# Patient Record
Sex: FEMALE | Race: WHITE | NOT HISPANIC OR LATINO | Employment: OTHER | URBAN - METROPOLITAN AREA
[De-identification: names, ages, dates, MRNs, and addresses within clinical notes are randomized per-mention and may not be internally consistent; named-entity substitution may affect disease eponyms.]

---

## 2023-03-10 ENCOUNTER — TELEPHONE (OUTPATIENT)
Dept: PHARMACY | Facility: HOSPITAL | Age: 78
End: 2023-03-10
Payer: MEDICARE

## 2023-03-10 PROBLEM — I82.403 DVT, BILATERAL LOWER LIMBS (MULTI): Status: ACTIVE | Noted: 2023-03-10

## 2023-03-10 NOTE — TELEPHONE ENCOUNTER
Pharmacist Clinic  Genesis Garcia is a 77 y.o. female was referred to Clinical Pharmacy Team for post hospital follow up in December. Pharmacy team reached out to patient as she had been re-hospitalized in January.     Referring Provider: Albert Garner MD     PATIENT EDUCATION/DISCUSSION:  Spoke with patient regarding her recent hospitalizations:   1. Eliquis: counseled on medication, discussed this medication can become expensive, advised pt to reach out to pharmacy team if for any reason she has issues affording the medication   2. Diuretics: patient states she has noticed an increase in urination, counseled to take meds in the morning    Clinical Pharmacist follow-up: as needed, patient has my contact information to reach out with any questions/concerns    Thank you,   Savanna Frazier, BhaveshD

## 2023-07-18 LAB
ALANINE AMINOTRANSFERASE (SGPT) (U/L) IN SER/PLAS: 8 U/L (ref 7–45)
ALBUMIN (G/DL) IN SER/PLAS: 3.5 G/DL (ref 3.4–5)
ALKALINE PHOSPHATASE (U/L) IN SER/PLAS: 81 U/L (ref 33–136)
ANION GAP IN SER/PLAS: 12 MMOL/L (ref 10–20)
ANTI-DNA (DS): 2 IU/ML
ASPARTATE AMINOTRANSFERASE (SGOT) (U/L) IN SER/PLAS: 16 U/L (ref 9–39)
BILIRUBIN TOTAL (MG/DL) IN SER/PLAS: 0.4 MG/DL (ref 0–1.2)
CALCIUM (MG/DL) IN SER/PLAS: 9.2 MG/DL (ref 8.6–10.6)
CARBON DIOXIDE, TOTAL (MMOL/L) IN SER/PLAS: 30 MMOL/L (ref 21–32)
CHLORIDE (MMOL/L) IN SER/PLAS: 102 MMOL/L (ref 98–107)
COMPLEMENT C3 (MG/DL) IN SER/PLAS: 140 MG/DL (ref 87–200)
COMPLEMENT C4 (MG/DL) IN SER/PLAS: 34 MG/DL (ref 10–50)
CREATININE (MG/DL) IN SER/PLAS: 1.56 MG/DL (ref 0.5–1.05)
ERYTHROCYTE DISTRIBUTION WIDTH (RATIO) BY AUTOMATED COUNT: 17.2 % (ref 11.5–14.5)
ERYTHROCYTE MEAN CORPUSCULAR HEMOGLOBIN CONCENTRATION (G/DL) BY AUTOMATED: 28.7 G/DL (ref 32–36)
ERYTHROCYTE MEAN CORPUSCULAR VOLUME (FL) BY AUTOMATED COUNT: 79 FL (ref 80–100)
ERYTHROCYTES (10*6/UL) IN BLOOD BY AUTOMATED COUNT: 3.7 X10E12/L (ref 4–5.2)
GFR FEMALE: 34 ML/MIN/1.73M2
GLUCOSE (MG/DL) IN SER/PLAS: 94 MG/DL (ref 74–99)
HEMATOCRIT (%) IN BLOOD BY AUTOMATED COUNT: 29.3 % (ref 36–46)
HEMOGLOBIN (G/DL) IN BLOOD: 8.4 G/DL (ref 12–16)
LEUKOCYTES (10*3/UL) IN BLOOD BY AUTOMATED COUNT: 8.9 X10E9/L (ref 4.4–11.3)
NRBC (PER 100 WBCS) BY AUTOMATED COUNT: 0 /100 WBC (ref 0–0)
PLATELETS (10*3/UL) IN BLOOD AUTOMATED COUNT: 373 X10E9/L (ref 150–450)
POTASSIUM (MMOL/L) IN SER/PLAS: 4.1 MMOL/L (ref 3.5–5.3)
PROTEIN TOTAL: 6.3 G/DL (ref 6.4–8.2)
SODIUM (MMOL/L) IN SER/PLAS: 140 MMOL/L (ref 136–145)
UREA NITROGEN (MG/DL) IN SER/PLAS: 36 MG/DL (ref 6–23)

## 2023-07-21 ENCOUNTER — TELEPHONE (OUTPATIENT)
Dept: PRIMARY CARE | Facility: CLINIC | Age: 78
End: 2023-07-21
Payer: MEDICARE

## 2023-07-21 NOTE — TELEPHONE ENCOUNTER
Pt lvm stating she need to schedule with pcp    Called pt she request increase in gabapentin and she is scheduled

## 2023-07-27 PROBLEM — D64.9 ANEMIA: Status: ACTIVE | Noted: 2023-07-27

## 2023-07-27 PROBLEM — I48.91 ATRIAL FIBRILLATION (MULTI): Status: ACTIVE | Noted: 2023-07-27

## 2023-07-27 PROBLEM — J44.9 COPD (CHRONIC OBSTRUCTIVE PULMONARY DISEASE) (MULTI): Status: ACTIVE | Noted: 2023-07-27

## 2023-07-27 PROBLEM — E55.9 VITAMIN D DEFICIENCY: Status: ACTIVE | Noted: 2023-07-27

## 2023-07-27 PROBLEM — J45.909 ASTHMA (HHS-HCC): Status: ACTIVE | Noted: 2023-07-27

## 2023-07-27 PROBLEM — R92.8 ABNORMAL MAMMOGRAM: Status: ACTIVE | Noted: 2023-07-27

## 2023-07-27 PROBLEM — M19.90 ARTHRITIS: Status: ACTIVE | Noted: 2023-07-27

## 2023-07-27 PROBLEM — K57.90 DIVERTICULOSIS: Status: ACTIVE | Noted: 2023-07-27

## 2023-07-27 PROBLEM — R01.1 HEART MURMUR: Status: ACTIVE | Noted: 2023-07-27

## 2023-07-27 RX ORDER — NYSTATIN 100000 [USP'U]/ML
SUSPENSION ORAL
COMMUNITY
Start: 2023-01-03 | End: 2023-10-02 | Stop reason: ALTCHOICE

## 2023-07-27 RX ORDER — ALBUTEROL SULFATE 90 UG/1
AEROSOL, METERED RESPIRATORY (INHALATION)
Status: ON HOLD | COMMUNITY
End: 2023-12-28 | Stop reason: ENTERED-IN-ERROR

## 2023-07-27 RX ORDER — ASPIRIN 81 MG/1
TABLET ORAL 2 TIMES DAILY
COMMUNITY
Start: 2022-11-22 | End: 2023-10-02 | Stop reason: ALTCHOICE

## 2023-07-27 RX ORDER — APIXABAN 5 MG/1
1 TABLET, FILM COATED ORAL 2 TIMES DAILY
COMMUNITY
Start: 2023-01-20 | End: 2024-04-22 | Stop reason: ALTCHOICE

## 2023-07-27 RX ORDER — FLUTICASONE PROPIONATE AND SALMETEROL 250; 50 UG/1; UG/1
POWDER RESPIRATORY (INHALATION)
COMMUNITY
End: 2023-10-02 | Stop reason: SDUPTHER

## 2023-07-27 RX ORDER — TORSEMIDE 20 MG/1
TABLET ORAL
COMMUNITY
Start: 2022-11-23 | End: 2023-10-02 | Stop reason: ALTCHOICE

## 2023-07-27 RX ORDER — GABAPENTIN 400 MG/1
1 CAPSULE ORAL 3 TIMES DAILY
COMMUNITY
Start: 2023-01-20 | End: 2024-02-20 | Stop reason: SDUPTHER

## 2023-07-27 RX ORDER — CALCIUM CARBONATE 600 MG
1 TABLET ORAL DAILY
COMMUNITY

## 2023-07-27 RX ORDER — DOXYCYCLINE HYCLATE 100 MG
1 TABLET ORAL 2 TIMES DAILY
COMMUNITY
Start: 2022-12-30 | End: 2023-10-02 | Stop reason: ALTCHOICE

## 2023-07-27 RX ORDER — VANCOMYCIN HYDROCHLORIDE 125 MG/1
CAPSULE ORAL
COMMUNITY
Start: 2023-01-03 | End: 2023-10-02 | Stop reason: ALTCHOICE

## 2023-07-27 RX ORDER — COLLAGENASE SANTYL 250 [ARB'U]/G
OINTMENT TOPICAL
COMMUNITY
Start: 2023-01-03 | End: 2023-10-02 | Stop reason: ALTCHOICE

## 2023-07-27 RX ORDER — TRIAMTERENE AND HYDROCHLOROTHIAZIDE 37.5; 25 MG/1; MG/1
1 CAPSULE ORAL DAILY
COMMUNITY
End: 2023-10-02 | Stop reason: ALTCHOICE

## 2023-07-27 RX ORDER — LOSARTAN POTASSIUM 100 MG/1
1 TABLET ORAL DAILY
COMMUNITY

## 2023-07-27 RX ORDER — CHOLECALCIFEROL (VITAMIN D3) 50 MCG
TABLET ORAL
COMMUNITY

## 2023-07-27 RX ORDER — HYDROXYCHLOROQUINE SULFATE 200 MG/1
1 TABLET, FILM COATED ORAL 2 TIMES DAILY
COMMUNITY

## 2023-07-27 RX ORDER — SPIRONOLACTONE 25 MG/1
1 TABLET ORAL DAILY
COMMUNITY
Start: 2023-02-24

## 2023-07-27 RX ORDER — FUROSEMIDE 40 MG/1
40 TABLET ORAL DAILY
COMMUNITY
End: 2023-10-02 | Stop reason: ALTCHOICE

## 2023-07-27 RX ORDER — LOPERAMIDE HYDROCHLORIDE 2 MG/1
CAPSULE ORAL
COMMUNITY
Start: 2023-01-03 | End: 2023-10-02 | Stop reason: ALTCHOICE

## 2023-07-27 RX ORDER — POTASSIUM CHLORIDE 1500 MG/1
TABLET, EXTENDED RELEASE ORAL
COMMUNITY
Start: 2022-11-23 | End: 2023-10-02 | Stop reason: ALTCHOICE

## 2023-07-28 ENCOUNTER — OFFICE VISIT (OUTPATIENT)
Dept: PRIMARY CARE | Facility: CLINIC | Age: 78
End: 2023-07-28
Payer: MEDICARE

## 2023-07-28 VITALS
SYSTOLIC BLOOD PRESSURE: 132 MMHG | WEIGHT: 205 LBS | OXYGEN SATURATION: 96 % | BODY MASS INDEX: 37.73 KG/M2 | HEART RATE: 73 BPM | RESPIRATION RATE: 16 BRPM | HEIGHT: 62 IN | DIASTOLIC BLOOD PRESSURE: 66 MMHG

## 2023-07-28 DIAGNOSIS — M79.672 BILATERAL FOOT PAIN: Primary | ICD-10-CM

## 2023-07-28 DIAGNOSIS — M79.671 BILATERAL FOOT PAIN: Primary | ICD-10-CM

## 2023-07-28 DIAGNOSIS — J45.909 ASTHMA, UNSPECIFIED ASTHMA SEVERITY, UNSPECIFIED WHETHER COMPLICATED, UNSPECIFIED WHETHER PERSISTENT (HHS-HCC): ICD-10-CM

## 2023-07-28 DIAGNOSIS — N18.32 STAGE 3B CHRONIC KIDNEY DISEASE (MULTI): ICD-10-CM

## 2023-07-28 DIAGNOSIS — I10 PRIMARY HYPERTENSION: ICD-10-CM

## 2023-07-28 PROCEDURE — 3075F SYST BP GE 130 - 139MM HG: CPT | Performed by: FAMILY MEDICINE

## 2023-07-28 PROCEDURE — 1159F MED LIST DOCD IN RCRD: CPT | Performed by: FAMILY MEDICINE

## 2023-07-28 PROCEDURE — 1160F RVW MEDS BY RX/DR IN RCRD: CPT | Performed by: FAMILY MEDICINE

## 2023-07-28 PROCEDURE — 1126F AMNT PAIN NOTED NONE PRSNT: CPT | Performed by: FAMILY MEDICINE

## 2023-07-28 PROCEDURE — 3078F DIAST BP <80 MM HG: CPT | Performed by: FAMILY MEDICINE

## 2023-07-28 PROCEDURE — 1036F TOBACCO NON-USER: CPT | Performed by: FAMILY MEDICINE

## 2023-07-28 PROCEDURE — 99214 OFFICE O/P EST MOD 30 MIN: CPT | Performed by: FAMILY MEDICINE

## 2023-07-28 PROCEDURE — 1157F ADVNC CARE PLAN IN RCRD: CPT | Performed by: FAMILY MEDICINE

## 2023-07-28 NOTE — PATIENT INSTRUCTIONS
Referred to pain management as discussed.    F/U for med refills (asthma., hypertension) when needed.

## 2023-07-28 NOTE — PROGRESS NOTES
"Subjective   Patient ID: Genesis Garcia is a 78 y.o. female who presents for Med Refill (Discuss refills/dose change).    HPI   Reports numbness in ankles.  States she saw neuro, who told her she did not have neuropathy.  [facility] neuro referred her to rheumatology for further eval.  [facility] rheumatology told her it was possibly neuropathy and advised her to follow up w/PCP to consider increasing Gabapentin.  States prior PCP was giving her Gabapentin for back pain (which has since resolved).    Of note:  I have not provided Gabapentin in the past.  Per [place], 3 diff physicians have recently prescribed Gabapentin since her last PCP, but she states she does not know who they were (she did not recognize their names when reviewing [place] in office).    H/O Asthma, HTN.  Condition(s) stable.  Taking med(s) as directed.  Does not need refills at this time.    H/O CKD.  Followed by nephrology    Review of Systems  No other complaints.     Objective   /66   Pulse 73   Resp 16   Ht 1.575 m (5' 2\")   Wt 93 kg (205 lb)   SpO2 96%   BMI 37.49 kg/m²     Physical Exam  Constitutional:       General: She is not in acute distress.     Appearance: She is obese.   Musculoskeletal:      Comments: Ambulating w/wheelchair.   Neurological:      Mental Status: She is oriented to person, place, and time.   Psychiatric:         Mood and Affect: Mood normal.         Behavior: Behavior normal.     Assessment/Plan   Diagnoses and all orders for this visit:  Bilateral foot pain  -     Referral to Pain Medicine; Future  Asthma, unspecified asthma severity, unspecified whether complicated, unspecified whether persistent  Primary hypertension  Stage 3b chronic kidney disease    Referred to pain management as discussed.    F/U for med refills (asthma, hypertension) when needed.  "

## 2023-08-23 PROBLEM — L03.119 CELLULITIS, LEG: Status: ACTIVE | Noted: 2023-08-23

## 2023-08-23 PROBLEM — L97.919 ULCER OF RIGHT LOWER EXTREMITY (MULTI): Status: ACTIVE | Noted: 2023-08-23

## 2023-08-23 PROBLEM — S82.63XA CLOSED FRACTURE OF LATERAL MALLEOLUS: Status: ACTIVE | Noted: 2023-08-23

## 2023-08-23 PROBLEM — H26.9 CATARACT: Status: ACTIVE | Noted: 2023-08-23

## 2023-08-23 PROBLEM — I82.403 ACUTE DEEP VEIN THROMBOSIS (DVT) OF BOTH LOWER EXTREMITIES (MULTI): Status: ACTIVE | Noted: 2023-08-23

## 2023-08-23 PROBLEM — K43.9 VENTRAL HERNIA: Status: ACTIVE | Noted: 2023-08-23

## 2023-08-23 PROBLEM — M16.11 ARTHRITIS OF RIGHT HIP: Status: ACTIVE | Noted: 2023-08-23

## 2023-08-23 PROBLEM — L02.416 ABSCESS OF HIP, LEFT: Status: ACTIVE | Noted: 2023-08-23

## 2023-08-23 PROBLEM — M19.079 ANKLE ARTHRITIS: Status: ACTIVE | Noted: 2023-08-23

## 2023-08-23 PROBLEM — E04.2 MULTINODULAR GOITER: Status: ACTIVE | Noted: 2023-08-23

## 2023-08-23 PROBLEM — M84.30XA STRESS FRACTURE: Status: ACTIVE | Noted: 2023-08-23

## 2023-08-23 PROBLEM — E87.6 HYPOKALEMIA: Status: ACTIVE | Noted: 2023-08-23

## 2023-08-23 PROBLEM — K42.9 UMBILICAL HERNIA: Status: ACTIVE | Noted: 2023-08-23

## 2023-08-23 PROBLEM — R06.09 DYSPNEA ON EXERTION: Status: ACTIVE | Noted: 2023-08-23

## 2023-08-23 PROBLEM — R94.4 DECREASED GFR: Status: ACTIVE | Noted: 2023-08-23

## 2023-08-23 PROBLEM — M21.40 FLAT FOOT: Status: ACTIVE | Noted: 2020-10-07

## 2023-08-23 PROBLEM — R22.43 LOCALIZED SWELLING OF BOTH LOWER LEGS: Status: ACTIVE | Noted: 2023-08-23

## 2023-08-23 PROBLEM — M13.0 POLYARTHRITIS: Status: ACTIVE | Noted: 2020-10-07

## 2023-08-23 PROBLEM — R49.0 HOARSENESS OF VOICE: Status: ACTIVE | Noted: 2023-08-23

## 2023-08-23 PROBLEM — I87.2 CHRONIC VENOUS INSUFFICIENCY: Status: ACTIVE | Noted: 2023-08-23

## 2023-08-23 PROBLEM — L71.9 ROSACEA: Status: ACTIVE | Noted: 2020-02-13

## 2023-08-23 PROBLEM — K21.00 GASTROESOPHAGEAL REFLUX DISEASE WITH ESOPHAGITIS WITHOUT HEMORRHAGE: Status: ACTIVE | Noted: 2023-08-23

## 2023-08-23 PROBLEM — R76.8 ANA POSITIVE: Status: ACTIVE | Noted: 2020-10-07

## 2023-08-23 PROBLEM — N63.20 LEFT BREAST MASS: Status: ACTIVE | Noted: 2023-08-23

## 2023-08-23 PROBLEM — M35.9 CONNECTIVE TISSUE DISEASE, UNDIFFERENTIATED (MULTI): Status: ACTIVE | Noted: 2023-08-23

## 2023-08-23 PROBLEM — I82.409 DVT (DEEP VENOUS THROMBOSIS) (MULTI): Status: ACTIVE | Noted: 2023-08-23

## 2023-08-23 PROBLEM — R49.9 VOICE DISTURBANCE: Status: ACTIVE | Noted: 2023-08-23

## 2023-08-23 PROBLEM — K43.2 POSTOPERATIVE INCISIONAL HERNIA: Status: ACTIVE | Noted: 2023-08-23

## 2023-08-23 PROBLEM — S82.839A FRACTURE OF DISTAL FIBULA: Status: ACTIVE | Noted: 2023-08-23

## 2023-08-23 PROBLEM — M85.80 OSTEOPENIA, SENILE: Status: ACTIVE | Noted: 2020-12-29

## 2023-08-23 PROBLEM — L97.209 CHRONIC ULCER OF CALF (MULTI): Status: ACTIVE | Noted: 2023-08-23

## 2023-08-23 RX ORDER — LOSARTAN POTASSIUM 50 MG/1
1 TABLET ORAL DAILY
COMMUNITY
End: 2023-10-02 | Stop reason: ALTCHOICE

## 2023-08-23 RX ORDER — TALC
1 POWDER (GRAM) TOPICAL NIGHTLY PRN
COMMUNITY
Start: 2023-01-20 | End: 2023-10-02 | Stop reason: ALTCHOICE

## 2023-08-23 RX ORDER — FLUTICASONE PROPIONATE 50 MCG
1 SPRAY, SUSPENSION (ML) NASAL DAILY
COMMUNITY
Start: 2021-09-24 | End: 2023-10-02 | Stop reason: ALTCHOICE

## 2023-08-23 RX ORDER — DEXTROMETHORPHAN HYDROBROMIDE, GUAIFENESIN 5; 100 MG/5ML; MG/5ML
650 LIQUID ORAL EVERY 8 HOURS PRN
Status: ON HOLD | COMMUNITY
End: 2023-12-29 | Stop reason: ALTCHOICE

## 2023-08-23 RX ORDER — B-COMPLEX WITH VITAMIN C
1 TABLET ORAL DAILY
COMMUNITY

## 2023-08-23 RX ORDER — KETOCONAZOLE 20 MG/ML
SHAMPOO, SUSPENSION TOPICAL
COMMUNITY
Start: 2018-08-06 | End: 2023-10-02 | Stop reason: ALTCHOICE

## 2023-08-23 RX ORDER — MULTIVITAMIN
1 TABLET ORAL DAILY
COMMUNITY
Start: 2022-11-23 | End: 2023-10-02 | Stop reason: SDUPTHER

## 2023-08-23 RX ORDER — FLUTICASONE PROPIONATE AND SALMETEROL 250; 50 UG/1; UG/1
1 POWDER RESPIRATORY (INHALATION) 2 TIMES DAILY
COMMUNITY
Start: 2022-05-12 | End: 2023-11-17 | Stop reason: ALTCHOICE

## 2023-08-23 RX ORDER — TORSEMIDE 20 MG/1
20 TABLET ORAL DAILY
COMMUNITY
Start: 2022-12-30 | End: 2023-12-26 | Stop reason: HOSPADM

## 2023-08-23 RX ORDER — MELOXICAM 15 MG/1
1 TABLET ORAL DAILY
COMMUNITY
Start: 2015-12-21 | End: 2023-10-02 | Stop reason: ALTCHOICE

## 2023-08-23 RX ORDER — GABAPENTIN 800 MG/1
1 TABLET ORAL 3 TIMES DAILY
COMMUNITY
Start: 2017-10-24 | End: 2023-10-02 | Stop reason: SDUPTHER

## 2023-08-23 RX ORDER — OXYCODONE AND ACETAMINOPHEN 5; 325 MG/1; MG/1
1 TABLET ORAL EVERY 6 HOURS PRN
COMMUNITY
Start: 2022-11-14 | End: 2023-10-02 | Stop reason: ALTCHOICE

## 2023-08-23 RX ORDER — KETOROLAC TROMETHAMINE 10 MG/1
1 TABLET, FILM COATED ORAL EVERY 6 HOURS
COMMUNITY
Start: 2022-11-02 | End: 2023-10-02 | Stop reason: ALTCHOICE

## 2023-08-23 RX ORDER — TRAMADOL HYDROCHLORIDE 50 MG/1
1 TABLET ORAL EVERY 6 HOURS PRN
COMMUNITY
Start: 2023-01-20 | End: 2023-10-02 | Stop reason: ALTCHOICE

## 2023-08-23 RX ORDER — TRIAMCINOLONE ACETONIDE 0.25 MG/G
CREAM TOPICAL 2 TIMES DAILY
COMMUNITY
Start: 2020-11-27 | End: 2023-10-02 | Stop reason: ALTCHOICE

## 2023-08-23 RX ORDER — L. ACIDOPHILUS/L.BULGARICUS 1MM CELL
1 TABLET ORAL 2 TIMES DAILY
Status: ON HOLD | COMMUNITY
Start: 2023-01-03 | End: 2023-12-28 | Stop reason: ENTERED-IN-ERROR

## 2023-08-23 RX ORDER — GABAPENTIN 300 MG/1
1-2 CAPSULE ORAL 3 TIMES DAILY PRN
COMMUNITY
Start: 2015-10-28 | End: 2023-10-02 | Stop reason: SDUPTHER

## 2023-08-23 RX ORDER — POLYETHYLENE GLYCOL 3350 17 G/17G
17 POWDER, FOR SOLUTION ORAL DAILY
Status: ON HOLD | COMMUNITY
Start: 2022-11-22 | End: 2023-12-20

## 2023-08-23 RX ORDER — TRIAMCINOLONE ACETONIDE 1 MG/G
OINTMENT TOPICAL
Status: ON HOLD | COMMUNITY
Start: 2023-08-01 | End: 2023-12-20 | Stop reason: ALTCHOICE

## 2023-08-23 RX ORDER — PROPRANOLOL/HYDROCHLOROTHIAZID 40 MG-25MG
3 TABLET ORAL DAILY
COMMUNITY
End: 2023-10-02 | Stop reason: ALTCHOICE

## 2023-08-23 RX ORDER — POTASSIUM CHLORIDE 20 MEQ/1
2 TABLET, EXTENDED RELEASE ORAL DAILY
COMMUNITY
Start: 2022-11-22 | End: 2023-10-02 | Stop reason: ALTCHOICE

## 2023-08-23 RX ORDER — DOCUSATE SODIUM 100 MG/1
1 CAPSULE, LIQUID FILLED ORAL 2 TIMES DAILY
COMMUNITY
End: 2023-10-02 | Stop reason: ALTCHOICE

## 2023-08-23 RX ORDER — ALBUTEROL SULFATE 0.83 MG/ML
3 SOLUTION RESPIRATORY (INHALATION) EVERY 4 HOURS PRN
COMMUNITY
Start: 2014-09-15 | End: 2024-01-29 | Stop reason: SDUPTHER

## 2023-08-23 RX ORDER — MULTIVIT,CALC,MINS/IRON/FOLIC 9MG-400MCG
1 TABLET ORAL DAILY
COMMUNITY
Start: 2023-02-25 | End: 2023-10-02 | Stop reason: ALTCHOICE

## 2023-08-25 LAB
ANION GAP IN SER/PLAS: 11 MMOL/L (ref 10–20)
BASOPHILS (10*3/UL) IN BLOOD BY AUTOMATED COUNT: 0.06 X10E9/L (ref 0–0.1)
BASOPHILS/100 LEUKOCYTES IN BLOOD BY AUTOMATED COUNT: 0.9 % (ref 0–2)
CALCIUM (MG/DL) IN SER/PLAS: 9.1 MG/DL (ref 8.6–10.3)
CARBON DIOXIDE, TOTAL (MMOL/L) IN SER/PLAS: 29 MMOL/L (ref 21–32)
CHLORIDE (MMOL/L) IN SER/PLAS: 103 MMOL/L (ref 98–107)
CREATININE (MG/DL) IN SER/PLAS: 1.37 MG/DL (ref 0.5–1.05)
EOSINOPHILS (10*3/UL) IN BLOOD BY AUTOMATED COUNT: 0.39 X10E9/L (ref 0–0.4)
EOSINOPHILS/100 LEUKOCYTES IN BLOOD BY AUTOMATED COUNT: 6.1 % (ref 0–6)
ERYTHROCYTE DISTRIBUTION WIDTH (RATIO) BY AUTOMATED COUNT: 16.9 % (ref 11.5–14.5)
ERYTHROCYTE MEAN CORPUSCULAR HEMOGLOBIN CONCENTRATION (G/DL) BY AUTOMATED: 29.2 G/DL (ref 32–36)
ERYTHROCYTE MEAN CORPUSCULAR VOLUME (FL) BY AUTOMATED COUNT: 75 FL (ref 80–100)
ERYTHROCYTES (10*6/UL) IN BLOOD BY AUTOMATED COUNT: 3.94 X10E12/L (ref 4–5.2)
GFR FEMALE: 40 ML/MIN/1.73M2
GLUCOSE (MG/DL) IN SER/PLAS: 90 MG/DL (ref 74–99)
HEMATOCRIT (%) IN BLOOD BY AUTOMATED COUNT: 29.5 % (ref 36–46)
HEMOGLOBIN (G/DL) IN BLOOD: 8.6 G/DL (ref 12–16)
IMMATURE GRANULOCYTES/100 LEUKOCYTES IN BLOOD BY AUTOMATED COUNT: 0.3 % (ref 0–0.9)
LEUKOCYTES (10*3/UL) IN BLOOD BY AUTOMATED COUNT: 6.4 X10E9/L (ref 4.4–11.3)
LYMPHOCYTES (10*3/UL) IN BLOOD BY AUTOMATED COUNT: 1.12 X10E9/L (ref 0.8–3)
LYMPHOCYTES/100 LEUKOCYTES IN BLOOD BY AUTOMATED COUNT: 17.4 % (ref 13–44)
MONOCYTES (10*3/UL) IN BLOOD BY AUTOMATED COUNT: 0.89 X10E9/L (ref 0.05–0.8)
MONOCYTES/100 LEUKOCYTES IN BLOOD BY AUTOMATED COUNT: 13.9 % (ref 2–10)
NEUTROPHILS (10*3/UL) IN BLOOD BY AUTOMATED COUNT: 3.94 X10E9/L (ref 1.6–5.5)
NEUTROPHILS/100 LEUKOCYTES IN BLOOD BY AUTOMATED COUNT: 61.4 % (ref 40–80)
PLATELETS (10*3/UL) IN BLOOD AUTOMATED COUNT: 279 X10E9/L (ref 150–450)
POTASSIUM (MMOL/L) IN SER/PLAS: 3.7 MMOL/L (ref 3.5–5.3)
SODIUM (MMOL/L) IN SER/PLAS: 139 MMOL/L (ref 136–145)
UREA NITROGEN (MG/DL) IN SER/PLAS: 41 MG/DL (ref 6–23)

## 2023-09-07 ENCOUNTER — HOSPITAL ENCOUNTER (OUTPATIENT)
Dept: DATA CONVERSION | Facility: HOSPITAL | Age: 78
End: 2023-09-07
Attending: OTOLARYNGOLOGY | Admitting: OTOLARYNGOLOGY
Payer: MEDICARE

## 2023-09-07 DIAGNOSIS — E04.2 NONTOXIC MULTINODULAR GOITER: ICD-10-CM

## 2023-09-16 LAB
ALBUMIN (G/DL) IN SER/PLAS: 3.6 G/DL (ref 3.4–5)
ANION GAP IN SER/PLAS: 13 MMOL/L (ref 10–20)
CALCIUM (MG/DL) IN SER/PLAS: 9.2 MG/DL (ref 8.6–10.6)
CARBON DIOXIDE, TOTAL (MMOL/L) IN SER/PLAS: 27 MMOL/L (ref 21–32)
CHLORIDE (MMOL/L) IN SER/PLAS: 105 MMOL/L (ref 98–107)
CREATININE (MG/DL) IN SER/PLAS: 1.33 MG/DL (ref 0.5–1.05)
GFR FEMALE: 41 ML/MIN/1.73M2
GLUCOSE (MG/DL) IN SER/PLAS: 86 MG/DL (ref 74–99)
PHOSPHATE (MG/DL) IN SER/PLAS: 3.2 MG/DL (ref 2.5–4.9)
POTASSIUM (MMOL/L) IN SER/PLAS: 4 MMOL/L (ref 3.5–5.3)
SODIUM (MMOL/L) IN SER/PLAS: 141 MMOL/L (ref 136–145)
UREA NITROGEN (MG/DL) IN SER/PLAS: 24 MG/DL (ref 6–23)

## 2023-09-18 LAB
COMPLETE PATHOLOGY REPORT: NORMAL
CONVERTED CLINICAL DIAGNOSIS-HISTORY: NORMAL
CONVERTED FINAL DIAGNOSIS: NORMAL
CONVERTED FINAL REPORT PDF LINK TO COPY AND PASTE: NORMAL
CONVERTED GROSS DESCRIPTION: NORMAL

## 2023-09-29 VITALS — HEIGHT: 62 IN | BODY MASS INDEX: 37.97 KG/M2 | WEIGHT: 206.35 LBS

## 2023-09-30 NOTE — H&P
History & Physical Reviewed:   I have reviewed the History and Physical dated:  25-Aug-2023   History and Physical reviewed and relevant findings noted. Patient examined to review pertinent physical  findings.: No significant changes   Home Medications Reviewed: no changes noted   Allergies Reviewed: no changes noted       ERAS (Enhanced Recovery After Surgery):  ·  ERAS Patient: no     Consent:   COVID-19 Consent:  ·  COVID-19 Risk Consent Surgeon has reviewed key risks related to the risk of caitie COVID-19 and if they contract COVID-19 what the risks are.     Attestation:   Note Completion:  I am a:  Resident/Fellow   Attending Attestation I saw and evaluated the patient.  I personally obtained the key and critical portions of the history and physical exam or was physically present for key and  critical portions performed by the resident/fellow. I reviewed the resident/fellow?s documentation and discussed the patient with the resident/fellow.  I agree with the resident/fellow?s medical decision making as documented in the note.     I personally evaluated the patient on 07-Sep-2023         Electronic Signatures:  Donald Coombs (Resident))  (Signed 07-Sep-2023 06:44)   Authored: History & Physical Reviewed, ERAS, Consent,  Note Completion  Pedro Dawkins)  (Signed 07-Sep-2023 18:41)   Authored: Note Completion   Co-Signer: History & Physical Reviewed, ERAS, Consent, Note Completion      Last Updated: 07-Sep-2023 18:41 by Pedro Dawkins)

## 2023-10-01 NOTE — OP NOTE
PROCEDURE DETAILS    Preoperative Diagnosis:  Thyroid nodule  Follicular lesion of unknown significance  Postoperative Diagnosis:  Thyroid nodule  Follicular lesion of unknown significance  Surgeon: Pedro Dawkins MD  Resident/Fellow/Other Assistant: Donald Coombs MD    Procedure:  1. Right thyroid substernal lobectomy with isthmusectomy  2. Use of intraoperative nerve monitoring  3. Right superior parathyroid reimplantation  Estimated Blood Loss: 10  Findings: 1. Multinodular right thyroid lobe  2. Right recurrent laryngeal nerve identified and preserved  3. Right superior parathyroid reimplanted in right sternocleidomastoid  Specimens(s) Collected: yes,     Complications: None  Patient Returned To/Condition: PACU/Stable        Operative Report:   Operative Indications:  This patient is a 78-year-old female who was found to have nodules of the right thyroid gland, including a TI-RADS 4 nodule which was sampled and found to be a follicular lesion of unknown significance.  Molecular testing was performed which showed a  20 to 25% risk of malignancy and thus decision was made to proceed the operative) and thyroidectomy.  Risks, effects, alternatives were discussed with the patient who ultimately agreed to proceed.    Operative Findings:  1. Multinodular right thyroid lobe  2. Right recurrent laryngeal nerve identified and preserved  3. Right superior parathyroid reimplanted in right sternocleidomastoid    Description of Procedure:  The patient was seen in the preoperative setting and written informed consent was obtained. The patient was then taken back to the operating room and transferred to the operating room table. A preoperative timeout was then performed by Dr. Dawkins. The  patient was then successfully sedated and intubated by the anesthesia team with a nerve monitoring endotracheal tube. The head of the bed was then rotated 90 degrees. A preoperative incision was marked out along the lower neck in an  existing neck crease  several centimeters above the sternal notch and injected with 1% lidocaine with epinephrine 1:100,000. The patient was then prepped and draped in a sterile manner.     15 blade was used to make incision through the skin and through the dermis.  Monopolar cautery was used to dissect down through the subcutaneous tissue to the level of the strap muscles.  Several large veins were encountered during this dissection and  the right anterior jugular vein was isolated and ligated.  The left anterior jugular vein was preserved.  We identified the right sternohyoid muscle and elevated over the surface of the muscle superiorly and inferiorly.  The sternothyroid muscle was identified  immediately deep to the sternohyoid muscle.  The midline raphae was identified and dissected through, exposing the anterior surface of the thyroid gland.  Elastic retractors were then placed.  Inferiorly, we were able to palpate the innominate artery  which was relatively high in location.  The strap muscles were gently retracted laterally and we slowly dissected over the anterior surface of the right thyroid lobe until we reached the lateral extent of the lobe.  This was performed using a combination  of blunt dissection and bipolar cautery.  Given the size of the thyroid gland, decision was made to split the strap muscles, which was performed with the LigaSure.  This greatly improved our exposure of the right thyroid lobe.  With the lateral extent  of the gland largely identified, we then carried our dissection superiorly to identify and skeletonized the superior pole and its vascular pedicle.  A combination of bipolar cautery and surgical clips were used to control the superior vascular pedicle  and a Rome was placed to allow us to retract the superior lobe inferiorly.  The cricothyroid muscle was identified and dissected towards the laryngotracheal complex as we dissected through Joll's space. In the midline, we  clearly delineated our trachea  and cricoid cartilage to serve as a reliable landmark.  With the superior pole mobilized, we returned our attention to the lateral aspect of the gland. We carried our dissection along the lateral aspect of the gland and while dissecting on the capsule,  encountered the right superior parathyroid gland.  We were able to dissect the gland away from the thyroid in an attempt to preserve it.  After dissecting away, it did appear to darken in color and decision was made to excise it and placed in saline for  later reimplantation.  As we freed up the lateral aspect of the gland, we identified branches of the middle thyroid vein which were controlled with surgical clips.  We continued dissection inferiorly and freed up the inferior pole and its feeding vessels  using a combination of bipolar and surgical clips and carefully delieverd it from its substernal location, THe innominate artery and carotid were noted and palpated as we delivered the lower aspect of the gland from its retroclavicular and substernal  location. The right lobe was then gently retracted medially and we began to dissect along the deep aspect of the gland.  There was a nodular appearing portion of the gland that extended posteriorly near the cricothyroid joint and we dissected around this  lesion.  Immediately lateral was a nervelike structure which was coursing laterally which did not stimulate.  As we continued dissection deep to this tissue, we encountered an additional nervelike structure coursing towards the cricothyroid joint.  This  was stimulated and confirmed to be the right recurrent laryngeal nerve.  With the nerve implant site we were able to dissect out the nodular appearing portion of the gland that was extending posteriorly with the nerve implant site.  We dissected along  the course of the nerve to ensure clear distance from our thyroid lobe and when this was confirmed, we continued to dissect out the  gland using LigaSure and monopolar cautery.  The gland was dissected away from the airway and fully released.  The right  superior lobe was marked with a stitch and sent for permanent pathology.  We then identified the anterior surface of the right sternocleidomastoid muscle.  The previously excised parathyroid gland was then minced and a small pocket was created in the  anterior portion of the muscle.  The minced parathyroid was placed into the muscle pocket and surgical clips were used to close off the tissue.  A Valsalva maneuver was performed and hemostasis was achieved using bipolar cautery.  The surgical cavity  was copiously irrigated with saline and suctioned.  A small amount of fibrillar was placed in the surgical bed.  The strap muscles were then reapproximated where they were split and then gently reapproximated in the midline using Vicryl suture in a horizontal  mattress fashion.  An additional layer of fibrillar was placed in the deep tissues were approximated using Vicryl suture.  The skin was closed using a running subcuticular Monocryl suture.  Mastisol and Steri-Strips were placed over the incision.    The patient was then handed over the anesthesia team and successfully extubated. The patient was taken to the postoperative anesthesia care unit. She tolerated the procedure well and there were no complications.     Dr. Dawkins was present for the critical portions of the procedure..                        Attestation:   Note Completion:  Attending Attestation I was present for the entire procedure    I am a: Resident/Fellow         Electronic Signatures:  Donald Coombs (Resident))  (Signed 07-Sep-2023 11:21)   Authored: Post-Operative Note, Chart Review, Note Completion  Pedro Dawkins)  (Signed 07-Sep-2023 18:46)   Authored: Post-Operative Note, Chart Review, Note Completion   Co-Signer: Post-Operative Note, Chart Review, Note Completion      Last Updated: 07-Sep-2023 18:46 by Pedro Dawkins  (MD)

## 2023-10-02 ENCOUNTER — CLINICAL SUPPORT (OUTPATIENT)
Dept: WOUND CARE | Facility: CLINIC | Age: 78
End: 2023-10-02
Payer: MEDICARE

## 2023-10-02 VITALS
RESPIRATION RATE: 16 BRPM | HEIGHT: 62 IN | DIASTOLIC BLOOD PRESSURE: 74 MMHG | SYSTOLIC BLOOD PRESSURE: 161 MMHG | WEIGHT: 215 LBS | BODY MASS INDEX: 39.56 KG/M2 | HEART RATE: 74 BPM

## 2023-10-02 DIAGNOSIS — L97.221 CHRONIC ULCER OF LEFT CALF LIMITED TO BREAKDOWN OF SKIN (MULTI): Primary | ICD-10-CM

## 2023-10-02 DIAGNOSIS — I87.2 CHRONIC VENOUS INSUFFICIENCY: ICD-10-CM

## 2023-10-02 PROCEDURE — 1036F TOBACCO NON-USER: CPT | Performed by: INTERNAL MEDICINE

## 2023-10-02 PROCEDURE — 1159F MED LIST DOCD IN RCRD: CPT | Performed by: INTERNAL MEDICINE

## 2023-10-02 PROCEDURE — 3077F SYST BP >= 140 MM HG: CPT | Performed by: INTERNAL MEDICINE

## 2023-10-02 PROCEDURE — 3078F DIAST BP <80 MM HG: CPT | Performed by: INTERNAL MEDICINE

## 2023-10-02 PROCEDURE — 1126F AMNT PAIN NOTED NONE PRSNT: CPT | Performed by: INTERNAL MEDICINE

## 2023-10-02 PROCEDURE — 99214 OFFICE O/P EST MOD 30 MIN: CPT | Performed by: INTERNAL MEDICINE

## 2023-10-02 PROCEDURE — 1160F RVW MEDS BY RX/DR IN RCRD: CPT | Performed by: INTERNAL MEDICINE

## 2023-10-02 ASSESSMENT — PAIN SCALES - GENERAL: PAINLEVEL: 0-NO PAIN

## 2023-10-02 ASSESSMENT — ENCOUNTER SYMPTOMS
DEPRESSION: 0
LOSS OF SENSATION IN FEET: 0
OCCASIONAL FEELINGS OF UNSTEADINESS: 0

## 2023-10-02 NOTE — PROGRESS NOTES
"Subjective     Referral Requested By: Dr. Albert Garner PCP  Last Seen: 09/11/2023    Patient ID: Genesis Garcia is a 78 y.o. female     HPI  Here for follow up left calf ulcer. Overall doing well. Skin stable and in good condition. Has some rash medially on the left. +itching and redness of the left hallux.      Current Dressing:  Who is performing the dressing change:  Patient/ [facility]  Dressing applied: Neeru and 4x4s  Dressing Frequency: Daily    Edema Management   Compression:  Right: double Tubigrip 36.5 CM   Left: Circaid 39.5 CM     Other Modality  Sleeping in Bed: Yes  Elevating FOB: Yes    Offloading/Pressure Relief  Activity Level: Ad travon  Protection Devices: NA  Offloading/Pressure Relief Effective?     ROS  No fevers or chills  +toe rash    Objective     Vitals  /74 (BP Location: Left arm)   Pulse 74   Resp 16   Ht 1.575 m (5' 2\")   Wt 97.5 kg (215 lb)   BMI 39.32 kg/m²     Physical Exam  Appears well, NAD  1+ edema lorenzo  Ulcer left calf as noted   DP2+      Wound Assess/ Care    Wound Assessment:  Wound #1 LEFT POSTERIOR LATERAL CALF WOUND BLOCK  LENGTH- 2.0 cm  WIDTH- 0.5 cm  DEPTH- 0.1 cm  Base mixed fibrogranular and slough  90% fibrous  No surrounding erythema  Small, serosanguinous drainage  No odor       Wound #4 ANTERIOR NECK INCISION SITE  PHOTO DOCUMENTATION        Wound #3 LLE SKIN CHANGES  PHOTO DOCUMENTATION OF SKIN CHANGES       Wound #5 RLE SKIN CHANGES  PHOTO DOCUMENTATION OF SKIN CHANGES       Wound #6 LEFT FOREARM SKIN TEAR  HEALED- PHOTO DOCUMENTATION       Procedure      Assessment/Plan     Visit Orders  Vascular Study Required: N  Labs Required: N  Radiology Imaging Required: N  Consultation Required: N    Rx Provided:   Changes to Plan of Care : Y      New Orders:  Dressing: MOIST NEERU AND DSD CHANGE EVERY 2-3 DAYS  Compression: DOUBLE TUBIGRIP RIGHT; CIRCAID LEFT  Offloading:   LOTRIMINE 2 X A DAY TO THE LEFT TOE  TRIAMCINOLONE TO THE RASH DAILY  AQUAPHOR " FOR SKIN CARE.     Discharge Planning    Follow Up: 3 WEEKS  Nurse Visit:  Orders:     HCC/ECF/Assisted Living  Agency/Facility: [facility]   days/week: 1  Contacted Regarding Changes: Yes    A/P:  WOUND SLOWLY IMPROVING. CONTINUE THE DRESSING WITH CHANGES  as  NOTED. CONTINUE THE SKIN CARE WITH CHANGES  as NOTED  CONTINUE THE SAME COMPRESSION. Discussed proper donning of the circaid. Follow up 3 weeks.

## 2023-10-02 NOTE — PATIENT INSTRUCTIONS
New Orders:  Dressing: MOIST NEERU AND DSD CHANGE EVERY 2-3 DAYS  Compression: DOUBLE TUBIGRIP RIGHT; CIRCAID LEFT  Offloading:   LOTRIMINE 2 X A DAY TO THE LEFT TOE  TRIAMCINOLONE TO THE RASH DAILY  AQUAPHOR FOR SKIN CARE.     Discharge Planning    Follow Up: 3 WEEKS  Nurse Visit:  Orders:     HCC/ECF/Assisted Living  Ag[facility]Facility[facility]   days/week: 1  Contacted Regarding Changes: Yes    A/P:  WOUND SLOWLY IMPROVING. CONTINUE THE DRESSING WITH CHANGES  as  NOTED. CONTINUE THE SKIN CARE WITH CHANGES  as NOTED  CONTINUE THE SAME COMPRESSION. Discussed proper donning of the circaid. Follow up 3 weeks.

## 2023-10-03 ENCOUNTER — APPOINTMENT (OUTPATIENT)
Dept: PAIN MEDICINE | Facility: CLINIC | Age: 78
End: 2023-10-03
Payer: MEDICARE

## 2023-10-16 ENCOUNTER — OFFICE VISIT (OUTPATIENT)
Dept: WOUND CARE | Facility: CLINIC | Age: 78
End: 2023-10-16
Payer: MEDICARE

## 2023-10-16 VITALS
RESPIRATION RATE: 18 BRPM | SYSTOLIC BLOOD PRESSURE: 149 MMHG | WEIGHT: 214 LBS | HEIGHT: 62 IN | DIASTOLIC BLOOD PRESSURE: 70 MMHG | HEART RATE: 83 BPM | BODY MASS INDEX: 39.38 KG/M2

## 2023-10-16 DIAGNOSIS — I87.2 CHRONIC VENOUS INSUFFICIENCY: ICD-10-CM

## 2023-10-16 DIAGNOSIS — L97.221 CHRONIC ULCER OF LEFT CALF LIMITED TO BREAKDOWN OF SKIN (MULTI): Primary | ICD-10-CM

## 2023-10-16 PROCEDURE — 1160F RVW MEDS BY RX/DR IN RCRD: CPT | Performed by: INTERNAL MEDICINE

## 2023-10-16 PROCEDURE — 3078F DIAST BP <80 MM HG: CPT | Performed by: INTERNAL MEDICINE

## 2023-10-16 PROCEDURE — 1159F MED LIST DOCD IN RCRD: CPT | Performed by: INTERNAL MEDICINE

## 2023-10-16 PROCEDURE — 11042 DBRDMT SUBQ TIS 1ST 20SQCM/<: CPT | Performed by: INTERNAL MEDICINE

## 2023-10-16 PROCEDURE — 1125F AMNT PAIN NOTED PAIN PRSNT: CPT | Performed by: INTERNAL MEDICINE

## 2023-10-16 PROCEDURE — 3077F SYST BP >= 140 MM HG: CPT | Performed by: INTERNAL MEDICINE

## 2023-10-16 PROCEDURE — 1036F TOBACCO NON-USER: CPT | Performed by: INTERNAL MEDICINE

## 2023-10-16 ASSESSMENT — PATIENT HEALTH QUESTIONNAIRE - PHQ9
SUM OF ALL RESPONSES TO PHQ9 QUESTIONS 1 AND 2: 0
1. LITTLE INTEREST OR PLEASURE IN DOING THINGS: NOT AT ALL
2. FEELING DOWN, DEPRESSED OR HOPELESS: NOT AT ALL

## 2023-10-16 ASSESSMENT — COLUMBIA-SUICIDE SEVERITY RATING SCALE - C-SSRS
6. HAVE YOU EVER DONE ANYTHING, STARTED TO DO ANYTHING, OR PREPARED TO DO ANYTHING TO END YOUR LIFE?: NO
1. IN THE PAST MONTH, HAVE YOU WISHED YOU WERE DEAD OR WISHED YOU COULD GO TO SLEEP AND NOT WAKE UP?: NO
2. HAVE YOU ACTUALLY HAD ANY THOUGHTS OF KILLING YOURSELF?: NO

## 2023-10-16 ASSESSMENT — PAIN SCALES - GENERAL: PAINLEVEL: 7

## 2023-10-16 NOTE — PROGRESS NOTES
"REFERRAL REQUESTED BY: Dr. Albert Garner, PCP  LAST SEEN: 10/2/23    Patient ID: Genesis Garcia is a 78 y.o. female     HPI  Here for follow up left calf ulcer    CURRENT DRESSING:  Who is performing the dressing change: patient; [facility]  Dressing applied: promogran argenis, 4x4  Dressing Frequency: every other day    EDEMA MANAGEMENT  Compression:  Right: Double Tubigrip CALF 37 cm   Left: Tubigrip, circaid   CALF 38.5 cm     Other Modality  Sleeping in Bed: yes  Elevating FOB: yes    Offloading/Pressure Relief  Activity Level: ad travon  Protection Devices: N/A    Offloading/Pressure Relief Effective?     ROS      Objective     Vitals  /70 (BP Location: Left arm, Patient Position: Sitting)   Pulse 83   Resp 18   Ht 1.575 m (5' 2\")   Wt 97.1 kg (214 lb)   BMI 39.14 kg/m²       Physical Exam    ARRIVAL: wheelchair, ambulating  TRANSFER: assist x1    PSYCHIATRIC:  Oriented to person, place and time: A&Ox3    CONSTITUTIONAL:  A&O    Appearance:  clinically well    SKIN:  LDS and stasis changes lorenzo    PULSES:   WWP    EXTREMITY TEMPERATURE:    RIGHT: normal  LEFT: normal    EDEMA:  2+  WOUND ASSESS/CARE       Wound 04/28/23 #1 LEFT POSTERIOR LATERAL CALF WOUND   Date First Assessed: 04/28/23  #1 LEFT POSTERIOR LATERAL CALF WOUND      Assessments 9/13/2023  3:13 PM 10/16/2023 10:26 AM   Site Assessment Fibrinous Granulation;Fibrinous;Pink;Red   Jackie-Wound Assessment Erythematous Intact   Non-staged Wound Description Full thickness Full thickness   Wound Length (cm) 2 cm 1.5 cm   Wound Width (cm) 1.7 cm 1.5 cm   Wound Surface Area (cm^2) 3.4 cm^2 2.25 cm^2   Wound Depth (cm) 0.2 cm 0.1 cm   Wound Volume (cm^3) 0.68 cm^3 0.225 cm^3   Wound Healing % -- 67   Wound Bed Granulation (%) 20 % 10 %   Drainage Description Serosanguineous Serosanguineous   Drainage Amount Scant Small   Dressing Status -- Removed       No associated orders.       Wound 09/11/23 #4 ANTERIOR NECK INCISION   Date First Assessed: " 09/11/23   Healed      No assessment data to display       No associated orders.          Debridement   Wound 04/28/23 Venous Ulcer Leg Dorsal;Left;Lateral;Lower    Consent obtained? verbal  Performed by: resident and physician  Debridement type: surgical  Level of debridement: subcutaneous tissue  Pain control: lidocaine 2%  Post-debridement measurements  Length (cm): 1.5  Width (cm): 1.5  Depth (cm): 0.3  Percent debrided: 50%  Surface Area (cm^2): 2.25  Area debrided (cm^2): 1.13  Volume (cm^3): 0.68  Tissue and other material debrided: subcutaneous tissue  Devitalized tissue debrided: fibrin  Instrument(s) utilized: blade and curette  Bleeding: small  Hemostasis obtained with: not applicable  Response to treatment: procedure was tolerated well        Assessment/Plan     PROVIDER IMPRESSIONS:  Overall doing OK. Ulcer slowly improving. Continue the same dressing and compression. Continue to elevate at night. Walk every hour. Follow up 3 weeks.     VISIT ORDERS  Vascular Study Required: n  Labs Required: n  Radiology Imaging Required: n  Consultation Required: n  Rx Provided:   Changes to Plan of Care : n      NEW ORDERS:  Wash: kelsey wash  Irrigate/Soak:   Skin Care: eucerin  Dressing:  moist promogran and DSD change every 3 days  Compression:  double tubigrip right; circaid and tubigrip left  Offloading: keep pressure off the calf.     DISCHARGE PLANNING:    Follow Up: 3 weeks  Nurse Visit: prn    General Instructions:  Center RN to apply EMLA/Lidocaine 1% jelly/LMX  topically to wound(s) prior to debridement by physician.  Center RN my see patient as a nurse consult in my absence.      RN Post Procedure Note:  Applied moistened Promogran,4x4 to left leg ulcer, then applied tubigrip and circaid. Double tubigrip applied to right leg. Will send revision to home care.Enrique WELDON      [facility]/ECF/Assisted Living  Agency/Facility: [facility]  days/week: 1 day/week  Contacted Regarding Changes: yes

## 2023-10-16 NOTE — PATIENT INSTRUCTIONS
PROVIDER IMPRESSIONS:  Overall doing OK. Ulcer slowly improving. Continue the same dressing and compression. Continue to elevate at night. Walk every hour. Follow up 3 weeks.     VISIT ORDERS  Vascular Study Required: n  Labs Required: n  Radiology Imaging Required: n  Consultation Required: n  Rx Provided:   Changes to Plan of Care : n      NEW ORDERS:  Wash: kelsey wash  Irrigate/Soak:   Skin Care: eucerin  Dressing:  moist promogran and DSD change every 3 days  Compression:  double tubigrip right; circaid and tubigrip left  Offloading: keep pressure off the calf.     DISCHARGE PLANNING:    Follow Up: 3 weeks  Nurse Visit: prn

## 2023-10-17 ENCOUNTER — TELEPHONE (OUTPATIENT)
Dept: OTOLARYNGOLOGY | Facility: HOSPITAL | Age: 78
End: 2023-10-17
Payer: MEDICARE

## 2023-10-17 DIAGNOSIS — R13.10 DYSPHAGIA, UNSPECIFIED TYPE: ICD-10-CM

## 2023-10-17 NOTE — TELEPHONE ENCOUNTER
Spoke to patient. She feels as if she gets pain in her throat on the right and has issues swallowing.  Per gerardo Rizo for esophagram.  Patient aware and arranged.

## 2023-10-17 NOTE — TELEPHONE ENCOUNTER
Patient left a message that she is s/p thyroidectomy and is now having right sided pain and a hard time swallowing.  Wants to know if this is to be expected.  Please advise.

## 2023-10-25 ENCOUNTER — HOSPITAL ENCOUNTER (OUTPATIENT)
Dept: RADIOLOGY | Facility: HOSPITAL | Age: 78
Discharge: HOME | End: 2023-10-25
Payer: MEDICARE

## 2023-10-25 DIAGNOSIS — R13.10 DYSPHAGIA, UNSPECIFIED TYPE: ICD-10-CM

## 2023-10-25 PROCEDURE — 2500000001 HC RX 250 WO HCPCS SELF ADMINISTERED DRUGS (ALT 637 FOR MEDICARE OP)

## 2023-10-25 PROCEDURE — 74221 X-RAY XM ESOPHAGUS 2CNTRST: CPT | Performed by: RADIOLOGY

## 2023-10-25 PROCEDURE — 74220 X-RAY XM ESOPHAGUS 1CNTRST: CPT

## 2023-10-25 RX ADMIN — BARIUM SULFATE 150 ML: 960 POWDER, FOR SUSPENSION ORAL at 10:00

## 2023-11-10 ENCOUNTER — OFFICE VISIT (OUTPATIENT)
Dept: SURGERY | Facility: CLINIC | Age: 78
End: 2023-11-10
Payer: MEDICARE

## 2023-11-10 VITALS — BODY MASS INDEX: 39.56 KG/M2 | WEIGHT: 215 LBS | HEIGHT: 62 IN

## 2023-11-10 DIAGNOSIS — K43.9 HERNIA OF ABDOMINAL WALL: Primary | ICD-10-CM

## 2023-11-10 PROCEDURE — 1160F RVW MEDS BY RX/DR IN RCRD: CPT | Performed by: SURGERY

## 2023-11-10 PROCEDURE — 99213 OFFICE O/P EST LOW 20 MIN: CPT | Performed by: SURGERY

## 2023-11-10 PROCEDURE — 1036F TOBACCO NON-USER: CPT | Performed by: SURGERY

## 2023-11-10 PROCEDURE — 3077F SYST BP >= 140 MM HG: CPT | Performed by: SURGERY

## 2023-11-10 PROCEDURE — 1159F MED LIST DOCD IN RCRD: CPT | Performed by: SURGERY

## 2023-11-10 PROCEDURE — 3078F DIAST BP <80 MM HG: CPT | Performed by: SURGERY

## 2023-11-10 PROCEDURE — 1126F AMNT PAIN NOTED NONE PRSNT: CPT | Performed by: SURGERY

## 2023-11-10 ASSESSMENT — PAIN SCALES - GENERAL: PAINLEVEL: 0-NO PAIN

## 2023-11-10 NOTE — PROGRESS NOTES
History of Present Illness  78-year-old female with multiple comorbid medical conditions to include obesity. She is known to me having taken to surgery for incisional hernia repair with mesh. This procedure was performed on 8/1/20 for an indication of incarcerated incisional hernia associated with small bowel obstruction     She did well following this operation but comes now with a bulge involving her abdominal wall just above the umbilicus determined to be a moderate-sized incisional hernia. We have established a strategy of watchful waiting.     She comes in today with a friend. She continues to do well but she has had a few medical setbacks recently related to right hip replacement surgery       Regarding her hernia, she denies any abdominal pain, nausea or vomiting.      Review of Systems  On review of systems patient denies any weight loss, fever, change in bowel habits, melena, hematochezia, coronary artery disease, TIA/CVA, bleeding, jaundice, pancreatitis, hepatitis.     Former smoker. Patient does not drink alcohol.      Active Problems     · Abnormal mammogram (793.80) (R92.8)   · Acute deep vein thrombosis (DVT) of both lower extremities, unspecified vein (453.40)  (I82.403)   · Aftercare following joint replacement surgery (V54.81) (Z47.1)   · Anemia (285.9) (D64.9)   · Ankle arthritis (716.97) (M19.079)   · Arthritis of right hip (716.95) (M16.11)   · Asthma (493.90) (J45.909)   · Breast cancer screening (V76.10) (Z12.39)   · Cataract (366.9) (H26.9)   · Cellulitis, leg (682.6) (L03.119)   · Chronic ulcer of calf (707.12) (L97.209)   · Chronic venous insufficiency (459.81) (I87.2)   · Connective tissue disease, 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)   · COPD (chronic obstructive pulmonary disease) (496) (J44.9)   · Decreased GFR (794.4) (R94.4)   · Diverticulosis (562.10) (K57.90)   · DVT (deep venous thrombosis) (453.40) (I82.409)   · Dyspnea on exertion (786.09) (R06.09)   · Encounter for immunization  (V03.89) (Z23)   · Gastroesophageal reflux disease with esophagitis without hemorrhage (530.81,530.10)  (K21.00)   · Heart murmur (785.2) (R01.1)   · Hoarseness of voice (784.42) (R49.0)   · Hypertension (401.9) (I10)   · Hypokalemia (276.8) (E87.6)   · Knee pain, right (719.46) (M25.561)   · Left breast mass (611.72) (N63.20)   · Localized swelling of both lower legs (729.81) (R22.43)   · Multinodular goiter (241.1) (E04.2)   · Pneumonia (486) (J18.9)   · Postoperative incisional hernia (553.21) (K43.2)   · 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) (Z96.641)   · Ulcer of right lower extremity (707.10) (L97.919)   · Umbilical hernia (553.1) (K42.9)   · Ventral hernia (553.20) (K43.9)   · Vitamin D deficiency (268.9) (E55.9)     Past Medical History     · History of Ankle pain, right (719.47) (M25.571)   · Resolved Date: 21 Jul 2022   · Asthma (493.90) (J45.909)   · History of Cellulitis of skin (682.9) (L03.90)   · Resolved Date: 21 Jul 2022   · History of Closed fracture of lateral malleolus (824.2) (S82.63XA)   · Resolved Date: 01 Dec 2022   · History of Fracture of distal fibula (824.8) (S82.839A)   · Resolved Date: 01 Dec 2022   · History of Hip pain, right (719.45) (M25.551)   · Resolved Date: 21 Jul 2022   · History of acute renal failure (V13.09) (Z87.448)   · History of arthritis (V13.4) (Z87.39)   · Resolved Date: 01 Dec 2022   · History of atrial fibrillation (V12.59) (Z86.79)   · Resolved Date: 01 Dec 2022   · History of cellulitis (V13.3) (Z87.2)   · Resolved Date: 01 Dec 2022   · History of stress fracture (V13.52) (Z87.312)   · Resolved Date: 01 Dec 2022   · History of tachycardia (V13.89) (Z87.898)   · Resolved Date: 01 Dec 2022   · History of Hospital discharge follow-up (V67.59) (Z09)   · Resolved Date: 01 Dec 2022   · Hypertension (401.9) (I10)   · History of Left ankle pain, unspecified chronicity (719.47) (M25.572)   · Resolved Date: 01 Dec 2022   · History of Leg swelling (729.81)  (M79.89)   · Resolved Date: 01 Dec 2022   · History of Pre-op examination (V72.84) (Z01.818)   · Resolved Date: 01 Dec 2022   · Vitamin D deficiency (268.9) (E55.9)     Surgical History     · History of Back Surgery   · History of Cataract surgery   · History of Cholecystectomy   · History of Knee Replacement   · Bilateral     Family History     · Family history of type 2 diabetes mellitus (V18.0) (Z83.3)     · Family history of cardiac disorder (V17.49) (Z82.49)   · Family history of hypertension (V17.49) (Z82.49)     Social History     · Former smoker (V15.82) (Z87.891)   · Max 1 pack/wk ages 23 to 47   · No alcohol use   · No illicit drug use     Physical Exam  morbidly obese, requires a wheelchair in no distress  Lungs are clear to auscultation bilaterally.  Cardiac exam is regular rhythm and rate.  Abdomen is soft nontender nondistended. bulge just above and to the right of the umbilicus   Neurologic exam is without focal deficit.    Impression: Status post urgent repair of an incarcerated incisional hernia with small bowel obstruction back in 2020     Has moderate sized, chronically incarcerated incisional hernia in the epigastrium.      She ambulates minimally and does no heavy lifting. she has been asymptomatic with respect to the hernia for several years now. Given this and her co-morbid medical conditions do not recommend surgical repair of her hernia at this time.     Follow up as needed

## 2023-11-10 NOTE — LETTER
November 10, 2023     Albert Garner MD  [address]  [facility] [address]    Patient: Genesis Garcia   YOB: 1945   Date of Visit: 11/10/2023       Dear Dr. Albert Garner MD:    Thank you for referring Genesis Garcia to me for evaluation. Below are my notes for this consultation.  If you have questions, please do not hesitate to call me. I look forward to following your patient along with you.       Sincerely,     Kalen Russo MD      CC: No Recipients  ______________________________________________________________________________________         History of Present Illness  78-year-old female with multiple comorbid medical conditions to include obesity. She is known to me having taken to surgery for incisional hernia repair with mesh. This procedure was performed on 8/1/20 for an indication of incarcerated incisional hernia associated with small bowel obstruction     She did well following this operation but comes now with a bulge involving her abdominal wall just above the umbilicus determined to be a moderate-sized incisional hernia. We have established a strategy of watchful waiting.     She comes in today with a friend. She continues to do well but she has had a few medical setbacks recently related to right hip replacement surgery       Regarding her hernia, she denies any abdominal pain, nausea or vomiting.      Review of Systems  On review of systems patient denies any weight loss, fever, change in bowel habits, melena, hematochezia, coronary artery disease, TIA/CVA, bleeding, jaundice, pancreatitis, hepatitis.     Former smoker. Patient does not drink alcohol.      Active Problems     · Abnormal mammogram (793.80) (R92.8)   · Acute deep vein thrombosis (DVT) of both lower extremities, unspecified vein (453.40)  (I82.403)   · Aftercare following joint replacement surgery (V54.81) (Z47.1)   · Anemia (285.9) (D64.9)   · Ankle arthritis  (716.97) (M19.079)   · Arthritis of right hip (716.95) (M16.11)   · Asthma (493.90) (J45.909)   · Breast cancer screening (V76.10) (Z12.39)   · Cataract (366.9) (H26.9)   · Cellulitis, leg (682.6) (L03.119)   · Chronic ulcer of calf (707.12) (L97.209)   · Chronic venous insufficiency (459.81) (I87.2)   · Connective tissue disease, 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)   · COPD (chronic obstructive pulmonary disease) (496) (J44.9)   · Decreased GFR (794.4) (R94.4)   · Diverticulosis (562.10) (K57.90)   · DVT (deep venous thrombosis) (453.40) (I82.409)   · Dyspnea on exertion (786.09) (R06.09)   · Encounter for immunization (V03.89) (Z23)   · Gastroesophageal reflux disease with esophagitis without hemorrhage (530.81,530.10)  (K21.00)   · Heart murmur (785.2) (R01.1)   · Hoarseness of voice (784.42) (R49.0)   · Hypertension (401.9) (I10)   · Hypokalemia (276.8) (E87.6)   · Knee pain, right (719.46) (M25.561)   · Left breast mass (611.72) (N63.20)   · Localized swelling of both lower legs (729.81) (R22.43)   · Multinodular goiter (241.1) (E04.2)   · Pneumonia (486) (J18.9)   · Postoperative incisional hernia (553.21) (K43.2)   · 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) (Z96.641)   · Ulcer of right lower extremity (707.10) (L97.919)   · Umbilical hernia (553.1) (K42.9)   · Ventral hernia (553.20) (K43.9)   · Vitamin D deficiency (268.9) (E55.9)     Past Medical History     · History of Ankle pain, right (719.47) (M25.571)   · Resolved Date: 21 Jul 2022   · Asthma (493.90) (J45.909)   · History of Cellulitis of skin (682.9) (L03.90)   · Resolved Date: 21 Jul 2022   · History of Closed fracture of lateral malleolus (824.2) (S82.63XA)   · Resolved Date: 01 Dec 2022   · History of Fracture of distal fibula (824.8) (S82.839A)   · Resolved Date: 01 Dec 2022   · History of Hip pain, right (719.45) (M25.551)   · Resolved Date: 21 Jul 2022   · History of acute renal failure (V13.09) (Z87.448)   · History of arthritis  (V13.4) (Z87.39)   · Resolved Date: 01 Dec 2022   · History of atrial fibrillation (V12.59) (Z86.79)   · Resolved Date: 01 Dec 2022   · History of cellulitis (V13.3) (Z87.2)   · Resolved Date: 01 Dec 2022   · History of stress fracture (V13.52) (Z87.312)   · Resolved Date: 01 Dec 2022   · History of tachycardia (V13.89) (Z87.898)   · Resolved Date: 01 Dec 2022   · History of Hospital discharge follow-up (V67.59) (Z09)   · Resolved Date: 01 Dec 2022   · Hypertension (401.9) (I10)   · History of Left ankle pain, unspecified chronicity (719.47) (M25.572)   · Resolved Date: 01 Dec 2022   · History of Leg swelling (729.81) (M79.89)   · Resolved Date: 01 Dec 2022   · History of Pre-op examination (V72.84) (Z01.818)   · Resolved Date: 01 Dec 2022   · Vitamin D deficiency (268.9) (E55.9)     Surgical History     · History of Back Surgery   · History of Cataract surgery   · History of Cholecystectomy   · History of Knee Replacement   · Bilateral     Family History     · Family history of type 2 diabetes mellitus (V18.0) (Z83.3)     · Family history of cardiac disorder (V17.49) (Z82.49)   · Family history of hypertension (V17.49) (Z82.49)     Social History     · Former smoker (V15.82) (Z87.891)   · Max 1 pack/wk ages 23 to 47   · No alcohol use   · No illicit drug use     Physical Exam  morbidly obese, requires a wheelchair in no distress  Lungs are clear to auscultation bilaterally.  Cardiac exam is regular rhythm and rate.  Abdomen is soft nontender nondistended. bulge just above and to the right of the umbilicus   Neurologic exam is without focal deficit.    Impression: Status post urgent repair of an incarcerated incisional hernia with small bowel obstruction back in 2020     Has moderate sized, chronically incarcerated incisional hernia in the epigastrium.      She ambulates minimally and does no heavy lifting. she has been asymptomatic with respect to the hernia for several years now. Given this and her co-morbid  medical conditions do not recommend surgical repair of her hernia at this time.     Follow up as needed

## 2023-11-17 ENCOUNTER — OFFICE VISIT (OUTPATIENT)
Dept: RHEUMATOLOGY | Facility: CLINIC | Age: 78
End: 2023-11-17
Payer: MEDICARE

## 2023-11-17 ENCOUNTER — OFFICE VISIT (OUTPATIENT)
Dept: WOUND CARE | Facility: HOSPITAL | Age: 78
End: 2023-11-17
Payer: MEDICARE

## 2023-11-17 VITALS
DIASTOLIC BLOOD PRESSURE: 65 MMHG | RESPIRATION RATE: 16 BRPM | BODY MASS INDEX: 46.01 KG/M2 | HEIGHT: 62 IN | SYSTOLIC BLOOD PRESSURE: 139 MMHG | WEIGHT: 250 LBS | HEART RATE: 85 BPM

## 2023-11-17 VITALS — WEIGHT: 215 LBS | BODY MASS INDEX: 39.32 KG/M2

## 2023-11-17 DIAGNOSIS — L97.221 CHRONIC ULCER OF LEFT CALF LIMITED TO BREAKDOWN OF SKIN (MULTI): Primary | ICD-10-CM

## 2023-11-17 DIAGNOSIS — I87.2 CHRONIC VENOUS INSUFFICIENCY: ICD-10-CM

## 2023-11-17 DIAGNOSIS — M35.9 CONNECTIVE TISSUE DISEASE, UNDIFFERENTIATED (MULTI): Primary | ICD-10-CM

## 2023-11-17 PROCEDURE — 1036F TOBACCO NON-USER: CPT | Performed by: INTERNAL MEDICINE

## 2023-11-17 PROCEDURE — 1126F AMNT PAIN NOTED NONE PRSNT: CPT | Performed by: INTERNAL MEDICINE

## 2023-11-17 PROCEDURE — 1159F MED LIST DOCD IN RCRD: CPT | Performed by: INTERNAL MEDICINE

## 2023-11-17 PROCEDURE — 3075F SYST BP GE 130 - 139MM HG: CPT | Performed by: INTERNAL MEDICINE

## 2023-11-17 PROCEDURE — 1160F RVW MEDS BY RX/DR IN RCRD: CPT | Performed by: INTERNAL MEDICINE

## 2023-11-17 PROCEDURE — 11042 DBRDMT SUBQ TIS 1ST 20SQCM/<: CPT | Performed by: INTERNAL MEDICINE

## 2023-11-17 PROCEDURE — 3078F DIAST BP <80 MM HG: CPT | Performed by: INTERNAL MEDICINE

## 2023-11-17 PROCEDURE — 99213 OFFICE O/P EST LOW 20 MIN: CPT | Performed by: INTERNAL MEDICINE

## 2023-11-17 RX ORDER — FLUTICASONE PROPIONATE AND SALMETEROL 250; 50 UG/1; UG/1
1 POWDER RESPIRATORY (INHALATION)
COMMUNITY

## 2023-11-17 ASSESSMENT — PAIN SCALES - GENERAL: PAINLEVEL: 0-NO PAIN

## 2023-11-17 NOTE — PROGRESS NOTES
"Subjective   Patient ID: Genesis Garcia is a 78 y.o. female who presents for follow-up.    HPI.  78-year-old female with history of UCTD (positive MAURICE, anticentromere antibody and arthralgia), OA s/p bilateral TKR, HTN, DVT, chronic venous insufficiency, chronic ulcer of the calf and asthma presented for follow-up.     She continues to have chronic pain in her feet.  She feels like walking on the yvrose.  She has ankle valgus deformities.  She has history of venous stasis with ulcers.  She has history of neuropathy.  She has an appointment with pain management.    Immunosuppression: Hydroxychloroquine.    Review of Systems   All other systems reviewed and are negative.    Objective .      7/28/2023    10:56 AM 7/28/2023     2:56 PM 9/7/2023     6:30 AM 10/2/2023    11:00 AM 10/16/2023    10:21 AM 11/10/2023     9:21 AM 11/17/2023     9:53 AM   Vitals   Systolic 132   161 149     Diastolic 66   74 70     Heart Rate 73   74 83     Resp 16   16 18     Height (in) 1.575 m (5' 2\") 1.575 m (5' 2\") 1.574 m (5' 1.97\") 1.575 m (5' 2\") 1.575 m (5' 2\") 1.575 m (5' 2\")    Weight (lb) 205 205 206.35 215 214 215 215   BMI 37.49 kg/m2 37.49 kg/m2 37.78 kg/m2 39.32 kg/m2 39.14 kg/m2 39.32 kg/m2 39.32 kg/m2   BSA (m2) 2.02 m2 2.02 m2 2.02 m2 2.07 m2 2.06 m2 2.07 m2 2.07 m2   Visit Report Report Report   Report Report Report      Physical Exam.  Gen. AAO x3, NAD.  HEENT: No pallor or icterus, PERRLA, EOMI. Parotid glands  not enlarged. No cervical lymphadenopathy .  Skin: No rashes.  Heart: S1, S2/ RRR.   Lungs: CTA B.  Abdomen: Soft, NT/ND, BS regular.  MSK: Chronic synovial proliferation of the right hand MCP joints without erythema or tenderness.  Bilateral handgrip excellent.  Bilateral wrist and elbows without swelling and tenderness.  Bilateral shoulders with good range of motion.  Bilateral pes planus with bilateral ankle valgus deformity.  Neuro:  Sensation to touch intact.Strength 5/5 throughout.   Psych:Appropriate " mood and behavior  EXT: No edema    Assessment/Plan .  78-year-old female with history of UCTD (positive MAURICE, anticentromere antibody and arthralgia), OA s/p bilateral TKR, HTN, DVT, chronic venous insufficiency, chronic ulcer of the calf and asthma presented for follow-up.     #1: UCTD.  She is doing well.  Chronic feet pain in the settings of pes planus, DJD, venous stasis and possible neuropathy.  She has an upcoming appointment with pain management.  -Continue hydroxychloroquine twice a day.  Eye exam up-to-date.    Follow-up in 4 months.     This note was partially generated using the Dragon Voice recognition system. There may be some incorrect wording, spelling and/or spelling errors or punctuation errors that were not corrected prior to committing the note to the medical record.    Patient Active Problem List   Diagnosis    DVT, bilateral lower limbs (CMS/HCC)    Vitamin D deficiency    Heart murmur    Diverticulosis    COPD (chronic obstructive pulmonary disease) (CMS/HCC)    Arthritis    Asthma    Atrial fibrillation (CMS/HCC)    Anemia    Abnormal mammogram    Abscess of hip, left    MAURICE positive    Ankle arthritis    Arthritis of right hip    Carpal tunnel syndrome    Cataract    Cellulitis, leg    Ulcer of right lower extremity (CMS/HCC)    Chronic bronchitis (CMS/HCC)    Chronic low back pain    Chronic ulcer of calf (CMS/HCC)    Chronic venous insufficiency    Closed fracture of lateral malleolus    Connective tissue disease, undifferentiated (CMS/HCC)    Decreased GFR    DVT (deep venous thrombosis) (CMS/HCC)    Dyspnea on exertion    Essential hypertension    Flat foot    Fracture of distal fibula    Gastroesophageal reflux disease with esophagitis without hemorrhage    Hoarseness of voice    Hypokalemia    Left breast mass    Localized swelling of both lower legs    Morbid obesity due to excess calories (CMS/HCC)    Multinodular goiter    Osteopenia, senile    Postoperative incisional hernia     Primary osteoarthritis of right knee    Rosacea    Stress fracture    Umbilical hernia    Ventral hernia    Voice disturbance    Polyarthritis    Acute deep vein thrombosis (DVT) of both lower extremities (CMS/HCC)      Past Surgical History:   Procedure Laterality Date    BACK SURGERY  2016    Back Surgery    CHOLECYSTECTOMY  04/15/2016    Cholecystectomy    OTHER SURGICAL HISTORY  2021    Cataract surgery    TOTAL KNEE ARTHROPLASTY  2022    Knee Replacement      Social History     Tobacco Use    Smoking status: Former     Packs/day: 0.15     Years: 24.00     Additional pack years: 0.00     Total pack years: 3.60     Types: Cigarettes     Quit date:      Years since quittin.8    Smokeless tobacco: Never   Substance Use Topics    Alcohol use: Not Currently      Allergies   Allergen Reactions    Sulfur Hives    Cefprozil Hives    Sulfa (Sulfonamide Antibiotics) Hives      Current Outpatient Medications   Medication Instructions    acetaminophen (TYLENOL 8 HOUR) 650 mg, oral, Every 8 hours PRN    albuterol 2.5 mg /3 mL (0.083 %) nebulizer solution 3 mL, inhalation, Every 4 hours PRN    b complex-vitamin c tablet 1 tablet, oral, Daily    calcium carbonate 600 mg calcium (1,500 mg) tablet 1 tablet, oral, Daily    cholecalciferol (Vitamin D-3) 50 MCG (2000 UT) tablet oral    Eliquis 5 mg tablet 1 tablet, oral, 2 times daily    fluticasone propion-salmeteroL (Advair Diskus) 250-50 mcg/dose diskus inhaler 1 puff, inhalation, 2 times daily    gabapentin (Neurontin) 400 mg capsule 1 capsule, oral, 3 times daily    hydroxychloroquine (Plaquenil) 200 mg tablet 1 tablet, oral, 2 times daily    lactobacillus acidophilus (Lactobacillus acidoph-L.bulgar) tablet tablet 1 tablet, oral, 2 times daily    losartan (Cozaar) 100 mg tablet 1 tablet, oral, Daily    multivitamin capsule oral    polyethylene glycol (GLYCOLAX, MIRALAX) 17 g, oral, Daily    ProAir HFA 90 mcg/actuation inhaler inhalation    PSYLLIUM  HUSK ORAL oral, Daily    spironolactone (Aldactone) 25 mg tablet 1 tablet, oral, Daily    TORSEMIDE ORAL 20 mg, oral, Daily    triamcinolone (Kenalog) 0.1 % ointment  Apply sparingly to legs with each dressing change.<BR>

## 2023-11-17 NOTE — PROGRESS NOTES
"REFERRAL REQUESTED BY:  Dr Albert Garner PCP    LAST SEEN:  10/02/2023    Patient ID: Genesis Garcia is a 78 y.o. female     HPI:   Here for follow up CVHTN and recent ulcers.    CURRENT DRESSING:  Who is performing the dressing change:  [facility] and family  Dressing applied: Circaid, Progran argenis, 4x4  Dressing Frequency: Every other day    EDEMA MANAGEMENT:  Compression:  Right: Double Tubigrip CALF 37.5 cm   Left: Other Circaid    CALF 39.0 cm     Other Modality  Sleeping in Bed: yes  Elevating FOB:  yes    Offloading/Pressure Relief  Activity Level:  walker  Protection Devices:  na    Offloading/Pressure Relief Effective?     ROS:      Objective     VITALS:  /65 (BP Location: Left arm, Patient Position: Sitting, BP Cuff Size: Adult)   Pulse 85   Resp 16   Ht 1.575 m (5' 2\")   Wt 113 kg (250 lb)   BMI 45.73 kg/m²       Physical Exam    ARRIVAL:  Wheelchair  TRANSFER:  One Assist, Minimal    PSYCHIATRIC:  Oriented to person, place and time: A & O x 3    CONSTITUTIONAL:    Appearance:  Well Groomed    SKIN:   LDS and hemosiderin staining     PULSES:     EXTREMITY TEMPERATURE:    RIGHT: normal  LEFT: normal    EDEMA: 1-2+    WOUND ASSESSMENT:    Wound 04/28/23 Venous Ulcer Leg Dorsal;Left;Lateral;Lower (Active)   Date First Assessed: 04/28/23   Present on Original Admission: Yes  Primary Wound Type: Venous Ulcer  Location: (c) Leg  Wound Location Orientation: (c) Dorsal;Left;Lateral;Lower      Assessments 9/13/2023  3:13 PM 11/17/2023  3:01 PM   Wound Image      Site Assessment Fibrinous Pink;Red;Fibrinous   Jackie-Wound Assessment Erythematous Intact   Non-staged Wound Description Full thickness Full thickness   Wound Length (cm) 2 cm 2.3 cm   Wound Width (cm) 1.7 cm 1.7 cm   Wound Surface Area (cm^2) 3.4 cm^2 3.91 cm^2   Wound Depth (cm) 0.2 cm 0.1 cm   Wound Volume (cm^3) 0.68 cm^3 0.391 cm^3   Wound Healing % -- 43   Wound Bed Granulation (%) 20 % 10 %   Drainage Description " Serosanguineous Serosanguineous   Drainage Amount Scant Small       Inactive Orders   Date Order Priority Status Authorizing Provider   10/16/23 1107 Debridement Routine Completed Rosy Atkinson MD       Wound 09/11/23 Incision Throat Anterior (Active)   Date First Assessed: 09/11/23   Present on Original Admission: No  Primary Wound Type: Incision  Location: Throat  Wound Location Orientation: Anterior  Number of Sutures Placed: (c)   Wound Outcome: Healed      No assessment data to display       No associated orders.          Debridement    Consent obtained? verbal  Performed by: physician  Debridement type: surgical  Level of debridement: subcutaneous tissue  Pain control: lidocaine 2% and benzocaine 20%  Post-debridement measurements  Length (cm): 2.5  Width (cm): 2  Depth (cm): 0.2  Percent debrided: 80%  Surface Area (cm^2): 5  Area debrided (cm^2): 4  Volume (cm^3): 1  Tissue and other material debrided: epidermis and subcutaneous tissue  Devitalized tissue debrided: fibrin  Comment regarding debrided tissue: epibole  Instrument(s) utilized: curette, nippers and blade  Bleeding: small  Hemostasis obtained with: not applicable  Response to treatment: procedure was tolerated well        Assessment/Plan   Edema stable. Wounds stalled. Continue the argenis now. Discussed offloading when sleeping. Discussed proper circaid donning. Follow up 4 weeks.       VISIT ORDERS:  Vascular Study Required: n  Labs Required: n  Radiology Imaging Required: n  Consultation Required: n  Rx Provided:   Changes to Plan of Care: n      NEW ORDERS:  Wash: kelsey  Irrigate/Soak:  Skin Care: eucerin  Dressing: moist argenis and DSD  Compression: double tubigrip - right; tubigrip and circaid - left  Offloading: keep pressure off the ulcer at all times    DISCHARGE PLANNING:    Follow Up: 4 weeks.  Nurse Visit: prn    General Instructions:  Center RN to apply EMLA/Lidocaine 1% jelly/LMX  topically to wound(s) prior to debridement by  physician.  [place] RN my see patient as a nurse consult in my absence.      RN Post Procedure Note:  Wound rewashed with soap and water, rinsed and patted dry. LLE dressing: Georgina applied to wound base and covered with meplix and circaid for compression. RLE; double tubigrip. Patient tolerated well. ESTELLA Barnett RN      HCC/ECF/Assisted Living  Agency/Facility: [facility]  days/week: 1 x a week  Contacted Regarding Changes: yes

## 2023-11-28 ENCOUNTER — OFFICE VISIT (OUTPATIENT)
Dept: PAIN MEDICINE | Facility: CLINIC | Age: 78
End: 2023-11-28
Payer: MEDICARE

## 2023-11-28 DIAGNOSIS — M54.16 LUMBAR RADICULITIS: ICD-10-CM

## 2023-11-28 DIAGNOSIS — M96.1 LUMBAR POST-LAMINECTOMY SYNDROME: Primary | ICD-10-CM

## 2023-11-28 PROCEDURE — 1159F MED LIST DOCD IN RCRD: CPT | Performed by: PHYSICAL MEDICINE & REHABILITATION

## 2023-11-28 PROCEDURE — 99204 OFFICE O/P NEW MOD 45 MIN: CPT | Performed by: PHYSICAL MEDICINE & REHABILITATION

## 2023-11-28 PROCEDURE — 1160F RVW MEDS BY RX/DR IN RCRD: CPT | Performed by: PHYSICAL MEDICINE & REHABILITATION

## 2023-11-28 PROCEDURE — 1036F TOBACCO NON-USER: CPT | Performed by: PHYSICAL MEDICINE & REHABILITATION

## 2023-11-28 PROCEDURE — 1126F AMNT PAIN NOTED NONE PRSNT: CPT | Performed by: PHYSICAL MEDICINE & REHABILITATION

## 2023-11-28 NOTE — PROGRESS NOTES
Chief complaint  Back pain and lower limbs pain     History  Ms Prince is 78 years of age presenting for back and legs pain   Her friend Cheryl is with her    She has the back pain and lower limbs pain There is decreased sensory to light touch on the lateral aspects of the thighs and around the   knee going down to the lateral aspect of the legs to the   lateral malleoli into the dorsum of the feet..    Deep tendon reflexes is present for the patellar tendons bilaterally.  Achilles reflexes are present bilaterally and symmetric.    Medial Hamstrings reflex is decreased bilaterally  Plantar cutaneous are downgoing.  Ankle dorsiflexion is 5/5 bilaterally.  Plantar flexion of the ankles are 5/5 bilaterally.  Big toe extension is 4/5 bilaterally  Negative Tinel's sign over the right peroneal nerve at the fibular neck.  Beverly sign for axial loading and global rotation are negative.  No aberrant pain behavior.     She had surgeries and fusions in 2001 having pain since. Seh had multiple injection but no SCS   She is telling me she is here for Gabapentin 400 mg TID!!  She tried injection did not help in the past  does not want to repeat    Referred by Dr Garner for mediations maintenance     Pain level without medication is 8/10 , with the medication pain level 4/10.   The gabapentin takes the edge off    She sees rheum for RA.   She walks short distances. But uses wheel chair for longer distances   The pain meds are helping control the pain and improving Activities of Daily living and quality of life and quality of sleep.         Denied any fever or chills. No weight loss and no night sweats. No cough or sputum production. No diarrhea   The constipation has been responding to fibers and over the counter medications.     No bladder and bowel incontinence and no other changes in bladder and bowel. No skin changes.  Reports tiredness and fatigability only if the pain is not controlled.   Denied opioids diversion and  abuse and denies alcoholism. Denies overuse of the pain medications.         Physical Exam  Awake, alert and oriented for time place and persons.  Declined Chaperone for the visit and was adequately  draped for the exam.    Gait widened perimeter of stance  but today came on Wheel chair           The upper limbs were tested for strength in the upper limbs at the shoulders (Flexion, abduction and extension, internal and external rotation), elbow (flexion and extension) and wrist and hands  bilaterally. I also checked for cervical root tension signs and sensory dermatomes in the upper limbs were tested. Deep tendon reflexes were checked for the biceps, triceps and brachioradialis. Tinels sign of the median nerves at the wrists and ulnar nerves at the elbows were checked. The clinical exam findings showed good strenghth    The Lumbar Spine was tested for the range of motion and inspected for any incisions. I checked for palpable spasms in the paraspinal muscles. I also looked for trigger points by palpation. The Lumbar facets and sacro-iliac joint were loaded bilaterally. The Hips, Knees and ankles were tested for range of motion and stability, and they were inspected for alignment and contractures, clinical effusion or change in the color of the skin. The orthopedic exam of the lumbar spine and lower limbs showed   Healed incision over Lspin tight ROM reversal of the lumbar lordosis   For the lumbar spine and lower limbs included the straight leg raising was tested bilaterally, Sensory to light touch, pinprick, were tested bilaterally. Manual muscle testing of the hip (Flex, Ext, Abd, and Add), Knees (Flex/Ext), Plantar and Dorsiflexors and the big toes extension were tested. DTRs at the knees and ankles and plantar cutaneous were tested. I checked for abnormal movements and tone in the lower limbs. Tinel's sign over the peroneal nerve at the fibular neck was tested. The neurological exam for the lower limbs  showed  There is decreased sensory to light touch on the lateral aspects of the thighs and around the   knee going down to the lateral aspect of the legs to the   lateral malleoli into the dorsum of the feet..    Deep tendon reflexes is present for the patellar tendons bilaterally.  Achilles reflexes are present bilaterally and symmetric.    Medial Hamstrings reflex is decreased bilaterally  Plantar cutaneous are downgoing.  Ankle dorsiflexion is 5/5 bilaterally.  Plantar flexion of the ankles are 5/5 bilaterally.  Big toe extension is 4/5 bilaterally  Negative Tinel's sign over the right peroneal nerve at the fibular neck.  Beverly sign for axial loading and global rotation are negative.  No aberrant pain behavior.      Beverly testing for axial loading and log rotation of the Lumbar spine with the hips negative     Skin of the face, upper and lower limbs showed normal color and texture, no subcutaneous masses, no lesions and no skin ulcers.   Peripheral vascular system: radial and pedal pulses present bilaterally. No edema in the lower limbs, few  apparent varicosities.    Diagnosis  Problem List Items Addressed This Visit    None       Plan  Reviewed the pain generators.  Went over the types of pain with neuropathic and nociceptive and different pathologies and therapeutic modalities. Discussed the mechanism of action of interventions from acupuncture, physical therapy , regular exercises, injections, botox, spinal cord stimulation, and role of surgery     Went over pathology of the intervertebral disc displacement and the anatomical relation to the Nerve roots and relation to the radicular symptoms. Went over treatment modalities with conservative treatment including acupuncture   and epidural steroid injection with fluoroscopy guidance and last resort of surgery       We discussed that we are prescribing the medications on good eugenia and legitimate medical reason.     We reviewed the side effects and  precautions of gabapentin prescriptions as discussed in the opioids treatment agreement.    DW her that at this time will give her Rx gabapentin she had 90 days supply end of October . She is good for end of January. 2024    I left message for Dr Garner about the Rx and if there are any reason to refer her to pain mgmt for that. Will follow up  For now she has meds until end of January . If Dr Garner does not want to keep maintaining her gabapentin, I will take over    I am not aware of any rules against PCP maintaining gabapentin as long as she has treatment agreement and checking OARRS and random UDS     Discussed about NSAIDS and I explained about the opioids sparing effect to allow keeping the opioids dose at minimal effective dose.   I went over the potential side effects of the NSAIDS on the gastrointestinal, renal and cardiovascular systems.      I detailed the side effects from the acetaminophen in the medication and made aware of those. I also explained about the cumulative effects on the organs and mainly the liver.          Follow-up 8 weeks or earlier if needed     The level of clinical decision making in this office visit,  is high, given the high risks of complications with the morbidity and mortality due to the fact that acute and chronic pain may pose a threat to life and bodily function, if under treated, poorly treated, or with failure to maintain adequate treatment and timely medical follow up. Additionally over treatment has its own set of complications including overdosing on the pain medications and also the habit forming potentials with the use of the medications used to treat chronic painful conditions including therapeutic classes classified as dangerous medications. Given the serious and fluctuating nature of pain level and instensity with extensive consideration for whenever pain changes, there is always the risk of prolonged functional impairment requiring close patient monitoring with  regular assessments and reassessments and high level medical decision making at every office visit. The amount and complexity of data reviewed is high given the patient clinical presentation, labs,  data, radiology reports, and other tests as discussed during office visits. Pertinent data whether positive or negative were taken in consideration in the process of making this high level medical decision.

## 2023-12-15 ENCOUNTER — OFFICE VISIT (OUTPATIENT)
Dept: NEPHROLOGY | Facility: CLINIC | Age: 78
End: 2023-12-15
Payer: MEDICARE

## 2023-12-15 ENCOUNTER — LAB (OUTPATIENT)
Dept: LAB | Facility: LAB | Age: 78
End: 2023-12-15
Payer: MEDICARE

## 2023-12-15 VITALS
BODY MASS INDEX: 39.56 KG/M2 | DIASTOLIC BLOOD PRESSURE: 74 MMHG | SYSTOLIC BLOOD PRESSURE: 143 MMHG | WEIGHT: 215 LBS | HEART RATE: 80 BPM | HEIGHT: 62 IN

## 2023-12-15 DIAGNOSIS — N17.9 AKI (ACUTE KIDNEY INJURY) (CMS-HCC): ICD-10-CM

## 2023-12-15 DIAGNOSIS — N17.9 AKI (ACUTE KIDNEY INJURY) (CMS-HCC): Primary | ICD-10-CM

## 2023-12-15 DIAGNOSIS — D64.9 ANEMIA, UNSPECIFIED TYPE: ICD-10-CM

## 2023-12-15 DIAGNOSIS — I10 ESSENTIAL HYPERTENSION: Chronic | ICD-10-CM

## 2023-12-15 LAB
ALBUMIN SERPL BCP-MCNC: 3.9 G/DL (ref 3.4–5)
ANION GAP SERPL CALC-SCNC: 11 MMOL/L (ref 10–20)
BUN SERPL-MCNC: 32 MG/DL (ref 6–23)
CALCIUM SERPL-MCNC: 9.8 MG/DL (ref 8.6–10.6)
CHLORIDE SERPL-SCNC: 106 MMOL/L (ref 98–107)
CO2 SERPL-SCNC: 30 MMOL/L (ref 21–32)
CREAT SERPL-MCNC: 1.42 MG/DL (ref 0.5–1.05)
GFR SERPL CREATININE-BSD FRML MDRD: 38 ML/MIN/1.73M*2
GLUCOSE SERPL-MCNC: 92 MG/DL (ref 74–99)
PHOSPHATE SERPL-MCNC: 3.9 MG/DL (ref 2.5–4.9)
POTASSIUM SERPL-SCNC: 4.3 MMOL/L (ref 3.5–5.3)
SODIUM SERPL-SCNC: 143 MMOL/L (ref 136–145)

## 2023-12-15 PROCEDURE — 3078F DIAST BP <80 MM HG: CPT | Performed by: NURSE PRACTITIONER

## 2023-12-15 PROCEDURE — 99213 OFFICE O/P EST LOW 20 MIN: CPT | Performed by: NURSE PRACTITIONER

## 2023-12-15 PROCEDURE — 1159F MED LIST DOCD IN RCRD: CPT | Performed by: NURSE PRACTITIONER

## 2023-12-15 PROCEDURE — 3077F SYST BP >= 140 MM HG: CPT | Performed by: NURSE PRACTITIONER

## 2023-12-15 PROCEDURE — 80069 RENAL FUNCTION PANEL: CPT

## 2023-12-15 PROCEDURE — 1036F TOBACCO NON-USER: CPT | Performed by: NURSE PRACTITIONER

## 2023-12-15 PROCEDURE — 1126F AMNT PAIN NOTED NONE PRSNT: CPT | Performed by: NURSE PRACTITIONER

## 2023-12-15 PROCEDURE — 1160F RVW MEDS BY RX/DR IN RCRD: CPT | Performed by: NURSE PRACTITIONER

## 2023-12-15 PROCEDURE — 36415 COLL VENOUS BLD VENIPUNCTURE: CPT

## 2023-12-15 ASSESSMENT — ENCOUNTER SYMPTOMS
NEUROLOGICAL NEGATIVE: 1
WOUND: 1
CONSTITUTIONAL NEGATIVE: 1
PSYCHIATRIC NEGATIVE: 1
MUSCULOSKELETAL NEGATIVE: 1
ENDOCRINE NEGATIVE: 1
RESPIRATORY NEGATIVE: 1
CARDIOVASCULAR NEGATIVE: 1
GASTROINTESTINAL NEGATIVE: 1
EYES NEGATIVE: 1
HEMATOLOGIC/LYMPHATIC NEGATIVE: 1

## 2023-12-15 NOTE — PROGRESS NOTES
History Of Present Illness  Genesis Garcia is a 78 y.o. female with medical history significant for HTN, HLD, a-fib s/p ablation, COPD/asthma, bilateral total knee arthroplasty, and right total hip arthroplasty (11/2022) c/b post-operative cellulitis/pneumonia needing antibiotic tx which further c/b C. difficile colitis and PAULY, chronic venous insufficiency, and chronic ulcer of the calf who presents for a 4-month fuv for PAULY in January 2023.     Past Medical History  As above.    Surgical History  She has a past surgical history that includes Cholecystectomy (04/15/2016); Back surgery (06/20/2016); Total knee arthroplasty (12/01/2022); and Other surgical history (05/03/2021).     Social History  She reports that she quit smoking about 31 years ago. Her smoking use included cigarettes. She has a 3.60 pack-year smoking history. She has never used smokeless tobacco. She reports that she does not currently use alcohol. She reports that she does not currently use drugs.    Family History  Family History   Problem Relation Name Age of Onset    Diabetes Mother          type 2    Heart disease Father      Hypertension Father      Other (cardiac disorder) Father          Allergies  Sulfur, Cefprozil, and Sulfa (sulfonamide antibiotics)    Review of Systems   Constitutional: Negative.    HENT: Negative.     Eyes: Negative.    Respiratory: Negative.     Cardiovascular: Negative.    Gastrointestinal: Negative.    Endocrine: Negative.    Genitourinary: Negative.    Musculoskeletal: Negative.    Skin:  Positive for wound (chronic lower leg wound).   Neurological: Negative.    Hematological: Negative.    Psychiatric/Behavioral: Negative.          Physical Exam  Constitutional:       Appearance: Normal appearance.   HENT:      Head: Normocephalic and atraumatic.      Mouth/Throat:      Mouth: Mucous membranes are moist.   Cardiovascular:      Rate and Rhythm: Normal rate and regular rhythm.      Pulses: Normal pulses.       "Heart sounds: Normal heart sounds.   Pulmonary:      Effort: Pulmonary effort is normal.      Breath sounds: Normal breath sounds.   Musculoskeletal:         General: Normal range of motion.   Skin:     General: Skin is warm and dry.      Comments: Chronic lower leg wound, followed by wound clinic   Neurological:      General: No focal deficit present.      Mental Status: She is alert and oriented to person, place, and time.   Psychiatric:         Mood and Affect: Mood normal.         Behavior: Behavior normal.         Thought Content: Thought content normal.         Judgment: Judgment normal.          Last Recorded Vitals  Blood pressure 143/74, pulse 80, height 1.575 m (5' 2\"), weight 97.5 kg (215 lb).    Relevant Results  Recent Results (from the past 2352 hour(s))   Renal Function Panel    Collection Time: 09/16/23  8:00 AM   Result Value Ref Range    Glucose 86 74 - 99 mg/dL    Sodium 141 136 - 145 mmol/L    Potassium 4.0 3.5 - 5.3 mmol/L    Chloride 105 98 - 107 mmol/L    Bicarbonate 27 21 - 32 mmol/L    Anion Gap 13 10 - 20 mmol/L    Urea Nitrogen 24 (H) 6 - 23 mg/dL    Creatinine 1.33 (H) 0.50 - 1.05 mg/dL    GFR Female 41 (A) >90 mL/min/1.73m2    Calcium 9.2 8.6 - 10.6 mg/dL    Phosphorus 3.2 2.5 - 4.9 mg/dL    Albumin 3.6 3.4 - 5.0 g/dL         Assessment/Plan     78-year-old female with medical history significant for HTN, HLD, a-fib s/p ablation, COPD/asthma, bilateral total knee arthroplasty, and right total hip arthroplasty (11/2022) c/b post-operative cellulitis/pneumonia needing antibiotic tx which further c/b C. difficile colitis and PAULY, chronic venous insufficiency, and chronic ulcer of the calf who presents for a 4-month fuv for previous PAULY.    # PAULY: hospitalized 1/10 - 1/15/23 for SBO, found to have PAUYL (peak sCr 3.62 mg/dL, 1/10/23) in setting of positive c.diff colitis. Upon discharge on 1/15/23, her sCr was down to 1.66 mg/dL. Furthermore, she was hospitalized 1/17 - 1/21/23 for DVT. During " this hospitalization, her sCr was 1.32 - 1.58 mg/dL. Subsequent lab showed sCr 1.56 mg/dL on 23, and 1.33 mg/dL on 23. CT abd/pelvis done on 1/10/23 showed unremarkable kidney. Kidney ultrasound done on 23 showed left simple cyst otherwise unremarkable. Most recent sCr .33 mg/dL on 23 - improving.    # HTN: slightly elevated in office today. Reportedly home BPs are at target 130/80 mmHg,    # Anemia: chronic. To be evaluated by heme/onc in 2024    Plan:  - Advised regular home BP checks and keep a log to bring it to next visit. Target < 130/80 mmHg. To call providers if BPs are persistently > 130/80 mmHg.  - Repeat RFP.  - Reviewed strategies for protecting kidney function includin) Avoidance of NSAIDs including Aleve (Naprosyn), Motrin (Ibuprofen), Mobic (Meloxicam), Celebrex (Celecoxib) and aspirin as well as PPI acid blocking medications such as Prilosec (omeprazole), Protonix (pantoprazole), and Nexium (esomeprazole), as well as other nephrotoxic agents.   2) Avoidance of tobacco or alcohol use.   3) Adequate hydration daily.   4) Blood pressure target 130/80 mmHg.   5) Daily dietary sodium intake less than 2 grams per day.    6) Maintain healthy lifestyle. Healthy diet and regular exercises.   7) Maintain ideal weight.  - FUV: in 6 months or sooner if concerns arise.     Patient verbalized understanding of above. She will not hesitate to contact Division of Nephrology at [phone] with concerns/questions.    I spent 20 minutes in the professional and overall care of this patient.    Torrey Myers, APRN-CNP

## 2023-12-18 ENCOUNTER — APPOINTMENT (OUTPATIENT)
Dept: PRIMARY CARE | Facility: CLINIC | Age: 78
End: 2023-12-18
Payer: MEDICARE

## 2023-12-18 ENCOUNTER — HOSPITAL ENCOUNTER (OUTPATIENT)
Dept: RADIOLOGY | Facility: EXTERNAL LOCATION | Age: 78
Discharge: HOME | End: 2023-12-18

## 2023-12-18 DIAGNOSIS — R05.9 COUGH, UNSPECIFIED TYPE: ICD-10-CM

## 2023-12-20 ENCOUNTER — HOSPITAL ENCOUNTER (INPATIENT)
Facility: HOSPITAL | Age: 78
LOS: 6 days | Discharge: HOME HEALTH | DRG: 193 | End: 2023-12-26
Attending: INTERNAL MEDICINE | Admitting: INTERNAL MEDICINE
Payer: MEDICARE

## 2023-12-20 ENCOUNTER — APPOINTMENT (OUTPATIENT)
Dept: RADIOLOGY | Facility: HOSPITAL | Age: 78
DRG: 193 | End: 2023-12-20
Payer: MEDICARE

## 2023-12-20 DIAGNOSIS — R01.1 HEART MURMUR: ICD-10-CM

## 2023-12-20 DIAGNOSIS — R22.43 LOCALIZED SWELLING OF BOTH LOWER LEGS: ICD-10-CM

## 2023-12-20 DIAGNOSIS — I48.19 PERSISTENT ATRIAL FIBRILLATION (MULTI): ICD-10-CM

## 2023-12-20 DIAGNOSIS — M54.16 LUMBAR RADICULITIS: ICD-10-CM

## 2023-12-20 DIAGNOSIS — Z01.810 ENCOUNTER FOR PREPROCEDURAL CARDIOVASCULAR EXAMINATION: ICD-10-CM

## 2023-12-20 DIAGNOSIS — J18.9 ACUTE PNEUMONIA: Primary | ICD-10-CM

## 2023-12-20 LAB
AMORPH CRY #/AREA UR COMP ASSIST: ABNORMAL /HPF
ANION GAP SERPL CALC-SCNC: 11 MMOL/L (ref 10–20)
APPEARANCE UR: ABNORMAL
BACTERIA #/AREA URNS AUTO: ABNORMAL /HPF
BILIRUB UR STRIP.AUTO-MCNC: NEGATIVE MG/DL
BNP SERPL-MCNC: 90 PG/ML (ref 0–99)
BUN SERPL-MCNC: 44 MG/DL (ref 6–23)
CALCIUM SERPL-MCNC: 7.8 MG/DL (ref 8.6–10.3)
CHLORIDE SERPL-SCNC: 100 MMOL/L (ref 98–107)
CO2 SERPL-SCNC: 25 MMOL/L (ref 21–32)
COLOR UR: YELLOW
CREAT SERPL-MCNC: 1.51 MG/DL (ref 0.5–1.05)
ERYTHROCYTE [DISTWIDTH] IN BLOOD BY AUTOMATED COUNT: 18.3 % (ref 11.5–14.5)
GFR SERPL CREATININE-BSD FRML MDRD: 36 ML/MIN/1.73M*2
GLUCOSE SERPL-MCNC: 118 MG/DL (ref 74–99)
GLUCOSE UR STRIP.AUTO-MCNC: NEGATIVE MG/DL
HCT VFR BLD AUTO: 25 % (ref 36–46)
HGB BLD-MCNC: 7.5 G/DL (ref 12–16)
KETONES UR STRIP.AUTO-MCNC: NEGATIVE MG/DL
LEUKOCYTE ESTERASE UR QL STRIP.AUTO: ABNORMAL
MCH RBC QN AUTO: 21.4 PG (ref 26–34)
MCHC RBC AUTO-ENTMCNC: 30 G/DL (ref 32–36)
MCV RBC AUTO: 71 FL (ref 80–100)
NITRITE UR QL STRIP.AUTO: NEGATIVE
NRBC BLD-RTO: 0 /100 WBCS (ref 0–0)
PH UR STRIP.AUTO: 5 [PH]
PLATELET # BLD AUTO: 191 X10*3/UL (ref 150–450)
POTASSIUM SERPL-SCNC: 3.8 MMOL/L (ref 3.5–5.3)
PROT UR STRIP.AUTO-MCNC: ABNORMAL MG/DL
RBC # BLD AUTO: 3.5 X10*6/UL (ref 4–5.2)
RBC # UR STRIP.AUTO: ABNORMAL /UL
RBC #/AREA URNS AUTO: ABNORMAL /HPF
SODIUM SERPL-SCNC: 132 MMOL/L (ref 136–145)
SP GR UR STRIP.AUTO: 1.02
SQUAMOUS #/AREA URNS AUTO: ABNORMAL /HPF
UROBILINOGEN UR STRIP.AUTO-MCNC: <2 MG/DL
WBC # BLD AUTO: 12.6 X10*3/UL (ref 4.4–11.3)
WBC #/AREA URNS AUTO: ABNORMAL /HPF
YEAST BUDDING #/AREA UR COMP ASSIST: PRESENT /HPF

## 2023-12-20 PROCEDURE — 36415 COLL VENOUS BLD VENIPUNCTURE: CPT | Performed by: NURSE PRACTITIONER

## 2023-12-20 PROCEDURE — 2500000004 HC RX 250 GENERAL PHARMACY W/ HCPCS (ALT 636 FOR OP/ED): Performed by: INTERNAL MEDICINE

## 2023-12-20 PROCEDURE — 1150000001 HC HOSPICE PRIVATE ROOM DAILY

## 2023-12-20 PROCEDURE — 94640 AIRWAY INHALATION TREATMENT: CPT

## 2023-12-20 PROCEDURE — 2500000002 HC RX 250 W HCPCS SELF ADMINISTERED DRUGS (ALT 637 FOR MEDICARE OP, ALT 636 FOR OP/ED): Performed by: INTERNAL MEDICINE

## 2023-12-20 PROCEDURE — 2500000001 HC RX 250 WO HCPCS SELF ADMINISTERED DRUGS (ALT 637 FOR MEDICARE OP): Performed by: NURSE PRACTITIONER

## 2023-12-20 PROCEDURE — 81001 URINALYSIS AUTO W/SCOPE: CPT | Performed by: INTERNAL MEDICINE

## 2023-12-20 PROCEDURE — 99223 1ST HOSP IP/OBS HIGH 75: CPT | Performed by: INTERNAL MEDICINE

## 2023-12-20 PROCEDURE — 99222 1ST HOSP IP/OBS MODERATE 55: CPT | Performed by: INTERNAL MEDICINE

## 2023-12-20 PROCEDURE — 85027 COMPLETE CBC AUTOMATED: CPT | Performed by: NURSE PRACTITIONER

## 2023-12-20 PROCEDURE — 71046 X-RAY EXAM CHEST 2 VIEWS: CPT | Performed by: RADIOLOGY

## 2023-12-20 PROCEDURE — 71046 X-RAY EXAM CHEST 2 VIEWS: CPT

## 2023-12-20 PROCEDURE — 2500000001 HC RX 250 WO HCPCS SELF ADMINISTERED DRUGS (ALT 637 FOR MEDICARE OP): Performed by: INTERNAL MEDICINE

## 2023-12-20 PROCEDURE — 82374 ASSAY BLOOD CARBON DIOXIDE: CPT | Performed by: NURSE PRACTITIONER

## 2023-12-20 PROCEDURE — 83880 ASSAY OF NATRIURETIC PEPTIDE: CPT | Performed by: INTERNAL MEDICINE

## 2023-12-20 RX ORDER — B-COMPLEX WITH VITAMIN C
1 TABLET ORAL DAILY
Status: DISCONTINUED | OUTPATIENT
Start: 2023-12-20 | End: 2023-12-26 | Stop reason: HOSPADM

## 2023-12-20 RX ORDER — CALCIUM CARBONATE 500(1250)
1250 TABLET ORAL DAILY
Status: DISCONTINUED | OUTPATIENT
Start: 2023-12-20 | End: 2023-12-26 | Stop reason: HOSPADM

## 2023-12-20 RX ORDER — FLUTICASONE FUROATE AND VILANTEROL 200; 25 UG/1; UG/1
1 POWDER RESPIRATORY (INHALATION)
Status: DISCONTINUED | OUTPATIENT
Start: 2023-12-20 | End: 2023-12-20

## 2023-12-20 RX ORDER — POLYETHYLENE GLYCOL 3350 17 G/17G
17 POWDER, FOR SOLUTION ORAL DAILY
Status: DISCONTINUED | OUTPATIENT
Start: 2023-12-20 | End: 2023-12-26 | Stop reason: HOSPADM

## 2023-12-20 RX ORDER — SPIRONOLACTONE 25 MG/1
25 TABLET ORAL DAILY
Status: DISCONTINUED | OUTPATIENT
Start: 2023-12-20 | End: 2023-12-26 | Stop reason: HOSPADM

## 2023-12-20 RX ORDER — HYDROXYCHLOROQUINE SULFATE 200 MG/1
200 TABLET, FILM COATED ORAL 2 TIMES DAILY
Status: DISCONTINUED | OUTPATIENT
Start: 2023-12-20 | End: 2023-12-26 | Stop reason: HOSPADM

## 2023-12-20 RX ORDER — GUAIFENESIN 600 MG/1
600 TABLET, EXTENDED RELEASE ORAL EVERY 12 HOURS PRN
Status: DISCONTINUED | OUTPATIENT
Start: 2023-12-20 | End: 2023-12-26 | Stop reason: HOSPADM

## 2023-12-20 RX ORDER — NYSTATIN 100000 [USP'U]/ML
4 SUSPENSION ORAL 4 TIMES DAILY
Status: DISCONTINUED | OUTPATIENT
Start: 2023-12-20 | End: 2023-12-26 | Stop reason: HOSPADM

## 2023-12-20 RX ORDER — ALBUTEROL SULFATE 0.83 MG/ML
2.5 SOLUTION RESPIRATORY (INHALATION)
Status: DISCONTINUED | OUTPATIENT
Start: 2023-12-20 | End: 2023-12-26 | Stop reason: HOSPADM

## 2023-12-20 RX ORDER — ALBUTEROL SULFATE 0.83 MG/ML
3 SOLUTION RESPIRATORY (INHALATION) EVERY 4 HOURS PRN
Status: DISCONTINUED | OUTPATIENT
Start: 2023-12-20 | End: 2023-12-26 | Stop reason: HOSPADM

## 2023-12-20 RX ORDER — PANTOPRAZOLE SODIUM 40 MG/10ML
40 INJECTION, POWDER, LYOPHILIZED, FOR SOLUTION INTRAVENOUS
Status: DISCONTINUED | OUTPATIENT
Start: 2023-12-21 | End: 2023-12-26 | Stop reason: HOSPADM

## 2023-12-20 RX ORDER — PANTOPRAZOLE SODIUM 40 MG/1
40 TABLET, DELAYED RELEASE ORAL
Status: DISCONTINUED | OUTPATIENT
Start: 2023-12-21 | End: 2023-12-26 | Stop reason: HOSPADM

## 2023-12-20 RX ORDER — ONDANSETRON HYDROCHLORIDE 2 MG/ML
4 INJECTION, SOLUTION INTRAVENOUS EVERY 8 HOURS PRN
Status: DISCONTINUED | OUTPATIENT
Start: 2023-12-20 | End: 2023-12-26 | Stop reason: HOSPADM

## 2023-12-20 RX ORDER — TRAMADOL HYDROCHLORIDE 50 MG/1
50 TABLET ORAL EVERY 8 HOURS PRN
Status: DISCONTINUED | OUTPATIENT
Start: 2023-12-20 | End: 2023-12-24

## 2023-12-20 RX ORDER — ACETAMINOPHEN 325 MG/1
650 TABLET ORAL EVERY 4 HOURS PRN
Status: DISCONTINUED | OUTPATIENT
Start: 2023-12-20 | End: 2023-12-26 | Stop reason: HOSPADM

## 2023-12-20 RX ORDER — GABAPENTIN 400 MG/1
400 CAPSULE ORAL 3 TIMES DAILY
Status: DISCONTINUED | OUTPATIENT
Start: 2023-12-20 | End: 2023-12-26 | Stop reason: HOSPADM

## 2023-12-20 RX ORDER — ACETAMINOPHEN 160 MG/5ML
650 SOLUTION ORAL EVERY 4 HOURS PRN
Status: DISCONTINUED | OUTPATIENT
Start: 2023-12-20 | End: 2023-12-26 | Stop reason: HOSPADM

## 2023-12-20 RX ORDER — ACETAMINOPHEN 650 MG/1
650 SUPPOSITORY RECTAL EVERY 4 HOURS PRN
Status: DISCONTINUED | OUTPATIENT
Start: 2023-12-20 | End: 2023-12-26 | Stop reason: HOSPADM

## 2023-12-20 RX ORDER — ONDANSETRON 4 MG/1
4 TABLET, FILM COATED ORAL EVERY 8 HOURS PRN
Status: DISCONTINUED | OUTPATIENT
Start: 2023-12-20 | End: 2023-12-26 | Stop reason: HOSPADM

## 2023-12-20 RX ORDER — FUROSEMIDE 10 MG/ML
40 INJECTION INTRAMUSCULAR; INTRAVENOUS 2 TIMES DAILY
Status: DISCONTINUED | OUTPATIENT
Start: 2023-12-20 | End: 2023-12-22

## 2023-12-20 RX ORDER — FORMOTEROL FUMARATE DIHYDRATE 20 UG/2ML
20 SOLUTION RESPIRATORY (INHALATION)
Status: DISCONTINUED | OUTPATIENT
Start: 2023-12-20 | End: 2023-12-26 | Stop reason: HOSPADM

## 2023-12-20 RX ORDER — LOSARTAN POTASSIUM 50 MG/1
100 TABLET ORAL DAILY
Status: DISCONTINUED | OUTPATIENT
Start: 2023-12-20 | End: 2023-12-26 | Stop reason: HOSPADM

## 2023-12-20 RX ORDER — ALBUTEROL SULFATE 0.83 MG/ML
2.5 SOLUTION RESPIRATORY (INHALATION) EVERY 6 HOURS PRN
Status: DISCONTINUED | OUTPATIENT
Start: 2023-12-20 | End: 2023-12-20

## 2023-12-20 RX ORDER — L. ACIDOPHILUS/L.BULGARICUS 1MM CELL
1 TABLET ORAL 2 TIMES DAILY
Status: DISCONTINUED | OUTPATIENT
Start: 2023-12-20 | End: 2023-12-20

## 2023-12-20 RX ORDER — BUDESONIDE 0.5 MG/2ML
0.5 INHALANT ORAL
Status: DISCONTINUED | OUTPATIENT
Start: 2023-12-20 | End: 2023-12-26 | Stop reason: HOSPADM

## 2023-12-20 RX ORDER — TALC
3 POWDER (GRAM) TOPICAL DAILY
Status: DISCONTINUED | OUTPATIENT
Start: 2023-12-20 | End: 2023-12-26 | Stop reason: HOSPADM

## 2023-12-20 RX ADMIN — FORMOTEROL FUMARATE DIHYDRATE 20 MCG: 20 SOLUTION RESPIRATORY (INHALATION) at 20:18

## 2023-12-20 RX ADMIN — APIXABAN 5 MG: 5 TABLET, FILM COATED ORAL at 10:25

## 2023-12-20 RX ADMIN — PIPERACILLIN SODIUM AND TAZOBACTAM SODIUM 3.38 G: 3; .375 INJECTION, SOLUTION INTRAVENOUS at 22:53

## 2023-12-20 RX ADMIN — SPIRONOLACTONE 25 MG: 25 TABLET ORAL at 10:26

## 2023-12-20 RX ADMIN — Medication 1 TABLET: at 10:26

## 2023-12-20 RX ADMIN — HYDROXYCHLOROQUINE SULFATE 200 MG: 200 TABLET, FILM COATED ORAL at 20:31

## 2023-12-20 RX ADMIN — GUAIFENESIN 600 MG: 600 TABLET, EXTENDED RELEASE ORAL at 20:30

## 2023-12-20 RX ADMIN — APIXABAN 5 MG: 5 TABLET, FILM COATED ORAL at 20:32

## 2023-12-20 RX ADMIN — ACETAMINOPHEN 650 MG: 325 TABLET ORAL at 20:31

## 2023-12-20 RX ADMIN — LOSARTAN POTASSIUM 100 MG: 50 TABLET, FILM COATED ORAL at 10:26

## 2023-12-20 RX ADMIN — NYSTATIN 400000 UNITS: 100000 SUSPENSION ORAL at 16:37

## 2023-12-20 RX ADMIN — FUROSEMIDE 40 MG: 10 INJECTION, SOLUTION INTRAMUSCULAR; INTRAVENOUS at 16:37

## 2023-12-20 RX ADMIN — GABAPENTIN 400 MG: 400 CAPSULE ORAL at 16:37

## 2023-12-20 RX ADMIN — GABAPENTIN 400 MG: 400 CAPSULE ORAL at 10:26

## 2023-12-20 RX ADMIN — PIPERACILLIN SODIUM AND TAZOBACTAM SODIUM 3.38 G: 3; .375 INJECTION, SOLUTION INTRAVENOUS at 10:27

## 2023-12-20 RX ADMIN — HYDROXYCHLOROQUINE SULFATE 200 MG: 200 TABLET, FILM COATED ORAL at 10:26

## 2023-12-20 RX ADMIN — PIPERACILLIN SODIUM AND TAZOBACTAM SODIUM 3.38 G: 3; .375 INJECTION, SOLUTION INTRAVENOUS at 16:37

## 2023-12-20 RX ADMIN — GABAPENTIN 400 MG: 400 CAPSULE ORAL at 20:31

## 2023-12-20 RX ADMIN — TRAMADOL HYDROCHLORIDE 50 MG: 50 TABLET, COATED ORAL at 17:51

## 2023-12-20 RX ADMIN — AZITHROMYCIN MONOHYDRATE 500 MG: 500 INJECTION, POWDER, LYOPHILIZED, FOR SOLUTION INTRAVENOUS at 12:30

## 2023-12-20 RX ADMIN — ALBUTEROL SULFATE 2.5 MG: 2.5 SOLUTION RESPIRATORY (INHALATION) at 20:17

## 2023-12-20 RX ADMIN — TRAMADOL HYDROCHLORIDE 50 MG: 50 TABLET, COATED ORAL at 10:26

## 2023-12-20 RX ADMIN — NYSTATIN 400000 UNITS: 100000 SUSPENSION ORAL at 20:32

## 2023-12-20 RX ADMIN — NYSTATIN 400000 UNITS: 100000 SUSPENSION ORAL at 12:31

## 2023-12-20 RX ADMIN — CALCIUM 1250 MG: 500 TABLET ORAL at 10:25

## 2023-12-20 RX ADMIN — BUDESONIDE INHALATION 0.5 MG: 0.5 SUSPENSION RESPIRATORY (INHALATION) at 20:18

## 2023-12-20 SDOH — SOCIAL STABILITY: SOCIAL INSECURITY: ARE THERE ANY APPARENT SIGNS OF INJURIES/BEHAVIORS THAT COULD BE RELATED TO ABUSE/NEGLECT?: NO

## 2023-12-20 SDOH — SOCIAL STABILITY: SOCIAL INSECURITY: DOES ANYONE TRY TO KEEP YOU FROM HAVING/CONTACTING OTHER FRIENDS OR DOING THINGS OUTSIDE YOUR HOME?: NO

## 2023-12-20 SDOH — SOCIAL STABILITY: SOCIAL INSECURITY: HAS ANYONE EVER THREATENED TO HURT YOUR FAMILY OR YOUR PETS?: NO

## 2023-12-20 SDOH — SOCIAL STABILITY: SOCIAL INSECURITY: DO YOU FEEL UNSAFE GOING BACK TO THE PLACE WHERE YOU ARE LIVING?: NO

## 2023-12-20 SDOH — SOCIAL STABILITY: SOCIAL INSECURITY: ABUSE: ADULT

## 2023-12-20 SDOH — SOCIAL STABILITY: SOCIAL INSECURITY: DO YOU FEEL ANYONE HAS EXPLOITED OR TAKEN ADVANTAGE OF YOU FINANCIALLY OR OF YOUR PERSONAL PROPERTY?: NO

## 2023-12-20 SDOH — SOCIAL STABILITY: SOCIAL INSECURITY: WERE YOU ABLE TO COMPLETE ALL THE BEHAVIORAL HEALTH SCREENINGS?: YES

## 2023-12-20 SDOH — SOCIAL STABILITY: SOCIAL INSECURITY: HAVE YOU HAD THOUGHTS OF HARMING ANYONE ELSE?: NO

## 2023-12-20 SDOH — SOCIAL STABILITY: SOCIAL INSECURITY: ARE YOU OR HAVE YOU BEEN THREATENED OR ABUSED PHYSICALLY, EMOTIONALLY, OR SEXUALLY BY ANYONE?: NO

## 2023-12-20 ASSESSMENT — LIFESTYLE VARIABLES
HOW OFTEN DO YOU HAVE 6 OR MORE DRINKS ON ONE OCCASION: NEVER
SKIP TO QUESTIONS 9-10: 1
HOW MANY STANDARD DRINKS CONTAINING ALCOHOL DO YOU HAVE ON A TYPICAL DAY: PATIENT DOES NOT DRINK
HOW OFTEN DO YOU HAVE A DRINK CONTAINING ALCOHOL: NEVER
AUDIT-C TOTAL SCORE: 0
AUDIT-C TOTAL SCORE: 0

## 2023-12-20 ASSESSMENT — COLUMBIA-SUICIDE SEVERITY RATING SCALE - C-SSRS
6. HAVE YOU EVER DONE ANYTHING, STARTED TO DO ANYTHING, OR PREPARED TO DO ANYTHING TO END YOUR LIFE?: NO
2. HAVE YOU ACTUALLY HAD ANY THOUGHTS OF KILLING YOURSELF?: NO
1. IN THE PAST MONTH, HAVE YOU WISHED YOU WERE DEAD OR WISHED YOU COULD GO TO SLEEP AND NOT WAKE UP?: NO

## 2023-12-20 ASSESSMENT — COGNITIVE AND FUNCTIONAL STATUS - GENERAL
STANDING UP FROM CHAIR USING ARMS: A LITTLE
CLIMB 3 TO 5 STEPS WITH RAILING: A LOT
MOBILITY SCORE: 19
WALKING IN HOSPITAL ROOM: A LITTLE
WALKING IN HOSPITAL ROOM: A LITTLE
CLIMB 3 TO 5 STEPS WITH RAILING: A LOT
DRESSING REGULAR UPPER BODY CLOTHING: A LITTLE
MOBILITY SCORE: 19
DAILY ACTIVITIY SCORE: 24
MOVING TO AND FROM BED TO CHAIR: A LITTLE
MOVING TO AND FROM BED TO CHAIR: A LITTLE
TURNING FROM BACK TO SIDE WHILE IN FLAT BAD: A LITTLE
STANDING UP FROM CHAIR USING ARMS: A LITTLE
DAILY ACTIVITIY SCORE: 24
STANDING UP FROM CHAIR USING ARMS: A LITTLE
MOBILITY SCORE: 17
CLIMB 3 TO 5 STEPS WITH RAILING: A LOT
MOVING FROM LYING ON BACK TO SITTING ON SIDE OF FLAT BED WITH BEDRAILS: A LITTLE
MOVING TO AND FROM BED TO CHAIR: A LITTLE
WALKING IN HOSPITAL ROOM: A LITTLE
DRESSING REGULAR LOWER BODY CLOTHING: A LITTLE
PATIENT BASELINE BEDBOUND: NO
HELP NEEDED FOR BATHING: A LITTLE
TOILETING: A LITTLE
DAILY ACTIVITIY SCORE: 20

## 2023-12-20 ASSESSMENT — PATIENT HEALTH QUESTIONNAIRE - PHQ9
1. LITTLE INTEREST OR PLEASURE IN DOING THINGS: NOT AT ALL
SUM OF ALL RESPONSES TO PHQ9 QUESTIONS 1 & 2: 0
2. FEELING DOWN, DEPRESSED OR HOPELESS: NOT AT ALL

## 2023-12-20 ASSESSMENT — PAIN - FUNCTIONAL ASSESSMENT
PAIN_FUNCTIONAL_ASSESSMENT: 0-10

## 2023-12-20 ASSESSMENT — ACTIVITIES OF DAILY LIVING (ADL)
ASSISTIVE_DEVICE: WALKER;DENTURES LOWER;DENTURES UPPER
BATHING: INDEPENDENT
TOILETING: INDEPENDENT
ADEQUATE_TO_COMPLETE_ADL: YES
DRESSING YOURSELF: INDEPENDENT
JUDGMENT_ADEQUATE_SAFELY_COMPLETE_DAILY_ACTIVITIES: YES
PATIENT'S MEMORY ADEQUATE TO SAFELY COMPLETE DAILY ACTIVITIES?: YES
GROOMING: INDEPENDENT
LACK_OF_TRANSPORTATION: NO
FEEDING YOURSELF: INDEPENDENT
HEARING - LEFT EAR: FUNCTIONAL
HEARING - RIGHT EAR: FUNCTIONAL
WALKS IN HOME: NEEDS ASSISTANCE

## 2023-12-20 ASSESSMENT — PAIN SCALES - GENERAL
PAINLEVEL_OUTOF10: 5 - MODERATE PAIN
PAINLEVEL_OUTOF10: 8
PAINLEVEL_OUTOF10: 8
PAINLEVEL_OUTOF10: 7

## 2023-12-20 NOTE — H&P
Genesis Garcia is a 78 y.o. female   HPI   Patient with a past medical history of COPD/asthma hypertension dyslipidemia atrial fibrillation s/p ablation s/p bilateral total knee arthroplasty  and recent total hip arthroplasty which was further complicated by cellulitis/pneumonia which resulted in antibiotic induced C. difficile colitis and acute kidney injury was doing well until couple days ago when she started having cough and shortness of breath  She went to [facility] emergency room where she was noted to be hypoxic with elevated white counts concerning for sepsis and also noted to have left leg cellulitis  Started on antibiotics and stayed in the emergency room for 2 days before getting transferred to our facility  Seen this morning she is on 2 L of oxygen  Denies any chest pain or palpitations    Past Medical History  Past Medical History:   Diagnosis Date    Cellulitis, unspecified 08/24/2020    Cellulitis of skin    Displaced fracture of lateral malleolus of unspecified fibula, initial encounter for closed fracture 07/22/2021    Closed fracture of lateral malleolus    Encounter for follow-up examination after completed treatment for conditions other than malignant neoplasm 11/30/2022    Hospital discharge follow-up    Encounter for other preprocedural examination 10/18/2022    Pre-op examination    Essential (primary) hypertension 01/04/2023    Hypertension    Other fracture of upper and lower end of unspecified fibula, initial encounter for closed fracture 05/21/2021    Fracture of distal fibula    Other specified soft tissue disorders 09/19/2022    Leg swelling    Pain in left ankle and joints of left foot 05/03/2021    Left ankle pain, unspecified chronicity    Pain in right ankle and joints of right foot 05/03/2021    Ankle pain, right    Pain in right hip 05/03/2021    Hip pain, right    Personal history of (healed) stress fracture 06/26/2021    History of stress fracture    Personal history of  diseases of the skin and subcutaneous tissue 11/19/2022    History of cellulitis    Personal history of other diseases of the circulatory system     History of atrial fibrillation    Personal history of other diseases of the musculoskeletal system and connective tissue 10/20/2016    History of arthritis    Personal history of other diseases of urinary system     History of acute renal failure    Personal history of other specified conditions 11/19/2022    History of tachycardia    Unspecified asthma, uncomplicated 12/01/2022    Asthma    Vitamin D deficiency, unspecified 10/20/2016    Vitamin D deficiency       Surgical History  Past Surgical History:   Procedure Laterality Date    BACK SURGERY  06/20/2016    Back Surgery    CHOLECYSTECTOMY  04/15/2016    Cholecystectomy    OTHER SURGICAL HISTORY  05/03/2021    Cataract surgery    TOTAL KNEE ARTHROPLASTY  12/01/2022    Knee Replacement        Social History  She reports that she quit smoking about 31 years ago. Her smoking use included cigarettes. She has a 3.60 pack-year smoking history. She has never used smokeless tobacco. She reports that she does not currently use alcohol. She reports that she does not currently use drugs.    Family History  Family History   Problem Relation Name Age of Onset    Diabetes Mother          type 2    Heart disease Father      Hypertension Father      Other (cardiac disorder) Father          Allergies  Sulfur, Cefprozil, and Sulfa (sulfonamide antibiotics)    Review of Systems     Constitutional: No fever  Eyes: no blurred vision and no diplopia.   ENT: no hearing loss, no tinnitus, no earache, no sore throat, no hoarseness and no swollen glands in the neck.   Cardiovascular: no chest pain, no tightness or heavy pressure, .   Respiratory: Cough  Gastrointestinal: no change in bowel habits, no diarrhea, no constipation, no bloody stools, no nausea, no vomiting, no abdominal pain, no signs and symptoms of ulcer disease, no magdy  colored stools and no intolerance to fatty foods.   Genitourinary: no urinary frequency, no dysuria, no hematuria, no burning sensation during urination, urinary stream is not smaller and urinary stream does not start and stop.   Musculoskeletal: Difficulty ambulating because of knee pain  Skin: no rashes, no change in skin color and pigmentation, no skin lesions and no skin lumps.   Neurological: no headaches, no dizziness, no seizures, no tingling, no numbness, no signs and symptoms of stroke and no limb weakness.   Psychiatric: no confusion, no memory lapses or loss, no depression and no sleep disturbances.   Endocrine:  no excessive thirst, no dry skin, no cold intolerance, no heat intolerance and no increased urinary frequency.   Hematologic/Lymphatic: is not slow to heal, does not bleed easily, does not bruise easily, no thrombophlebitis, no anemia and no history of blood transfusion.   All other systems have been reviewed and are negative for complaint.     Vitals:    12/20/23 1234   BP: 157/83   Pulse: 83   Resp: 19   Temp: 36.2 °C (97.2 °F)   SpO2: 95%        Scheduled medications  apixaban, 5 mg, oral, BID  azithromycin, 500 mg, intravenous, Daily  B complex-vitamin C, 1 tablet, oral, Daily  budesonide, 0.5 mg, nebulization, BID   And  formoterol, 20 mcg, nebulization, BID  calcium carbonate, 1,250 mg, oral, Daily  furosemide, 40 mg, intravenous, BID  gabapentin, 400 mg, oral, TID  hydroxychloroquine, 200 mg, oral, BID  lactobacillus acidophilus, 1 capsule, oral, BID  losartan, 100 mg, oral, Daily  melatonin, 3 mg, oral, Daily  nystatin, 4 mL, Swish & Swallow, 4x daily  [START ON 12/21/2023] pantoprazole, 40 mg, oral, Daily before breakfast   Or  [START ON 12/21/2023] pantoprazole, 40 mg, intravenous, Daily before breakfast  piperacillin-tazobactam, 3.375 g, intravenous, q6h  polyethylene glycol, 17 g, oral, Daily  psyllium, 1 packet, oral, Daily  spironolactone, 25 mg, oral, Daily      Continuous  medications     PRN medications  PRN medications: acetaminophen **OR** acetaminophen **OR** acetaminophen, albuterol, guaiFENesin, ondansetron **OR** ondansetron, traMADol        Results from last 7 days   Lab Units 12/15/23  1017   SODIUM mmol/L 143   POTASSIUM mmol/L 4.3   CHLORIDE mmol/L 106   CO2 mmol/L 30   BUN mg/dL 32*   CREATININE mg/dL 1.42*   CALCIUM mg/dL 9.8   GLUCOSE mg/dL 92            XR chest 2 views   Final Result   Left-greater-than-right basilar infiltrates or atelectasis with small   left pleural effusion. Follow-up to ensure resolution after   appropriate treatment recommended.        MACRO:   None.        Signed by: Jenifer Friedman 12/20/2023 11:52 AM   Dictation workstation:   [id number]      Transthoracic Echo (TTE) Complete    (Results Pending)       Physical Exam     Constitutional   General appearance: Alert and in no acute distress.   Eyes   Inspection of eyes: Sclera and conjunctiva were normal.    Pupil exam: Pupils were equal in size. Extraocular movements were intact.   Pulmonary   Respiratory assessment: No respiratory distress, normal respiratory rhythm and effort.    Auscultation of Lungs: Bilateral crackles  Cardiovascular   Auscultation of heart: Apical pulse normal, heart rate and rhythm normal, normal S1 and S2, no murmurs and no pericardial rub.    Exam for edema: 1+ edema  Abdomen   Abdominal Exam: No bruits, normal bowel sounds, soft, non-tender, no abdominal mass palpated.    Liver and Spleen exam: No hepato-splenomegaly.   Musculoskeletal   Examination of gait: Normal.    Inspection of digits and nails: No clubbing or cyanosis of the fingernails.    Inspection/palpation of joints, bones and muscles: No joint swelling. Normal movement of all extremities.   Skin   Skin breakdown with slight erythema on the left thigh  Neurologic   Cranial nerves: Nerves 2-12 were intact, no focal neuro defects.   Psychiatric   Orientation: Oriented to person, place, and time.    Mood and  affect: Normal.       Assessment/Plan      #Acute respiratory failure with hypoxia  #Community-acquired pneumonia  #COPD  Started IV antibiotics  DuoNebs    #Acute on chronic diastolic congestive heart failure  IV Lasix  Strict I's and O's with daily weights and electrolytes    #Possible cellulitis of left leg  Wound care  Continue antibiotics and monitor    #Atrial fibrillation  Rate controlled  Continue Eliquis    #Hypertension  Continue home medications and hold for systolic less than 100    #Dyslipidemia  Continue statins with a target LDL of less than 70

## 2023-12-20 NOTE — CONSULTS
"Inpatient consult to Cardiology  Consult performed by: Cullen Morales DO  Consult ordered by: Judy Gomez, APRN-CNP  Reason for consult: chf      History Of Present Illness:    Genesis Garcia is a 78 y.o. 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  In the setting of presumed bacterial pneumonia and lower extremity cellulitis.  Cardiology has been consulted for \"CHF\".    Patient originally presented to [facility] where she was felt to be suffering from pneumonia as evidenced by 2 view chest x-ray with left lung base opacity.  She was felt to have sepsis criteria and was volume resuscitated.  It is unclear how much total volume she received.  She was subsequently started on antibiotics and transferred for further care.  When seen today, she reports feeling \"blah\" and reports persistent shortness of breath.  She is unable to lie flat but she attributes this more to \"my back breaking on me\".    Patient denies chest pain and angina.  Pt reports shortness of breath, dyspnea on exertion, cough, congestion   Pt denies worsening of her chronic lower extremity edema.  Pt denies palpitations or syncope.  No recent falls.  No fever or chills.  No cough.  No change in bowel or bladder habits.  No travel.  No sick contacts.  No recent travel    12 point review of systems was performed and is otherwise negative.  Past medical history:  As above.  Medications:  Reviewed.  Allergies:  Reviewed.  Social history:  Patient denies smoking, alcohol abuse, or illicit drug use.  Family history:  No sudden cardiac death or premature coronary artery disease.       Last Recorded Vitals:  Vitals:    12/20/23 0430 " "12/20/23 0901   BP: 106/57 153/82   BP Location: Right arm Right arm   Patient Position: Lying Lying   Pulse: 76    Resp: 19 20   Temp: 36.2 °C (97.2 °F) 36.8 °C (98.2 °F)   TempSrc: Temporal Temporal   SpO2: 96% 96%   Weight: 103 kg (227 lb 6.4 oz)    Height: 1.574 m (5' 1.97\")        Last Labs:  [facility] Studies 12/19/2023  WBC 12.2   HBG 7.5 / Hematocrit 24.7    COVID-19 /influenza A/influenza B/RSV -- Negative  NT-Pro BNP 4570      Results from last 7 days   Lab Units 12/15/23  1017   SODIUM mmol/L 143   POTASSIUM mmol/L 4.3   CHLORIDE mmol/L 106   CO2 mmol/L 30   BUN mg/dL 32*   CREATININE mg/dL 1.42*   CALCIUM mg/dL 9.8   GLUCOSE mg/dL 92         Troponin I   Date/Time Value Ref Range Status   01/17/2023 01:45 PM 10 0 - 13 ng/L Final     Comment:     .  Less than 99th percentile of normal range cutoff-  Female and children under 18 years old <14 ng/L; Male <21 ng/L: Negative  Repeat testing should be performed if clinically indicated.   .  Female and children under 18 years old 14-50 ng/L; Male 21-50 ng/L:  Consistent with possible cardiac damage and possible increased clinical   risk. Serial measurements may help to assess extent of myocardial damage.   .  >50 ng/L: Consistent with cardiac damage, increased clinical risk and  myocardial infarction. Serial measurements may help assess extent of   myocardial damage.   .   NOTE: Children less than 1 year old may have higher baseline troponin   levels and results should be interpreted in conjunction with the overall   clinical context.   .  NOTE: Troponin I testing is performed using a different   testing methodology at [facility] than at other   [facility] hospitals. Direct result comparisons should only   be made within the same method.     01/10/2023 11:07 PM 9 0 - 13 ng/L Final     Comment:     .  Less than 99th percentile of normal range cutoff-  Female and children under 18 years old <14 ng/L; Male <21 ng/L: Negative  Repeat testing should be " performed if clinically indicated.   .  Female and children under 18 years old 14-50 ng/L; Male 21-50 ng/L:  Consistent with possible cardiac damage and possible increased clinical   risk. Serial measurements may help to assess extent of myocardial damage.   .  >50 ng/L: Consistent with cardiac damage, increased clinical risk and  myocardial infarction. Serial measurements may help assess extent of   myocardial damage.   .   NOTE: Children less than 1 year old may have higher baseline troponin   levels and results should be interpreted in conjunction with the overall   clinical context.   .  NOTE: Troponin I testing is performed using a different   testing methodology at [facility] than at other   system hospitals. Direct result comparisons should only   be made within the same method.     01/10/2023 10:07 PM 12 0 - 13 ng/L Final     Comment:     .  Less than 99th percentile of normal range cutoff-  Female and children under 18 years old <14 ng/L; Male <21 ng/L: Negative  Repeat testing should be performed if clinically indicated.   .  Female and children under 18 years old 14-50 ng/L; Male 21-50 ng/L:  Consistent with possible cardiac damage and possible increased clinical   risk. Serial measurements may help to assess extent of myocardial damage.   .  >50 ng/L: Consistent with cardiac damage, increased clinical risk and  myocardial infarction. Serial measurements may help assess extent of   myocardial damage.   .   NOTE: Children less than 1 year old may have higher baseline troponin   levels and results should be interpreted in conjunction with the overall   clinical context.   .  NOTE: Troponin I testing is performed using a different   testing methodology at [facility] than at other   [facility]. Direct result comparisons should only   be made within the same method.       BNP   Date/Time Value Ref Range Status   01/17/2023 01:45  (H) 0 - 99 pg/mL Final     Comment:     .   <100 pg/mL - Heart failure unlikely  100-299 pg/mL - Intermediate probability of acute heart  .               failure exacerbation. Correlate with clinical  .               context and patient history.    >=300 pg/mL - Heart Failure likely. Correlate with clinical  .               context and patient history.  BNP testing is performed using different testing   methodology at [facility] than at other   system hospitals. Direct result comparisons should   only be made within the same method.     01/10/2023 10:07 PM 35 0 - 99 pg/mL Final     Comment:     .  <100 pg/mL - Heart failure unlikely  100-299 pg/mL - Intermediate probability of acute heart  .               failure exacerbation. Correlate with clinical  .               context and patient history.    >=300 pg/mL - Heart Failure likely. Correlate with clinical  .               context and patient history.  BNP testing is performed using different testing   methodology at [facility] than at other   [facility] hospitals. Direct result comparisons should   only be made within the same method.        Last I/O:  No intake/output data recorded.    Past Cardiology Tests (Last 3 Years):  EKG:  No results found for this or any previous visit from the past 1095 days.    Echo:  10/2022   1. Left ventricular systolic function is normal with a 65-70% estimated ejection fraction.   2. There is moderate concentric left ventricular hypertrophy.   3. There is no evidence of cardiac tamponade.   4. Mildly elevated RVSP.      Cardiac Imaging:      CXR 12/18/2023 @ 10:54 -- [facility]  ( Image Not available )   Indeterminate opacity in the left lung base.  This could represent   pneumonia in the acute setting.  Follow-up CT of the chest is recommended   to exclude underlying mass.     Lines, tubes, and devices:  None     Lungs and pleura:  Indeterminate opacity in the left lung base.    Suspected small bilateral pleural effusions.     Cardiomediastinal  silhouette:  Normal cardiomediastinal silhouette.     Bones/soft tissues:  Chronic appearing L1 versus T12 compression fracture.       CXR 12/18/2023 -- Urgent Care   Small bilateral pleural effusions with enlargement of the cardiac  silhouette and vascular congestion. Mild bibasilar atelectasis  present as well. Hyperinflated lungs.      Vascular Duplex 08/2023  CONCLUSIONS:  Right Lower Venous: The remainder of the right leg is negative for deep vein thrombosis. Additional Findings; Lymph nodes measuring approxiamtely 0.6 cm x 1.5 cm.  Left Lower Venous: There is age indeterminate deep vein thrombosis visualized in the proximal femoral, mid femoral, distal femoral and common femoral veins. The remainder of the left leg is negative for deep vein thrombosis.     Comparison:  Compared with study from 1/18/2023, the right leg appears to be negative for DVT in today's exam. The left leg DVT appears to be age indeterminate in today's exam.     Additional Findings:  Calf veins could not be visualized due to bilateral Unna boots.        Past Medical History:  She has a past medical history of Cellulitis, unspecified (08/24/2020), Displaced fracture of lateral malleolus of unspecified fibula, initial encounter for closed fracture (07/22/2021), Encounter for follow-up examination after completed treatment for conditions other than malignant neoplasm (11/30/2022), Encounter for other preprocedural examination (10/18/2022), Essential (primary) hypertension (01/04/2023), Other fracture of upper and lower end of unspecified fibula, initial encounter for closed fracture (05/21/2021), Other specified soft tissue disorders (09/19/2022), Pain in left ankle and joints of left foot (05/03/2021), Pain in right ankle and joints of right foot (05/03/2021), Pain in right hip (05/03/2021), Personal history of (healed) stress fracture (06/26/2021), Personal history of diseases of the skin and subcutaneous tissue (11/19/2022), Personal  history of other diseases of the circulatory system, Personal history of other diseases of the musculoskeletal system and connective tissue (10/20/2016), Personal history of other diseases of urinary system, Personal history of other specified conditions (11/19/2022), Unspecified asthma, uncomplicated (12/01/2022), and Vitamin D deficiency, unspecified (10/20/2016).    Past Surgical History:  She has a past surgical history that includes Cholecystectomy (04/15/2016); Back surgery (06/20/2016); Total knee arthroplasty (12/01/2022); and Other surgical history (05/03/2021).      Social History:  She reports that she quit smoking about 31 years ago. Her smoking use included cigarettes. She has a 3.60 pack-year smoking history. She has never used smokeless tobacco. She reports that she does not currently use alcohol. She reports that she does not currently use drugs.    Family History:  Family History   Problem Relation Name Age of Onset    Diabetes Mother          type 2    Heart disease Father      Hypertension Father      Other (cardiac disorder) Father          Allergies:  Sulfur, Cefprozil, and Sulfa (sulfonamide antibiotics)    Inpatient Medications:  Scheduled medications   Medication Dose Route Frequency    apixaban  5 mg oral BID    azithromycin  500 mg intravenous Daily    B complex-vitamin C  1 tablet oral Daily    budesonide  0.5 mg nebulization BID    And    formoterol  20 mcg nebulization BID    calcium carbonate  1,250 mg oral Daily    gabapentin  400 mg oral TID    hydroxychloroquine  200 mg oral BID    lactobacillus acidophilus  1 tablet oral BID    losartan  100 mg oral Daily    melatonin  3 mg oral Daily    nystatin  4 mL Swish & Swallow 4x daily    [START ON 12/21/2023] pantoprazole  40 mg oral Daily before breakfast    Or    [START ON 12/21/2023] pantoprazole  40 mg intravenous Daily before breakfast    piperacillin-tazobactam  3.375 g intravenous q6h    polyethylene glycol  17 g oral Daily     psyllium  1 packet oral Daily    spironolactone  25 mg oral Daily     PRN medications   Medication    acetaminophen    Or    acetaminophen    Or    acetaminophen    albuterol    guaiFENesin    ondansetron    Or    ondansetron    traMADol     Continuous Medications   Medication Dose Last Rate     Outpatient Medications:  Current Outpatient Medications   Medication Instructions    acetaminophen (TYLENOL 8 HOUR) 650 mg, oral, Every 8 hours PRN    albuterol 2.5 mg /3 mL (0.083 %) nebulizer solution 3 mL, inhalation, Every 4 hours PRN    b complex-vitamin c tablet 1 tablet, oral, Daily    calcium carbonate 600 mg calcium (1,500 mg) tablet 1 tablet, oral, Daily    cholecalciferol (Vitamin D-3) 50 MCG (2000 UT) tablet oral    Eliquis 5 mg tablet 1 tablet, oral, 2 times daily    fluticasone propion-salmeteroL (Advair Diskus) 250-50 mcg/dose diskus inhaler 1 puff, inhalation, 2 times daily RT, Rinse mouth with water after use to reduce aftertaste and incidence of candidiasis. Do not swallow.    gabapentin (Neurontin) 400 mg capsule 1 capsule, oral, 3 times daily    hydroxychloroquine (Plaquenil) 200 mg tablet 1 tablet, oral, 2 times daily    lactobacillus acidophilus (Lactobacillus acidoph-L.bulgar) tablet tablet 1 tablet, oral, 2 times daily    losartan (Cozaar) 100 mg tablet 1 tablet, oral, Daily    multivitamin capsule oral    ProAir HFA 90 mcg/actuation inhaler inhalation    spironolactone (Aldactone) 25 mg tablet 1 tablet, oral, Daily    torsemide (DEMADEX) 20 mg, oral, Daily       Physical Exam:  Vitals and nursing note reviewed.   Constitutional:       Appearance: Not in distress. Acutely ill-appearing.   Eyes:      Pupils: Pupils are equal, round, and reactive to light.   HENT:    Mouth/Throat:      Pharynx: Oropharynx is clear.   Neck:      Thyroid: Thyroid normal.      Vascular: JVD elevated with 10 cm of water.      Lymphadenopathy: No cervical adenopathy.   Pulmonary:      Effort: Pulmonary effort is normal.       "Breath sounds: Bibasilar Rhonchi present. Bibasilar Rales present.   Chest:      Chest wall: Not tender to palpatation.   Cardiovascular:      Normal rate. Regular rhythm.      Murmurs: There is no murmur.      Comments: Chronic trophic changes noted of the lower extremities consistent with longstanding venous congestion/lymphedema  Pulses:     Intact distal pulses.   Edema:     Peripheral edema present.     Pretibial: bilateral 2+ edema of the pretibial area.     Ankle: bilateral 2+ edema of the ankle.  Abdominal:      General: Bowel sounds are normal.   Musculoskeletal: Normal range of motion.      Cervical back: Normal range of motion. Skin:     General: Skin is warm and dry.         Assessment/Plan   Genesis Garcia Is a 78 y.o. 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  In the setting of presumed bacterial pneumonia and lower extremity cellulitis.  Cardiology has been consulted for \"CHF\".    Patient originally presented to [facility] where she was felt to be suffering from pneumonia as evidenced by 2 view chest x-ray with left lung base opacity.  She was felt to have sepsis criteria and was volume resuscitated.  It is unclear how much total volume she received.  She was subsequently started on antibiotics and transferred for further care.  When seen today, she reports feeling \"blah\" and reports persistent shortness of breath.  She is unable to lie flat but she attributes this more to \"my back breaking on me\".    She does not appear overly Volume Up and her chronic Lower Extremity Edema does not seem worsend by her accord.     We will obtain a transthoracic echocardiogram for structural evaluation " including ejection fraction, assessment of regional wall motion abnormalities or valvular disease, and further evaluation of hemodynamics.    I would plan to diurese with Lasix 40 mg IV twice daily for goal diuresis of 1.5-2 L per 24 hour period.  Electrolytes will need to be monitored closely and repleted.  Patient will need accurate I/O's and daily standing weights. Patient should be on a salt restricted diet. Free water should be restricted to 1500 cc per day. I would go with a slightly lower diuresis goal of maintaining a negative fluid balance.     Consider CT Imaging as suggest by Radiology for further assessment of LLL infiltrate.     PNA management as per primary service         Code Status:  Full Code    I spent 75 minutes in the professional and overall care of this patient.        Cullen Morales, DO

## 2023-12-20 NOTE — PROGRESS NOTES
Pharmacy Medication History Review    Genesis Garcia is a 78 y.o. female admitted for Acute pneumonia. Pharmacy reviewed the patient's [id number] medications and allergies for accuracy.    The list below reflectives the updated PTA list. Please review each medication in order reconciliation for additional clarification and justification.  Medications Prior to Admission   Medication Sig Dispense Refill Last Dose    acetaminophen (Tylenol 8 HOUR) 650 mg ER tablet Take 1 tablet (650 mg) by mouth every 8 hours if needed.   12/19/2023    albuterol 2.5 mg /3 mL (0.083 %) nebulizer solution Inhale 3 mL every 4 hours if needed.   12/19/2023    b complex-vitamin c tablet Take 1 tablet by mouth once daily.   12/19/2023    calcium carbonate 600 mg calcium (1,500 mg) tablet Take 1 tablet (1,500 mg) by mouth once daily.   12/19/2023    cholecalciferol (Vitamin D-3) 50 MCG (2000 UT) tablet Take by mouth.   12/19/2023    Eliquis 5 mg tablet Take 1 tablet (5 mg) by mouth 2 times a day.   12/19/2023    fluticasone propion-salmeteroL (Advair Diskus) 250-50 mcg/dose diskus inhaler Inhale 1 puff 2 times a day. Rinse mouth with water after use to reduce aftertaste and incidence of candidiasis. Do not swallow.   12/19/2023    gabapentin (Neurontin) 400 mg capsule Take 1 capsule (400 mg) by mouth 3 times a day.   12/19/2023    hydroxychloroquine (Plaquenil) 200 mg tablet Take 1 tablet (200 mg) by mouth 2 times a day.   12/19/2023    lactobacillus acidophilus (Lactobacillus acidoph-L.bulgar) tablet tablet Take 1 tablet by mouth 2 times a day.   12/19/2023    losartan (Cozaar) 100 mg tablet Take 1 tablet (100 mg) by mouth once daily.   12/19/2023    multivitamin capsule Take by mouth.   12/19/2023    ProAir HFA 90 mcg/actuation inhaler Inhale.   More than a month    spironolactone (Aldactone) 25 mg tablet Take 1 tablet (25 mg) by mouth once daily.   12/19/2023    torsemide (Demadex) 20 mg tablet Take 1 tablet (20 mg) by mouth  once daily.   12/19/2023        The list below reflectives the updated allergy list. Please review each documented allergy for additional clarification and justification.  Allergies  Reviewed by Stewart Burgos RN on 12/20/2023        Severity Reactions Comments    Sulfur High Hives     Cefprozil Not Specified Hives     Sulfa (sulfonamide Antibiotics) Not Specified Hives             Below are additional concerns with the patient's PTA list.    Spoke to Mrs. Mcnally directly and she was able to tell me every medication strength and direction without any issues. There are no concerns at this time     Connor Brannon, PharmD

## 2023-12-20 NOTE — PROGRESS NOTES
12/20/23 1031   Discharge Planning   Living Arrangements Family members;Friends   Support Systems Family members;Friends/neighbors   Assistance Needed relies on some help for iADLs at home   Type of Residence Private residence   Home or Post Acute Services In home services   Type of Home Care Services Home nursing visits;Home OT;Home PT   Patient expects to be discharged to: Home and resume with [facility]   Does the patient need discharge transport arranged? Yes   RoundTrip coordination needed? Yes   Has discharge transport been arranged? Yes     Met with patient at bedside  Explained role of TCC  Patient admitted for PNA/Sepsis from [facility]    Patient states she lives with her roommate/friend Cheryl Vasquez and her 2 great grandchildren ages 7&10, patient is their main caregiver but also Cheryl assists with caring for the children, they are in Cheryl's care at this time, patient is active with [facility] for SN, would like to try and get SN/PT/OT at dc, uses a walker at baseline, no home o2, no HD, states she has just recently started back to driving short distances but Cheryl takes to and from appts, etc, Cheryl will also provide patients transport home at dc.    ADOD 2-3 days  DC: home with [facility] SN/PT/OT

## 2023-12-21 ENCOUNTER — APPOINTMENT (OUTPATIENT)
Dept: CARDIOLOGY | Facility: HOSPITAL | Age: 78
DRG: 193 | End: 2023-12-21
Payer: MEDICARE

## 2023-12-21 LAB
ANION GAP SERPL CALC-SCNC: 12 MMOL/L (ref 10–20)
AORTIC VALVE MEAN GRADIENT: 4.4
AORTIC VALVE PEAK VELOCITY: 1.37
AV PEAK GRADIENT: 7.6
AVA (PEAK VEL): 1.64
AVA (VTI): 1.62
BUN SERPL-MCNC: 43 MG/DL (ref 6–23)
CALCIUM SERPL-MCNC: 7.6 MG/DL (ref 8.6–10.3)
CHLORIDE SERPL-SCNC: 97 MMOL/L (ref 98–107)
CO2 SERPL-SCNC: 25 MMOL/L (ref 21–32)
CREAT SERPL-MCNC: 1.53 MG/DL (ref 0.5–1.05)
EJECTION FRACTION APICAL 4 CHAMBER: 68.5
ERYTHROCYTE [DISTWIDTH] IN BLOOD BY AUTOMATED COUNT: 18.2 % (ref 11.5–14.5)
FERRITIN SERPL-MCNC: 109 NG/ML (ref 8–150)
GFR SERPL CREATININE-BSD FRML MDRD: 35 ML/MIN/1.73M*2
GLUCOSE SERPL-MCNC: 110 MG/DL (ref 74–99)
HCT VFR BLD AUTO: 24.7 % (ref 36–46)
HGB BLD-MCNC: 7.3 G/DL (ref 12–16)
IRON SATN MFR SERPL: ABNORMAL %
IRON SERPL-MCNC: <10 UG/DL (ref 35–150)
LEFT VENTRICLE INTERNAL DIMENSION DIASTOLE: 4.69 (ref 3.5–6)
LEFT VENTRICULAR OUTFLOW TRACT DIAMETER: 2.1
MAGNESIUM SERPL-MCNC: 2.2 MG/DL (ref 1.6–2.4)
MCH RBC QN AUTO: 20.8 PG (ref 26–34)
MCHC RBC AUTO-ENTMCNC: 29.6 G/DL (ref 32–36)
MCV RBC AUTO: 70 FL (ref 80–100)
MITRAL VALVE E/A RATIO: 1.01
NRBC BLD-RTO: 0 /100 WBCS (ref 0–0)
PLATELET # BLD AUTO: 179 X10*3/UL (ref 150–450)
POTASSIUM SERPL-SCNC: 3.3 MMOL/L (ref 3.5–5.3)
RBC # BLD AUTO: 3.51 X10*6/UL (ref 4–5.2)
RIGHT VENTRICLE FREE WALL PEAK S': 13
RIGHT VENTRICLE PEAK SYSTOLIC PRESSURE: 35.5
SODIUM SERPL-SCNC: 131 MMOL/L (ref 136–145)
TIBC SERPL-MCNC: ABNORMAL UG/DL
UIBC SERPL-MCNC: 269 UG/DL (ref 110–370)
WBC # BLD AUTO: 12.4 X10*3/UL (ref 4.4–11.3)

## 2023-12-21 PROCEDURE — 94760 N-INVAS EAR/PLS OXIMETRY 1: CPT

## 2023-12-21 PROCEDURE — 94640 AIRWAY INHALATION TREATMENT: CPT

## 2023-12-21 PROCEDURE — 99232 SBSQ HOSP IP/OBS MODERATE 35: CPT | Performed by: NURSE PRACTITIONER

## 2023-12-21 PROCEDURE — 85027 COMPLETE CBC AUTOMATED: CPT | Performed by: INTERNAL MEDICINE

## 2023-12-21 PROCEDURE — 1150000001 HC HOSPICE PRIVATE ROOM DAILY

## 2023-12-21 PROCEDURE — 2500000004 HC RX 250 GENERAL PHARMACY W/ HCPCS (ALT 636 FOR OP/ED): Performed by: NURSE PRACTITIONER

## 2023-12-21 PROCEDURE — 2500000002 HC RX 250 W HCPCS SELF ADMINISTERED DRUGS (ALT 637 FOR MEDICARE OP, ALT 636 FOR OP/ED): Performed by: INTERNAL MEDICINE

## 2023-12-21 PROCEDURE — 36415 COLL VENOUS BLD VENIPUNCTURE: CPT | Performed by: INTERNAL MEDICINE

## 2023-12-21 PROCEDURE — 2500000001 HC RX 250 WO HCPCS SELF ADMINISTERED DRUGS (ALT 637 FOR MEDICARE OP): Performed by: NURSE PRACTITIONER

## 2023-12-21 PROCEDURE — 2500000001 HC RX 250 WO HCPCS SELF ADMINISTERED DRUGS (ALT 637 FOR MEDICARE OP): Performed by: INTERNAL MEDICINE

## 2023-12-21 PROCEDURE — 2500000004 HC RX 250 GENERAL PHARMACY W/ HCPCS (ALT 636 FOR OP/ED): Performed by: INTERNAL MEDICINE

## 2023-12-21 PROCEDURE — 93306 TTE W/DOPPLER COMPLETE: CPT

## 2023-12-21 PROCEDURE — 2500000001 HC RX 250 WO HCPCS SELF ADMINISTERED DRUGS (ALT 637 FOR MEDICARE OP): Performed by: PHARMACIST

## 2023-12-21 PROCEDURE — 99232 SBSQ HOSP IP/OBS MODERATE 35: CPT | Performed by: INTERNAL MEDICINE

## 2023-12-21 PROCEDURE — 82728 ASSAY OF FERRITIN: CPT | Mod: AHULAB | Performed by: NURSE PRACTITIONER

## 2023-12-21 PROCEDURE — 93306 TTE W/DOPPLER COMPLETE: CPT | Performed by: INTERNAL MEDICINE

## 2023-12-21 PROCEDURE — 83540 ASSAY OF IRON: CPT | Performed by: NURSE PRACTITIONER

## 2023-12-21 PROCEDURE — 83735 ASSAY OF MAGNESIUM: CPT | Performed by: NURSE PRACTITIONER

## 2023-12-21 PROCEDURE — 80048 BASIC METABOLIC PNL TOTAL CA: CPT | Performed by: INTERNAL MEDICINE

## 2023-12-21 RX ORDER — POTASSIUM CHLORIDE 20 MEQ/1
40 TABLET, EXTENDED RELEASE ORAL ONCE
Status: COMPLETED | OUTPATIENT
Start: 2023-12-21 | End: 2023-12-21

## 2023-12-21 RX ADMIN — Medication 1 TABLET: at 09:13

## 2023-12-21 RX ADMIN — ACETAMINOPHEN 650 MG: 325 TABLET ORAL at 22:14

## 2023-12-21 RX ADMIN — TRAMADOL HYDROCHLORIDE 50 MG: 50 TABLET, COATED ORAL at 20:55

## 2023-12-21 RX ADMIN — NYSTATIN 400000 UNITS: 100000 SUSPENSION ORAL at 17:55

## 2023-12-21 RX ADMIN — ACETAMINOPHEN 650 MG: 325 TABLET ORAL at 16:04

## 2023-12-21 RX ADMIN — FORMOTEROL FUMARATE DIHYDRATE 20 MCG: 20 SOLUTION RESPIRATORY (INHALATION) at 07:20

## 2023-12-21 RX ADMIN — PIPERACILLIN SODIUM AND TAZOBACTAM SODIUM 3.38 G: 3; .375 INJECTION, SOLUTION INTRAVENOUS at 16:04

## 2023-12-21 RX ADMIN — FUROSEMIDE 40 MG: 10 INJECTION, SOLUTION INTRAMUSCULAR; INTRAVENOUS at 06:36

## 2023-12-21 RX ADMIN — FORMOTEROL FUMARATE DIHYDRATE 20 MCG: 20 SOLUTION RESPIRATORY (INHALATION) at 20:29

## 2023-12-21 RX ADMIN — ALBUTEROL SULFATE 2.5 MG: 2.5 SOLUTION RESPIRATORY (INHALATION) at 15:04

## 2023-12-21 RX ADMIN — GABAPENTIN 400 MG: 400 CAPSULE ORAL at 09:13

## 2023-12-21 RX ADMIN — FUROSEMIDE 40 MG: 10 INJECTION, SOLUTION INTRAMUSCULAR; INTRAVENOUS at 16:04

## 2023-12-21 RX ADMIN — ALBUTEROL SULFATE 2.5 MG: 2.5 SOLUTION RESPIRATORY (INHALATION) at 07:19

## 2023-12-21 RX ADMIN — CALCIUM 1250 MG: 500 TABLET ORAL at 09:13

## 2023-12-21 RX ADMIN — HYDROXYCHLOROQUINE SULFATE 200 MG: 200 TABLET, FILM COATED ORAL at 09:13

## 2023-12-21 RX ADMIN — NYSTATIN 400000 UNITS: 100000 SUSPENSION ORAL at 20:54

## 2023-12-21 RX ADMIN — PIPERACILLIN SODIUM AND TAZOBACTAM SODIUM 3.38 G: 3; .375 INJECTION, SOLUTION INTRAVENOUS at 04:44

## 2023-12-21 RX ADMIN — HYDROXYCHLOROQUINE SULFATE 200 MG: 200 TABLET, FILM COATED ORAL at 20:54

## 2023-12-21 RX ADMIN — SPIRONOLACTONE 25 MG: 25 TABLET ORAL at 09:13

## 2023-12-21 RX ADMIN — BUDESONIDE INHALATION 0.5 MG: 0.5 SUSPENSION RESPIRATORY (INHALATION) at 20:29

## 2023-12-21 RX ADMIN — BUDESONIDE INHALATION 0.5 MG: 0.5 SUSPENSION RESPIRATORY (INHALATION) at 07:19

## 2023-12-21 RX ADMIN — TRAMADOL HYDROCHLORIDE 50 MG: 50 TABLET, COATED ORAL at 10:10

## 2023-12-21 RX ADMIN — NYSTATIN 400000 UNITS: 100000 SUSPENSION ORAL at 06:37

## 2023-12-21 RX ADMIN — LOSARTAN POTASSIUM 100 MG: 50 TABLET, FILM COATED ORAL at 09:13

## 2023-12-21 RX ADMIN — TRAMADOL HYDROCHLORIDE 50 MG: 50 TABLET, COATED ORAL at 02:00

## 2023-12-21 RX ADMIN — AZITHROMYCIN MONOHYDRATE 500 MG: 500 INJECTION, POWDER, LYOPHILIZED, FOR SOLUTION INTRAVENOUS at 09:13

## 2023-12-21 RX ADMIN — GABAPENTIN 400 MG: 400 CAPSULE ORAL at 16:04

## 2023-12-21 RX ADMIN — PIPERACILLIN SODIUM AND TAZOBACTAM SODIUM 3.38 G: 3; .375 INJECTION, SOLUTION INTRAVENOUS at 22:14

## 2023-12-21 RX ADMIN — PANTOPRAZOLE SODIUM 40 MG: 40 TABLET, DELAYED RELEASE ORAL at 06:37

## 2023-12-21 RX ADMIN — ACETAMINOPHEN 650 MG: 325 TABLET ORAL at 03:23

## 2023-12-21 RX ADMIN — APIXABAN 5 MG: 5 TABLET, FILM COATED ORAL at 09:13

## 2023-12-21 RX ADMIN — POTASSIUM CHLORIDE 40 MEQ: 1500 TABLET, EXTENDED RELEASE ORAL at 09:13

## 2023-12-21 RX ADMIN — NYSTATIN 400000 UNITS: 100000 SUSPENSION ORAL at 13:37

## 2023-12-21 RX ADMIN — Medication 1 CAPSULE: at 21:00

## 2023-12-21 RX ADMIN — GABAPENTIN 400 MG: 400 CAPSULE ORAL at 20:55

## 2023-12-21 RX ADMIN — APIXABAN 5 MG: 5 TABLET, FILM COATED ORAL at 20:55

## 2023-12-21 RX ADMIN — ALBUTEROL SULFATE 2.5 MG: 2.5 SOLUTION RESPIRATORY (INHALATION) at 20:28

## 2023-12-21 RX ADMIN — Medication 1 CAPSULE: at 09:14

## 2023-12-21 RX ADMIN — PIPERACILLIN SODIUM AND TAZOBACTAM SODIUM 3.38 G: 3; .375 INJECTION, SOLUTION INTRAVENOUS at 10:59

## 2023-12-21 ASSESSMENT — COGNITIVE AND FUNCTIONAL STATUS - GENERAL
HELP NEEDED FOR BATHING: A LITTLE
STANDING UP FROM CHAIR USING ARMS: A LOT
MOVING TO AND FROM BED TO CHAIR: A LOT
DRESSING REGULAR UPPER BODY CLOTHING: A LITTLE
TOILETING: A LITTLE
WALKING IN HOSPITAL ROOM: A LITTLE
MOVING FROM LYING ON BACK TO SITTING ON SIDE OF FLAT BED WITH BEDRAILS: A LITTLE
DRESSING REGULAR LOWER BODY CLOTHING: A LITTLE
MOBILITY SCORE: 14
DAILY ACTIVITIY SCORE: 19
TURNING FROM BACK TO SIDE WHILE IN FLAT BAD: A LOT
PERSONAL GROOMING: A LITTLE
CLIMB 3 TO 5 STEPS WITH RAILING: A LOT

## 2023-12-21 ASSESSMENT — PAIN SCALES - GENERAL
PAINLEVEL_OUTOF10: 9
PAINLEVEL_OUTOF10: 9
PAINLEVEL_OUTOF10: 10 - WORST POSSIBLE PAIN
PAINLEVEL_OUTOF10: 6

## 2023-12-21 ASSESSMENT — PAIN - FUNCTIONAL ASSESSMENT
PAIN_FUNCTIONAL_ASSESSMENT: 0-10
PAIN_FUNCTIONAL_ASSESSMENT: 0-10

## 2023-12-21 NOTE — PROGRESS NOTES
Genesis Garcia is a 78 y.o. female on day 1 of admission presenting with Acute pneumonia.      Subjective     Patient seen and examined  Awake in bed, in NAD  C/o 10/10 severe low back pain, reports it started after she fell at home  Reports breathing has improved, currently on 2L NC    Objective     Last Recorded Vitals  /51 (BP Location: Left arm, Patient Position: Lying)   Pulse 75   Temp 36.1 °C (97 °F) (Temporal)   Resp 18   Wt 102 kg (224 lb)   SpO2 97%   Intake/Output last 3 Shifts:    Intake/Output Summary (Last 24 hours) at 12/21/2023 1744  Last data filed at 12/21/2023 1212  Gross per 24 hour   Intake 590 ml   Output 1550 ml   Net -960 ml       Admission Weight  Weight: 103 kg (227 lb 6.4 oz) (12/20/23 0430)    Daily Weight  12/21/23 : 102 kg (224 lb)    Image Results  Transthoracic Echo (TTE) [facility], [address] [place] [address]               Tel [phone] and Fax [phone]    TRANSTHORACIC ECHOCARDIOGRAM REPORT       Patient Name:     GENESIS GARCIA Reading Physician:  Bright Jaramillo DO  Study Date:       12/21/2023          Ordering Provider:  Bright JARAMILLO  MRN/PID:          [id number]            Fellow:  Accession#:       [id number]        Nurse:  Date of           1945 / 78      Sonographer:        Kalen Mark RDCS  Birth/Age:        years  Gender:           F                   Additional Staff:  Height:           157.48 cm           Admit Date:         12/20/2023  Weight:           102.97 kg           Admission Status:   Inpatient - Routine  BSA:              2.02 m2             Encounter#:         [id number]                                        Department          [facility]                                        Location:           Invasive  Blood Pressure: 105 /51 mmHg    Study Type:     TRANSTHORACIC ECHO (TTE) COMPLETE  Diagnosis/ICD: Encounter for preprocedural cardiovascular examination-Z01.810  Indication:    GEIGER  CPT Code:      Echo Complete w Full Doppler-20381    Patient History:  Smoker:            Former.  Pertinent History: HTN, COPD and Hyperlipidemia. S/P ablation 2022.    Study Detail: The following Echo studies were performed: M-Mode, 2D and Doppler.                Technically challenging study due to body habitus and patient                lying in supine position.       PHYSICIAN INTERPRETATION:  Left Ventricle: The left ventricular systolic function is normal, with an estimated ejection fraction of 55-60%. There are no regional wall motion abnormalities. The left ventricular cavity size is normal. Left ventricular diastolic filling was indeterminate.  Left Atrium: The left atrium is normal in size.  Right Ventricle: The right ventricle is normal in size. There is normal right ventricular global systolic function.  Right Atrium: The right atrium is normal in size.  Aortic Valve: The aortic valve appears structurally normal. There is mild aortic valve cusp calcification. There is trivial aortic valve regurgitation. The peak instantaneous gradient of the aortic valve is 7.6 mmHg. The mean gradient of the aortic valve is 4.4 mmHg.  Mitral Valve: The mitral valve is normal in structure. There is trace mitral valve regurgitation.  Tricuspid Valve: The tricuspid valve is structurally normal. There is trace to mild tricuspid regurgitation. The Doppler estimated RVSP is slightly elevated at 35.5 mmHg.  Pulmonic Valve: The pulmonic valve is structurally normal. There is physiologic pulmonic valve regurgitation.  Pericardium: There is no pericardial effusion noted.  Aorta: The aortic root is abnormal. There is mild dilatation of the ascending aorta. There is mild dilatation of the aortic root.  Systemic Veins: The inferior vena cava appears mildly dilated.  In comparison to the previous  echocardiogram(s): Compared with study from 10/19/2022,.       CONCLUSIONS:   1. Left ventricular systolic function is normal with a 55-60% estimated ejection fraction.   2. Slightly elevated RVSP.    QUANTITATIVE DATA SUMMARY:  2D MEASUREMENTS:                            Normal Ranges:  IVSd:          1.72 cm    (0.6-1.1cm)  LVPWd:         0.75 cm    (0.6-1.1cm)  LVIDd:         4.69 cm    (3.9-5.9cm)  LVIDs:         3.65 cm  LV Mass Index: 108.9 g/m2  LV % FS        22.3 %    LA VOLUME:                               Normal Ranges:  LA Vol A4C:        42.1 ml   (22+/-6mL/m2)  LA Vol Index A4C:  20.9ml/m2  LA Area A4C:       16.8 cm2  LA Major Axis A4C: 5.7 cm  LA Vol A4C:        32.3 ml    RA VOLUME BY A/L METHOD:                        Normal Ranges:  RA Area A4C: 13.7 cm2    M-MODE MEASUREMENTS:               Normal Ranges:  LAs: 2.95 cm (2.7-4.0cm)    AORTA MEASUREMENTS:                       Normal Ranges:  Ao Sinus, d: 3.90 cm (2.1-3.5cm)  Ao STJ, d:   3.00 cm (1.7-3.4cm)  Asc Ao, d:   3.70 cm (2.1-3.4cm)    LV SYSTOLIC FUNCTION BY 2D PLANIMETRY (MOD):                      Normal Ranges:  EF-A4C View: 68.5 % (>=55%)    LV DIASTOLIC FUNCTION:                                Normal Ranges:  MV Peak E:        0.64 m/s    (0.7-1.2 m/s)  MV Peak A:        0.64 m/s    (0.42-0.7 m/s)  E/A Ratio:        1.01        (1.0-2.2)  MV lateral e'     0.07 m/s  MV medial e'      0.05 m/s  MV A Dur:         168.40 msec  PulmV Sys Dipak:    44.07 cm/s  PulmV Wilde Dipak:   29.37 cm/s  PulmV S/D Dipak:    1.50  PulmV A Revs Dipak: 26.56 cm/s  PulmV A Revs Dur: 166.09 msec    MITRAL VALVE:                  Normal Ranges:  MV DT: 132 msec (150-240msec)    AORTIC VALVE:                                    Normal Ranges:  AoV Vmax:                1.37 m/s (<=1.7m/s)  AoV Peak P.6 mmHg (<20mmHg)  AoV Mean P.4 mmHg (1.7-11.5mmHg)  LVOT Max Dipak:            0.65 m/s (<=1.1m/s)  AoV VTI:                 30.27 cm  (18-25cm)  LVOT VTI:                14.16 cm  LVOT Diameter:           2.10 cm  (1.8-2.4cm)  AoV Area, VTI:           1.62 cm2 (2.5-5.5cm2)  AoV Area,Vmax:           1.64 cm2 (2.5-4.5cm2)  AoV Dimensionless Index: 0.47       RIGHT VENTRICLE:  RV s' 0.13 m/s    TRICUSPID VALVE/RVSP:                              Normal Ranges:  Peak TR Velocity: 2.85 m/s  Est. RA Pressure: 3 mmHg  RV Syst Pressure: 35.5 mmHg (< 30mmHg)  IVC Diam:         2.40 cm    PULMONIC VALVE:                          Normal Ranges:  PV Accel Time: 74 msec  (>120ms)  PV Max Dipak:    0.8 m/s  (0.6-0.9m/s)  PV Max P.9 mmHg    Pulmonary Veins:  PulmV A Revs Dur: 166.09 msec  PulmV A Revs Dipak: 26.56 cm/s  PulmV Wilde Dipak:   29.37 cm/s  PulmV S/D Dipak:    1.50  PulmV Sys Dipak:    44.07 cm/s       [id number] [address]   Electronically signed on 2023 at 5:41:21 PM       ** Final **      Physical Exam  Constitutional:       Appearance: Normal appearance. She is obese.   HENT:      Head: Normocephalic and atraumatic.      Right Ear: Tympanic membrane normal.      Left Ear: Tympanic membrane normal.      Nose: Nose normal.      Mouth/Throat:      Mouth: Mucous membranes are moist.      Pharynx: Oropharynx is clear.   Eyes:      Extraocular Movements: Extraocular movements intact.      Conjunctiva/sclera: Conjunctivae normal.      Pupils: Pupils are equal, round, and reactive to light.   Cardiovascular:      Rate and Rhythm: Normal rate and regular rhythm.      Pulses: Normal pulses.      Heart sounds: Normal heart sounds.   Pulmonary:      Effort: Pulmonary effort is normal.      Comments: Diminished   Abdominal:      General: Abdomen is flat.      Palpations: Abdomen is soft.   Musculoskeletal:         General: Normal range of motion.      Cervical back: Normal range of motion and neck supple.      Right lower leg: Edema present.      Left lower leg: Edema present.   Skin:     General: Skin is warm.      Capillary Refill: Capillary refill takes  less than 2 seconds.      Comments: LLE erythema has improved, non tender, chronic wound to LLE lateral aspect    Neurological:      General: No focal deficit present.      Mental Status: She is alert and oriented to person, place, and time.   Psychiatric:         Mood and Affect: Mood normal.           Relevant Results    Results for orders placed or performed during the hospital encounter of 12/20/23 (from the past 24 hour(s))   CBC   Result Value Ref Range    WBC 12.4 (H) 4.4 - 11.3 x10*3/uL    nRBC 0.0 0.0 - 0.0 /100 WBCs    RBC 3.51 (L) 4.00 - 5.20 x10*6/uL    Hemoglobin 7.3 (L) 12.0 - 16.0 g/dL    Hematocrit 24.7 (L) 36.0 - 46.0 %    MCV 70 (L) 80 - 100 fL    MCH 20.8 (L) 26.0 - 34.0 pg    MCHC 29.6 (L) 32.0 - 36.0 g/dL    RDW 18.2 (H) 11.5 - 14.5 %    Platelets 179 150 - 450 x10*3/uL   Basic metabolic panel   Result Value Ref Range    Glucose 110 (H) 74 - 99 mg/dL    Sodium 131 (L) 136 - 145 mmol/L    Potassium 3.3 (L) 3.5 - 5.3 mmol/L    Chloride 97 (L) 98 - 107 mmol/L    Bicarbonate 25 21 - 32 mmol/L    Anion Gap 12 10 - 20 mmol/L    Urea Nitrogen 43 (H) 6 - 23 mg/dL    Creatinine 1.53 (H) 0.50 - 1.05 mg/dL    eGFR 35 (L) >60 mL/min/1.73m*2    Calcium 7.6 (L) 8.6 - 10.3 mg/dL   Magnesium   Result Value Ref Range    Magnesium 2.20 1.60 - 2.40 mg/dL   Iron and TIBC   Result Value Ref Range    Iron <10 (L) 35 - 150 ug/dL    UIBC 269 110 - 370 ug/dL    TIBC      % Saturation     Ferritin   Result Value Ref Range    Ferritin 109 8 - 150 ng/mL   Transthoracic Echo (TTE) Complete   Result Value Ref Range    AV pk mariah 1.37     AV mn grad 4.4     LVOT diam 2.10     MV E/A ratio 1.01     RV free wall pk S' 13.00     LVIDd 4.69     RVSP 35.5     Aortic Valve Area by Continuity of VTI 1.62     Aortic Valve Area by Continuity of Peak Velocity 1.64     AV pk grad 7.6     LV A4C EF 68.5        Scheduled medications  albuterol, 2.5 mg, nebulization, TID  apixaban, 5 mg, oral, BID  azithromycin, 500 mg, intravenous,  Daily  B complex-vitamin C, 1 tablet, oral, Daily  budesonide, 0.5 mg, nebulization, BID   And  formoterol, 20 mcg, nebulization, BID  calcium carbonate, 1,250 mg, oral, Daily  furosemide, 40 mg, intravenous, BID  gabapentin, 400 mg, oral, TID  hydroxychloroquine, 200 mg, oral, BID  lactobacillus acidophilus, 1 capsule, oral, BID  losartan, 100 mg, oral, Daily  melatonin, 3 mg, oral, Daily  nystatin, 4 mL, Swish & Swallow, 4x daily  pantoprazole, 40 mg, oral, Daily before breakfast   Or  pantoprazole, 40 mg, intravenous, Daily before breakfast  piperacillin-tazobactam, 3.375 g, intravenous, q6h  polyethylene glycol, 17 g, oral, Daily  psyllium, 1 packet, oral, Daily  spironolactone, 25 mg, oral, Daily      Continuous medications     PRN medications  PRN medications: acetaminophen **OR** acetaminophen **OR** acetaminophen, albuterol, guaiFENesin, ondansetron **OR** ondansetron, traMADol    Assessment/Plan      Ms. Garcia is a 79yo female with past medical history significant for HTN, DLD, Atrial fibrillation s/p ablation, COPD/asthma, Chronic venous insufficiency, chronic back pain, UCTD (positive MAURICE, anticentromere antibody and arthralgia), and anemia, who initially presented to [facility] on 12/18 with c/o shortness of breath despite outpatient treatment for suspected pneumonia.     Acute respiratory failure  Community-acquired pneumonia  Asthma/COPD, not in acute exacerbation  -continue IV atb  -supplemental 02 as needed  -aerosols prn and scheduled     Oral candidiasis  -Nystatin     Fluid overload, ?CHF  -cardiology following and diuresing, echocardiogram pending     LLE cellulitis  LLE vascular ulcer, chronic venous insufficiency   -continue atb  -wound care consult     Severe lower back pain 2/2 reported fall at home  Chronic back pain/post lami syndrome    -prn analgesics, continue gabapentin   -CT L spine ordered     HTN  -stable, continue current medications and monitor     HLD  -continue statin     Atrial  fibrillation s/p ablation  -continue Eliquis  - monitor on tele     Chronic anemia  -monitor and transfuse as needed     UCTD  -continue Methotrexate     DVT prophylaxis  -on eliquis     Discharge planning  -TBD, PT/OT eval     Labs/Testing reviewed    Interdisciplinary team rounding completed    NP discussed plan and lab/testing results with Dr. Lind     * 35 minutes total spent on patient's care today; >50% time spent on counseling/coordination of care      DENISA Jung-CNP

## 2023-12-21 NOTE — PROGRESS NOTES
Genesis Garcia is a 78 y.o. female     Patient complains of worsening back pain  Started after she had a mechanical fall on Monday last week  Radiates across her back  No numbness    Review of Systems     Constitutional: no fever, no chills,   Cardiovascular: no chest pain   Respiratory: Shortness of breath  Gastrointestinal: no abdominal pain, no constipation, no melena, no nausea, no diarrhea, no vomiting and no blood in stools.   Neurological: no headache,   All other systems have been reviewed and are negative for complaint.       Vitals:    12/21/23 1240   BP: 105/51   Pulse: 75   Resp: 18   Temp:    SpO2: 98%        Scheduled medications  albuterol, 2.5 mg, nebulization, TID  apixaban, 5 mg, oral, BID  azithromycin, 500 mg, intravenous, Daily  B complex-vitamin C, 1 tablet, oral, Daily  budesonide, 0.5 mg, nebulization, BID   And  formoterol, 20 mcg, nebulization, BID  calcium carbonate, 1,250 mg, oral, Daily  furosemide, 40 mg, intravenous, BID  gabapentin, 400 mg, oral, TID  hydroxychloroquine, 200 mg, oral, BID  lactobacillus acidophilus, 1 capsule, oral, BID  losartan, 100 mg, oral, Daily  melatonin, 3 mg, oral, Daily  nystatin, 4 mL, Swish & Swallow, 4x daily  pantoprazole, 40 mg, oral, Daily before breakfast   Or  pantoprazole, 40 mg, intravenous, Daily before breakfast  piperacillin-tazobactam, 3.375 g, intravenous, q6h  polyethylene glycol, 17 g, oral, Daily  psyllium, 1 packet, oral, Daily  spironolactone, 25 mg, oral, Daily      Continuous medications     PRN medications  PRN medications: acetaminophen **OR** acetaminophen **OR** acetaminophen, albuterol, guaiFENesin, ondansetron **OR** ondansetron, traMADol    Lab Review   Results from last 7 days   Lab Units 12/21/23  0526 12/20/23  1442   WBC AUTO x10*3/uL 12.4* 12.6*   HEMOGLOBIN g/dL 7.3* 7.5*   HEMATOCRIT % 24.7* 25.0*   PLATELETS AUTO x10*3/uL 179 191     Results from last 7 days   Lab Units 12/21/23  0526 12/20/23  1442 12/15/23  1017    SODIUM mmol/L 131* 132* 143   POTASSIUM mmol/L 3.3* 3.8 4.3   CHLORIDE mmol/L 97* 100 106   CO2 mmol/L 25 25 30   BUN mg/dL 43* 44* 32*   CREATININE mg/dL 1.53* 1.51* 1.42*   CALCIUM mg/dL 7.6* 7.8* 9.8   GLUCOSE mg/dL 110* 118* 92            XR chest 2 views   Final Result   Left-greater-than-right basilar infiltrates or atelectasis with small   left pleural effusion. Follow-up to ensure resolution after   appropriate treatment recommended.        MACRO:   None.        Signed by: Jenifer Friedman 12/20/2023 11:52 AM   Dictation workstation:   [id number]      Transthoracic Echo (TTE) Complete    (Results Pending)   CT lumbar spine wo IV contrast    (Results Pending)         Physical Exam     Constitutional   General appearance: Alert     Pulmonary   Respiratory assessment: Crackles  Cardiovascular   Auscultation of heart: Apical pulse normal, heart rate and rhythm normal, normal S1 and S2, no murmurs and no pericardial rub.    Exam for edema: 1+ edema  Abdomen   Abdominal Exam: No bruits, normal bowel sounds, soft, non-tender, no abdominal mass palpated.    Liver and Spleen exam: No hepato-splenomegaly.   Musculoskeletal   Examination of gait: ROM intact  Skin inspection: Normal skin color and pigmentation, normal skin turgor and no visible rash.   Neurologic   Cranial nerves: Nerves 2-12 were intact, no focal neuro defects.     Assessment/Plan      #Acute respiratory failure with hypoxia  #Community-acquired pneumonia  #COPD  Continue antibiotics and DuoNebs     #Acute on chronic diastolic congestive heart failure  IV Lasix  Strict I's and O's with daily weights and electrolytes    #Acute back pain s/p fall  Will check imaging  Pain control  Physical therapy     #Possible cellulitis of left leg  Already improved        #Atrial fibrillation  Rate controlled  Continue Eliquis     #Hypertension  Continue home medications and hold for systolic less than 100     #Dyslipidemia  Continue statins with a target LDL of less  than 70

## 2023-12-21 NOTE — PROGRESS NOTES
"Subjective Data:  Patient seen and examined, chart reviewed  -- Breathing easier, urinating well  -- Biggest complaint is back pain         Objective Data:  Last Recorded Vitals:  Vitals:    12/21/23 0720 12/21/23 0722 12/21/23 1240 12/21/23 1504   BP:  111/55 105/51    BP Location:  Left arm Left arm    Patient Position:  Lying Lying    Pulse:  67 75    Resp:  18 18    Temp:       TempSrc:       SpO2: 96% 96% 98% 97%   Weight:   102 kg (224 lb)    Height:   1.574 m (5' 1.97\")        Last Labs:  Results from last 7 days   Lab Units 12/21/23  0526 12/20/23  1442   WBC AUTO x10*3/uL 12.4* 12.6*   HEMOGLOBIN g/dL 7.3* 7.5*   HEMATOCRIT % 24.7* 25.0*   PLATELETS AUTO x10*3/uL 179 191     Results from last 7 days   Lab Units 12/21/23  0526 12/20/23  1442 12/15/23  1017   SODIUM mmol/L 131* 132* 143   POTASSIUM mmol/L 3.3* 3.8 4.3   CHLORIDE mmol/L 97* 100 106   CO2 mmol/L 25 25 30   BUN mg/dL 43* 44* 32*   CREATININE mg/dL 1.53* 1.51* 1.42*   CALCIUM mg/dL 7.6* 7.8* 9.8   GLUCOSE mg/dL 110* 118* 92           TROPHS   Date/Time Value Ref Range Status   01/17/2023 01:45 PM 10 0 - 13 ng/L Final     Comment:     .  Less than 99th percentile of normal range cutoff-  Female and children under 18 years old <14 ng/L; Male <21 ng/L: Negative  Repeat testing should be performed if clinically indicated.   .  Female and children under 18 years old 14-50 ng/L; Male 21-50 ng/L:  Consistent with possible cardiac damage and possible increased clinical   risk. Serial measurements may help to assess extent of myocardial damage.   .  >50 ng/L: Consistent with cardiac damage, increased clinical risk and  myocardial infarction. Serial measurements may help assess extent of   myocardial damage.   .   NOTE: Children less than 1 year old may have higher baseline troponin   levels and results should be interpreted in conjunction with the overall   clinical context.   .  NOTE: Troponin I testing is performed using a different   testing " methodology at [facility] than at other   [facility]. Direct result comparisons should only   be made within the same method.     01/10/2023 11:07 PM 9 0 - 13 ng/L Final     Comment:     .  Less than 99th percentile of normal range cutoff-  Female and children under 18 years old <14 ng/L; Male <21 ng/L: Negative  Repeat testing should be performed if clinically indicated.   .  Female and children under 18 years old 14-50 ng/L; Male 21-50 ng/L:  Consistent with possible cardiac damage and possible increased clinical   risk. Serial measurements may help to assess extent of myocardial damage.   .  >50 ng/L: Consistent with cardiac damage, increased clinical risk and  myocardial infarction. Serial measurements may help assess extent of   myocardial damage.   .   NOTE: Children less than 1 year old may have higher baseline troponin   levels and results should be interpreted in conjunction with the overall   clinical context.   .  NOTE: Troponin I testing is performed using a different   testing methodology at [facility] than at other   [facility]. Direct result comparisons should only   be made within the same method.     01/10/2023 10:07 PM 12 0 - 13 ng/L Final     Comment:     .  Less than 99th percentile of normal range cutoff-  Female and children under 18 years old <14 ng/L; Male <21 ng/L: Negative  Repeat testing should be performed if clinically indicated.   .  Female and children under 18 years old 14-50 ng/L; Male 21-50 ng/L:  Consistent with possible cardiac damage and possible increased clinical   risk. Serial measurements may help to assess extent of myocardial damage.   .  >50 ng/L: Consistent with cardiac damage, increased clinical risk and  myocardial infarction. Serial measurements may help assess extent of   myocardial damage.   .   NOTE: Children less than 1 year old may have higher baseline troponin   levels and results should be interpreted in conjunction with  "the overall   clinical context.   .  NOTE: Troponin I testing is performed using a different   testing methodology at [facility] than at other   system hospitals. Direct result comparisons should only   be made within the same method.       BNP   Date/Time Value Ref Range Status   12/20/2023 02:42 PM 90 0 - 99 pg/mL Final   01/17/2023 01:45  0 - 99 pg/mL Final     Comment:     .  <100 pg/mL - Heart failure unlikely  100-299 pg/mL - Intermediate probability of acute heart  .               failure exacerbation. Correlate with clinical  .               context and patient history.    >=300 pg/mL - Heart Failure likely. Correlate with clinical  .               context and patient history.  BNP testing is performed using different testing   methodology at [facility] than at other   [facility]. Direct result comparisons should   only be made within the same method.     01/10/2023 10:07 PM 35 0 - 99 pg/mL Final     Comment:     .  <100 pg/mL - Heart failure unlikely  100-299 pg/mL - Intermediate probability of acute heart  .               failure exacerbation. Correlate with clinical  .               context and patient history.    >=300 pg/mL - Heart Failure likely. Correlate with clinical  .               context and patient history.  BNP testing is performed using different testing   methodology at [facility] than at other   [facility] hospitals. Direct result comparisons should   only be made within the same method.        Last I/O:  I/O last 3 completed shifts:  In: 595 (5.9 mL/kg) [P.O.:595]  Out: 1300 (12.8 mL/kg) [Urine:1300 (0.4 mL/kg/hr)]  Weight: 101.7 kg     Past Cardiology Tests (Last 3 Years):  EKG:  No results found for this or any previous visit from the past 1095 days.    Echo:  No results found for this or any previous visit from the past 1095 days.    Ejection Fractions:  No results found for: \"EF\"  Cath:  No results found for this or any previous visit " "from the past 1095 days.    Stress Test:  No results found for this or any previous visit from the past 1095 days.    Cardiac Imaging:  No results found for this or any previous visit from the past 1095 days.      Inpatient Medications:  Scheduled medications   Medication Dose Route Frequency    albuterol  2.5 mg nebulization TID    apixaban  5 mg oral BID    azithromycin  500 mg intravenous Daily    B complex-vitamin C  1 tablet oral Daily    budesonide  0.5 mg nebulization BID    And    formoterol  20 mcg nebulization BID    calcium carbonate  1,250 mg oral Daily    furosemide  40 mg intravenous BID    gabapentin  400 mg oral TID    hydroxychloroquine  200 mg oral BID    lactobacillus acidophilus  1 capsule oral BID    losartan  100 mg oral Daily    melatonin  3 mg oral Daily    nystatin  4 mL Swish & Swallow 4x daily    pantoprazole  40 mg oral Daily before breakfast    Or    pantoprazole  40 mg intravenous Daily before breakfast    piperacillin-tazobactam  3.375 g intravenous q6h    polyethylene glycol  17 g oral Daily    psyllium  1 packet oral Daily    spironolactone  25 mg oral Daily     PRN medications   Medication    acetaminophen    Or    acetaminophen    Or    acetaminophen    albuterol    guaiFENesin    ondansetron    Or    ondansetron    traMADol     Continuous Medications   Medication Dose Last Rate       Physical Exam:  /51 (BP Location: Left arm, Patient Position: Lying)   Pulse 75   Temp 36.1 °C (97 °F) (Temporal)   Resp 18   Ht 1.574 m (5' 1.97\")   Wt 102 kg (224 lb)   SpO2 97%   BMI 41.01 kg/m²   Net IO Since Admission: -665 mL [12/21/23 1715]    Intake/Output Summary (Last 24 hours) at 12/21/2023 1715  Last data filed at 12/21/2023 1212  Gross per 24 hour   Intake 945 ml   Output 1750 ml   Net -805 ml       Constitutional:       Appearance: Note in distress.  Supplemental Oxygen in place   Eyes:      Pupils: Pupils are equal, round, and reactive to light.   HENT:    Mouth/Throat:     " " Pharynx: Oropharynx is clear.   Neck:      Thyroid: Thyroid normal.      Vascular: JVD elevated with 10 cm of water.      Lymphadenopathy: No cervical adenopathy.   Pulmonary:      Effort: Pulmonary effort is normal.      Breath sounds: Bibasilar Rhonchi present. Bibasilar Rales present.   Chest:      Chest wall: Not tender to palpatation.   Cardiovascular:      Normal rate. Regular rhythm.      Murmurs: There is no murmur.      Comments: Chronic trophic changes noted of the lower extremities consistent with longstanding venous congestion/lymphedema  Pulses:     Intact distal pulses.   Edema:     Peripheral edema present.     Pretibial: bilateral 2+ edema of the pretibial area.     Ankle: bilateral 2+ edema of the ankle.  Abdominal:      General: Bowel sounds are normal.   Musculoskeletal: Normal range of motion.      Cervical back: Normal range of motion. Skin:     General: Skin is warm and dry.            Assessment/Plan   Genesis Garcia Is a 78 y.o. 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  In the setting of presumed bacterial pneumonia and lower extremity cellulitis.  Cardiology has been consulted for \"CHF\".     Patient originally presented to [facility] where she was felt to be suffering from pneumonia as evidenced by 2 view chest x-ray with left lung base opacity.  She was felt to have sepsis criteria and was volume resuscitated.  It is unclear how much total volume she received.  She was subsequently started on antibiotics and transferred for further care.  When seen today, she reports feeling \"blah\" and reports persistent shortness of breath.  She is unable to lie flat but she attributes this more " "to \"my back breaking on me\".     She does not appear overly Volume Up and her chronic Lower Extremity Edema does not seem worsend by her accord.      We will obtain a transthoracic echocardiogram for structural evaluation including ejection fraction, assessment of regional wall motion abnormalities or valvular disease, and further evaluation of hemodynamics.     I would plan to diurese with Lasix 40 mg IV twice daily for goal diuresis of 1.5-2 L per 24 hour period.  Electrolytes will need to be monitored closely and repleted.  Patient will need accurate I/O's and daily standing weights. Patient should be on a salt restricted diet. Free water should be restricted to 1500 cc per day. I would go with a slightly lower diuresis goal of maintaining a negative fluid balance.            Peripheral IV 20 G Left;Proximal;Ventral Forearm (Active)   Site Assessment Clean;Dry;Intact 12/21/23 1606   Dressing Status Clean;Dry 12/21/23 1606   Number of days:        Code Status:  Full Code    I spent 35  minutes in the professional and overall care of this patient.        Cullen Morales, DO  "

## 2023-12-21 NOTE — CONSULTS
Wound Care Consult     Visit Date: 12/21/2023      Patient Name: Genesis Garcia         MRN: [id number]           YOB: 1945     Reason for Consult: Assessment complete. Additional supplies left at bedside.         Wound History: Patient reports chronic ulcers to [facility]. Patient is followed by Dr. Atkinson for wound care       Pertinent Labs:   Albumin   Date Value Ref Range Status   12/15/2023 3.9 3.4 - 5.0 g/dL Final       Wound Assessment:  Wound 04/28/23 Venous Ulcer Leg Dorsal;Left;Lateral;Lower (Active)   Wound Image   12/21/23 1507   Site Assessment Pink 12/21/23 1507   Non-staged Wound Description Partial thickness 12/21/23 1507   Shape Round 12/21/23 1507   Wound Length (cm) 1 cm 12/21/23 1507   Wound Width (cm) 1 cm 12/21/23 1507   Wound Surface Area (cm^2) 1 cm^2 12/21/23 1507   Wound Depth (cm) 0.2 cm 12/21/23 1507   Wound Volume (cm^3) 0.2 cm^3 12/21/23 1507   Wound Healing % 71 12/21/23 1507       Wound 09/11/23 Incision Throat Anterior (Active)       Wound Team Summary Assessment: Notified Judy Gomez CNP of wound care recommendations     RLE lateral- chornic vascular ulcer  Irrigate with normal saline or wound cleanser, Pat dry.  Apply no sting skin barrier (cavilon) to marj wound   Apply single layer of  xeroform dressing  Cover with Mepilex border dressing   Secure with Tubigrip size F (May remove for hygiene and reapply)   Change every other day and as needed       Wound Team Plan: Please re-consult wound/ostomy care for any changes or concerns    While in bed patient should only be on one fitted sheet, and one chux. Please, do not use brief while patient is resting in bed. Elevate heels off the bed surface at all times. Turn and reposition at least every 2 hours.     Thank you for this consultations, while inpatient please contact with any questions or changes in condition.        Maria Dolores Piña RN BSN,[facility],CWOCN  [phone]   12/21/2023  4:11 PM

## 2023-12-21 NOTE — CARE PLAN
The patient's goals for the shift include sleep    The clinical goals for the shift include wound care, antibx      Problem: Pain - Adult  Goal: Verbalizes/displays adequate comfort level or baseline comfort level  Outcome: Progressing     Problem: Safety - Adult  Goal: Free from fall injury  Outcome: Progressing     Problem: Discharge Planning  Goal: Discharge to home or other facility with appropriate resources  Outcome: Progressing     Problem: Chronic Conditions and Co-morbidities  Goal: Patient's chronic conditions and co-morbidity symptoms are monitored and maintained or improved  Outcome: Progressing     Problem: Fall/Injury  Goal: Not fall by end of shift  Outcome: Progressing  Goal: Be free from injury by end of the shift  Outcome: Progressing  Goal: Verbalize understanding of personal risk factors for fall in the hospital  Outcome: Progressing  Goal: Verbalize understanding of risk factor reduction measures to prevent injury from fall in the home  Outcome: Progressing  Goal: Use assistive devices by end of the shift  Outcome: Progressing  Goal: Pace activities to prevent fatigue by end of the shift  Outcome: Progressing     Problem: Pain  Goal: Takes deep breaths with improved pain control throughout the shift  Outcome: Progressing  Goal: Turns in bed with improved pain control throughout the shift  Outcome: Progressing  Goal: Walks with improved pain control throughout the shift  Outcome: Progressing  Goal: Performs ADL's with improved pain control throughout shift  Outcome: Progressing  Goal: Participates in PT with improved pain control throughout the shift  Outcome: Progressing  Goal: Free from opioid side effects throughout the shift  Outcome: Progressing  Goal: Free from acute confusion related to pain meds throughout the shift  Outcome: Progressing

## 2023-12-22 ENCOUNTER — APPOINTMENT (OUTPATIENT)
Dept: RADIOLOGY | Facility: HOSPITAL | Age: 78
DRG: 193 | End: 2023-12-22
Payer: MEDICARE

## 2023-12-22 ENCOUNTER — APPOINTMENT (OUTPATIENT)
Dept: OTOLARYNGOLOGY | Facility: CLINIC | Age: 78
End: 2023-12-22
Payer: MEDICARE

## 2023-12-22 LAB
ANION GAP SERPL CALC-SCNC: 14 MMOL/L (ref 10–20)
BUN SERPL-MCNC: 41 MG/DL (ref 6–23)
CALCIUM SERPL-MCNC: 7.8 MG/DL (ref 8.6–10.3)
CHLORIDE SERPL-SCNC: 98 MMOL/L (ref 98–107)
CO2 SERPL-SCNC: 22 MMOL/L (ref 21–32)
CREAT SERPL-MCNC: 1.53 MG/DL (ref 0.5–1.05)
ERYTHROCYTE [DISTWIDTH] IN BLOOD BY AUTOMATED COUNT: 18.5 % (ref 11.5–14.5)
GFR SERPL CREATININE-BSD FRML MDRD: 35 ML/MIN/1.73M*2
GLUCOSE SERPL-MCNC: 120 MG/DL (ref 74–99)
HCT VFR BLD AUTO: 27.7 % (ref 36–46)
HGB BLD-MCNC: 7.6 G/DL (ref 12–16)
MAGNESIUM SERPL-MCNC: 2.1 MG/DL (ref 1.6–2.4)
MCH RBC QN AUTO: 21 PG (ref 26–34)
MCHC RBC AUTO-ENTMCNC: 27.4 G/DL (ref 32–36)
MCV RBC AUTO: 77 FL (ref 80–100)
NRBC BLD-RTO: 0 /100 WBCS (ref 0–0)
PLATELET # BLD AUTO: 175 X10*3/UL (ref 150–450)
POTASSIUM SERPL-SCNC: 3.6 MMOL/L (ref 3.5–5.3)
RBC # BLD AUTO: 3.62 X10*6/UL (ref 4–5.2)
SODIUM SERPL-SCNC: 130 MMOL/L (ref 136–145)
WBC # BLD AUTO: 11 X10*3/UL (ref 4.4–11.3)

## 2023-12-22 PROCEDURE — 2500000001 HC RX 250 WO HCPCS SELF ADMINISTERED DRUGS (ALT 637 FOR MEDICARE OP): Performed by: NURSE PRACTITIONER

## 2023-12-22 PROCEDURE — 1150000001 HC HOSPICE PRIVATE ROOM DAILY

## 2023-12-22 PROCEDURE — 83735 ASSAY OF MAGNESIUM: CPT | Performed by: NURSE PRACTITIONER

## 2023-12-22 PROCEDURE — 2500000001 HC RX 250 WO HCPCS SELF ADMINISTERED DRUGS (ALT 637 FOR MEDICARE OP): Performed by: PHARMACIST

## 2023-12-22 PROCEDURE — 99232 SBSQ HOSP IP/OBS MODERATE 35: CPT | Performed by: INTERNAL MEDICINE

## 2023-12-22 PROCEDURE — 85027 COMPLETE CBC AUTOMATED: CPT | Performed by: INTERNAL MEDICINE

## 2023-12-22 PROCEDURE — 94640 AIRWAY INHALATION TREATMENT: CPT

## 2023-12-22 PROCEDURE — 72131 CT LUMBAR SPINE W/O DYE: CPT | Performed by: STUDENT IN AN ORGANIZED HEALTH CARE EDUCATION/TRAINING PROGRAM

## 2023-12-22 PROCEDURE — 80048 BASIC METABOLIC PNL TOTAL CA: CPT | Performed by: INTERNAL MEDICINE

## 2023-12-22 PROCEDURE — 99232 SBSQ HOSP IP/OBS MODERATE 35: CPT | Performed by: NURSE PRACTITIONER

## 2023-12-22 PROCEDURE — 2500000004 HC RX 250 GENERAL PHARMACY W/ HCPCS (ALT 636 FOR OP/ED): Performed by: INTERNAL MEDICINE

## 2023-12-22 PROCEDURE — 72131 CT LUMBAR SPINE W/O DYE: CPT

## 2023-12-22 PROCEDURE — 36415 COLL VENOUS BLD VENIPUNCTURE: CPT | Performed by: INTERNAL MEDICINE

## 2023-12-22 PROCEDURE — 2500000005 HC RX 250 GENERAL PHARMACY W/O HCPCS: Performed by: INTERNAL MEDICINE

## 2023-12-22 PROCEDURE — 2500000004 HC RX 250 GENERAL PHARMACY W/ HCPCS (ALT 636 FOR OP/ED)

## 2023-12-22 PROCEDURE — 2500000002 HC RX 250 W HCPCS SELF ADMINISTERED DRUGS (ALT 637 FOR MEDICARE OP, ALT 636 FOR OP/ED): Performed by: INTERNAL MEDICINE

## 2023-12-22 PROCEDURE — 2500000001 HC RX 250 WO HCPCS SELF ADMINISTERED DRUGS (ALT 637 FOR MEDICARE OP): Performed by: INTERNAL MEDICINE

## 2023-12-22 RX ORDER — TORSEMIDE 20 MG/1
40 TABLET ORAL DAILY
Status: DISCONTINUED | OUTPATIENT
Start: 2023-12-22 | End: 2023-12-26 | Stop reason: HOSPADM

## 2023-12-22 RX ADMIN — LOSARTAN POTASSIUM 100 MG: 50 TABLET, FILM COATED ORAL at 10:35

## 2023-12-22 RX ADMIN — APIXABAN 5 MG: 5 TABLET, FILM COATED ORAL at 10:35

## 2023-12-22 RX ADMIN — TRAMADOL HYDROCHLORIDE 50 MG: 50 TABLET, COATED ORAL at 23:40

## 2023-12-22 RX ADMIN — ALBUTEROL SULFATE 2.5 MG: 2.5 SOLUTION RESPIRATORY (INHALATION) at 09:27

## 2023-12-22 RX ADMIN — GABAPENTIN 400 MG: 400 CAPSULE ORAL at 21:25

## 2023-12-22 RX ADMIN — BUDESONIDE INHALATION 0.5 MG: 0.5 SUSPENSION RESPIRATORY (INHALATION) at 19:22

## 2023-12-22 RX ADMIN — PIPERACILLIN SODIUM AND TAZOBACTAM SODIUM 3.38 G: 3; .375 INJECTION, SOLUTION INTRAVENOUS at 10:35

## 2023-12-22 RX ADMIN — PIPERACILLIN SODIUM AND TAZOBACTAM SODIUM 3.38 G: 3; .375 INJECTION, SOLUTION INTRAVENOUS at 21:28

## 2023-12-22 RX ADMIN — PIPERACILLIN SODIUM AND TAZOBACTAM SODIUM 3.38 G: 3; .375 INJECTION, SOLUTION INTRAVENOUS at 15:54

## 2023-12-22 RX ADMIN — TRAMADOL HYDROCHLORIDE 50 MG: 50 TABLET, COATED ORAL at 04:38

## 2023-12-22 RX ADMIN — FORMOTEROL FUMARATE DIHYDRATE 20 MCG: 20 SOLUTION RESPIRATORY (INHALATION) at 09:28

## 2023-12-22 RX ADMIN — PIPERACILLIN SODIUM AND TAZOBACTAM SODIUM 3.38 G: 3; .375 INJECTION, SOLUTION INTRAVENOUS at 04:38

## 2023-12-22 RX ADMIN — TRAMADOL HYDROCHLORIDE 50 MG: 50 TABLET, COATED ORAL at 13:17

## 2023-12-22 RX ADMIN — CALCIUM 1250 MG: 500 TABLET ORAL at 10:35

## 2023-12-22 RX ADMIN — Medication 2 L/MIN: at 09:27

## 2023-12-22 RX ADMIN — Medication 1 TABLET: at 10:35

## 2023-12-22 RX ADMIN — HYDROXYCHLOROQUINE SULFATE 200 MG: 200 TABLET, FILM COATED ORAL at 21:25

## 2023-12-22 RX ADMIN — NYSTATIN 400000 UNITS: 100000 SUSPENSION ORAL at 13:17

## 2023-12-22 RX ADMIN — AZITHROMYCIN MONOHYDRATE 500 MG: 500 INJECTION, POWDER, LYOPHILIZED, FOR SOLUTION INTRAVENOUS at 13:17

## 2023-12-22 RX ADMIN — ALBUTEROL SULFATE 2.5 MG: 2.5 SOLUTION RESPIRATORY (INHALATION) at 16:13

## 2023-12-22 RX ADMIN — ALBUTEROL SULFATE 2.5 MG: 2.5 SOLUTION RESPIRATORY (INHALATION) at 19:22

## 2023-12-22 RX ADMIN — GABAPENTIN 400 MG: 400 CAPSULE ORAL at 10:35

## 2023-12-22 RX ADMIN — GUAIFENESIN 600 MG: 600 TABLET, EXTENDED RELEASE ORAL at 04:38

## 2023-12-22 RX ADMIN — Medication 1 CAPSULE: at 09:00

## 2023-12-22 RX ADMIN — TORSEMIDE 40 MG: 20 TABLET ORAL at 15:56

## 2023-12-22 RX ADMIN — NYSTATIN 400000 UNITS: 100000 SUSPENSION ORAL at 06:33

## 2023-12-22 RX ADMIN — SPIRONOLACTONE 25 MG: 25 TABLET ORAL at 10:35

## 2023-12-22 RX ADMIN — HYDROXYCHLOROQUINE SULFATE 200 MG: 200 TABLET, FILM COATED ORAL at 10:35

## 2023-12-22 RX ADMIN — APIXABAN 5 MG: 5 TABLET, FILM COATED ORAL at 21:25

## 2023-12-22 RX ADMIN — NYSTATIN 400000 UNITS: 100000 SUSPENSION ORAL at 21:25

## 2023-12-22 RX ADMIN — FUROSEMIDE 40 MG: 10 INJECTION, SOLUTION INTRAMUSCULAR; INTRAVENOUS at 06:33

## 2023-12-22 RX ADMIN — FORMOTEROL FUMARATE DIHYDRATE 20 MCG: 20 SOLUTION RESPIRATORY (INHALATION) at 19:22

## 2023-12-22 RX ADMIN — GABAPENTIN 400 MG: 400 CAPSULE ORAL at 15:54

## 2023-12-22 RX ADMIN — ACETAMINOPHEN 650 MG: 325 TABLET ORAL at 02:35

## 2023-12-22 RX ADMIN — NYSTATIN 400000 UNITS: 100000 SUSPENSION ORAL at 18:55

## 2023-12-22 RX ADMIN — BUDESONIDE INHALATION 0.5 MG: 0.5 SUSPENSION RESPIRATORY (INHALATION) at 09:28

## 2023-12-22 ASSESSMENT — COGNITIVE AND FUNCTIONAL STATUS - GENERAL
MOBILITY SCORE: 14
HELP NEEDED FOR BATHING: A LITTLE
MOVING TO AND FROM BED TO CHAIR: A LOT
PERSONAL GROOMING: A LITTLE
TURNING FROM BACK TO SIDE WHILE IN FLAT BAD: A LOT
DRESSING REGULAR UPPER BODY CLOTHING: A LITTLE
WALKING IN HOSPITAL ROOM: A LITTLE
DAILY ACTIVITIY SCORE: 19
DRESSING REGULAR LOWER BODY CLOTHING: A LITTLE
CLIMB 3 TO 5 STEPS WITH RAILING: A LOT
STANDING UP FROM CHAIR USING ARMS: A LOT
TOILETING: A LITTLE
MOVING FROM LYING ON BACK TO SITTING ON SIDE OF FLAT BED WITH BEDRAILS: A LITTLE

## 2023-12-22 ASSESSMENT — PAIN SCALES - GENERAL
PAINLEVEL_OUTOF10: 7
PAINLEVEL_OUTOF10: 8
PAINLEVEL_OUTOF10: 10 - WORST POSSIBLE PAIN

## 2023-12-22 ASSESSMENT — PAIN - FUNCTIONAL ASSESSMENT: PAIN_FUNCTIONAL_ASSESSMENT: 0-10

## 2023-12-22 ASSESSMENT — PAIN DESCRIPTION - LOCATION: LOCATION: BACK

## 2023-12-22 NOTE — PROGRESS NOTES
Subjective Data:  Patient seen and examined, chart reviewed  -- Breathing easier, urinating well  -- States she is very uncomfortable in the bed    On room air     Objective Data:  Last Recorded Vitals:  Vitals:    12/22/23 0609 12/22/23 0813 12/22/23 0927 12/22/23 1213   BP:  161/66  132/60   BP Location:  Left arm  Left arm   Patient Position:  Lying  Lying   Pulse:  73  79   Resp:  20  20   Temp:  36.4 °C (97.6 °F)  36.6 °C (97.8 °F)   TempSrc:  Temporal  Temporal   SpO2:  94% 96% 98%   Weight: 102 kg (225 lb)      Height:           Last Labs:  Results from last 7 days   Lab Units 12/22/23  0718 12/21/23  0526 12/20/23  1442   WBC AUTO x10*3/uL 11.0 12.4* 12.6*   HEMOGLOBIN g/dL 7.6* 7.3* 7.5*   HEMATOCRIT % 27.7* 24.7* 25.0*   PLATELETS AUTO x10*3/uL 175 179 191       Results from last 7 days   Lab Units 12/22/23  0718 12/21/23  0526 12/20/23  1442   SODIUM mmol/L 130* 131* 132*   POTASSIUM mmol/L 3.6 3.3* 3.8   CHLORIDE mmol/L 98 97* 100   CO2 mmol/L 22 25 25   BUN mg/dL 41* 43* 44*   CREATININE mg/dL 1.53* 1.53* 1.51*   CALCIUM mg/dL 7.8* 7.6* 7.8*   GLUCOSE mg/dL 120* 110* 118*             TROPHS   Date/Time Value Ref Range Status   01/17/2023 01:45 PM 10 0 - 13 ng/L Final     Comment:     .  Less than 99th percentile of normal range cutoff-  Female and children under 18 years old <14 ng/L; Male <21 ng/L: Negative  Repeat testing should be performed if clinically indicated.   .  Female and children under 18 years old 14-50 ng/L; Male 21-50 ng/L:  Consistent with possible cardiac damage and possible increased clinical   risk. Serial measurements may help to assess extent of myocardial damage.   .  >50 ng/L: Consistent with cardiac damage, increased clinical risk and  myocardial infarction. Serial measurements may help assess extent of   myocardial damage.   .   NOTE: Children less than 1 year old may have higher baseline troponin   levels and results should be interpreted in conjunction with the overall    clinical context.   .  NOTE: Troponin I testing is performed using a different   testing methodology at [facility] than at other   [facility] hospitals. Direct result comparisons should only   be made within the same method.     01/10/2023 11:07 PM 9 0 - 13 ng/L Final     Comment:     .  Less than 99th percentile of normal range cutoff-  Female and children under 18 years old <14 ng/L; Male <21 ng/L: Negative  Repeat testing should be performed if clinically indicated.   .  Female and children under 18 years old 14-50 ng/L; Male 21-50 ng/L:  Consistent with possible cardiac damage and possible increased clinical   risk. Serial measurements may help to assess extent of myocardial damage.   .  >50 ng/L: Consistent with cardiac damage, increased clinical risk and  myocardial infarction. Serial measurements may help assess extent of   myocardial damage.   .   NOTE: Children less than 1 year old may have higher baseline troponin   levels and results should be interpreted in conjunction with the overall   clinical context.   .  NOTE: Troponin I testing is performed using a different   testing methodology at [facility] than at other   [facility] hospitals. Direct result comparisons should only   be made within the same method.     01/10/2023 10:07 PM 12 0 - 13 ng/L Final     Comment:     .  Less than 99th percentile of normal range cutoff-  Female and children under 18 years old <14 ng/L; Male <21 ng/L: Negative  Repeat testing should be performed if clinically indicated.   .  Female and children under 18 years old 14-50 ng/L; Male 21-50 ng/L:  Consistent with possible cardiac damage and possible increased clinical   risk. Serial measurements may help to assess extent of myocardial damage.   .  >50 ng/L: Consistent with cardiac damage, increased clinical risk and  myocardial infarction. Serial measurements may help assess extent of   myocardial damage.   .   NOTE: Children less than 1 year old may  have higher baseline troponin   levels and results should be interpreted in conjunction with the overall   clinical context.   .  NOTE: Troponin I testing is performed using a different   testing methodology at [facility] than at other   system hospitals. Direct result comparisons should only   be made within the same method.       BNP   Date/Time Value Ref Range Status   12/20/2023 02:42 PM 90 0 - 99 pg/mL Final   01/17/2023 01:45  0 - 99 pg/mL Final     Comment:     .  <100 pg/mL - Heart failure unlikely  100-299 pg/mL - Intermediate probability of acute heart  .               failure exacerbation. Correlate with clinical  .               context and patient history.    >=300 pg/mL - Heart Failure likely. Correlate with clinical  .               context and patient history.  BNP testing is performed using different testing   methodology at [facility] than at other   [facility] hospitals. Direct result comparisons should   only be made within the same method.     01/10/2023 10:07 PM 35 0 - 99 pg/mL Final     Comment:     .  <100 pg/mL - Heart failure unlikely  100-299 pg/mL - Intermediate probability of acute heart  .               failure exacerbation. Correlate with clinical  .               context and patient history.    >=300 pg/mL - Heart Failure likely. Correlate with clinical  .               context and patient history.  BNP testing is performed using different testing   methodology at [facility] than at other   [facility] hospitals. Direct result comparisons should   only be made within the same method.        Last I/O:  I/O last 3 completed shifts:  In: 590 (5.8 mL/kg) [P.O.:240; IV Piggyback:350]  Out: 1900 (18.6 mL/kg) [Urine:1900 (0.5 mL/kg/hr)]  Weight: 102.1 kg     Past Cardiology Tests (Last 3 Years):  EKG:  No results found for this or any previous visit from the past 1095 days.    Echo:  TTE 12/20/2023  CONCLUSIONS:   1. Left ventricular systolic function  "is normal with a 55-60% estimated ejection fraction.   2. Slightly elevated RVSP.  Cath:  No results found for this or any previous visit from the past 1095 days.    Stress Test:  No results found for this or any previous visit from the past 1095 days.    Cardiac Imaging:  No results found for this or any previous visit from the past 1095 days.      Inpatient Medications:  Scheduled medications   Medication Dose Route Frequency    albuterol  2.5 mg nebulization TID    apixaban  5 mg oral BID    B complex-vitamin C  1 tablet oral Daily    budesonide  0.5 mg nebulization BID    And    formoterol  20 mcg nebulization BID    calcium carbonate  1,250 mg oral Daily    furosemide  40 mg intravenous BID    gabapentin  400 mg oral TID    hydroxychloroquine  200 mg oral BID    lactobacillus acidophilus  1 capsule oral BID    losartan  100 mg oral Daily    melatonin  3 mg oral Daily    nystatin  4 mL Swish & Swallow 4x daily    pantoprazole  40 mg oral Daily before breakfast    Or    pantoprazole  40 mg intravenous Daily before breakfast    piperacillin-tazobactam  3.375 g intravenous q6h    polyethylene glycol  17 g oral Daily    psyllium  1 packet oral Daily    spironolactone  25 mg oral Daily     PRN medications   Medication    acetaminophen    Or    acetaminophen    Or    acetaminophen    albuterol    guaiFENesin    ondansetron    Or    ondansetron    oxygen    traMADol     Continuous Medications   Medication Dose Last Rate       Physical Exam:  /60 (BP Location: Left arm, Patient Position: Lying)   Pulse 79   Temp 36.6 °C (97.8 °F) (Temporal)   Resp 20   Ht 1.574 m (5' 1.97\")   Wt 102 kg (225 lb)   SpO2 98%   BMI 41.19 kg/m²   Net IO Since Admission: -1,465 mL [12/22/23 1429]    Intake/Output Summary (Last 24 hours) at 12/22/2023 1429  Last data filed at 12/21/2023 1847  Gross per 24 hour   Intake --   Output 800 ml   Net -800 ml         Constitutional:       Appearance: Note in distress.  Supplemental Oxygen " "in place   Eyes:      Pupils: Pupils are equal, round, and reactive to light.   HENT:    Mouth/Throat:      Pharynx: Oropharynx is clear.   Neck:      Thyroid: Thyroid normal.      Vascular: JVD elevated with 10 cm of water.      Lymphadenopathy: No cervical adenopathy.   Pulmonary:      Effort: Pulmonary effort is normal.      Breath sounds: Bibasilar Rhonchi present. Bibasilar Rales present.   Chest:      Chest wall: Not tender to palpatation.   Cardiovascular:      Normal rate. Regular rhythm.      Murmurs: There is no murmur.      Comments: Chronic trophic changes noted of the lower extremities consistent with longstanding venous congestion/lymphedema  Pulses:     Intact distal pulses.   Edema:     Peripheral edema present.     Pretibial: bilateral 2+ edema of the pretibial area.     Ankle: bilateral 2+ edema of the ankle.  Abdominal:      General: Bowel sounds are normal.   Musculoskeletal: Normal range of motion.      Cervical back: Normal range of motion. Skin:     General: Skin is warm and dry.           Assessment/Plan   Genesis Garcia Is a 78 y.o. 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  In the setting of presumed bacterial pneumonia and lower extremity cellulitis.  Cardiology has been consulted for \"CHF\".     Patient originally presented to [facility] where she was felt to be suffering from pneumonia as evidenced by 2 view chest x-ray with left lung base opacity.  She was felt to have sepsis criteria and was volume resuscitated.  It is unclear how much total volume she received.  She was subsequently started on antibiotics and transferred for further care.  When seen today, she reports feeling " "\"blah\" and reports persistent shortness of breath.  She is unable to lie flat but she attributes this more to \"my back breaking on me\".     She does not appear overly Volume Up and her chronic Lower Extremity Edema does not seem worsend by her accord.      Echo 12/20/2023 : CONCLUSIONS:   1. Left ventricular systolic function is normal with a 55-60% estimated ejection fraction.   2. Slightly elevated RVSP.     Plan:  - Ok to stop IV diuresis at this time.   - Start torsemide 40mg oral daily and continue up on discharge.     Cardiology will sign off at this time, please call with any questions or concerns.       Code Status:  Full Code    I spent 35  minutes in the professional and overall care of this patient.    Angi Shields, APRN-CNP  "

## 2023-12-22 NOTE — PROGRESS NOTES
Physical Therapy                 Therapy Communication Note    Patient Name: Genesis Garcia  MRN: [id number]  Today's Date: 12/22/2023     Discipline: Physical Therapy    Missed Visit Reason: Missed Visit Reason: Patient refused (RN cleared pt for therapy but stated pt has pain. Pt received supine lying  and stating she is in too much pain at this time to participate.)    Missed Time: Attempt    Comment:

## 2023-12-22 NOTE — PROGRESS NOTES
Genesis Garcia is a 78 y.o. female     Back pain persists  CAT scan of the lumbar spine does show some loosening of the screws  Unable to compare with any previous CAT scans have not have been done in the last few years  Will ask orthopedics input    Review of Systems     Constitutional: no fever, no chills,   Cardiovascular: no chest pain   Respiratory: Shortness of breath  Gastrointestinal: no abdominal pain, no constipation, no melena, no nausea, no diarrhea, no vomiting and no blood in stools.   Neurological: no headache,   All other systems have been reviewed and are negative for complaint.       Vitals:    12/22/23 1613   BP:    Pulse:    Resp:    Temp:    SpO2: 97%        Scheduled medications  albuterol, 2.5 mg, nebulization, TID  apixaban, 5 mg, oral, BID  B complex-vitamin C, 1 tablet, oral, Daily  budesonide, 0.5 mg, nebulization, BID   And  formoterol, 20 mcg, nebulization, BID  calcium carbonate, 1,250 mg, oral, Daily  gabapentin, 400 mg, oral, TID  hydroxychloroquine, 200 mg, oral, BID  lactobacillus acidophilus, 1 capsule, oral, BID  losartan, 100 mg, oral, Daily  melatonin, 3 mg, oral, Daily  nystatin, 4 mL, Swish & Swallow, 4x daily  pantoprazole, 40 mg, oral, Daily before breakfast   Or  pantoprazole, 40 mg, intravenous, Daily before breakfast  piperacillin-tazobactam, 3.375 g, intravenous, q6h  polyethylene glycol, 17 g, oral, Daily  psyllium, 1 packet, oral, Daily  spironolactone, 25 mg, oral, Daily  torsemide, 40 mg, oral, Daily      Continuous medications     PRN medications  PRN medications: acetaminophen **OR** acetaminophen **OR** acetaminophen, albuterol, guaiFENesin, ondansetron **OR** ondansetron, oxygen, traMADol    Lab Review   Results from last 7 days   Lab Units 12/22/23  0718 12/21/23  0526 12/20/23  1442   WBC AUTO x10*3/uL 11.0 12.4* 12.6*   HEMOGLOBIN g/dL 7.6* 7.3* 7.5*   HEMATOCRIT % 27.7* 24.7* 25.0*   PLATELETS AUTO x10*3/uL 175 179 191       Results from last 7 days    Lab Units 12/22/23  0718 12/21/23  0526 12/20/23  1442   SODIUM mmol/L 130* 131* 132*   POTASSIUM mmol/L 3.6 3.3* 3.8   CHLORIDE mmol/L 98 97* 100   CO2 mmol/L 22 25 25   BUN mg/dL 41* 43* 44*   CREATININE mg/dL 1.53* 1.53* 1.51*   CALCIUM mg/dL 7.8* 7.6* 7.8*   GLUCOSE mg/dL 120* 110* 118*              CT lumbar spine wo IV contrast   Final Result   1. Postoperative changes at L4-S1 with new/worsened lucency about the   S1 pedicle screws, concerning for loosening.   2. No acute fracture or subluxation of the lumbar spine.        MACRO:   None        Signed by: Juancarlos Marcial 12/22/2023 10:29 AM   Dictation workstation:   [id number]      Transthoracic Echo (TTE) Complete   Final Result      XR chest 2 views   Final Result   Left-greater-than-right basilar infiltrates or atelectasis with small   left pleural effusion. Follow-up to ensure resolution after   appropriate treatment recommended.        MACRO:   None.        Signed by: Jenifer Friedman 12/20/2023 11:52 AM   Dictation workstation:   [id number]            Physical Exam     Constitutional   General appearance: Alert     Pulmonary   Respiratory assessment: Crackles  Cardiovascular   Auscultation of heart: Apical pulse normal, heart rate and rhythm normal, normal S1 and S2, no murmurs and no pericardial rub.    Exam for edema: 1+ edema  Abdomen   Abdominal Exam: No bruits, normal bowel sounds, soft, non-tender, no abdominal mass palpated.    Liver and Spleen exam: No hepato-splenomegaly.   Musculoskeletal   Examination of gait: ROM intact  Skin inspection: Normal skin color and pigmentation, normal skin turgor and no visible rash.   Neurologic   Cranial nerves: Nerves 2-12 were intact, no focal neuro defects.     Assessment/Plan      #Acute respiratory failure with hypoxia  #Community-acquired pneumonia  #COPD  Continue antibiotics and DuoNebs     #Acute on chronic diastolic congestive heart failure  IV Lasix  Strict I's and O's with daily weights and  electrolytes    #Acute back pain s/p fall  CAT scan shows loosening of the screws  Will get orthopedic input  Pain control     #Possible cellulitis of left leg  Already improved        #Atrial fibrillation  Rate controlled  Continue Eliquis     #Hypertension  Continue home medications and hold for systolic less than 100     #Dyslipidemia  Continue statins with a target LDL of less than 70

## 2023-12-22 NOTE — PROGRESS NOTES
Occupational Therapy                 Therapy Communication Note    Patient Name: Genesis Garcia  MRN: [id number]  Today's Date: 12/22/2023     Discipline: Occupational Therapy    Missed Visit Reason: Missed Visit Reason: Patient refused (First attempt at 9:00 however pt off floor for CT scan.  Second attempt at 14:03 and patient reporting too much pain/fatigue to participate at this time and requested to be seen tomorrow instead.)    Missed Time: Attempt    Comment:

## 2023-12-22 NOTE — CARE PLAN
Patient admitted for PNA/Sepsis from [facility] ED    Patient being followed by cardiology for CHF, currently IV diuresis, on initial TCC assessment- patient refused SNF, and already active with [facility], pt/ot eval pending, currently patient refusing PT eval d/t back pain. Will reassess dc plan once seen by pt/ot.    ADOD 2-3 days  DC: [facility] vs SNF

## 2023-12-23 PROBLEM — Z78.9 IMPAIRED MOBILITY AND ADLS: Status: ACTIVE | Noted: 2023-12-23

## 2023-12-23 PROBLEM — Z74.09 IMPAIRED MOBILITY AND ADLS: Status: ACTIVE | Noted: 2023-12-23

## 2023-12-23 LAB
ANION GAP SERPL CALC-SCNC: 14 MMOL/L (ref 10–20)
BUN SERPL-MCNC: 38 MG/DL (ref 6–23)
CALCIUM SERPL-MCNC: 8 MG/DL (ref 8.6–10.3)
CHLORIDE SERPL-SCNC: 94 MMOL/L (ref 98–107)
CO2 SERPL-SCNC: 27 MMOL/L (ref 21–32)
CREAT SERPL-MCNC: 1.43 MG/DL (ref 0.5–1.05)
ERYTHROCYTE [DISTWIDTH] IN BLOOD BY AUTOMATED COUNT: 17.9 % (ref 11.5–14.5)
GFR SERPL CREATININE-BSD FRML MDRD: 38 ML/MIN/1.73M*2
GLUCOSE SERPL-MCNC: 99 MG/DL (ref 74–99)
HCT VFR BLD AUTO: 25.5 % (ref 36–46)
HGB BLD-MCNC: 7.8 G/DL (ref 12–16)
MAGNESIUM SERPL-MCNC: 1.9 MG/DL (ref 1.6–2.4)
MCH RBC QN AUTO: 21.1 PG (ref 26–34)
MCHC RBC AUTO-ENTMCNC: 30.6 G/DL (ref 32–36)
MCV RBC AUTO: 69 FL (ref 80–100)
NRBC BLD-RTO: 0 /100 WBCS (ref 0–0)
PLATELET # BLD AUTO: 259 X10*3/UL (ref 150–450)
POTASSIUM SERPL-SCNC: 3.3 MMOL/L (ref 3.5–5.3)
RBC # BLD AUTO: 3.7 X10*6/UL (ref 4–5.2)
SODIUM SERPL-SCNC: 132 MMOL/L (ref 136–145)
WBC # BLD AUTO: 13.2 X10*3/UL (ref 4.4–11.3)

## 2023-12-23 PROCEDURE — 2500000001 HC RX 250 WO HCPCS SELF ADMINISTERED DRUGS (ALT 637 FOR MEDICARE OP): Performed by: NURSE PRACTITIONER

## 2023-12-23 PROCEDURE — 85027 COMPLETE CBC AUTOMATED: CPT | Performed by: INTERNAL MEDICINE

## 2023-12-23 PROCEDURE — 97530 THERAPEUTIC ACTIVITIES: CPT | Mod: GO

## 2023-12-23 PROCEDURE — 83735 ASSAY OF MAGNESIUM: CPT | Performed by: NURSE PRACTITIONER

## 2023-12-23 PROCEDURE — 97166 OT EVAL MOD COMPLEX 45 MIN: CPT | Mod: GO

## 2023-12-23 PROCEDURE — 80048 BASIC METABOLIC PNL TOTAL CA: CPT | Performed by: INTERNAL MEDICINE

## 2023-12-23 PROCEDURE — 2500000001 HC RX 250 WO HCPCS SELF ADMINISTERED DRUGS (ALT 637 FOR MEDICARE OP): Performed by: INTERNAL MEDICINE

## 2023-12-23 PROCEDURE — 2500000004 HC RX 250 GENERAL PHARMACY W/ HCPCS (ALT 636 FOR OP/ED): Performed by: INTERNAL MEDICINE

## 2023-12-23 PROCEDURE — 1150000001 HC HOSPICE PRIVATE ROOM DAILY

## 2023-12-23 PROCEDURE — 94640 AIRWAY INHALATION TREATMENT: CPT

## 2023-12-23 PROCEDURE — 2500000001 HC RX 250 WO HCPCS SELF ADMINISTERED DRUGS (ALT 637 FOR MEDICARE OP): Performed by: PHARMACIST

## 2023-12-23 PROCEDURE — 97535 SELF CARE MNGMENT TRAINING: CPT | Mod: GO

## 2023-12-23 PROCEDURE — 36415 COLL VENOUS BLD VENIPUNCTURE: CPT | Performed by: INTERNAL MEDICINE

## 2023-12-23 PROCEDURE — 2500000002 HC RX 250 W HCPCS SELF ADMINISTERED DRUGS (ALT 637 FOR MEDICARE OP, ALT 636 FOR OP/ED): Performed by: INTERNAL MEDICINE

## 2023-12-23 RX ADMIN — BUDESONIDE INHALATION 0.5 MG: 0.5 SUSPENSION RESPIRATORY (INHALATION) at 08:08

## 2023-12-23 RX ADMIN — HYDROXYCHLOROQUINE SULFATE 200 MG: 200 TABLET, FILM COATED ORAL at 09:05

## 2023-12-23 RX ADMIN — TORSEMIDE 40 MG: 20 TABLET ORAL at 09:06

## 2023-12-23 RX ADMIN — GABAPENTIN 400 MG: 400 CAPSULE ORAL at 09:05

## 2023-12-23 RX ADMIN — ACETAMINOPHEN 650 MG: 325 TABLET ORAL at 22:14

## 2023-12-23 RX ADMIN — BUDESONIDE INHALATION 0.5 MG: 0.5 SUSPENSION RESPIRATORY (INHALATION) at 19:17

## 2023-12-23 RX ADMIN — NYSTATIN 400000 UNITS: 100000 SUSPENSION ORAL at 06:14

## 2023-12-23 RX ADMIN — PIPERACILLIN SODIUM AND TAZOBACTAM SODIUM 3.38 G: 3; .375 INJECTION, SOLUTION INTRAVENOUS at 21:50

## 2023-12-23 RX ADMIN — ACETAMINOPHEN 650 MG: 325 TABLET ORAL at 18:06

## 2023-12-23 RX ADMIN — PIPERACILLIN SODIUM AND TAZOBACTAM SODIUM 3.38 G: 3; .375 INJECTION, SOLUTION INTRAVENOUS at 12:47

## 2023-12-23 RX ADMIN — CALCIUM 1250 MG: 500 TABLET ORAL at 09:05

## 2023-12-23 RX ADMIN — PIPERACILLIN SODIUM AND TAZOBACTAM SODIUM 3.38 G: 3; .375 INJECTION, SOLUTION INTRAVENOUS at 03:17

## 2023-12-23 RX ADMIN — ALBUTEROL SULFATE 2.5 MG: 2.5 SOLUTION RESPIRATORY (INHALATION) at 14:27

## 2023-12-23 RX ADMIN — NYSTATIN 400000 UNITS: 100000 SUSPENSION ORAL at 17:35

## 2023-12-23 RX ADMIN — FORMOTEROL FUMARATE DIHYDRATE 20 MCG: 20 SOLUTION RESPIRATORY (INHALATION) at 08:08

## 2023-12-23 RX ADMIN — ACETAMINOPHEN 650 MG: 325 TABLET ORAL at 09:47

## 2023-12-23 RX ADMIN — Medication 1 CAPSULE: at 12:41

## 2023-12-23 RX ADMIN — ALBUTEROL SULFATE 2.5 MG: 2.5 SOLUTION RESPIRATORY (INHALATION) at 08:08

## 2023-12-23 RX ADMIN — TRAMADOL HYDROCHLORIDE 50 MG: 50 TABLET, COATED ORAL at 18:06

## 2023-12-23 RX ADMIN — FORMOTEROL FUMARATE DIHYDRATE 20 MCG: 20 SOLUTION RESPIRATORY (INHALATION) at 19:17

## 2023-12-23 RX ADMIN — GABAPENTIN 400 MG: 400 CAPSULE ORAL at 20:24

## 2023-12-23 RX ADMIN — SPIRONOLACTONE 25 MG: 25 TABLET ORAL at 09:05

## 2023-12-23 RX ADMIN — GABAPENTIN 400 MG: 400 CAPSULE ORAL at 15:32

## 2023-12-23 RX ADMIN — ALBUTEROL SULFATE 2.5 MG: 2.5 SOLUTION RESPIRATORY (INHALATION) at 19:17

## 2023-12-23 RX ADMIN — Medication 1 TABLET: at 09:06

## 2023-12-23 RX ADMIN — NYSTATIN 400000 UNITS: 100000 SUSPENSION ORAL at 20:24

## 2023-12-23 RX ADMIN — APIXABAN 5 MG: 5 TABLET, FILM COATED ORAL at 20:24

## 2023-12-23 RX ADMIN — LOSARTAN POTASSIUM 100 MG: 50 TABLET, FILM COATED ORAL at 09:06

## 2023-12-23 RX ADMIN — APIXABAN 5 MG: 5 TABLET, FILM COATED ORAL at 09:05

## 2023-12-23 RX ADMIN — TRAMADOL HYDROCHLORIDE 50 MG: 50 TABLET, COATED ORAL at 09:47

## 2023-12-23 RX ADMIN — HYDROXYCHLOROQUINE SULFATE 200 MG: 200 TABLET, FILM COATED ORAL at 20:24

## 2023-12-23 RX ADMIN — PIPERACILLIN SODIUM AND TAZOBACTAM SODIUM 3.38 G: 3; .375 INJECTION, SOLUTION INTRAVENOUS at 17:35

## 2023-12-23 RX ADMIN — NYSTATIN 400000 UNITS: 100000 SUSPENSION ORAL at 12:46

## 2023-12-23 ASSESSMENT — COGNITIVE AND FUNCTIONAL STATUS - GENERAL
MOBILITY SCORE: 11
DRESSING REGULAR LOWER BODY CLOTHING: A LOT
DAILY ACTIVITIY SCORE: 17
DRESSING REGULAR UPPER BODY CLOTHING: A LOT
PERSONAL GROOMING: A LITTLE
TURNING FROM BACK TO SIDE WHILE IN FLAT BAD: A LOT
MOVING TO AND FROM BED TO CHAIR: A LOT
DRESSING REGULAR UPPER BODY CLOTHING: A LITTLE
MOVING FROM LYING ON BACK TO SITTING ON SIDE OF FLAT BED WITH BEDRAILS: A LOT
WALKING IN HOSPITAL ROOM: A LOT
CLIMB 3 TO 5 STEPS WITH RAILING: TOTAL
DRESSING REGULAR LOWER BODY CLOTHING: A LOT
HELP NEEDED FOR BATHING: A LOT
DAILY ACTIVITIY SCORE: 15
TOILETING: A LOT
TOILETING: A LITTLE
HELP NEEDED FOR BATHING: A LOT
PERSONAL GROOMING: A LITTLE
STANDING UP FROM CHAIR USING ARMS: A LOT

## 2023-12-23 ASSESSMENT — PAIN SCALES - GENERAL
PAINLEVEL_OUTOF10: 8
PAINLEVEL_OUTOF10: 10 - WORST POSSIBLE PAIN
PAINLEVEL_OUTOF10: 8
PAINLEVEL_OUTOF10: 10 - WORST POSSIBLE PAIN

## 2023-12-23 ASSESSMENT — PAIN DESCRIPTION - LOCATION
LOCATION: BACK
LOCATION: BACK

## 2023-12-23 ASSESSMENT — PAIN - FUNCTIONAL ASSESSMENT: PAIN_FUNCTIONAL_ASSESSMENT: 0-10

## 2023-12-23 ASSESSMENT — ACTIVITIES OF DAILY LIVING (ADL)
BATHING_ASSISTANCE: MODERATE
ADL_ASSISTANCE: INDEPENDENT
EFFECT OF PAIN ON DAILY ACTIVITIES: DECREASED ACTIVITY TOLERANCE

## 2023-12-23 NOTE — PROGRESS NOTES
Occupational Therapy    Evaluation/Treatment    Patient Name: Genesis Garcia  MRN: [id number]  : 1945  Today's Date: 23  Time Calculation  Start Time: 1220  Stop Time: 1338  Time Calculation (min): 78 min       Assessment:  OT Assessment: Patient is deconditioned and would benefit from continued skilled OT to maximize functional potential.  Prognosis: Good  Barriers to Discharge: None  Evaluation/Treatment Tolerance: Patient tolerated treatment well (rest as needed)  Medical Staff Made Aware: Yes  End of Session Communication: Bedside nurse, PCT/NA/CTA (Notified RN & CTA of patient's performance and functional abilities, ability to ambulate to/from bathroom, and placement of BSC in room.)  End of Session Patient Position: Up in chair, Alarm on (All needs within reach)  OT Assessment Results: Decreased ADL status, Decreased functional mobility, Decreased endurance  Prognosis: Good  Barriers to Discharge: None  Evaluation/Treatment Tolerance: Patient tolerated treatment well (rest as needed)  Medical Staff Made Aware: Yes  Strengths: Ability to acquire knowledge, Attitude of self  Barriers to Participation:  (Pain)    Plan:  Treatment Interventions: ADL retraining, Functional transfer training, UE strengthening/ROM, Endurance training, Patient/family training  OT Frequency: 3 times per week  OT Discharge Recommendations: Moderate intensity level of continued care (Patient's preference is to return home with Home Care)  OT Recommended Transfer Status: Minimal assist, Assist of 1  OT - OK to Discharge: Yes (OT POC established)  Treatment Interventions: ADL retraining, Functional transfer training, UE strengthening/ROM, Endurance training, Patient/family training      Subjective   General:   OT Received On: 23  General  Reason for Referral: Acute pneumonia  Past Medical History Relevant to Rehab: Originally presented to [facility] ED due to c/o cough and shortness of breath; found to be hypoxic with  "elevated white counts concerning for sepsis and also noted to have left leg cellulitis.  Patient was started on antibiotics and stayed in the ED 2 days before transfer to [facility] [place], [place]; PMH: COPD/asthma hypertension dyslipidemia atrial fibrillation s/p ablation s/p bilateral total knee arthroplasty, recent total hip arthroplasty which was further complicated by cellulitis/pneumonia which resulted in antibiotic induced C. difficile colitis and acute kidney injury  Family/Caregiver Present: No  Prior to Session Communication: Bedside nurse (RN cleared patient for OT Eval with no indication of adverse effects of participation.)  Patient Position Received: Bed, 3 rail up, Alarm on  General Comment: Agreeable to Eval    Precautions:  Hearing/Visual Limitations: WFL  Medical Precautions: Fall precautions (tele, IV, external catheter (removed for oob activity))     Pain:  Pain Assessment  Pain Assessment: 0-10  Pain Score: 10 - Worst possible pain (Patient reports pain level as \"A better 10 than the 10 from a few days ago.\")  Pain Location: Back  Effect of Pain on Daily Activities: Decreased activity tolerance  Pain Interventions: Ambulation/increased activity, Repositioned  Response to Interventions: Favorable    Objective   Cognition:  Overall Cognitive Status: Within Functional Limits     Home Living:  Type of Home: Condo  Lives With:  (Friend/roommate & great grandchildren (6yo & 9yo))  Home Adaptive Equipment: Walker rolling or standard, Reacher, Sock aid (Adjustable bed)  Home Layout: One level  Home Access: No concerns  Bathroom Shower/Tub: Walk-in shower  Bathroom Equipment: Grab bars in shower, Shower chair with back, Hand-held shower hose    Prior Function:  Level of [place]: Independent with ADLs and functional transfers, Independent with homemaking with ambulation (Mod I)  ADL Assistance: Independent (Mod I; typically wears Depends; Increased time req'd for UB ADL due to shoulder limitations)  Homemaking " Assistance: Independent (Mod I)  Ambulatory Assistance: Independent (Mod I with walker; Sometimes leans forward and rests arm(s) on walker when fatigued)  Hand Dominance: Right  Prior Function Comments: Primary caregiver for 2 great grandchildren     ADL:  Eating Assistance: Modified independent (Device) (able to open containers with use of spoon to assist as needed)  Grooming Assistance: Stand by  Grooming Deficit: Setup, Wash/dry hands, Wash/dry face (seated)  Bathing Assistance: Moderate  Bathing Deficit: Setup, Increased time to complete   LE Dressing Assistance: Maximal  LE Dressing Deficit:  (Dependent to don socks; anticipate mod-max assist for bottoms; uses reacher as needed at home; does not typically use sock aid)  Toileting Assistance with Device: Minimal  Toileting Deficit: Grab bar use, Setup (Setup for clothing mgmt and to manage lines; Able to retrieve toilet paper and perform hygiene with SBA; Min assist to stand from toilet to perform hygiene)    Activities of Daily Living: Feeding  Feeding Level of Assistance: Modified independent  Feeding Where Assessed: Chair    Grooming  Grooming Level of Assistance: Close supervision, Setup  Grooming Where Assessed: Chair  Grooming Comments: Hand hygiene seated; unable to tolerate standing at sink for hand hygiene after toileting    Toileting  Toileting Adaptive Equipment:  (Grab bar for support)  Toileting Level of Assistance: Setup, Minimum assistance  Where Assessed: Toilet  Toileting Comments: Assist req'd to stand to perform hygiene; setup assist for clothing mgmt    Activity Tolerance:  Endurance: Tolerates 10 - 20 min exercise with multiple rests  Activity Tolerance Comments: Limited by pain; easily fatigued due to limited activity during hospitalization    Functional Standing Tolerance:  Functional Standing Tolerance Comments: Able to tolerate brief periods of standing for ADL and functional mobility.  Limited by back pain.    Bed Mobility/Transfers:  Bed Mobility  Bed Mobility: Yes  Bed Mobility 1  Bed Mobility 1: Supine to sitting  Level of Assistance 1: Minimum assistance, Minimal verbal cues    Transfers  Transfer: Yes  Transfer 1  Transfer From 1: Bed to  Transfer to 1: Stand  Technique 1: Sit to stand  Transfer Device 1: Walker  Transfer Level of Assistance 1: Minimum assistance, Minimal verbal cues (1-person assist)  Transfers 2  Transfer From 2: Stand to  Transfer to 2: Chair with arms  Technique 2: Stand to sit (Stand step/ambulatory transfer with walker bed to chair)  Transfer Device 2: Walker  Transfer Level of Assistance 2: Minimum assistance, Minimal verbal cues (1-person assist)  Transfers 3  Transfer From 3: Chair with arms to  Transfer to 3: Toilet  Technique 3: Sit to stand, Stand to sit (Ambulatory transfer chair to bathroom)  Transfer Device 3: Walker (Grab bar)  Transfer Level of Assistance 3: Minimum assistance, Minimal verbal cues (1-person assist)  Transfers 4  Transfer From 4: Toilet to  Transfer to 4: Chair with arms  Technique 4: Sit to stand, Stand to sit (Ambulatory transfer bathroom to chair)  Transfer Device 4: Walker (Grab bar)  Transfer Level of Assistance 4: Minimum assistance, Minimal verbal cues (1-person assist)    Toilet Transfers  Toilet Transfer From: Chair  Toilet Transfer Type: To and from  Toilet Transfer to: Standard toilet  Toilet Transfer Technique: Ambulating  Toilet Transfers: Minimal assistance  Toilet Transfers Comments: Located [facility] [place] and placed in patient's room for use in case patient is too fatigued to ambulate to/from bathroom safely.    Sitting Balance:  Static Sitting Balance  Static Sitting-Level of Assistance: Independent  Dynamic Sitting Balance  Dynamic Sitting-Balance:  (ADL)    Standing Balance:  Static Standing Balance  Static Standing-Level of Assistance: Contact guard  Dynamic Standing Balance  Dynamic Standing-Balance:  (ADL; walking with walker)    Therapy/Activity: Therapeutic Activity  Therapeutic  Activity Performed: Yes  Therapeutic Activity 1: Instructed on safe technique, body positioning, and hand placement to perform bed mobility and transfers, and to facilitate active participation while minimizing risk of increased pain.  Therapeutic Activity 2: Encouraged oob activity and facilitated safe participation in ADL and functional mobility.    Balance/Neuromuscular Re-Education  Balance/Neuromuscular Re-Education Activity Performed: Yes  Balance/Neuromuscular Re-Education Activity 1: Educated on activation of large muscle groups (glutes and abs) while standing to decrease load/pressure on back, and to promote upright standing posture with decreased c/o pain.     Vision:Vision - Basic Assessment  Current Vision: No visual deficits    Sensation:  Sensation Comment: c/o chronic numbness in fingertips and feet    Strength:  Strength Comments: BUE distal strength WFL; BUE shoulders grossly 3-/5      Hand Function:  Hand Function  Gross Grasp: Functional  Coordination: Functional  Extremities: RUE   RUE : Exceptions to WFL (Distal AROM WFL)  RUE AROM (degrees)  R Shoulder Flexion  0-170:  (less than 45 degress) and LUE   LUE: Exceptions to WFL (Distal AROM WFL)  LUE AROM (degrees)  L Shoulder Flexion  0-170:  (less than 45 degrees)    Outcome Measures: [facility] Daily Activity  Putting on and taking off regular lower body clothing: A lot  Bathing (including washing, rinsing, drying): A lot  Putting on and taking off regular upper body clothing: A little  Toileting, which includes using toilet, bedpan or urinal: A little  Taking care of personal grooming such as brushing teeth: A little  Eating Meals: None  Daily Activity - Total Score: 17      Education Documentation  Body Mechanics, taught by Tracey Castanon OT at 12/23/2023  2:49 PM.  Learner: Patient  Readiness: Acceptance  Method: Explanation, Demonstration  Response: Verbalizes Understanding    Precautions, taught by Tracey Castanon OT at 12/23/2023  2:49  PM.  Learner: Patient  Readiness: Acceptance  Method: Explanation, Demonstration  Response: Verbalizes Understanding    ADL Training, taught by Tracey Castanon OT at 12/23/2023  2:49 PM.  Learner: Patient  Readiness: Acceptance  Method: Explanation, Demonstration  Response: Verbalizes Understanding    Education Comments  No comments found.       Goals:  Encounter Problems       Encounter Problems (Active)       Balance       STG - Patient will maintain standing balance to allow for safe completion of daily activities Mod I       Start:  12/23/23    Expected End:  01/06/24               Bathing       STG - Patient will bathe body Mod I       Start:  12/23/23    Expected End:  01/06/24       With AE as appropriate             Dressings Lower Extremities       STG - Patient will complete lower body dressing Mod I       Start:  12/23/23    Expected End:  01/06/24       With AE as appropriate             Toileting       STG - Patient will complete toileting tasks Mod I       Start:  12/23/23    Expected End:  01/06/24               Transfers       STG - Patient will perform bed, toilet, sit/stand transfers Mod I       Start:  12/23/23    Expected End:  01/06/24

## 2023-12-23 NOTE — CARE PLAN
Problem: Pain - Adult  Goal: Verbalizes/displays adequate comfort level or baseline comfort level  Outcome: Progressing     Problem: Safety - Adult  Goal: Free from fall injury  Outcome: Progressing   The patient's goals for the shift include pain free    The clinical goals for the shift include pt will have less pain    Over the shift, the patient did not make progress toward the following goals. Barriers to progression include . Recommendations to address these barriers include .

## 2023-12-24 LAB
ANION GAP SERPL CALC-SCNC: 14 MMOL/L (ref 10–20)
BUN SERPL-MCNC: 38 MG/DL (ref 6–23)
CALCIUM SERPL-MCNC: 8.3 MG/DL (ref 8.6–10.3)
CHLORIDE SERPL-SCNC: 96 MMOL/L (ref 98–107)
CO2 SERPL-SCNC: 29 MMOL/L (ref 21–32)
CREAT SERPL-MCNC: 1.51 MG/DL (ref 0.5–1.05)
ERYTHROCYTE [DISTWIDTH] IN BLOOD BY AUTOMATED COUNT: 17.9 % (ref 11.5–14.5)
GFR SERPL CREATININE-BSD FRML MDRD: 36 ML/MIN/1.73M*2
GLUCOSE SERPL-MCNC: 99 MG/DL (ref 74–99)
HCT VFR BLD AUTO: 26.3 % (ref 36–46)
HGB BLD-MCNC: 7.9 G/DL (ref 12–16)
MAGNESIUM SERPL-MCNC: 2 MG/DL (ref 1.6–2.4)
MCH RBC QN AUTO: 21.2 PG (ref 26–34)
MCHC RBC AUTO-ENTMCNC: 30 G/DL (ref 32–36)
MCV RBC AUTO: 71 FL (ref 80–100)
NRBC BLD-RTO: 0 /100 WBCS (ref 0–0)
PLATELET # BLD AUTO: 278 X10*3/UL (ref 150–450)
POTASSIUM SERPL-SCNC: 3.7 MMOL/L (ref 3.5–5.3)
RBC # BLD AUTO: 3.72 X10*6/UL (ref 4–5.2)
SODIUM SERPL-SCNC: 135 MMOL/L (ref 136–145)
WBC # BLD AUTO: 11.9 X10*3/UL (ref 4.4–11.3)

## 2023-12-24 PROCEDURE — 36415 COLL VENOUS BLD VENIPUNCTURE: CPT | Performed by: INTERNAL MEDICINE

## 2023-12-24 PROCEDURE — 94640 AIRWAY INHALATION TREATMENT: CPT

## 2023-12-24 PROCEDURE — 2500000001 HC RX 250 WO HCPCS SELF ADMINISTERED DRUGS (ALT 637 FOR MEDICARE OP): Performed by: FAMILY MEDICINE

## 2023-12-24 PROCEDURE — 2500000001 HC RX 250 WO HCPCS SELF ADMINISTERED DRUGS (ALT 637 FOR MEDICARE OP): Performed by: NURSE PRACTITIONER

## 2023-12-24 PROCEDURE — 2500000002 HC RX 250 W HCPCS SELF ADMINISTERED DRUGS (ALT 637 FOR MEDICARE OP, ALT 636 FOR OP/ED): Performed by: INTERNAL MEDICINE

## 2023-12-24 PROCEDURE — 85027 COMPLETE CBC AUTOMATED: CPT | Performed by: INTERNAL MEDICINE

## 2023-12-24 PROCEDURE — 94760 N-INVAS EAR/PLS OXIMETRY 1: CPT

## 2023-12-24 PROCEDURE — 1150000001 HC HOSPICE PRIVATE ROOM DAILY

## 2023-12-24 PROCEDURE — 2500000004 HC RX 250 GENERAL PHARMACY W/ HCPCS (ALT 636 FOR OP/ED): Performed by: INTERNAL MEDICINE

## 2023-12-24 PROCEDURE — 80048 BASIC METABOLIC PNL TOTAL CA: CPT | Performed by: INTERNAL MEDICINE

## 2023-12-24 PROCEDURE — 2500000001 HC RX 250 WO HCPCS SELF ADMINISTERED DRUGS (ALT 637 FOR MEDICARE OP): Performed by: INTERNAL MEDICINE

## 2023-12-24 PROCEDURE — 83735 ASSAY OF MAGNESIUM: CPT | Performed by: NURSE PRACTITIONER

## 2023-12-24 RX ORDER — OXYCODONE HYDROCHLORIDE 5 MG/1
5 TABLET ORAL EVERY 6 HOURS PRN
Status: DISCONTINUED | OUTPATIENT
Start: 2023-12-24 | End: 2023-12-26 | Stop reason: HOSPADM

## 2023-12-24 RX ADMIN — PANTOPRAZOLE SODIUM 40 MG: 40 TABLET, DELAYED RELEASE ORAL at 08:39

## 2023-12-24 RX ADMIN — APIXABAN 5 MG: 5 TABLET, FILM COATED ORAL at 08:40

## 2023-12-24 RX ADMIN — PIPERACILLIN SODIUM AND TAZOBACTAM SODIUM 3.38 G: 3; .375 INJECTION, SOLUTION INTRAVENOUS at 04:03

## 2023-12-24 RX ADMIN — APIXABAN 5 MG: 5 TABLET, FILM COATED ORAL at 20:50

## 2023-12-24 RX ADMIN — TORSEMIDE 40 MG: 20 TABLET ORAL at 08:39

## 2023-12-24 RX ADMIN — PIPERACILLIN SODIUM AND TAZOBACTAM SODIUM 3.38 G: 3; .375 INJECTION, SOLUTION INTRAVENOUS at 21:57

## 2023-12-24 RX ADMIN — PIPERACILLIN SODIUM AND TAZOBACTAM SODIUM 3.38 G: 3; .375 INJECTION, SOLUTION INTRAVENOUS at 11:31

## 2023-12-24 RX ADMIN — GABAPENTIN 400 MG: 400 CAPSULE ORAL at 20:50

## 2023-12-24 RX ADMIN — HYDROXYCHLOROQUINE SULFATE 200 MG: 200 TABLET, FILM COATED ORAL at 20:50

## 2023-12-24 RX ADMIN — SPIRONOLACTONE 25 MG: 25 TABLET ORAL at 08:40

## 2023-12-24 RX ADMIN — ALBUTEROL SULFATE 2.5 MG: 2.5 SOLUTION RESPIRATORY (INHALATION) at 19:18

## 2023-12-24 RX ADMIN — GUAIFENESIN 600 MG: 600 TABLET, EXTENDED RELEASE ORAL at 02:36

## 2023-12-24 RX ADMIN — NYSTATIN 400000 UNITS: 100000 SUSPENSION ORAL at 20:50

## 2023-12-24 RX ADMIN — TRAMADOL HYDROCHLORIDE 50 MG: 50 TABLET, COATED ORAL at 02:36

## 2023-12-24 RX ADMIN — ACETAMINOPHEN 650 MG: 325 TABLET ORAL at 15:44

## 2023-12-24 RX ADMIN — Medication 1 TABLET: at 08:40

## 2023-12-24 RX ADMIN — CALCIUM 1250 MG: 500 TABLET ORAL at 08:40

## 2023-12-24 RX ADMIN — OXYCODONE HYDROCHLORIDE 5 MG: 5 TABLET ORAL at 23:11

## 2023-12-24 RX ADMIN — NYSTATIN 400000 UNITS: 100000 SUSPENSION ORAL at 16:26

## 2023-12-24 RX ADMIN — BUDESONIDE INHALATION 0.5 MG: 0.5 SUSPENSION RESPIRATORY (INHALATION) at 19:18

## 2023-12-24 RX ADMIN — BUDESONIDE INHALATION 0.5 MG: 0.5 SUSPENSION RESPIRATORY (INHALATION) at 07:37

## 2023-12-24 RX ADMIN — GABAPENTIN 400 MG: 400 CAPSULE ORAL at 15:13

## 2023-12-24 RX ADMIN — NYSTATIN 400000 UNITS: 100000 SUSPENSION ORAL at 08:39

## 2023-12-24 RX ADMIN — TRAMADOL HYDROCHLORIDE 50 MG: 50 TABLET, COATED ORAL at 15:44

## 2023-12-24 RX ADMIN — GABAPENTIN 400 MG: 400 CAPSULE ORAL at 08:40

## 2023-12-24 RX ADMIN — HYDROXYCHLOROQUINE SULFATE 200 MG: 200 TABLET, FILM COATED ORAL at 08:40

## 2023-12-24 RX ADMIN — ACETAMINOPHEN 650 MG: 325 TABLET ORAL at 20:50

## 2023-12-24 RX ADMIN — LOSARTAN POTASSIUM 100 MG: 50 TABLET, FILM COATED ORAL at 08:40

## 2023-12-24 RX ADMIN — FORMOTEROL FUMARATE DIHYDRATE 20 MCG: 20 SOLUTION RESPIRATORY (INHALATION) at 07:37

## 2023-12-24 RX ADMIN — NYSTATIN 400000 UNITS: 100000 SUSPENSION ORAL at 12:51

## 2023-12-24 RX ADMIN — FORMOTEROL FUMARATE DIHYDRATE 20 MCG: 20 SOLUTION RESPIRATORY (INHALATION) at 19:19

## 2023-12-24 RX ADMIN — PIPERACILLIN SODIUM AND TAZOBACTAM SODIUM 3.38 G: 3; .375 INJECTION, SOLUTION INTRAVENOUS at 16:22

## 2023-12-24 ASSESSMENT — PAIN SCALES - GENERAL
PAINLEVEL_OUTOF10: 8
PAINLEVEL_OUTOF10: 7
PAINLEVEL_OUTOF10: 9
PAINLEVEL_OUTOF10: 7

## 2023-12-24 NOTE — PROGRESS NOTES
Genesis Garcia is a 78 y.o. female     Back pain persists  CAT scan of the lumbar spine does show some loosening of the screws  Unable to compare with any previous CAT scans have not have been done in the last few years  Will ask orthopedics input    Review of Systems     Constitutional: no fever, no chills,   Cardiovascular: no chest pain   Respiratory: Shortness of breath  Gastrointestinal: no abdominal pain, no constipation, no melena, no nausea, no diarrhea, no vomiting and no blood in stools.   Neurological: no headache,   All other systems have been reviewed and are negative for complaint.     Remains on oxygen     Vitals:        BP: 135/59   Pulse: 71   Resp: 20   Temp: 36.4 °C (97.5 °F)   SpO2: 94%        Scheduled medications  albuterol, 2.5 mg, nebulization, TID  apixaban, 5 mg, oral, BID  B complex-vitamin C, 1 tablet, oral, Daily  budesonide, 0.5 mg, nebulization, BID   And  formoterol, 20 mcg, nebulization, BID  calcium carbonate, 1,250 mg, oral, Daily  gabapentin, 400 mg, oral, TID  hydroxychloroquine, 200 mg, oral, BID  lactobacillus acidophilus, 1 capsule, oral, BID  losartan, 100 mg, oral, Daily  melatonin, 3 mg, oral, Daily  nystatin, 4 mL, Swish & Swallow, 4x daily  pantoprazole, 40 mg, oral, Daily before breakfast   Or  pantoprazole, 40 mg, intravenous, Daily before breakfast  piperacillin-tazobactam, 3.375 g, intravenous, q6h  polyethylene glycol, 17 g, oral, Daily  psyllium, 1 packet, oral, Daily  spironolactone, 25 mg, oral, Daily  torsemide, 40 mg, oral, Daily      Continuous medications     PRN medications  PRN medications: acetaminophen **OR** acetaminophen **OR** acetaminophen, albuterol, guaiFENesin, ondansetron **OR** ondansetron, oxygen, traMADol    Lab Review   Results from last 7 days   Lab Units 12/23/23  0644 12/22/23  0718 12/21/23  0526   WBC AUTO x10*3/uL 13.2* 11.0 12.4*   HEMOGLOBIN g/dL 7.8* 7.6* 7.3*   HEMATOCRIT % 25.5* 27.7* 24.7*   PLATELETS AUTO x10*3/uL 259 175 179        Results from last 7 days   Lab Units 12/23/23  0644 12/22/23  0718 12/21/23  0526   SODIUM mmol/L 132* 130* 131*   POTASSIUM mmol/L 3.3* 3.6 3.3*   CHLORIDE mmol/L 94* 98 97*   CO2 mmol/L 27 22 25   BUN mg/dL 38* 41* 43*   CREATININE mg/dL 1.43* 1.53* 1.53*   CALCIUM mg/dL 8.0* 7.8* 7.6*   GLUCOSE mg/dL 99 120* 110*              CT lumbar spine wo IV contrast   Final Result   1. Postoperative changes at L4-S1 with new/worsened lucency about the   S1 pedicle screws, concerning for loosening.   2. No acute fracture or subluxation of the lumbar spine.        MACRO:   None        Signed by: Juancarlos Marcial 12/22/2023 10:29 AM   Dictation workstation:   [id number]      Transthoracic Echo (TTE) Complete   Final Result      XR chest 2 views   Final Result   Left-greater-than-right basilar infiltrates or atelectasis with small   left pleural effusion. Follow-up to ensure resolution after   appropriate treatment recommended.        MACRO:   None.        Signed by: Jenifer Friedman 12/20/2023 11:52 AM   Dictation workstation:   [id number]            Physical Exam     Constitutional   General appearance: Alert     Pulmonary   Respiratory assessment: Crackles  Cardiovascular   Auscultation of heart: Apical pulse normal, heart rate and rhythm normal, normal S1 and S2, no murmurs and no pericardial rub.    Exam for edema: 1+ edema  Abdomen   Abdominal Exam: No bruits, normal bowel sounds, soft, non-tender, no abdominal mass palpated.    Liver and Spleen exam: No hepato-splenomegaly.   Musculoskeletal   Examination of gait: ROM intact  Skin inspection: Normal skin color and pigmentation, normal skin turgor and no visible rash.   Neurologic   Cranial nerves: Nerves 2-12 were intact, no focal neuro defects.     Assessment/Plan      #Acute respiratory failure with hypoxia  #Community-acquired pneumonia  #COPD  Continue antibiotics and DuoNebs  Cont to wean oxygen     #Acute on chronic diastolic congestive heart failure  IV  Lasix  Strict I's and O's with daily weights and electrolytes    #Acute back pain s/p fall  CAT scan shows loosening of the screws  Will get orthopedic input  Pain control     #Possible cellulitis of left leg  Already improved        #Atrial fibrillation  Rate controlled  Continue Eliquis     #Hypertension  Continue home medications and hold for systolic less than 100     #Dyslipidemia  Continue statins with a target LDL of less than 70    Ortho input pending   Cont to wean oxygen   Supplement K for hypokalemia

## 2023-12-24 NOTE — PROGRESS NOTES
Genesis Garcia is a 78 y.o. female     Back pain persists  CAT scan of the lumbar spine does show some loosening of the screws  Unable to compare with any previous CAT scans have not have been done in the last few years  Will ask orthopedics input    No chest pain   No shortness of rbeath   Remains on oxygen    Review of Systems     Constitutional: no fever, no chills,   Cardiovascular: no chest pain   Respiratory: Shortness of breath  Gastrointestinal: no abdominal pain, no constipation, no melena, no nausea, no diarrhea, no vomiting and no blood in stools.   Neurological: no headache,   All other systems have been reviewed and are negative for complaint.     Remains on oxygen     Vitals:        BP: 135/59   Pulse: 71   Resp: 20   Temp: 36.4 °C (97.5 °F)   SpO2: 94%        Scheduled medications  albuterol, 2.5 mg, nebulization, TID  apixaban, 5 mg, oral, BID  B complex-vitamin C, 1 tablet, oral, Daily  budesonide, 0.5 mg, nebulization, BID   And  formoterol, 20 mcg, nebulization, BID  calcium carbonate, 1,250 mg, oral, Daily  gabapentin, 400 mg, oral, TID  hydroxychloroquine, 200 mg, oral, BID  lactobacillus acidophilus, 1 capsule, oral, BID  losartan, 100 mg, oral, Daily  melatonin, 3 mg, oral, Daily  nystatin, 4 mL, Swish & Swallow, 4x daily  pantoprazole, 40 mg, oral, Daily before breakfast   Or  pantoprazole, 40 mg, intravenous, Daily before breakfast  piperacillin-tazobactam, 3.375 g, intravenous, q6h  polyethylene glycol, 17 g, oral, Daily  psyllium, 1 packet, oral, Daily  spironolactone, 25 mg, oral, Daily  torsemide, 40 mg, oral, Daily      Continuous medications     PRN medications  PRN medications: acetaminophen **OR** acetaminophen **OR** acetaminophen, albuterol, guaiFENesin, ondansetron **OR** ondansetron, oxygen, traMADol    Lab Review   Results from last 7 days   Lab Units 12/24/23  0646 12/23/23  0644 12/22/23  0718   WBC AUTO x10*3/uL 11.9* 13.2* 11.0   HEMOGLOBIN g/dL 7.9* 7.8* 7.6*    HEMATOCRIT % 26.3* 25.5* 27.7*   PLATELETS AUTO x10*3/uL 278 259 175       Results from last 7 days   Lab Units 12/24/23  0646 12/23/23  0644 12/22/23  0718   SODIUM mmol/L 135* 132* 130*   POTASSIUM mmol/L 3.7 3.3* 3.6   CHLORIDE mmol/L 96* 94* 98   CO2 mmol/L 29 27 22   BUN mg/dL 38* 38* 41*   CREATININE mg/dL 1.51* 1.43* 1.53*   CALCIUM mg/dL 8.3* 8.0* 7.8*   GLUCOSE mg/dL 99 99 120*              CT lumbar spine wo IV contrast   Final Result   1. Postoperative changes at L4-S1 with new/worsened lucency about the   S1 pedicle screws, concerning for loosening.   2. No acute fracture or subluxation of the lumbar spine.        MACRO:   None        Signed by: Juancarlos Marcial 12/22/2023 10:29 AM   Dictation workstation:   [id number]      Transthoracic Echo (TTE) Complete   Final Result      XR chest 2 views   Final Result   Left-greater-than-right basilar infiltrates or atelectasis with small   left pleural effusion. Follow-up to ensure resolution after   appropriate treatment recommended.        MACRO:   None.        Signed by: Jenifer Friedman 12/20/2023 11:52 AM   Dictation workstation:   [id number]            Physical Exam     Constitutional   General appearance: Alert     Pulmonary   Respiratory assessment: Crackles  Cardiovascular   Auscultation of heart: Apical pulse normal, heart rate and rhythm normal, normal S1 and S2, no murmurs and no pericardial rub.    Exam for edema: 1+ edema  Abdomen   Abdominal Exam: No bruits, normal bowel sounds, soft, non-tender, no abdominal mass palpated.    Liver and Spleen exam: No hepato-splenomegaly.   Musculoskeletal   Examination of gait: ROM intact  Skin inspection: Normal skin color and pigmentation, normal skin turgor and no visible rash.   Neurologic   Cranial nerves: Nerves 2-12 were intact, no focal neuro defects.     Assessment/Plan      #Acute respiratory failure with hypoxia  #Community-acquired pneumonia  #COPD  Continue antibiotics and DuoNebs  Cont to wean  oxygen     #Acute on chronic diastolic congestive heart failure  IV Lasix  Strict I's and O's with daily weights and electrolytes    #Acute back pain s/p fall  CAT scan shows loosening of the screws  Will get orthopedic input  Pain control     #Possible cellulitis of left leg  Already improved        #Atrial fibrillation  Rate controlled  Continue Eliquis     #Hypertension  Continue home medications and hold for systolic less than 100     #Dyslipidemia  Continue statins with a target LDL of less than 70    Ortho input pending   Cont to wean oxygen   Home with home care tomorrow,   Dw daughter by brenda

## 2023-12-25 LAB
ANION GAP SERPL CALC-SCNC: 13 MMOL/L (ref 10–20)
BUN SERPL-MCNC: 39 MG/DL (ref 6–23)
CALCIUM SERPL-MCNC: 8.4 MG/DL (ref 8.6–10.3)
CHLORIDE SERPL-SCNC: 96 MMOL/L (ref 98–107)
CO2 SERPL-SCNC: 27 MMOL/L (ref 21–32)
CREAT SERPL-MCNC: 1.57 MG/DL (ref 0.5–1.05)
ERYTHROCYTE [DISTWIDTH] IN BLOOD BY AUTOMATED COUNT: 18 % (ref 11.5–14.5)
GFR SERPL CREATININE-BSD FRML MDRD: 34 ML/MIN/1.73M*2
GLUCOSE SERPL-MCNC: 116 MG/DL (ref 74–99)
HCT VFR BLD AUTO: 27.1 % (ref 36–46)
HGB BLD-MCNC: 7.8 G/DL (ref 12–16)
MAGNESIUM SERPL-MCNC: 2.1 MG/DL (ref 1.6–2.4)
MCH RBC QN AUTO: 21.1 PG (ref 26–34)
MCHC RBC AUTO-ENTMCNC: 28.8 G/DL (ref 32–36)
MCV RBC AUTO: 73 FL (ref 80–100)
NRBC BLD-RTO: 0 /100 WBCS (ref 0–0)
PLATELET # BLD AUTO: 336 X10*3/UL (ref 150–450)
POTASSIUM SERPL-SCNC: 3.3 MMOL/L (ref 3.5–5.3)
RBC # BLD AUTO: 3.69 X10*6/UL (ref 4–5.2)
SODIUM SERPL-SCNC: 133 MMOL/L (ref 136–145)
WBC # BLD AUTO: 10.4 X10*3/UL (ref 4.4–11.3)

## 2023-12-25 PROCEDURE — 85027 COMPLETE CBC AUTOMATED: CPT | Performed by: INTERNAL MEDICINE

## 2023-12-25 PROCEDURE — 2500000004 HC RX 250 GENERAL PHARMACY W/ HCPCS (ALT 636 FOR OP/ED): Performed by: INTERNAL MEDICINE

## 2023-12-25 PROCEDURE — 2500000001 HC RX 250 WO HCPCS SELF ADMINISTERED DRUGS (ALT 637 FOR MEDICARE OP): Performed by: INTERNAL MEDICINE

## 2023-12-25 PROCEDURE — 94640 AIRWAY INHALATION TREATMENT: CPT

## 2023-12-25 PROCEDURE — 83735 ASSAY OF MAGNESIUM: CPT | Performed by: NURSE PRACTITIONER

## 2023-12-25 PROCEDURE — 2500000001 HC RX 250 WO HCPCS SELF ADMINISTERED DRUGS (ALT 637 FOR MEDICARE OP): Performed by: NURSE PRACTITIONER

## 2023-12-25 PROCEDURE — 2500000002 HC RX 250 W HCPCS SELF ADMINISTERED DRUGS (ALT 637 FOR MEDICARE OP, ALT 636 FOR OP/ED): Performed by: INTERNAL MEDICINE

## 2023-12-25 PROCEDURE — 2500000001 HC RX 250 WO HCPCS SELF ADMINISTERED DRUGS (ALT 637 FOR MEDICARE OP): Performed by: PHARMACIST

## 2023-12-25 PROCEDURE — 2500000004 HC RX 250 GENERAL PHARMACY W/ HCPCS (ALT 636 FOR OP/ED): Performed by: FAMILY MEDICINE

## 2023-12-25 PROCEDURE — 2500000005 HC RX 250 GENERAL PHARMACY W/O HCPCS: Performed by: INTERNAL MEDICINE

## 2023-12-25 PROCEDURE — 36415 COLL VENOUS BLD VENIPUNCTURE: CPT | Performed by: INTERNAL MEDICINE

## 2023-12-25 PROCEDURE — 2500000001 HC RX 250 WO HCPCS SELF ADMINISTERED DRUGS (ALT 637 FOR MEDICARE OP): Performed by: FAMILY MEDICINE

## 2023-12-25 PROCEDURE — 1150000001 HC HOSPICE PRIVATE ROOM DAILY

## 2023-12-25 PROCEDURE — 80048 BASIC METABOLIC PNL TOTAL CA: CPT | Performed by: INTERNAL MEDICINE

## 2023-12-25 RX ORDER — POTASSIUM CHLORIDE 20 MEQ/1
20 TABLET, EXTENDED RELEASE ORAL ONCE
Status: COMPLETED | OUTPATIENT
Start: 2023-12-25 | End: 2023-12-25

## 2023-12-25 RX ADMIN — APIXABAN 5 MG: 5 TABLET, FILM COATED ORAL at 08:43

## 2023-12-25 RX ADMIN — POTASSIUM CHLORIDE 20 MEQ: 1500 TABLET, EXTENDED RELEASE ORAL at 15:09

## 2023-12-25 RX ADMIN — GABAPENTIN 400 MG: 400 CAPSULE ORAL at 20:31

## 2023-12-25 RX ADMIN — HYDROXYCHLOROQUINE SULFATE 200 MG: 200 TABLET, FILM COATED ORAL at 08:44

## 2023-12-25 RX ADMIN — HYDROXYCHLOROQUINE SULFATE 200 MG: 200 TABLET, FILM COATED ORAL at 20:32

## 2023-12-25 RX ADMIN — NYSTATIN 400000 UNITS: 100000 SUSPENSION ORAL at 18:34

## 2023-12-25 RX ADMIN — APIXABAN 5 MG: 5 TABLET, FILM COATED ORAL at 20:31

## 2023-12-25 RX ADMIN — PIPERACILLIN SODIUM AND TAZOBACTAM SODIUM 3.38 G: 3; .375 INJECTION, SOLUTION INTRAVENOUS at 15:10

## 2023-12-25 RX ADMIN — PIPERACILLIN SODIUM AND TAZOBACTAM SODIUM 3.38 G: 3; .375 INJECTION, SOLUTION INTRAVENOUS at 09:39

## 2023-12-25 RX ADMIN — OXYCODONE HYDROCHLORIDE 5 MG: 5 TABLET ORAL at 14:47

## 2023-12-25 RX ADMIN — ACETAMINOPHEN 650 MG: 325 TABLET ORAL at 20:35

## 2023-12-25 RX ADMIN — ACETAMINOPHEN 650 MG: 325 TABLET ORAL at 14:47

## 2023-12-25 RX ADMIN — PANTOPRAZOLE SODIUM 40 MG: 40 TABLET, DELAYED RELEASE ORAL at 06:29

## 2023-12-25 RX ADMIN — OXYCODONE HYDROCHLORIDE 5 MG: 5 TABLET ORAL at 06:29

## 2023-12-25 RX ADMIN — NYSTATIN 400000 UNITS: 100000 SUSPENSION ORAL at 20:31

## 2023-12-25 RX ADMIN — BUDESONIDE INHALATION 0.5 MG: 0.5 SUSPENSION RESPIRATORY (INHALATION) at 07:12

## 2023-12-25 RX ADMIN — FORMOTEROL FUMARATE DIHYDRATE 20 MCG: 20 SOLUTION RESPIRATORY (INHALATION) at 07:12

## 2023-12-25 RX ADMIN — GUAIFENESIN 600 MG: 600 TABLET, EXTENDED RELEASE ORAL at 14:46

## 2023-12-25 RX ADMIN — FORMOTEROL FUMARATE DIHYDRATE 20 MCG: 20 SOLUTION RESPIRATORY (INHALATION) at 19:54

## 2023-12-25 RX ADMIN — ALBUTEROL SULFATE 2.5 MG: 2.5 SOLUTION RESPIRATORY (INHALATION) at 12:12

## 2023-12-25 RX ADMIN — BUDESONIDE INHALATION 0.5 MG: 0.5 SUSPENSION RESPIRATORY (INHALATION) at 19:55

## 2023-12-25 RX ADMIN — PIPERACILLIN SODIUM AND TAZOBACTAM SODIUM 3.38 G: 3; .375 INJECTION, SOLUTION INTRAVENOUS at 04:08

## 2023-12-25 RX ADMIN — TORSEMIDE 40 MG: 20 TABLET ORAL at 08:43

## 2023-12-25 RX ADMIN — Medication 1 TABLET: at 08:43

## 2023-12-25 RX ADMIN — NYSTATIN 400000 UNITS: 100000 SUSPENSION ORAL at 06:29

## 2023-12-25 RX ADMIN — GABAPENTIN 400 MG: 400 CAPSULE ORAL at 08:43

## 2023-12-25 RX ADMIN — Medication 1 L/MIN: at 19:54

## 2023-12-25 RX ADMIN — LOSARTAN POTASSIUM 100 MG: 50 TABLET, FILM COATED ORAL at 08:44

## 2023-12-25 RX ADMIN — GABAPENTIN 400 MG: 400 CAPSULE ORAL at 14:46

## 2023-12-25 RX ADMIN — CALCIUM 1250 MG: 500 TABLET ORAL at 08:44

## 2023-12-25 RX ADMIN — SPIRONOLACTONE 25 MG: 25 TABLET ORAL at 08:44

## 2023-12-25 RX ADMIN — ALBUTEROL SULFATE 2.5 MG: 2.5 SOLUTION RESPIRATORY (INHALATION) at 07:12

## 2023-12-25 RX ADMIN — PIPERACILLIN SODIUM AND TAZOBACTAM SODIUM 3.38 G: 3; .375 INJECTION, SOLUTION INTRAVENOUS at 22:40

## 2023-12-25 RX ADMIN — ALBUTEROL SULFATE 2.5 MG: 2.5 SOLUTION RESPIRATORY (INHALATION) at 19:54

## 2023-12-25 RX ADMIN — Medication 1 CAPSULE: at 09:00

## 2023-12-25 ASSESSMENT — COGNITIVE AND FUNCTIONAL STATUS - GENERAL
DRESSING REGULAR LOWER BODY CLOTHING: A LOT
PERSONAL GROOMING: A LITTLE
STANDING UP FROM CHAIR USING ARMS: A LOT
HELP NEEDED FOR BATHING: A LOT
MOBILITY SCORE: 11
CLIMB 3 TO 5 STEPS WITH RAILING: TOTAL
TURNING FROM BACK TO SIDE WHILE IN FLAT BAD: A LOT
DAILY ACTIVITIY SCORE: 15
WALKING IN HOSPITAL ROOM: A LOT
MOVING FROM LYING ON BACK TO SITTING ON SIDE OF FLAT BED WITH BEDRAILS: A LOT
TOILETING: A LOT
DRESSING REGULAR UPPER BODY CLOTHING: A LOT
MOVING TO AND FROM BED TO CHAIR: A LOT

## 2023-12-25 ASSESSMENT — PAIN DESCRIPTION - LOCATION
LOCATION: BACK
LOCATION: BACK

## 2023-12-25 ASSESSMENT — PAIN SCALES - GENERAL
PAINLEVEL_OUTOF10: 7
PAINLEVEL_OUTOF10: 2
PAINLEVEL_OUTOF10: 5 - MODERATE PAIN

## 2023-12-25 ASSESSMENT — PAIN DESCRIPTION - ORIENTATION: ORIENTATION: LOWER

## 2023-12-25 ASSESSMENT — PAIN - FUNCTIONAL ASSESSMENT
PAIN_FUNCTIONAL_ASSESSMENT: 0-10
PAIN_FUNCTIONAL_ASSESSMENT: 0-10

## 2023-12-25 NOTE — PROGRESS NOTES
Genesis Garcia is a 78 y.o. female     Back pain persists  CAT scan of the lumbar spine does show some loosening of the screws  Unable to compare with any previous CAT scans have not have been done in the last few years  Will ask orthopedics input    No chest pain   No shortness of rbeath   Remains on oxygen    Review of Systems     Constitutional: no fever, no chills,   Cardiovascular: no chest pain   Respiratory: Shortness of breath  Gastrointestinal: no abdominal pain, no constipation, no melena, no nausea, no diarrhea, no vomiting and no blood in stools.   Neurological: no headache,   All other systems have been reviewed and are negative for complaint.     Remains on oxygen       12/24/2023     5:00 PM 12/24/2023     7:24 PM 12/24/2023    11:20 PM 12/25/2023     3:06 AM 12/25/2023     7:40 AM 12/25/2023     3:47 PM 12/25/2023     4:07 PM   Vitals   Systolic 136 128 125 122 137  105   Diastolic 67 48 66 68 57  55   Heart Rate 68  70 68 70 83 82   Temp 36.7 °C (98.1 °F) 37 °C (98.6 °F) 36.4 °C (97.5 °F) 36.1 °C (97 °F) 37.4 °C (99.3 °F)     Resp 20 20 20 20 18 18 18            Scheduled medications  albuterol, 2.5 mg, nebulization, TID  apixaban, 5 mg, oral, BID  B complex-vitamin C, 1 tablet, oral, Daily  budesonide, 0.5 mg, nebulization, BID   And  formoterol, 20 mcg, nebulization, BID  calcium carbonate, 1,250 mg, oral, Daily  gabapentin, 400 mg, oral, TID  hydroxychloroquine, 200 mg, oral, BID  lactobacillus acidophilus, 1 capsule, oral, BID  losartan, 100 mg, oral, Daily  melatonin, 3 mg, oral, Daily  nystatin, 4 mL, Swish & Swallow, 4x daily  pantoprazole, 40 mg, oral, Daily before breakfast   Or  pantoprazole, 40 mg, intravenous, Daily before breakfast  piperacillin-tazobactam, 3.375 g, intravenous, q6h  polyethylene glycol, 17 g, oral, Daily  psyllium, 1 packet, oral, Daily  spironolactone, 25 mg, oral, Daily  torsemide, 40 mg, oral, Daily      Continuous medications     PRN medications  PRN  medications: acetaminophen **OR** acetaminophen **OR** acetaminophen, albuterol, guaiFENesin, ondansetron **OR** ondansetron, oxyCODONE, oxygen    Lab Review   Results from last 7 days   Lab Units 12/25/23  0739 12/24/23  0646 12/23/23  0644   WBC AUTO x10*3/uL 10.4 11.9* 13.2*   HEMOGLOBIN g/dL 7.8* 7.9* 7.8*   HEMATOCRIT % 27.1* 26.3* 25.5*   PLATELETS AUTO x10*3/uL 336 278 259       Results from last 7 days   Lab Units 12/25/23  0739 12/24/23  0646 12/23/23  0644   SODIUM mmol/L 133* 135* 132*   POTASSIUM mmol/L 3.3* 3.7 3.3*   CHLORIDE mmol/L 96* 96* 94*   CO2 mmol/L 27 29 27   BUN mg/dL 39* 38* 38*   CREATININE mg/dL 1.57* 1.51* 1.43*   CALCIUM mg/dL 8.4* 8.3* 8.0*   GLUCOSE mg/dL 116* 99 99              CT lumbar spine wo IV contrast   Final Result   1. Postoperative changes at L4-S1 with new/worsened lucency about the   S1 pedicle screws, concerning for loosening.   2. No acute fracture or subluxation of the lumbar spine.        MACRO:   None        Signed by: Juancarlos Marcial 12/22/2023 10:29 AM   Dictation workstation:   [id number]      Transthoracic Echo (TTE) Complete   Final Result      XR chest 2 views   Final Result   Left-greater-than-right basilar infiltrates or atelectasis with small   left pleural effusion. Follow-up to ensure resolution after   appropriate treatment recommended.        MACRO:   None.        Signed by: Jenifer Friedman 12/20/2023 11:52 AM   Dictation workstation:   [id number]            Physical Exam     Constitutional   General appearance: Alert     Pulmonary   Respiratory assessment: Crackles  Cardiovascular   Auscultation of heart: Apical pulse normal, heart rate and rhythm normal, normal S1 and S2, no murmurs and no pericardial rub.    Exam for edema: 1+ edema  Abdomen   Abdominal Exam: No bruits, normal bowel sounds, soft, non-tender, no abdominal mass palpated.    Liver and Spleen exam: No hepato-splenomegaly.   Musculoskeletal   Examination of gait: ROM intact  Skin  inspection: Normal skin color and pigmentation, normal skin turgor and no visible rash.   Neurologic   Cranial nerves: Nerves 2-12 were intact, no focal neuro defects.     Assessment/Plan      #Acute respiratory failure with hypoxia  #Community-acquired pneumonia  #COPD  Continue antibiotics and DuoNebs  Cont to wean oxygen     #Acute on chronic diastolic congestive heart failure  IV Lasix  Strict I's and O's with daily weights and electrolytes    #Acute back pain s/p fall  CAT scan shows loosening of the screws  Will get orthopedic input  Pain control     #Possible cellulitis of left leg  Already improved        #Atrial fibrillation  Rate controlled  Continue Eliquis     #Hypertension  Continue home medications and hold for systolic less than 100     #Dyslipidemia  Continue statins with a target LDL of less than 70    Ortho input pending   Cont to wean oxygen   Home with home care tomorrow,

## 2023-12-26 ENCOUNTER — TELEPHONE (OUTPATIENT)
Dept: SURGERY | Facility: HOSPITAL | Age: 78
End: 2023-12-26

## 2023-12-26 VITALS
TEMPERATURE: 97 F | OXYGEN SATURATION: 92 % | DIASTOLIC BLOOD PRESSURE: 82 MMHG | SYSTOLIC BLOOD PRESSURE: 166 MMHG | HEART RATE: 83 BPM | RESPIRATION RATE: 18 BRPM | WEIGHT: 220.6 LBS | BODY MASS INDEX: 40.59 KG/M2 | HEIGHT: 62 IN

## 2023-12-26 LAB
ANION GAP SERPL CALC-SCNC: 13 MMOL/L (ref 10–20)
BUN SERPL-MCNC: 47 MG/DL (ref 6–23)
CALCIUM SERPL-MCNC: 8.1 MG/DL (ref 8.6–10.3)
CHLORIDE SERPL-SCNC: 96 MMOL/L (ref 98–107)
CO2 SERPL-SCNC: 29 MMOL/L (ref 21–32)
CREAT SERPL-MCNC: 1.97 MG/DL (ref 0.5–1.05)
ERYTHROCYTE [DISTWIDTH] IN BLOOD BY AUTOMATED COUNT: 18 % (ref 11.5–14.5)
GFR SERPL CREATININE-BSD FRML MDRD: 26 ML/MIN/1.73M*2
GLUCOSE SERPL-MCNC: 90 MG/DL (ref 74–99)
HCT VFR BLD AUTO: 25.4 % (ref 36–46)
HGB BLD-MCNC: 7.6 G/DL (ref 12–16)
MAGNESIUM SERPL-MCNC: 2.1 MG/DL (ref 1.6–2.4)
MCH RBC QN AUTO: 20.9 PG (ref 26–34)
MCHC RBC AUTO-ENTMCNC: 29.9 G/DL (ref 32–36)
MCV RBC AUTO: 70 FL (ref 80–100)
NRBC BLD-RTO: 0 /100 WBCS (ref 0–0)
PLATELET # BLD AUTO: 395 X10*3/UL (ref 150–450)
POTASSIUM SERPL-SCNC: 3.4 MMOL/L (ref 3.5–5.3)
RBC # BLD AUTO: 3.64 X10*6/UL (ref 4–5.2)
SODIUM SERPL-SCNC: 135 MMOL/L (ref 136–145)
WBC # BLD AUTO: 10.6 X10*3/UL (ref 4.4–11.3)

## 2023-12-26 PROCEDURE — 94640 AIRWAY INHALATION TREATMENT: CPT

## 2023-12-26 PROCEDURE — 2500000001 HC RX 250 WO HCPCS SELF ADMINISTERED DRUGS (ALT 637 FOR MEDICARE OP): Performed by: PHARMACIST

## 2023-12-26 PROCEDURE — 97161 PT EVAL LOW COMPLEX 20 MIN: CPT | Mod: GP

## 2023-12-26 PROCEDURE — 36415 COLL VENOUS BLD VENIPUNCTURE: CPT | Performed by: INTERNAL MEDICINE

## 2023-12-26 PROCEDURE — 97535 SELF CARE MNGMENT TRAINING: CPT | Mod: GO | Performed by: OCCUPATIONAL THERAPIST

## 2023-12-26 PROCEDURE — 2500000001 HC RX 250 WO HCPCS SELF ADMINISTERED DRUGS (ALT 637 FOR MEDICARE OP): Performed by: NURSE PRACTITIONER

## 2023-12-26 PROCEDURE — 80048 BASIC METABOLIC PNL TOTAL CA: CPT | Performed by: INTERNAL MEDICINE

## 2023-12-26 PROCEDURE — 2500000004 HC RX 250 GENERAL PHARMACY W/ HCPCS (ALT 636 FOR OP/ED): Performed by: NURSE PRACTITIONER

## 2023-12-26 PROCEDURE — 85027 COMPLETE CBC AUTOMATED: CPT | Performed by: INTERNAL MEDICINE

## 2023-12-26 PROCEDURE — 83735 ASSAY OF MAGNESIUM: CPT | Performed by: NURSE PRACTITIONER

## 2023-12-26 PROCEDURE — 2500000001 HC RX 250 WO HCPCS SELF ADMINISTERED DRUGS (ALT 637 FOR MEDICARE OP): Performed by: INTERNAL MEDICINE

## 2023-12-26 PROCEDURE — 99232 SBSQ HOSP IP/OBS MODERATE 35: CPT | Performed by: INTERNAL MEDICINE

## 2023-12-26 PROCEDURE — 2500000001 HC RX 250 WO HCPCS SELF ADMINISTERED DRUGS (ALT 637 FOR MEDICARE OP): Performed by: FAMILY MEDICINE

## 2023-12-26 PROCEDURE — 2500000004 HC RX 250 GENERAL PHARMACY W/ HCPCS (ALT 636 FOR OP/ED): Performed by: INTERNAL MEDICINE

## 2023-12-26 PROCEDURE — 97530 THERAPEUTIC ACTIVITIES: CPT | Mod: GO | Performed by: OCCUPATIONAL THERAPIST

## 2023-12-26 PROCEDURE — 2500000002 HC RX 250 W HCPCS SELF ADMINISTERED DRUGS (ALT 637 FOR MEDICARE OP, ALT 636 FOR OP/ED): Performed by: INTERNAL MEDICINE

## 2023-12-26 RX ORDER — GUAIFENESIN 600 MG/1
600 TABLET, EXTENDED RELEASE ORAL EVERY 12 HOURS PRN
Qty: 10 TABLET | Refills: 0 | Status: SHIPPED | OUTPATIENT
Start: 2023-12-26 | End: 2023-12-31

## 2023-12-26 RX ORDER — ACETAMINOPHEN 325 MG/1
650 TABLET ORAL EVERY 4 HOURS PRN
Qty: 30 TABLET | Refills: 0
Start: 2023-12-26

## 2023-12-26 RX ORDER — LOPERAMIDE HYDROCHLORIDE 2 MG/1
2 CAPSULE ORAL 4 TIMES DAILY PRN
Status: DISCONTINUED | OUTPATIENT
Start: 2023-12-26 | End: 2023-12-26 | Stop reason: HOSPADM

## 2023-12-26 RX ORDER — OXYCODONE HYDROCHLORIDE 5 MG/1
5 TABLET ORAL EVERY 6 HOURS PRN
Qty: 15 TABLET | Refills: 0 | Status: SHIPPED | OUTPATIENT
Start: 2023-12-26 | End: 2024-02-20 | Stop reason: ALTCHOICE

## 2023-12-26 RX ORDER — POTASSIUM CHLORIDE 20 MEQ/1
20 TABLET, EXTENDED RELEASE ORAL ONCE
Status: COMPLETED | OUTPATIENT
Start: 2023-12-26 | End: 2023-12-26

## 2023-12-26 RX ORDER — NYSTATIN 100000 [USP'U]/ML
4 SUSPENSION ORAL 4 TIMES DAILY
Qty: 64 ML | Refills: 0 | Status: SHIPPED | OUTPATIENT
Start: 2023-12-26 | End: 2023-12-29 | Stop reason: HOSPADM

## 2023-12-26 RX ORDER — AMOXICILLIN AND CLAVULANATE POTASSIUM 500; 125 MG/1; MG/1
1 TABLET, FILM COATED ORAL 2 TIMES DAILY
Qty: 8 TABLET | Refills: 0 | Status: SHIPPED | OUTPATIENT
Start: 2023-12-26 | End: 2023-12-30

## 2023-12-26 RX ADMIN — ALBUTEROL SULFATE 2.5 MG: 2.5 SOLUTION RESPIRATORY (INHALATION) at 08:05

## 2023-12-26 RX ADMIN — OXYCODONE HYDROCHLORIDE 5 MG: 5 TABLET ORAL at 02:57

## 2023-12-26 RX ADMIN — TORSEMIDE 40 MG: 20 TABLET ORAL at 08:55

## 2023-12-26 RX ADMIN — PIPERACILLIN SODIUM AND TAZOBACTAM SODIUM 3.38 G: 3; .375 INJECTION, SOLUTION INTRAVENOUS at 10:30

## 2023-12-26 RX ADMIN — PANTOPRAZOLE SODIUM 40 MG: 40 TABLET, DELAYED RELEASE ORAL at 06:47

## 2023-12-26 RX ADMIN — GABAPENTIN 400 MG: 400 CAPSULE ORAL at 15:21

## 2023-12-26 RX ADMIN — HYDROXYCHLOROQUINE SULFATE 200 MG: 200 TABLET, FILM COATED ORAL at 08:55

## 2023-12-26 RX ADMIN — NYSTATIN 400000 UNITS: 100000 SUSPENSION ORAL at 06:47

## 2023-12-26 RX ADMIN — SPIRONOLACTONE 25 MG: 25 TABLET ORAL at 09:00

## 2023-12-26 RX ADMIN — ACETAMINOPHEN 650 MG: 650 SOLUTION ORAL at 06:52

## 2023-12-26 RX ADMIN — LOPERAMIDE HYDROCHLORIDE 2 MG: 2 CAPSULE ORAL at 18:06

## 2023-12-26 RX ADMIN — BUDESONIDE INHALATION 0.5 MG: 0.5 SUSPENSION RESPIRATORY (INHALATION) at 08:06

## 2023-12-26 RX ADMIN — Medication 1 TABLET: at 08:55

## 2023-12-26 RX ADMIN — LOSARTAN POTASSIUM 100 MG: 50 TABLET, FILM COATED ORAL at 08:55

## 2023-12-26 RX ADMIN — Medication 1 CAPSULE: at 09:00

## 2023-12-26 RX ADMIN — GABAPENTIN 400 MG: 400 CAPSULE ORAL at 08:55

## 2023-12-26 RX ADMIN — OXYCODONE HYDROCHLORIDE 5 MG: 5 TABLET ORAL at 18:06

## 2023-12-26 RX ADMIN — ALBUTEROL SULFATE 2.5 MG: 2.5 SOLUTION RESPIRATORY (INHALATION) at 14:54

## 2023-12-26 RX ADMIN — PIPERACILLIN SODIUM AND TAZOBACTAM SODIUM 3.38 G: 3; .375 INJECTION, SOLUTION INTRAVENOUS at 03:56

## 2023-12-26 RX ADMIN — PIPERACILLIN SODIUM AND TAZOBACTAM SODIUM 3.38 G: 3; .375 INJECTION, SOLUTION INTRAVENOUS at 16:30

## 2023-12-26 RX ADMIN — CALCIUM 1250 MG: 500 TABLET ORAL at 08:55

## 2023-12-26 RX ADMIN — FORMOTEROL FUMARATE DIHYDRATE 20 MCG: 20 SOLUTION RESPIRATORY (INHALATION) at 08:06

## 2023-12-26 RX ADMIN — POTASSIUM CHLORIDE 20 MEQ: 1500 TABLET, EXTENDED RELEASE ORAL at 13:27

## 2023-12-26 RX ADMIN — NYSTATIN 400000 UNITS: 100000 SUSPENSION ORAL at 13:27

## 2023-12-26 RX ADMIN — GUAIFENESIN 600 MG: 600 TABLET, EXTENDED RELEASE ORAL at 15:25

## 2023-12-26 RX ADMIN — APIXABAN 5 MG: 5 TABLET, FILM COATED ORAL at 08:56

## 2023-12-26 ASSESSMENT — COGNITIVE AND FUNCTIONAL STATUS - GENERAL
DRESSING REGULAR LOWER BODY CLOTHING: A LOT
MOVING TO AND FROM BED TO CHAIR: A LOT
TURNING FROM BACK TO SIDE WHILE IN FLAT BAD: A LITTLE
DRESSING REGULAR UPPER BODY CLOTHING: A LITTLE
MOVING FROM LYING ON BACK TO SITTING ON SIDE OF FLAT BED WITH BEDRAILS: A LITTLE
MOBILITY SCORE: 14
HELP NEEDED FOR BATHING: A LOT
TOILETING: A LITTLE
DAILY ACTIVITIY SCORE: 17
WALKING IN HOSPITAL ROOM: A LITTLE
STANDING UP FROM CHAIR USING ARMS: A LOT
CLIMB 3 TO 5 STEPS WITH RAILING: TOTAL
PERSONAL GROOMING: A LITTLE

## 2023-12-26 ASSESSMENT — PAIN - FUNCTIONAL ASSESSMENT
PAIN_FUNCTIONAL_ASSESSMENT: 0-10
PAIN_FUNCTIONAL_ASSESSMENT: 0-10

## 2023-12-26 ASSESSMENT — PAIN SCALES - GENERAL
PAINLEVEL_OUTOF10: 5 - MODERATE PAIN
PAINLEVEL_OUTOF10: 8
PAINLEVEL_OUTOF10: 7

## 2023-12-26 ASSESSMENT — ACTIVITIES OF DAILY LIVING (ADL)
HOME_MANAGEMENT_TIME_ENTRY: 11
ADL_ASSISTANCE: NEEDS ASSISTANCE

## 2023-12-26 NOTE — CARE PLAN
Patient admitted for PNA/Sepsis from [facility]        Spoke to patient this morning, she is aware she is waiting ortho consult for loosening of hardware in back, PT to see, patient states she will return home with [facility] [place]. Refused SNF placement, NP aware.

## 2023-12-26 NOTE — NURSING NOTE

## 2023-12-26 NOTE — PROGRESS NOTES
Physical Therapy    Physical Therapy Evaluation    Patient Name: Genesis Garcia  MRN: [id number]  Today's Date: 12/26/2023   Time Calculation  Start Time: 1128  Stop Time: 1142  Time Calculation (min): 14 min    Assessment/Plan   PT Assessment  PT Assessment Results: Decreased strength, Decreased endurance, Impaired balance, Decreased mobility, Pain  Rehab Prognosis: Good  Evaluation/Treatment Tolerance: Patient limited by fatigue, Patient limited by pain  Medical Staff Made Aware: Yes  Strengths: Ability to acquire knowledge, Access to adaptive/assistive products, Support and attitude of living partners  End of Session Communication: Bedside nurse  Assessment Comment: Pt presents today with deficits in strength, balance, endurance, and Hx of fall. Currently, pt is below the reported PLOF requring min-mod assist with transfers. Continued PT during the current admission would benefit the pt to address the above factors to bring pt closer to the PLOF and reduce fall risk.  End of Session Patient Position: Up in chair, Alarm on  IP OR SWING BED PT PLAN  Inpatient or Swing Bed: Inpatient  PT Plan  Treatment/Interventions: Bed mobility, Transfer training, Gait training, Balance training, Strengthening, Endurance training, Therapeutic exercise, Therapeutic activity, Home exercise program  PT Plan: Skilled PT  PT Frequency: 3 times per week  PT Discharge Recommendations: Moderate intensity level of continued care  Equipment Recommended upon Discharge: Wheeled walker  PT Recommended Transfer Status: Assist x1, Assistive device  PT - OK to Discharge: Yes (Per PT POC)      Subjective   General Visit Information:  General  Reason for Referral: Acute pneumonia  Referred By: ALEX Lind  Past Medical History Relevant to Rehab: COPD, asthma, HTN, dyslipidemia, a-fib s/p ablation, B TKA, recent MANUEL  Family/Caregiver Present: Yes (Living partner and grandchildren)  Prior to Session Communication: Bedside nurse  Patient Position  Received: Up in chair, Alarm on  Preferred Learning Style: verbal, visual  General Comment: Pt sitting in bedside chair upon PT arrival. Cleared to participate with RN, and agreeable to PT evaluation. Pt expresses desire for home PT.  Home Living:  Home Living  Type of Home: [place]  Lives With: Friends, Dependent children  Home Adaptive Equipment: Walker rolling or standard, Reacher, Sock aid, Adaptive stroller  Home Layout: One level  Home Access: No concerns  Bathroom Shower/Tub: Walk-in shower  Bathroom Toilet: Handicapped height  Bathroom Equipment: Grab bars in shower, Shower chair with back (FWW and rollator)  Prior Level of Function:  Prior Function Per Pt/Caregiver Report  Level of [place]: Needs assistance with ADLs, Independent with homemaking with ambulation  ADL Assistance: Needs assistance  Homemaking Assistance: Independent  Ambulatory Assistance:  (Mod IND with FWW)  Prior Function Comments: Pt reports some assist with bathing. Pt reports mechanical fall last Sunday.  Precautions:  Precautions  Medical Precautions: Fall precautions, Oxygen therapy device and L/min    Objective   Pain:  Pain Assessment  Pain Assessment: 0-10  Pain Score: 7  Pain Type: Chronic pain  Pain Location: Back  Pain Interventions: Ambulation/increased activity  Cognition:  Cognition  Orientation Level: Oriented X4    Activity Tolerance  Endurance: Tolerates 10 - 20 min exercise with multiple rests    Sensation  Sensation Comment: N/t reported in bilateral toes present before current admission.    Coordination  Movements are Fluid and Coordinated: Yes  Rapid Alternating Movements: Intact  Alternating Toe Taps: Intact    Postural Control  Postural Control: Impaired  Posture Comment: Forward flexion in standing with rounded shoulders    Static Sitting Balance  Static Sitting-Balance Support: Bilateral upper extremity supported, Feet supported    Static Standing Balance  Static Standing-Balance Support: Bilateral upper  extremity supported  Static Standing-Level of Assistance: Contact guard  Dynamic Standing Balance  Dynamic Standing-Balance Support: Bilateral upper extremity supported  Functional Assessments:  Bed Mobility  Bed Mobility:  (Pt in bedside chair upon PT arrival and opted to return to bedside chair after ambulation.)    Transfers  Transfer: Yes  Transfer 1  Transfer From 1: Chair with arms to  Transfer to 1: Stand  Technique 1: Sit to stand  Transfer Device 1: Walker  Transfer Level of Assistance 1: Arm in arm assistance, Moderate assistance  Trials/Comments 1: Pt able to intiate BUE push from armrests of chair when standing. Increased time/pt effort to reach standing requiring assist. Pt in forward flexion at head and trunk in standing with FWW.  Transfers 2  Transfer From 2: Stand to  Transfer to 2: Chair with arms  Technique 2: Stand to sit  Transfer Device 2: Walker  Transfer Level of Assistance 2: Arm in arm assistance, Minimum assistance  Trials/Comments 2: VC for BLE positioning against chair before attempting sit. VC for BUE reach for armrests of chair with return demonstration. Assist with eccentric descent to chair.    Ambulation/Gait Training  Ambulation/Gait Training Performed: Yes  Ambulation/Gait Training 1  Surface 1: Level tile  Device 1: Rolling walker  Assistance 1: Contact guard  Comments/Distance (ft) 1: About 18 ft. Decreased step length and renzo. Decreased knee flexion on RLE with increased trunk flexion/rounded shoulders. VC for head/neck extension to look up and scan environment during ambulation.    Stairs  Stairs: No  Extremity/Trunk Assessments:  RLE   RLE : Exceptions to WFL  Strength RLE  R Hip Flexion: 2+/5  R Knee Extension: 4-/5  R Ankle Dorsiflexion: 4/5  R Ankle Plantar Flexion: 3+/5  LLE   LLE : Exceptions to WFL  Strength LLE  L Hip Flexion: 3+/5  L Knee Extension: 4-/5  L Ankle Dorsiflexion: 4/5  L Ankle Plantar Flexion: 3+/5  Outcome Measures:  [facility] Basic Mobility  Turning  from your back to your side while in a flat bed without using bedrails: A little  Moving from lying on your back to sitting on the side of a flat bed without using bedrails: A little  Moving to and from bed to chair (including a wheelchair): A lot  Standing up from a chair using your arms (e.g. wheelchair or bedside chair): A lot  To walk in hospital room: A little  Climbing 3-5 steps with railing: Total  Basic Mobility - Total Score: 14    Encounter Problems       Encounter Problems (Active)       Balance       STG - Patient will maintain standing balance to allow for safe completion of daily activities Mod I       Start:  12/23/23    Expected End:  01/06/24               Balance       Goal 1 (Progressing)       Start:  12/26/23    Expected End:  01/09/24       Pt performs all sitting and standing balance with close supervision and LRAD            Mobility       STG - Patient will ambulate (Progressing)       Start:  12/26/23    Expected End:  01/09/24       50 ft with close CGA and LRAD            PT Problem       PT Goal 1 (Not Progressing)       Start:  12/26/23    Expected End:  01/09/24       Pt demonstrates IND in performing 2 sets of 15 reps of prescribed BLE HEP            Pain - Adult          Transfers       STG - Patient will perform bed, toilet, sit/stand transfers Mod I       Start:  12/23/23    Expected End:  01/06/24               Transfers       STG - Patient to transfer to and from sit to supine (Not Progressing)       Start:  12/26/23    Expected End:  01/09/24       With close supervision         STG - Patient will transfer sit to and from stand (Progressing)       Start:  12/26/23    Expected End:  01/09/24       With CGA and LRAD                Education Documentation  Body Mechanics, taught by Oscar Mccrary, PT at 12/26/2023 12:21 PM.  Learner: Patient  Readiness: Acceptance  Method: Explanation, Demonstration  Response: Verbalizes Understanding, Demonstrated Understanding    Mobility  Training, taught by Oscar Mccrary PT at 12/26/2023 12:21 PM.  Learner: Patient  Readiness: Acceptance  Method: Explanation, Demonstration  Response: Verbalizes Understanding, Demonstrated Understanding    Education Comments  No comments found.

## 2023-12-26 NOTE — CARE PLAN
Problem: Pain - Adult  Goal: Verbalizes/displays adequate comfort level or baseline comfort level  Outcome: Progressing     Problem: Safety - Adult  Goal: Free from fall injury  Outcome: Progressing     Problem: Discharge Planning  Goal: Discharge to home or other facility with appropriate resources  Outcome: Progressing     Problem: Chronic Conditions and Co-morbidities  Goal: Patient's chronic conditions and co-morbidity symptoms are monitored and maintained or improved  Outcome: Progressing     Problem: Fall/Injury  Goal: Not fall by end of shift  Outcome: Progressing  Goal: Be free from injury by end of the shift  Outcome: Progressing  Goal: Verbalize understanding of personal risk factors for fall in the hospital  Outcome: Progressing  Goal: Verbalize understanding of risk factor reduction measures to prevent injury from fall in the home  Outcome: Progressing  Goal: Use assistive devices by end of the shift  Outcome: Progressing  Goal: Pace activities to prevent fatigue by end of the shift  Outcome: Progressing     Problem: Pain  Goal: Takes deep breaths with improved pain control throughout the shift  Outcome: Progressing  Goal: Turns in bed with improved pain control throughout the shift  Outcome: Progressing  Goal: Walks with improved pain control throughout the shift  Outcome: Progressing  Goal: Performs ADL's with improved pain control throughout shift  Outcome: Progressing  Goal: Participates in PT with improved pain control throughout the shift  Outcome: Progressing  Goal: Free from opioid side effects throughout the shift  Outcome: Progressing  Goal: Free from acute confusion related to pain meds throughout the shift  Outcome: Progressing     Problem: Bathing  Goal: STG - Patient will bathe body Mod I  Outcome: Progressing     Problem: Dressings Lower Extremities  Goal: STG - Patient will complete lower body dressing Mod I  Outcome: Progressing     Problem: Toileting  Goal: STG - Patient will complete  toileting tasks Mod I  Outcome: Progressing

## 2023-12-26 NOTE — CARE PLAN
Problem: Skin  Goal: Prevent/manage excess moisture  Flowsheets (Taken 12/25/2023 2319)  Prevent/manage excess moisture: Cleanse incontinence/protect with barrier cream  Goal: Prevent/minimize sheer/friction injuries  Flowsheets (Taken 12/25/2023 2319)  Prevent/minimize sheer/friction injuries: Turn/reposition every 2 hours/use positioning/transfer devices

## 2023-12-26 NOTE — PROGRESS NOTES
Occupational Therapy    OT Treatment    Patient Name: Genesis Garcia  MRN: [id number]  Today's Date: 12/26/2023  Time Calculation  Start Time: 1225  Stop Time: 1249  Time Calculation (min): 24 min         Assessment:  End of Session Communication: Bedside nurse, PCT/NA/CTA  End of Session Patient Position: Bed, 3 rail up, Alarm on (all needs in reach)     Plan:  Treatment Interventions: ADL retraining, Functional transfer training, UE strengthening/ROM, Endurance training, Patient/family training  OT Frequency: 3 times per week  OT Discharge Recommendations: Moderate intensity level of continued care  OT Recommended Transfer Status: Minimal assist, Assist of 1  OT - OK to Discharge: Yes (Continue per OT POC)  Treatment Interventions: ADL retraining, Functional transfer training, UE strengthening/ROM, Endurance training, Patient/family training    Subjective   Previous Visit Info:  OT Last Visit  OT Received On: 12/26/23  General:  General  Past Medical History Relevant to Rehab: COPD, asthma, HTN, dyslipidemia, a-fib s/p ablation, B TKA, recent MANUEL  Family/Caregiver Present: No  Prior to Session Communication: Bedside nurse  Patient Position Received: Up in chair, Alarm on  General Comment: Pt seated in chair upon arrival, agreeable to treatment. Pt participates as able but limited by low back pain.  Precautions:  Medical Precautions: Fall precautions, Oxygen therapy device and L/min  Precautions Comment: protective spinal precautions for comfort       Pain:  Pain Assessment  Pain Assessment: 0-10  Pain Score: 7  Pain Type: Chronic pain  Pain Location: Back  Pain Orientation: Lower  Pain Interventions: Repositioned, Ambulation/increased activity    Objective    Cognition:  Cognition  Overall Cognitive Status: Within Functional Limits  Orientation Level: Oriented X4       Activities of Daily Living: Feeding  Feeding Level of Assistance: Modified independent  Feeding Where Assessed: Bed level    Grooming  Grooming  Level of Assistance: Setup  Grooming Where Assessed: Bed level, Edge of bed  Grooming Comments: pt washed face and performed hand hygiene    Toileting  Toileting Level of Assistance: Minimum assistance, Minimal verbal cues  Where Assessed: Bedside commode  Toileting Comments: assist for standing rear hygiene thoroughness s/p bowel movement, pt able to manage gown, brief not donned at time of session       Bed Mobility/Transfers: Bed Mobility  Bed Mobility: Yes  Bed Mobility 1  Bed Mobility 1: Sitting to supine  Level of Assistance 1: Moderate assistance, Minimal verbal cues, Minimal tactile cues  Bed Mobility Comments 1: assist on B LEs, cues for sequencing steps of log roll, HOB minimally elevated    Transfers  Transfer: Yes  Transfer 1  Technique 1: Sit to stand, Stand to sit  Transfer Device 1: Walker, Gait belt  Transfer Level of Assistance 1: Minimum assistance, Minimal verbal cues  Trials/Comments 1: from chair and EOB, cues for sequencing, safe hand placement and walker safety  Transfers 2  Transfer From 2: Chair with arms to, Commode-standard to  Transfer to 2: Bed  Technique 2: Stand pivot  Transfer Device 2: Walker  Transfer Level of Assistance 2: Contact guard, Minimal verbal cues  Trials/Comments 2: cues for walker safety and alignment to sitting surface    Toilet Transfers  Toilet Transfer Type: To and from  Toilet Transfer to: Standard bedside commode  Toilet Transfer Technique: Stand pivot  Toilet Transfers: Minimal assistance  Toilet Transfers Comments: cues for sequencing, safe hand placement and walker safety  Sitting Balance:  Static Sitting Balance  Static Sitting-Balance Support: Bilateral upper extremity supported  Static Sitting-Level of Assistance: Independent  Static Sitting-Comment/Number of Minutes: at EOB  Standing Balance:  Static Standing Balance  Static Standing-Balance Support: Bilateral upper extremity supported  Static Standing-Level of Assistance: Close supervision  Static  Standing-Comment/Number of Minutes: using FWW  Dynamic Standing Balance  Dynamic Standing-Balance Support: Right upper extremity supported  Dynamic Standing-Comments: CGA using FWW       Therapy/Activity: Therapeutic Activity  Therapeutic Activity Performed: Yes             Outcome Measures:[facility] Daily Activity  Putting on and taking off regular lower body clothing: A lot  Bathing (including washing, rinsing, drying): A lot  Putting on and taking off regular upper body clothing: A little  Toileting, which includes using toilet, bedpan or urinal: A little  Taking care of personal grooming such as brushing teeth: A little  Eating Meals: None  Daily Activity - Total Score: 17        Education Documentation  Body Mechanics, taught by Luba Arreguin OT at 12/26/2023  1:08 PM.  Learner: Patient  Readiness: Acceptance  Method: Explanation  Response: Verbalizes Understanding    Precautions, taught by Luba Arreguin OT at 12/26/2023  1:08 PM.  Learner: Patient  Readiness: Acceptance  Method: Explanation  Response: Verbalizes Understanding    ADL Training, taught by Luba Arreguin OT at 12/26/2023  1:08 PM.  Learner: Patient  Readiness: Acceptance  Method: Explanation  Response: Verbalizes Understanding    Education Comments  No comments found.           Goals:  Encounter Problems       Encounter Problems (Active)       Balance       STG - Patient will maintain standing balance to allow for safe completion of daily activities Mod I (Progressing)       Start:  12/23/23    Expected End:  01/06/24                       Bathing       STG - Patient will bathe body Mod I (Progressing)       Start:  12/23/23    Expected End:  01/06/24       With AE as appropriate             Dressings Lower Extremities       STG - Patient will complete lower body dressing Mod I (Progressing)       Start:  12/23/23    Expected End:  01/06/24       With AE as appropriate                     Toileting       STG - Patient will complete toileting  tasks Mod I (Progressing)       Start:  12/23/23    Expected End:  01/06/24               Transfers       STG - Patient will perform bed, toilet, sit/stand transfers Mod I (Progressing)       Start:  12/23/23    Expected End:  01/06/24

## 2023-12-26 NOTE — PROGRESS NOTES
Genesis Garcia is a 78 y.o. female     Back pain persists  Breathing however has improved      Review of Systems     Constitutional: no fever, no chills,   Cardiovascular: no chest pain   Respiratory: No shortness of breath  Gastrointestinal: no abdominal pain, no constipation, no melena, no nausea, no diarrhea, no vomiting and no blood in stools.   Neurological: no headache,   All other systems have been reviewed and are negative for complaint.       Vitals:    12/26/23 1201   BP: 102/61   Pulse: 79   Resp: 18   Temp: 36.4 °C (97.6 °F)   SpO2: 95%        Scheduled medications  albuterol, 2.5 mg, nebulization, TID  apixaban, 5 mg, oral, BID  B complex-vitamin C, 1 tablet, oral, Daily  budesonide, 0.5 mg, nebulization, BID   And  formoterol, 20 mcg, nebulization, BID  calcium carbonate, 1,250 mg, oral, Daily  gabapentin, 400 mg, oral, TID  hydroxychloroquine, 200 mg, oral, BID  lactobacillus acidophilus, 1 capsule, oral, BID  losartan, 100 mg, oral, Daily  melatonin, 3 mg, oral, Daily  nystatin, 4 mL, Swish & Swallow, 4x daily  pantoprazole, 40 mg, oral, Daily before breakfast   Or  pantoprazole, 40 mg, intravenous, Daily before breakfast  piperacillin-tazobactam, 3.375 g, intravenous, q6h  polyethylene glycol, 17 g, oral, Daily  potassium chloride CR, 20 mEq, oral, Once  psyllium, 1 packet, oral, Daily  spironolactone, 25 mg, oral, Daily  torsemide, 40 mg, oral, Daily      Continuous medications     PRN medications  PRN medications: acetaminophen **OR** acetaminophen **OR** acetaminophen, albuterol, guaiFENesin, ondansetron **OR** ondansetron, oxyCODONE, oxygen    Lab Review   Results from last 7 days   Lab Units 12/26/23  0550 12/25/23  0739 12/24/23  0646   WBC AUTO x10*3/uL 10.6 10.4 11.9*   HEMOGLOBIN g/dL 7.6* 7.8* 7.9*   HEMATOCRIT % 25.4* 27.1* 26.3*   PLATELETS AUTO x10*3/uL 395 336 278       Results from last 7 days   Lab Units 12/26/23  0550 12/25/23  0739 12/24/23  0646   SODIUM mmol/L 135* 133* 135*    POTASSIUM mmol/L 3.4* 3.3* 3.7   CHLORIDE mmol/L 96* 96* 96*   CO2 mmol/L 29 27 29   BUN mg/dL 47* 39* 38*   CREATININE mg/dL 1.97* 1.57* 1.51*   CALCIUM mg/dL 8.1* 8.4* 8.3*   GLUCOSE mg/dL 90 116* 99              CT lumbar spine wo IV contrast   Final Result   1. Postoperative changes at L4-S1 with new/worsened lucency about the   S1 pedicle screws, concerning for loosening.   2. No acute fracture or subluxation of the lumbar spine.        MACRO:   None        Signed by: Juancarlos Marcial 12/22/2023 10:29 AM   Dictation workstation:   [id number]      Transthoracic Echo (TTE) Complete   Final Result      XR chest 2 views   Final Result   Left-greater-than-right basilar infiltrates or atelectasis with small   left pleural effusion. Follow-up to ensure resolution after   appropriate treatment recommended.        MACRO:   None.        Signed by: Jenifer Friedman 12/20/2023 11:52 AM   Dictation workstation:   [id number]            Physical Exam     Constitutional   General appearance: Alert     Pulmonary   Respiratory assessment: Crackles  Cardiovascular   Auscultation of heart: Apical pulse normal, heart rate and rhythm normal, normal S1 and S2, no murmurs and no pericardial rub.    Exam for edema: 1+ edema  Abdomen   Abdominal Exam: No bruits, normal bowel sounds, soft, non-tender, no abdominal mass palpated.    Liver and Spleen exam: No hepato-splenomegaly.   Musculoskeletal   Examination of gait: ROM intact  Skin inspection: Normal skin color and pigmentation, normal skin turgor and no visible rash.   Neurologic   Cranial nerves: Nerves 2-12 were intact, no focal neuro defects.     Assessment/Plan      #Acute respiratory failure with hypoxia  #Community-acquired pneumonia  #COPD  Continue antibiotics and DuoNebs  Improved with medications     #Acute on chronic diastolic congestive heart failure  Improved  #Acute back pain s/p fall  CAT scan shows loosening of the screws  Will get orthopedic input  Pain control      #Possible cellulitis of left leg  Already improved        #Atrial fibrillation  Rate controlled  Continue Eliquis     #Hypertension  Continue home medications and hold for systolic less than 100     #Dyslipidemia  Continue statins with a target LDL of less than 70

## 2023-12-27 ENCOUNTER — HOSPITAL ENCOUNTER (OUTPATIENT)
Facility: HOSPITAL | Age: 78
Setting detail: OBSERVATION
Discharge: NURSING, REHAB OR LONG-TERM CARE | End: 2023-12-29
Attending: EMERGENCY MEDICINE | Admitting: INTERNAL MEDICINE
Payer: MEDICARE

## 2023-12-27 DIAGNOSIS — R53.1 GENERALIZED WEAKNESS: Primary | ICD-10-CM

## 2023-12-27 DIAGNOSIS — B37.0 THRUSH: ICD-10-CM

## 2023-12-27 DIAGNOSIS — J44.1 CHRONIC OBSTRUCTIVE PULMONARY DISEASE WITH ACUTE EXACERBATION (MULTI): ICD-10-CM

## 2023-12-27 LAB
ALBUMIN SERPL BCP-MCNC: 2.8 G/DL (ref 3.4–5)
ALBUMIN SERPL BCP-MCNC: 3 G/DL (ref 3.4–5)
ALP SERPL-CCNC: 73 U/L (ref 33–136)
ALT SERPL W P-5'-P-CCNC: 14 U/L (ref 7–45)
ANION GAP SERPL CALC-SCNC: 14 MMOL/L (ref 10–20)
ANION GAP SERPL CALC-SCNC: 17 MMOL/L (ref 10–20)
AST SERPL W P-5'-P-CCNC: 17 U/L (ref 9–39)
BASOPHILS # BLD AUTO: 0.05 X10*3/UL (ref 0–0.1)
BASOPHILS NFR BLD AUTO: 0.4 %
BILIRUB SERPL-MCNC: 0.5 MG/DL (ref 0–1.2)
BUN SERPL-MCNC: 43 MG/DL (ref 6–23)
BUN SERPL-MCNC: 51 MG/DL (ref 6–23)
CALCIUM SERPL-MCNC: 8 MG/DL (ref 8.6–10.3)
CALCIUM SERPL-MCNC: 8.2 MG/DL (ref 8.6–10.3)
CHLORIDE SERPL-SCNC: 97 MMOL/L (ref 98–107)
CHLORIDE SERPL-SCNC: 99 MMOL/L (ref 98–107)
CO2 SERPL-SCNC: 25 MMOL/L (ref 21–32)
CO2 SERPL-SCNC: 27 MMOL/L (ref 21–32)
CREAT SERPL-MCNC: 1.95 MG/DL (ref 0.5–1.05)
CREAT SERPL-MCNC: 2.25 MG/DL (ref 0.5–1.05)
EOSINOPHIL # BLD AUTO: 0.22 X10*3/UL (ref 0–0.4)
EOSINOPHIL NFR BLD AUTO: 1.7 %
ERYTHROCYTE [DISTWIDTH] IN BLOOD BY AUTOMATED COUNT: 18 % (ref 11.5–14.5)
GFR SERPL CREATININE-BSD FRML MDRD: 22 ML/MIN/1.73M*2
GFR SERPL CREATININE-BSD FRML MDRD: 26 ML/MIN/1.73M*2
GLUCOSE SERPL-MCNC: 109 MG/DL (ref 74–99)
GLUCOSE SERPL-MCNC: 86 MG/DL (ref 74–99)
HCT VFR BLD AUTO: 26 % (ref 36–46)
HGB BLD-MCNC: 7.8 G/DL (ref 12–16)
IMM GRANULOCYTES # BLD AUTO: 0.5 X10*3/UL (ref 0–0.5)
IMM GRANULOCYTES NFR BLD AUTO: 3.9 % (ref 0–0.9)
LYMPHOCYTES # BLD AUTO: 1.24 X10*3/UL (ref 0.8–3)
LYMPHOCYTES NFR BLD AUTO: 9.7 %
MCH RBC QN AUTO: 21 PG (ref 26–34)
MCHC RBC AUTO-ENTMCNC: 30 G/DL (ref 32–36)
MCV RBC AUTO: 70 FL (ref 80–100)
MONOCYTES # BLD AUTO: 1.13 X10*3/UL (ref 0.05–0.8)
MONOCYTES NFR BLD AUTO: 8.8 %
NEUTROPHILS # BLD AUTO: 9.64 X10*3/UL (ref 1.6–5.5)
NEUTROPHILS NFR BLD AUTO: 75.5 %
NRBC BLD-RTO: 0 /100 WBCS (ref 0–0)
PHOSPHATE SERPL-MCNC: 4 MG/DL (ref 2.5–4.9)
PLATELET # BLD AUTO: 429 X10*3/UL (ref 150–450)
POTASSIUM SERPL-SCNC: 3.2 MMOL/L (ref 3.5–5.3)
POTASSIUM SERPL-SCNC: 3.7 MMOL/L (ref 3.5–5.3)
PROT SERPL-MCNC: 6 G/DL (ref 6.4–8.2)
RBC # BLD AUTO: 3.72 X10*6/UL (ref 4–5.2)
SODIUM SERPL-SCNC: 136 MMOL/L (ref 136–145)
SODIUM SERPL-SCNC: 136 MMOL/L (ref 136–145)
WBC # BLD AUTO: 12.8 X10*3/UL (ref 4.4–11.3)

## 2023-12-27 PROCEDURE — 2500000004 HC RX 250 GENERAL PHARMACY W/ HCPCS (ALT 636 FOR OP/ED): Performed by: EMERGENCY MEDICINE

## 2023-12-27 PROCEDURE — 2500000001 HC RX 250 WO HCPCS SELF ADMINISTERED DRUGS (ALT 637 FOR MEDICARE OP): Performed by: EMERGENCY MEDICINE

## 2023-12-27 PROCEDURE — G0378 HOSPITAL OBSERVATION PER HR: HCPCS

## 2023-12-27 PROCEDURE — 36415 COLL VENOUS BLD VENIPUNCTURE: CPT | Performed by: EMERGENCY MEDICINE

## 2023-12-27 PROCEDURE — 85025 COMPLETE CBC W/AUTO DIFF WBC: CPT | Performed by: EMERGENCY MEDICINE

## 2023-12-27 PROCEDURE — 2500000001 HC RX 250 WO HCPCS SELF ADMINISTERED DRUGS (ALT 637 FOR MEDICARE OP): Performed by: INTERNAL MEDICINE

## 2023-12-27 PROCEDURE — 2500000004 HC RX 250 GENERAL PHARMACY W/ HCPCS (ALT 636 FOR OP/ED): Mod: MUE | Performed by: INTERNAL MEDICINE

## 2023-12-27 PROCEDURE — 80069 RENAL FUNCTION PANEL: CPT | Mod: CCI | Performed by: EMERGENCY MEDICINE

## 2023-12-27 PROCEDURE — 80053 COMPREHEN METABOLIC PANEL: CPT | Performed by: EMERGENCY MEDICINE

## 2023-12-27 PROCEDURE — 99285 EMERGENCY DEPT VISIT HI MDM: CPT | Performed by: EMERGENCY MEDICINE

## 2023-12-27 RX ORDER — ACETAMINOPHEN 325 MG/1
650 TABLET ORAL EVERY 4 HOURS PRN
Status: DISCONTINUED | OUTPATIENT
Start: 2023-12-27 | End: 2023-12-27

## 2023-12-27 RX ORDER — GABAPENTIN 400 MG/1
400 CAPSULE ORAL 3 TIMES DAILY
Status: DISCONTINUED | OUTPATIENT
Start: 2023-12-27 | End: 2023-12-29 | Stop reason: HOSPADM

## 2023-12-27 RX ORDER — POLYETHYLENE GLYCOL 3350 17 G/17G
17 POWDER, FOR SOLUTION ORAL DAILY
Status: DISCONTINUED | OUTPATIENT
Start: 2023-12-27 | End: 2023-12-29 | Stop reason: HOSPADM

## 2023-12-27 RX ORDER — ACETAMINOPHEN 650 MG/1
650 SUPPOSITORY RECTAL EVERY 4 HOURS PRN
Status: DISCONTINUED | OUTPATIENT
Start: 2023-12-27 | End: 2023-12-29 | Stop reason: HOSPADM

## 2023-12-27 RX ORDER — LOSARTAN POTASSIUM 50 MG/1
100 TABLET ORAL DAILY
Status: DISCONTINUED | OUTPATIENT
Start: 2023-12-27 | End: 2023-12-29 | Stop reason: HOSPADM

## 2023-12-27 RX ORDER — ONDANSETRON 4 MG/1
4 TABLET, FILM COATED ORAL EVERY 8 HOURS PRN
Status: DISCONTINUED | OUTPATIENT
Start: 2023-12-27 | End: 2023-12-29 | Stop reason: HOSPADM

## 2023-12-27 RX ORDER — ALBUTEROL SULFATE 90 UG/1
2 AEROSOL, METERED RESPIRATORY (INHALATION) EVERY 6 HOURS PRN
Status: DISCONTINUED | OUTPATIENT
Start: 2023-12-27 | End: 2023-12-27

## 2023-12-27 RX ORDER — GUAIFENESIN 600 MG/1
600 TABLET, EXTENDED RELEASE ORAL EVERY 12 HOURS PRN
Status: DISCONTINUED | OUTPATIENT
Start: 2023-12-27 | End: 2023-12-27

## 2023-12-27 RX ORDER — BUDESONIDE 0.5 MG/2ML
0.5 INHALANT ORAL
Status: DISCONTINUED | OUTPATIENT
Start: 2023-12-27 | End: 2023-12-29 | Stop reason: HOSPADM

## 2023-12-27 RX ORDER — PANTOPRAZOLE SODIUM 40 MG/10ML
40 INJECTION, POWDER, LYOPHILIZED, FOR SOLUTION INTRAVENOUS
Status: DISCONTINUED | OUTPATIENT
Start: 2023-12-28 | End: 2023-12-29 | Stop reason: HOSPADM

## 2023-12-27 RX ORDER — ALBUTEROL SULFATE 0.83 MG/ML
3 SOLUTION RESPIRATORY (INHALATION) EVERY 6 HOURS PRN
Status: DISCONTINUED | OUTPATIENT
Start: 2023-12-27 | End: 2023-12-27

## 2023-12-27 RX ORDER — OXYCODONE HYDROCHLORIDE 5 MG/1
5 TABLET ORAL EVERY 6 HOURS PRN
Status: DISCONTINUED | OUTPATIENT
Start: 2023-12-27 | End: 2023-12-29 | Stop reason: HOSPADM

## 2023-12-27 RX ORDER — GUAIFENESIN 600 MG/1
600 TABLET, EXTENDED RELEASE ORAL EVERY 12 HOURS PRN
Status: DISCONTINUED | OUTPATIENT
Start: 2023-12-27 | End: 2023-12-29 | Stop reason: HOSPADM

## 2023-12-27 RX ORDER — SPIRONOLACTONE 25 MG/1
25 TABLET ORAL DAILY
Status: DISCONTINUED | OUTPATIENT
Start: 2023-12-27 | End: 2023-12-29 | Stop reason: HOSPADM

## 2023-12-27 RX ORDER — PANTOPRAZOLE SODIUM 40 MG/1
40 TABLET, DELAYED RELEASE ORAL
Status: DISCONTINUED | OUTPATIENT
Start: 2023-12-28 | End: 2023-12-29 | Stop reason: HOSPADM

## 2023-12-27 RX ORDER — ONDANSETRON HYDROCHLORIDE 2 MG/ML
4 INJECTION, SOLUTION INTRAVENOUS EVERY 8 HOURS PRN
Status: DISCONTINUED | OUTPATIENT
Start: 2023-12-27 | End: 2023-12-29 | Stop reason: HOSPADM

## 2023-12-27 RX ORDER — AMOXICILLIN AND CLAVULANATE POTASSIUM 500; 125 MG/1; MG/1
1 TABLET, FILM COATED ORAL 2 TIMES DAILY
Status: DISCONTINUED | OUTPATIENT
Start: 2023-12-27 | End: 2023-12-29 | Stop reason: HOSPADM

## 2023-12-27 RX ORDER — FLUTICASONE FUROATE AND VILANTEROL 200; 25 UG/1; UG/1
1 POWDER RESPIRATORY (INHALATION)
Status: DISCONTINUED | OUTPATIENT
Start: 2023-12-28 | End: 2023-12-27

## 2023-12-27 RX ORDER — TORSEMIDE 20 MG/1
40 TABLET ORAL DAILY
Status: DISCONTINUED | OUTPATIENT
Start: 2023-12-27 | End: 2023-12-29 | Stop reason: HOSPADM

## 2023-12-27 RX ORDER — NYSTATIN 100000 [USP'U]/ML
4 SUSPENSION ORAL 4 TIMES DAILY
Status: DISCONTINUED | OUTPATIENT
Start: 2023-12-27 | End: 2023-12-28

## 2023-12-27 RX ORDER — ALBUTEROL SULFATE 0.83 MG/ML
2.5 SOLUTION RESPIRATORY (INHALATION) EVERY 6 HOURS PRN
Status: DISCONTINUED | OUTPATIENT
Start: 2023-12-27 | End: 2023-12-29

## 2023-12-27 RX ORDER — HEPARIN SODIUM 5000 [USP'U]/ML
7500 INJECTION, SOLUTION INTRAVENOUS; SUBCUTANEOUS EVERY 8 HOURS SCHEDULED
Status: DISCONTINUED | OUTPATIENT
Start: 2023-12-27 | End: 2023-12-27

## 2023-12-27 RX ORDER — ACETAMINOPHEN 160 MG/5ML
650 SOLUTION ORAL EVERY 4 HOURS PRN
Status: DISCONTINUED | OUTPATIENT
Start: 2023-12-27 | End: 2023-12-29 | Stop reason: HOSPADM

## 2023-12-27 RX ORDER — ACETAMINOPHEN 325 MG/1
650 TABLET ORAL EVERY 4 HOURS PRN
Status: DISCONTINUED | OUTPATIENT
Start: 2023-12-27 | End: 2023-12-29 | Stop reason: HOSPADM

## 2023-12-27 RX ORDER — FORMOTEROL FUMARATE DIHYDRATE 20 UG/2ML
20 SOLUTION RESPIRATORY (INHALATION)
Status: DISCONTINUED | OUTPATIENT
Start: 2023-12-27 | End: 2023-12-29 | Stop reason: HOSPADM

## 2023-12-27 RX ORDER — POTASSIUM CHLORIDE 1.5 G/1.58G
20 POWDER, FOR SOLUTION ORAL ONCE
Status: COMPLETED | OUTPATIENT
Start: 2023-12-27 | End: 2023-12-27

## 2023-12-27 RX ORDER — B-COMPLEX WITH VITAMIN C
1 TABLET ORAL DAILY
Status: DISCONTINUED | OUTPATIENT
Start: 2023-12-27 | End: 2023-12-29 | Stop reason: HOSPADM

## 2023-12-27 RX ORDER — OXYCODONE HYDROCHLORIDE 5 MG/1
5 TABLET ORAL ONCE
Status: COMPLETED | OUTPATIENT
Start: 2023-12-27 | End: 2023-12-27

## 2023-12-27 RX ORDER — HYDROXYCHLOROQUINE SULFATE 200 MG/1
200 TABLET, FILM COATED ORAL 2 TIMES DAILY
Status: DISCONTINUED | OUTPATIENT
Start: 2023-12-27 | End: 2023-12-29 | Stop reason: HOSPADM

## 2023-12-27 RX ORDER — L. ACIDOPHILUS/L.BULGARICUS 1MM CELL
1 TABLET ORAL 2 TIMES DAILY
Status: DISCONTINUED | OUTPATIENT
Start: 2023-12-27 | End: 2023-12-27

## 2023-12-27 RX ORDER — TALC
3 POWDER (GRAM) TOPICAL NIGHTLY
Status: DISCONTINUED | OUTPATIENT
Start: 2023-12-27 | End: 2023-12-29 | Stop reason: HOSPADM

## 2023-12-27 RX ADMIN — SODIUM CHLORIDE 500 ML: 9 INJECTION, SOLUTION INTRAVENOUS at 16:40

## 2023-12-27 RX ADMIN — OXYCODONE HYDROCHLORIDE 5 MG: 5 TABLET ORAL at 15:35

## 2023-12-27 RX ADMIN — GABAPENTIN 400 MG: 400 CAPSULE ORAL at 21:00

## 2023-12-27 RX ADMIN — AMOXICILLIN AND CLAVULANATE POTASSIUM 500 MG: 500; 125 TABLET, FILM COATED ORAL at 21:00

## 2023-12-27 RX ADMIN — SPIRONOLACTONE 25 MG: 25 TABLET ORAL at 20:45

## 2023-12-27 RX ADMIN — POTASSIUM CHLORIDE 20 MEQ: 1.5 POWDER, FOR SOLUTION ORAL at 15:35

## 2023-12-27 RX ADMIN — NYSTATIN 400000 UNITS: 100000 SUSPENSION ORAL at 21:00

## 2023-12-27 RX ADMIN — ACETAMINOPHEN 650 MG: 650 SOLUTION ORAL at 22:14

## 2023-12-27 ASSESSMENT — PAIN - FUNCTIONAL ASSESSMENT: PAIN_FUNCTIONAL_ASSESSMENT: 0-10

## 2023-12-27 ASSESSMENT — ACTIVITIES OF DAILY LIVING (ADL): LACK_OF_TRANSPORTATION: NO

## 2023-12-27 ASSESSMENT — PAIN SCALES - GENERAL: PAINLEVEL_OUTOF10: 0 - NO PAIN

## 2023-12-27 NOTE — PROGRESS NOTES
From home with room mate & her grandchildren & [facility], NEW SNF     12/27/23 1051   Current Planned Discharge Disposition   Current Planned Discharge Disposition SNF

## 2023-12-27 NOTE — PROGRESS NOTES
Transitional Care Coordination Progress Note:  Plan per Medical/Surgical team: patient Dc'd to home with [facility] yesterday. Room mate unable to manage patient's heavy assist need. Patient returned to ED for SNF admit.  Status: ED  Payor source: [employer] of [place] medicare  Discharge disposition: From home with room mate & her grandchildren & [facility], NEW SNF  Potential Barriers: precert, ?need new therapy notes  ADOD: 12/28/2023  PT rec MOD last admit. Discussed purpose & benefits of SNF. Patient provided choice list for new snf. Patient wants to stay in [place] if possible. Referrals sent to #1- [facility] SNF, #2- [facility], #3- [facility] SN[facility] & #4- [facility]. Awaiting responses.  ROSA Chan RN, BSN Transitional Care Coordinator ED# [phone]     @ 1146 Patient accepted to [facility] SN[facility]. Facility starting insurance precert.      12/27/23 1107   Discharge Planning   Living Arrangements Friends   Support Systems Friends/neighbors   Assistance Needed SNF placement   Type of Residence Private residence   Number of Stairs to Enter Residence 0   Number of Stairs Within Residence 0   Home or Post Acute Services Post acute facilities (Rehab/SNF/etc)   Type of Post Acute Facility Services Skilled nursing   Patient expects to be discharged to: From home with room mate & her grandchildren & [facility], NEW [facility]   Does the patient need discharge transport arranged? Yes   RoundTrip coordination needed? Yes   Has discharge transport been arranged? No   Financial Resource Strain   How hard is it for you to pay for the very basics like food, housing, medical care, and heating? Not hard   Housing Stability   In the last 12 months, was there a time when you were not able to pay the mortgage or rent on time? N   In the last 12 months, how many places have you lived? 1   In the last 12 months, was there a time when you did not have a steady place to sleep or slept in a  shelter (including now)? N   Transportation Needs   In the past 12 months, has lack of transportation kept you from medical appointments or from getting medications? no   In the past 12 months, has lack of transportation kept you from meetings, work, or from getting things needed for daily living? No   Patient Choice   Provider Choice list and CMS website (https://medicare.gov/care-compare#search) for post-acute Quality and Resource Measure Data were provided and reviewed with: Patient

## 2023-12-27 NOTE — PROGRESS NOTES
12/27/23 1059   [facility] Disability Status   Are you deaf or do you have serious difficulty hearing? N   Are you blind or do you have serious difficulty seeing, even when wearing glasses? N   Because of a physical, mental, or emotional condition, do you have serious difficulty concentrating, remembering, or making decisions? (5 years old or older) Y   Do you have serious difficulty walking or climbing stairs? Y   Do you have serious difficulty dressing or bathing? Y   Because of a physical, mental, or emotional condition, do you have serious difficulty doing errands alone such as visiting the doctor? Y

## 2023-12-27 NOTE — ED PROVIDER NOTES
HPI   Chief Complaint   Patient presents with    Weakness, Gen     Was just discharged from [facility], patient unable to ambulate so came back in, patient was discharged with meds but has not taken any meds since discharge       HPI  Patient is a 78-year-old female with a past medical history significant for COPD and asthma, hypertension, atrial fibrillation status post ablation, bilateral knee arthroplasty, recent total hip arthroplasty, C. difficile, pneumonia, cellulitis who presented to the emergency room for placement.  She states that she was just discharged less than 24 hours ago from our hospital.  During this hospitalization she was assessed by physical therapy and the recommendation was that she could be placed in a skilled nursing facility.  She also thought that she could do home health but when she got home she realized that she could not ambulate still and had a hard time getting around so she returned.  Denies any new symptoms including any falls, injuries.  She would like to be placed as offered during previous hospitalization.    PMHx: As above  PSHx: As above  FamilyHx: Denies pertinent  SocialHx: Denies pertinent  Allergies: Per EMR  Medications: See Medication Reconciliation     ROS  As above but otherwise denies    Physical Exam    GENERAL: Awake and Alert, No Acute Distress  HEENT: AT/NC, PERRL, EOMI, Normal Oropharynx, No Signs of Dehydration  NECK: Normal Inspection, No JVD  CARDIOVASCULAR: RRR, No M/R/G  RESPIRATORY: CTA Bilaterally, No Wheezes, Rales or Rhonchi, Chest Wall Non-tender  ABDOMEN: Soft, non-tender abdomen, Normal Bowel Sounds, No Distention  BACK: No CVA Tenderness  SKIN: Normal Color, Warm, Dry, No Rashes   EXTREMITIES: Brawny induration of the lower extremity particularly on the left.  Lateral dressed wound/ulcer without signs of current infection.  NEURO: A&O x 3, baseline motor and Sensation, Normal Mood and Affect    Nursing Assessment and Vitals Reviewed    Medical  Decision  Patient seen and evaluated for inability to walk at home.  She was just seen in the hospital and discharged yesterday at which time she was offered skilled nursing facility owing to her inability to walk.  She declined opting to receive home health but after arriving at home realized that she actually cannot walk and get around so she returns to be placed.  She denies any new symptoms or injuries.  Physical exam reveals a well-appearing female in no acute distress was generally weak.  He has some brawny induration of the lower extremities particularly on the left with notable edema which patient states is baseline.    Review of records show that patient was hospitalized on 20 December.  At that time she had pneumonia, COPD, acute respiratory failure with hypoxia as well as chronic diastolic congestive heart failure.  She was also noted to have cellulitis of the left leg, atrial fibrillation.  She is currently on Eliquis.    On contacting Dr. Lind, physician who treated her during this last hospitalization, he also states that patient has chronic back pain and a CT of the lumbar spine showed some loose screws.  No acute intervention per his discussion with neurosurgery.  He does agree with placement directly out of the ED if possible.    Our social workers in the ED believe patient may be placed directly from the ED without need for current hospitalization.  She has remained in stable condition in the ED awaiting this.    Workup included labs that revealed mild hypokalemia which was repleted orally.  BUN and creatinine slightly elevated compared to prior and she is given IV fluids.  She does have very mild leukocytosis and baseline anemia.  Will obtain urinalysis.  Patient denies any new symptoms of infection and lower extremities showed improvement in her previously worse cellulitis.    After a prolonged period of time patient remained in the ED awaiting the accepting facility to clear her.  As such she  is admitted pending placement.  Patient still in the hallway awaiting available bed and urine sample is still pending.                            Tina Coma Scale Score: 15                  Patient History   Past Medical History:   Diagnosis Date    Cellulitis, unspecified 08/24/2020    Cellulitis of skin    Displaced fracture of lateral malleolus of unspecified fibula, initial encounter for closed fracture 07/22/2021    Closed fracture of lateral malleolus    Encounter for follow-up examination after completed treatment for conditions other than malignant neoplasm 11/30/2022    Hospital discharge follow-up    Encounter for other preprocedural examination 10/18/2022    Pre-op examination    Essential (primary) hypertension 01/04/2023    Hypertension    Other fracture of upper and lower end of unspecified fibula, initial encounter for closed fracture 05/21/2021    Fracture of distal fibula    Other specified soft tissue disorders 09/19/2022    Leg swelling    Pain in left ankle and joints of left foot 05/03/2021    Left ankle pain, unspecified chronicity    Pain in right ankle and joints of right foot 05/03/2021    Ankle pain, right    Pain in right hip 05/03/2021    Hip pain, right    Personal history of (healed) stress fracture 06/26/2021    History of stress fracture    Personal history of diseases of the skin and subcutaneous tissue 11/19/2022    History of cellulitis    Personal history of other diseases of the circulatory system     History of atrial fibrillation    Personal history of other diseases of the musculoskeletal system and connective tissue 10/20/2016    History of arthritis    Personal history of other diseases of urinary system     History of acute renal failure    Personal history of other specified conditions 11/19/2022    History of tachycardia    Unspecified asthma, uncomplicated 12/01/2022    Asthma    Vitamin D deficiency, unspecified 10/20/2016    Vitamin D deficiency     Past Surgical  History:   Procedure Laterality Date    BACK SURGERY  2016    Back Surgery    CHOLECYSTECTOMY  04/15/2016    Cholecystectomy    OTHER SURGICAL HISTORY  2021    Cataract surgery    TOTAL KNEE ARTHROPLASTY  2022    Knee Replacement     Family History   Problem Relation Name Age of Onset    Diabetes Mother          type 2    Heart disease Father      Hypertension Father      Other (cardiac disorder) Father       Social History     Tobacco Use    Smoking status: Former     Packs/day: 0.15     Years: 24.00     Additional pack years: 0.00     Total pack years: 3.60     Types: Cigarettes     Quit date:      Years since quittin.0    Smokeless tobacco: Never   Substance Use Topics    Alcohol use: Not Currently    Drug use: Not Currently       Physical Exam   ED Triage Vitals [23 1033]   Temp Heart Rate Resp BP   37 °C (98.6 °F) 57 16 129/79      SpO2 Temp src Heart Rate Source Patient Position   98 % -- -- --      BP Location FiO2 (%)     -- --       Physical Exam    ED Course & MDM   Diagnoses as of 23 1433   Generalized weakness       Medical Decision Making      Procedure  Procedures     Ale Batista MD  23 1433

## 2023-12-28 ENCOUNTER — APPOINTMENT (OUTPATIENT)
Dept: RADIOLOGY | Facility: HOSPITAL | Age: 78
End: 2023-12-28
Payer: MEDICARE

## 2023-12-28 LAB
ANION GAP SERPL CALC-SCNC: 14 MMOL/L (ref 10–20)
APPEARANCE UR: ABNORMAL
BACTERIA #/AREA URNS AUTO: ABNORMAL /HPF
BILIRUB UR STRIP.AUTO-MCNC: NEGATIVE MG/DL
BUN SERPL-MCNC: 40 MG/DL (ref 6–23)
CALCIUM SERPL-MCNC: 8 MG/DL (ref 8.6–10.3)
CHLORIDE SERPL-SCNC: 101 MMOL/L (ref 98–107)
CO2 SERPL-SCNC: 26 MMOL/L (ref 21–32)
COLOR UR: YELLOW
CREAT SERPL-MCNC: 1.65 MG/DL (ref 0.5–1.05)
CRP SERPL-MCNC: 15.42 MG/DL
ERYTHROCYTE [DISTWIDTH] IN BLOOD BY AUTOMATED COUNT: 17.9 % (ref 11.5–14.5)
GFR SERPL CREATININE-BSD FRML MDRD: 32 ML/MIN/1.73M*2
GLUCOSE SERPL-MCNC: 83 MG/DL (ref 74–99)
GLUCOSE UR STRIP.AUTO-MCNC: NEGATIVE MG/DL
HCT VFR BLD AUTO: 25.6 % (ref 36–46)
HGB BLD-MCNC: 7.7 G/DL (ref 12–16)
KETONES UR STRIP.AUTO-MCNC: NEGATIVE MG/DL
LEUKOCYTE ESTERASE UR QL STRIP.AUTO: ABNORMAL
MCH RBC QN AUTO: 21.2 PG (ref 26–34)
MCHC RBC AUTO-ENTMCNC: 30.1 G/DL (ref 32–36)
MCV RBC AUTO: 71 FL (ref 80–100)
MUCOUS THREADS #/AREA URNS AUTO: ABNORMAL /LPF
NITRITE UR QL STRIP.AUTO: NEGATIVE
NRBC BLD-RTO: 0 /100 WBCS (ref 0–0)
PH UR STRIP.AUTO: 5 [PH]
PLATELET # BLD AUTO: 485 X10*3/UL (ref 150–450)
POTASSIUM SERPL-SCNC: 3.7 MMOL/L (ref 3.5–5.3)
PROT UR STRIP.AUTO-MCNC: ABNORMAL MG/DL
RBC # BLD AUTO: 3.63 X10*6/UL (ref 4–5.2)
RBC # UR STRIP.AUTO: NEGATIVE /UL
RBC #/AREA URNS AUTO: ABNORMAL /HPF
SODIUM SERPL-SCNC: 137 MMOL/L (ref 136–145)
SP GR UR STRIP.AUTO: 1.02
SQUAMOUS #/AREA URNS AUTO: ABNORMAL /HPF
UROBILINOGEN UR STRIP.AUTO-MCNC: ABNORMAL MG/DL
WBC # BLD AUTO: 11.7 X10*3/UL (ref 4.4–11.3)
WBC #/AREA URNS AUTO: ABNORMAL /HPF

## 2023-12-28 PROCEDURE — 2500000001 HC RX 250 WO HCPCS SELF ADMINISTERED DRUGS (ALT 637 FOR MEDICARE OP): Performed by: NURSE PRACTITIONER

## 2023-12-28 PROCEDURE — 71046 X-RAY EXAM CHEST 2 VIEWS: CPT

## 2023-12-28 PROCEDURE — G0378 HOSPITAL OBSERVATION PER HR: HCPCS

## 2023-12-28 PROCEDURE — 71046 X-RAY EXAM CHEST 2 VIEWS: CPT | Performed by: STUDENT IN AN ORGANIZED HEALTH CARE EDUCATION/TRAINING PROGRAM

## 2023-12-28 PROCEDURE — 97166 OT EVAL MOD COMPLEX 45 MIN: CPT | Mod: GO

## 2023-12-28 PROCEDURE — 2500000004 HC RX 250 GENERAL PHARMACY W/ HCPCS (ALT 636 FOR OP/ED): Performed by: INTERNAL MEDICINE

## 2023-12-28 PROCEDURE — 36415 COLL VENOUS BLD VENIPUNCTURE: CPT | Performed by: INTERNAL MEDICINE

## 2023-12-28 PROCEDURE — 85027 COMPLETE CBC AUTOMATED: CPT | Performed by: INTERNAL MEDICINE

## 2023-12-28 PROCEDURE — 97161 PT EVAL LOW COMPLEX 20 MIN: CPT | Mod: GP

## 2023-12-28 PROCEDURE — 2500000005 HC RX 250 GENERAL PHARMACY W/O HCPCS: Performed by: INTERNAL MEDICINE

## 2023-12-28 PROCEDURE — 80048 BASIC METABOLIC PNL TOTAL CA: CPT | Performed by: INTERNAL MEDICINE

## 2023-12-28 PROCEDURE — 87086 URINE CULTURE/COLONY COUNT: CPT | Mod: AHULAB | Performed by: EMERGENCY MEDICINE

## 2023-12-28 PROCEDURE — 2500000001 HC RX 250 WO HCPCS SELF ADMINISTERED DRUGS (ALT 637 FOR MEDICARE OP): Performed by: INTERNAL MEDICINE

## 2023-12-28 PROCEDURE — 99222 1ST HOSP IP/OBS MODERATE 55: CPT | Performed by: INTERNAL MEDICINE

## 2023-12-28 PROCEDURE — 86140 C-REACTIVE PROTEIN: CPT | Performed by: INTERNAL MEDICINE

## 2023-12-28 PROCEDURE — 81001 URINALYSIS AUTO W/SCOPE: CPT | Performed by: EMERGENCY MEDICINE

## 2023-12-28 PROCEDURE — 2500000002 HC RX 250 W HCPCS SELF ADMINISTERED DRUGS (ALT 637 FOR MEDICARE OP, ALT 636 FOR OP/ED): Performed by: INTERNAL MEDICINE

## 2023-12-28 PROCEDURE — 94640 AIRWAY INHALATION TREATMENT: CPT

## 2023-12-28 RX ORDER — LIDOCAINE HYDROCHLORIDE 20 MG/ML
1.25 SOLUTION OROPHARYNGEAL AS NEEDED
Status: DISCONTINUED | OUTPATIENT
Start: 2023-12-28 | End: 2023-12-29 | Stop reason: HOSPADM

## 2023-12-28 RX ORDER — FLUCONAZOLE 100 MG/1
200 TABLET ORAL ONCE
Status: COMPLETED | OUTPATIENT
Start: 2023-12-28 | End: 2023-12-28

## 2023-12-28 RX ORDER — FLUCONAZOLE 100 MG/1
100 TABLET ORAL DAILY
Status: DISCONTINUED | OUTPATIENT
Start: 2023-12-29 | End: 2023-12-29 | Stop reason: HOSPADM

## 2023-12-28 RX ADMIN — FLUCONAZOLE 200 MG: 100 TABLET ORAL at 13:20

## 2023-12-28 RX ADMIN — GABAPENTIN 400 MG: 400 CAPSULE ORAL at 09:47

## 2023-12-28 RX ADMIN — Medication 1 CAPSULE: at 09:00

## 2023-12-28 RX ADMIN — LOSARTAN POTASSIUM 100 MG: 50 TABLET, FILM COATED ORAL at 09:47

## 2023-12-28 RX ADMIN — Medication 3 MG: at 20:54

## 2023-12-28 RX ADMIN — APIXABAN 5 MG: 5 TABLET, FILM COATED ORAL at 20:54

## 2023-12-28 RX ADMIN — AMOXICILLIN AND CLAVULANATE POTASSIUM 500 MG: 500; 125 TABLET, FILM COATED ORAL at 09:46

## 2023-12-28 RX ADMIN — OXYCODONE HYDROCHLORIDE 5 MG: 5 TABLET ORAL at 00:03

## 2023-12-28 RX ADMIN — BUDESONIDE INHALATION 0.5 MG: 0.5 SUSPENSION RESPIRATORY (INHALATION) at 21:06

## 2023-12-28 RX ADMIN — ACETAMINOPHEN 650 MG: 325 TABLET ORAL at 03:49

## 2023-12-28 RX ADMIN — HYDROXYCHLOROQUINE SULFATE 200 MG: 200 TABLET, FILM COATED ORAL at 20:55

## 2023-12-28 RX ADMIN — HYDROXYCHLOROQUINE SULFATE 200 MG: 200 TABLET, FILM COATED ORAL at 09:47

## 2023-12-28 RX ADMIN — Medication 1 TABLET: at 09:47

## 2023-12-28 RX ADMIN — PANTOPRAZOLE SODIUM 40 MG: 40 TABLET, DELAYED RELEASE ORAL at 06:05

## 2023-12-28 RX ADMIN — AMOXICILLIN AND CLAVULANATE POTASSIUM 500 MG: 500; 125 TABLET, FILM COATED ORAL at 20:54

## 2023-12-28 RX ADMIN — BUDESONIDE INHALATION 0.5 MG: 0.5 SUSPENSION RESPIRATORY (INHALATION) at 07:54

## 2023-12-28 RX ADMIN — GABAPENTIN 400 MG: 400 CAPSULE ORAL at 16:14

## 2023-12-28 RX ADMIN — Medication 3 L/MIN: at 07:54

## 2023-12-28 RX ADMIN — FORMOTEROL FUMARATE DIHYDRATE 20 MCG: 20 SOLUTION RESPIRATORY (INHALATION) at 07:54

## 2023-12-28 RX ADMIN — OXYCODONE HYDROCHLORIDE 5 MG: 5 TABLET ORAL at 06:05

## 2023-12-28 RX ADMIN — GABAPENTIN 400 MG: 400 CAPSULE ORAL at 20:54

## 2023-12-28 RX ADMIN — SPIRONOLACTONE 25 MG: 25 TABLET ORAL at 09:47

## 2023-12-28 RX ADMIN — APIXABAN 5 MG: 5 TABLET, FILM COATED ORAL at 09:47

## 2023-12-28 RX ADMIN — GUAIFENESIN 600 MG: 600 TABLET ORAL at 03:50

## 2023-12-28 RX ADMIN — NYSTATIN 400000 UNITS: 100000 SUSPENSION ORAL at 06:06

## 2023-12-28 RX ADMIN — Medication 1 CAPSULE: at 21:00

## 2023-12-28 RX ADMIN — TORSEMIDE 40 MG: 20 TABLET ORAL at 09:47

## 2023-12-28 RX ADMIN — FORMOTEROL FUMARATE DIHYDRATE 20 MCG: 20 SOLUTION RESPIRATORY (INHALATION) at 21:06

## 2023-12-28 SDOH — SOCIAL STABILITY: SOCIAL INSECURITY: ARE YOU OR HAVE YOU BEEN THREATENED OR ABUSED PHYSICALLY, EMOTIONALLY, OR SEXUALLY BY ANYONE?: NO

## 2023-12-28 SDOH — SOCIAL STABILITY: SOCIAL INSECURITY: HAS ANYONE EVER THREATENED TO HURT YOUR FAMILY OR YOUR PETS?: NO

## 2023-12-28 SDOH — SOCIAL STABILITY: SOCIAL INSECURITY: DO YOU FEEL UNSAFE GOING BACK TO THE PLACE WHERE YOU ARE LIVING?: NO

## 2023-12-28 SDOH — SOCIAL STABILITY: SOCIAL INSECURITY: DO YOU FEEL ANYONE HAS EXPLOITED OR TAKEN ADVANTAGE OF YOU FINANCIALLY OR OF YOUR PERSONAL PROPERTY?: NO

## 2023-12-28 SDOH — SOCIAL STABILITY: SOCIAL INSECURITY: HAVE YOU HAD THOUGHTS OF HARMING ANYONE ELSE?: YES

## 2023-12-28 SDOH — SOCIAL STABILITY: SOCIAL INSECURITY: ARE THERE ANY APPARENT SIGNS OF INJURIES/BEHAVIORS THAT COULD BE RELATED TO ABUSE/NEGLECT?: NO

## 2023-12-28 SDOH — SOCIAL STABILITY: SOCIAL INSECURITY: ABUSE: ADULT

## 2023-12-28 SDOH — SOCIAL STABILITY: SOCIAL INSECURITY: DOES ANYONE TRY TO KEEP YOU FROM HAVING/CONTACTING OTHER FRIENDS OR DOING THINGS OUTSIDE YOUR HOME?: NO

## 2023-12-28 SDOH — SOCIAL STABILITY: SOCIAL INSECURITY: WERE YOU ABLE TO COMPLETE ALL THE BEHAVIORAL HEALTH SCREENINGS?: YES

## 2023-12-28 ASSESSMENT — PAIN SCALES - GENERAL
PAINLEVEL_OUTOF10: 0 - NO PAIN
PAINLEVEL_OUTOF10: 8
PAINLEVEL_OUTOF10: 0 - NO PAIN
PAINLEVEL_OUTOF10: 8
PAINLEVEL_OUTOF10: 5 - MODERATE PAIN
PAINLEVEL_OUTOF10: 3
PAINLEVEL_OUTOF10: 7
PAINLEVEL_OUTOF10: 10 - WORST POSSIBLE PAIN
PAINLEVEL_OUTOF10: 0 - NO PAIN

## 2023-12-28 ASSESSMENT — COGNITIVE AND FUNCTIONAL STATUS - GENERAL
STANDING UP FROM CHAIR USING ARMS: A LOT
WALKING IN HOSPITAL ROOM: A LOT
DRESSING REGULAR UPPER BODY CLOTHING: A LITTLE
MOVING FROM LYING ON BACK TO SITTING ON SIDE OF FLAT BED WITH BEDRAILS: A LITTLE
DRESSING REGULAR LOWER BODY CLOTHING: A LOT
MOBILITY SCORE: 22
PERSONAL GROOMING: A LITTLE
PATIENT BASELINE BEDBOUND: NO
MOBILITY SCORE: 13
HELP NEEDED FOR BATHING: A LITTLE
DAILY ACTIVITIY SCORE: 15
DRESSING REGULAR LOWER BODY CLOTHING: A LITTLE
MOVING FROM LYING ON BACK TO SITTING ON SIDE OF FLAT BED WITH BEDRAILS: A LITTLE
DRESSING REGULAR UPPER BODY CLOTHING: A LOT
CLIMB 3 TO 5 STEPS WITH RAILING: TOTAL
TOILETING: A LITTLE
DAILY ACTIVITIY SCORE: 15
HELP NEEDED FOR BATHING: A LOT
STANDING UP FROM CHAIR USING ARMS: A LOT
MOBILITY SCORE: 13
TOILETING: A LITTLE
MOBILITY SCORE: 12
CLIMB 3 TO 5 STEPS WITH RAILING: TOTAL
WALKING IN HOSPITAL ROOM: A LITTLE
TURNING FROM BACK TO SIDE WHILE IN FLAT BAD: A LOT
MOVING TO AND FROM BED TO CHAIR: A LOT
MOVING FROM LYING ON BACK TO SITTING ON SIDE OF FLAT BED WITH BEDRAILS: A LITTLE
DRESSING REGULAR UPPER BODY CLOTHING: A LOT
CLIMB 3 TO 5 STEPS WITH RAILING: A LITTLE
PERSONAL GROOMING: A LITTLE
DAILY ACTIVITIY SCORE: 20
DRESSING REGULAR LOWER BODY CLOTHING: A LITTLE
HELP NEEDED FOR BATHING: A LITTLE
MOVING TO AND FROM BED TO CHAIR: A LOT
MOVING TO AND FROM BED TO CHAIR: A LOT
DRESSING REGULAR LOWER BODY CLOTHING: A LOT
TURNING FROM BACK TO SIDE WHILE IN FLAT BAD: A LOT
PERSONAL GROOMING: A LITTLE
WALKING IN HOSPITAL ROOM: A LITTLE
CLIMB 3 TO 5 STEPS WITH RAILING: TOTAL
TURNING FROM BACK TO SIDE WHILE IN FLAT BAD: A LOT
DAILY ACTIVITIY SCORE: 19
TOILETING: A LOT
STANDING UP FROM CHAIR USING ARMS: A LOT
PERSONAL GROOMING: A LITTLE
WALKING IN HOSPITAL ROOM: A LITTLE
TOILETING: A LOT
HELP NEEDED FOR BATHING: A LOT

## 2023-12-28 ASSESSMENT — COLUMBIA-SUICIDE SEVERITY RATING SCALE - C-SSRS
2. HAVE YOU ACTUALLY HAD ANY THOUGHTS OF KILLING YOURSELF?: NO
6. HAVE YOU EVER DONE ANYTHING, STARTED TO DO ANYTHING, OR PREPARED TO DO ANYTHING TO END YOUR LIFE?: NO
1. IN THE PAST MONTH, HAVE YOU WISHED YOU WERE DEAD OR WISHED YOU COULD GO TO SLEEP AND NOT WAKE UP?: NO

## 2023-12-28 ASSESSMENT — PAIN - FUNCTIONAL ASSESSMENT
PAIN_FUNCTIONAL_ASSESSMENT: FLACC (FACE, LEGS, ACTIVITY, CRY, CONSOLABILITY)
PAIN_FUNCTIONAL_ASSESSMENT: 0-10
PAIN_FUNCTIONAL_ASSESSMENT: CPOT (CRITICAL CARE PAIN OBSERVATION TOOL)
PAIN_FUNCTIONAL_ASSESSMENT: 0-10

## 2023-12-28 ASSESSMENT — ACTIVITIES OF DAILY LIVING (ADL)
HEARING - RIGHT EAR: FUNCTIONAL
JUDGMENT_ADEQUATE_SAFELY_COMPLETE_DAILY_ACTIVITIES: YES
BATHING: NEEDS ASSISTANCE
ASSISTIVE_DEVICE: DENTURES UPPER;EYEGLASSES
FEEDING YOURSELF: INDEPENDENT
GROOMING: NEEDS ASSISTANCE
HEARING - LEFT EAR: FUNCTIONAL
PATIENT'S MEMORY ADEQUATE TO SAFELY COMPLETE DAILY ACTIVITIES?: YES
ADL_ASSISTANCE: INDEPENDENT
ADEQUATE_TO_COMPLETE_ADL: YES
DRESSING YOURSELF: INDEPENDENT
ADL_ASSISTANCE: INDEPENDENT
TOILETING: NEEDS ASSISTANCE
WALKS IN HOME: NEEDS ASSISTANCE

## 2023-12-28 ASSESSMENT — LIFESTYLE VARIABLES
HOW MANY STANDARD DRINKS CONTAINING ALCOHOL DO YOU HAVE ON A TYPICAL DAY: PATIENT DOES NOT DRINK
AUDIT-C TOTAL SCORE: 0
PRESCIPTION_ABUSE_PAST_12_MONTHS: NO
AUDIT-C TOTAL SCORE: 0
HOW OFTEN DO YOU HAVE 6 OR MORE DRINKS ON ONE OCCASION: NEVER
SKIP TO QUESTIONS 9-10: 1
HOW OFTEN DO YOU HAVE A DRINK CONTAINING ALCOHOL: NEVER
SUBSTANCE_ABUSE_PAST_12_MONTHS: NO

## 2023-12-28 ASSESSMENT — PAIN DESCRIPTION - DESCRIPTORS: DESCRIPTORS: STABBING

## 2023-12-28 NOTE — PROGRESS NOTES
Requested [facility] team escalate insurance auth that was started by [facility].  Tamra Fontana RN

## 2023-12-28 NOTE — CARE PLAN
Problem: Fall/Injury  Goal: Not fall by end of shift  Outcome: Progressing  Goal: Be free from injury by end of the shift  Outcome: Progressing  Goal: Verbalize understanding of personal risk factors for fall in the hospital  Outcome: Progressing  Goal: Verbalize understanding of risk factor reduction measures to prevent injury from fall in the home  Outcome: Progressing  Goal: Use assistive devices by end of the shift  Outcome: Progressing  Goal: Pace activities to prevent fatigue by end of the shift  Outcome: Progressing     Problem: Skin  Goal: Decreased wound size/increased tissue granulation at next dressing change  Outcome: Progressing  Goal: Participates in plan/prevention/treatment measures  Outcome: Progressing  Goal: Prevent/manage excess moisture  Outcome: Progressing  Goal: Prevent/minimize sheer/friction injuries  Outcome: Progressing  Goal: Promote/optimize nutrition  Outcome: Progressing  Goal: Promote skin healing  Outcome: Progressing   The patient's goals for the shift include decrease pain    The clinical goals for the shift include remain free of injury

## 2023-12-28 NOTE — PROGRESS NOTES
Pharmacy Medication History Review    Genesis Garcia is a 78 y.o. female admitted for Generalized weakness. Pharmacy reviewed the patient's [id number] medications and allergies for accuracy.    The list below reflectives the updated PTA list. Please review each medication in order reconciliation for additional clarification and justification.  Medications Prior to Admission   Medication Sig Dispense Refill Last Dose    acetaminophen (Tylenol 8 HOUR) 650 mg ER tablet Take 1 tablet (650 mg) by mouth every 8 hours if needed.       acetaminophen (Tylenol) 325 mg tablet Take 2 tablets (650 mg) by mouth every 4 hours if needed for mild pain (1 - 3) or moderate pain (4 - 6). 30 tablet 0     albuterol 2.5 mg /3 mL (0.083 %) nebulizer solution Inhale 3 mL every 4 hours if needed.       amoxicillin-pot clavulanate (Augmentin) 500-125 mg tablet Take 1 tablet (500 mg) by mouth 2 times a day for 4 days. 8 tablet 0     b complex-vitamin c tablet Take 1 tablet by mouth once daily.       calcium carbonate 600 mg calcium (1,500 mg) tablet Take 1 tablet (1,500 mg) by mouth once daily.       cholecalciferol (Vitamin D-3) 50 MCG (2000 UT) tablet Take by mouth.       Eliquis 5 mg tablet Take 1 tablet (5 mg) by mouth 2 times a day.   12/26/2023 at pm    fluticasone propion-salmeteroL (Advair Diskus) 250-50 mcg/dose diskus inhaler Inhale 1 puff 2 times a day. Rinse mouth with water after use to reduce aftertaste and incidence of candidiasis. Do not swallow.   12/26/2023    gabapentin (Neurontin) 400 mg capsule Take 1 capsule (400 mg) by mouth 3 times a day.   12/26/2023    guaiFENesin (Mucinex) 600 mg 12 hr tablet Take 1 tablet (600 mg) by mouth every 12 hours if needed for congestion for up to 5 days. Do not crush, chew, or split. 10 tablet 0     hydroxychloroquine (Plaquenil) 200 mg tablet Take 1 tablet (200 mg) by mouth 2 times a day.   12/26/2023 at pm    lactobacillus acidophilus capsule Take 1 capsule by mouth 2 times a  day. 60 capsule 0 12/26/2023    losartan (Cozaar) 100 mg tablet Take 1 tablet (100 mg) by mouth once daily.   12/26/2023    multivitamin capsule Take by mouth.       nystatin (Mycostatin) 100,000 unit/mL suspension Swish and swallow 4 mL (400,000 Units) 4 times a day for 16 doses. 64 mL 0     oxyCODONE (Roxicodone) 5 mg immediate release tablet Take 1 tablet (5 mg) by mouth every 6 hours if needed for severe pain (7 - 10) for up to 15 doses. 15 tablet 0     spironolactone (Aldactone) 25 mg tablet Take 1 tablet (25 mg) by mouth once daily.   12/26/2023    torsemide 40 mg tablet Take 40 mg by mouth once daily. Do not start before December 27, 2023. 30 tablet 0        Spoke to the patient. No changes      The list below reflectives the updated allergy list. Please review each documented allergy for additional clarification and justification.  Allergies  Reviewed by Clary Lucas RN on 12/27/2023        Severity Reactions Comments    Sulfur High Hives     Cefprozil Not Specified Hives     Sulfa (sulfonamide Antibiotics) Not Specified Hives             Below are additional concerns with the patient's PTA list.      Laureen Green CPhT

## 2023-12-28 NOTE — CONSULTS
Wound Care Consult     Visit Date: 12/28/2023      Patient Name: Genesis Garcia         MRN: [id number]           YOB: 1945     Reason for Consult: Assessment complete. Additional supplies left at bedside         Wound History: Chronic venous ulcer to LLE. Patient is followed by Dr. Mckeon outpatient      Pertinent Labs:   Albumin   Date Value Ref Range Status   12/27/2023 2.8 (L) 3.4 - 5.0 g/dL Final       Wound Assessment:  Wound 04/28/23 Venous Ulcer Leg Dorsal;Left;Lateral;Lower (Active)   Wound Image   12/28/23 1026   Site Assessment Red 12/28/23 1026   Non-staged Wound Description Partial thickness 12/28/23 1026   Shape Oval 12/28/23 1026   Wound Length (cm) 1.3 cm 12/28/23 1026   Wound Width (cm) 0.8 cm 12/28/23 1026   Wound Surface Area (cm^2) 1.04 cm^2 12/28/23 1026   Wound Depth (cm) 0.2 cm 12/28/23 1026   Wound Volume (cm^3) 0.208 cm^3 12/28/23 1026   Wound Healing % 69 12/28/23 1026   Drainage Amount None 12/28/23 0101   Dressing Foam 12/28/23 0101   Dressing Status Clean;Dry 12/28/23 0101       Wound 12/28/23 Toe (Comment  which one) Dorsal foot;Left (Active)   Wound Image   12/28/23 1027   Wound Length (cm) 3 cm 12/28/23 1027   Wound Width (cm) 3 cm 12/28/23 1027   Wound Surface Area (cm^2) 9 cm^2 12/28/23 1027       Wound Team Summary Assessment: Notified Dr. Lind of wound care recommendations     LLE lateral- Venous statis Ulcer   Irrigate with normal saline or wound cleanser, Pat dry.  Apply no sting skin barrier (cavilon) to marj wound   Apply single layer of  xeroform dressing  Cover with Mepilex border dressing   Change every day and as needed     Apply Tubigrip Size E to BLE (from base of toes to 2 finger width below the knee)   Please remove daily for hygiene and reapply       Wound Team Plan: Please re-consult wound/ostomy care for any changes or concerns    While in bed patient should only be on one fitted sheet, and one chux. Please, do not use brief while patient is  resting in bed. Elevate heels off the bed surface at all times. Turn and reposition at least every 2 hours.     Thank you for this consultations, while inpatient please contact with any questions or changes in condition.       Maria Dolores Piña RN BSN,[facility],CWOCN  [phone]   12/28/2023  2:43 PM

## 2023-12-28 NOTE — CARE PLAN
The patient's goals for the shift include decrease pain    The clinical goals for the shift include remain free of injury    Over the shift, the patient did not make progress toward the following goals. Barriers to progression include . Recommendations to address these barriers include .

## 2023-12-28 NOTE — PROGRESS NOTES
Occupational Therapy    Evaluation    Patient Name: Genesis Garcia  MRN: [id number]  Today's Date: 12/28/2023  Time Calculation  Start Time: 1505  Stop Time: 1520  Time Calculation (min): 15 min        Assessment:  OT Assessment: pt presents with generalized weakness, deconditioning and decreased mobility/balance which impedes ADL performance. pt would benefit from skilled OT services to address these deficits and to facilitate highest level of independence.  Prognosis: Good  Barriers to Discharge: Decreased caregiver support  Evaluation/Treatment Tolerance: Patient limited by fatigue, Patient limited by pain  Medical Staff Made Aware: Yes  End of Session Communication: Bedside nurse  End of Session Patient Position: Up in chair  OT Assessment Results: Decreased ADL status, Decreased endurance, Decreased functional mobility  Prognosis: Good  Barriers to Discharge: Decreased caregiver support  Evaluation/Treatment Tolerance: Patient limited by fatigue, Patient limited by pain  Medical Staff Made Aware: Yes  Strengths: Ability to acquire knowledge, Attitude of self  Plan:  Treatment Interventions: ADL retraining, Functional transfer training, UE strengthening/ROM, Endurance training, Equipment evaluation/education, Compensatory technique education  OT Frequency: 3 times per week  OT Discharge Recommendations: High intensity level of continued care  Equipment Recommended upon Discharge: Wheeled walker, Bedside commode  OT Recommended Transfer Status: Assist of 1, Moderate assist  OT - OK to Discharge: Yes (OT POC and Evaluation initiated)  Treatment Interventions: ADL retraining, Functional transfer training, UE strengthening/ROM, Endurance training, Equipment evaluation/education, Compensatory technique education      General:  General  Reason for Referral: 78 yr old female admitted with sepsis secondary to pneumonia/cellulitis and acute respiratory failure.  Was discharge from [facility] less than 24 hours ago and  refused SNF however upon home going pt was unable to take care of self and came back.  Referred By: Estefania Mojica, APRN-CNP  Past Medical History Relevant to Rehab: history of COPD asthma hypertension dyslipidemia atrial fibrillation chronic diastolic congestive heart failure hip replacement surgery,  Family/Caregiver Present: Yes  Caregiver Feedback: son and x2 grand kids present  Prior to Session Communication: Bedside nurse  Patient Position Received: Up in chair  Preferred Learning Style: kinesthetic, auditory  General Comment: pt agreeable and pleasant throughout  Precautions:  Medical Precautions: Fall precautions, Oxygen therapy device and L/min  Precautions Comment: protective spinal precautions for comfort  Vital Signs:     Pain:  Pain Assessment  Pain Assessment: 0-10  Pain Score: 8  Pain Type: Chronic pain  Pain Location: Back  Pain Orientation: Lower    Objective   Cognition:  Overall Cognitive Status: Within Functional Limits  Orientation Level: Oriented X4           Home Living:  Type of Home: Condo  Lives With: Friends, Dependent children (Friend/roommate & great grandchildren (6yo & 11yo))  Home Adaptive Equipment: Walker rolling or standard, Reacher, Sock aid, Adaptive stroller (hip kit)  Home Layout: One level  Home Access: Level entry  Bathroom Shower/Tub: Walk-in shower  Bathroom Toilet: Handicapped height  Bathroom Equipment: Grab bars in shower, Shower chair with back  Prior Function:  Level of [place]: Independent with ADLs and functional transfers, Independent with homemaking with ambulation  ADL Assistance: Independent  Ambulatory Assistance: Independent (Mod I with walker; Sometimes leans forward and rests arm(s) on walker when fatigued)  Hand Dominance: Right  Prior Function Comments: Normally independent with ADLs with use of AE/AD.  takes care of x2 grand kids     ADL:  LE Dressing Assistance: Maximal  LE Dressing Deficit:  (assist to thread BLE into pants, pt then stood with FWW  and required assistance for managing pants over hips.)  Activity Tolerance:  Endurance: Tolerates 10 - 20 min exercise with multiple rests  Bed Mobility/Transfers: Transfer 1  Technique 1: Stand pivot, Sit pivot  Transfer Device 1: Walker  Transfer Level of Assistance 1: Moderate assistance  Trials/Comments 1: from recliner chair; min cues for positioning and hand placement.  pt with slow ascent due to back pain and BLE weakness  Transfers 2  Transfer to 2: Chair with arms  Technique 2: Stand pivot  Transfer Device 2: Walker  Transfer Level of Assistance 2: Contact guard  Trials/Comments 2: pt transferred back to recliner at end of session, min cues for positioning      Ambulation/Gait Training:  Ambulation/Gait Training  Ambulation/Gait Training Performed: Yes  Ambulation/Gait Training 1  Surface 1: Level tile  Device 1: Rolling walker  Assistance 1: Contact guard  Comments/Distance (ft) 1: ~5 steps forwards and then backwards with FWW.  extended time to complete due to pain, pt with heavy reliance on BUE for support.  Sitting Balance:     Standing Balance:  Static Standing Balance  Static Standing-Balance Support: Bilateral upper extremity supported  Static Standing-Level of Assistance: Contact guard  Static Standing-Comment/Number of Minutes: heavy reliance on BUE for support      Vision:Vision - Basic Assessment  Current Vision: No visual deficits  Sensation:  Light Touch: No apparent deficits  Strength:  Strength Comments: WFL distally; 4+/5 bilaterally     Coordination:  Movements are Fluid and Coordinated: Yes      Extremities: RUE   RUE :  (shoulder flexion limited to <45 degrees, WFL distally) and LUE   LUE:  (shoulder flexion limited to <45 degrees, WFL distally)      Outcome Measures:[facility] Daily Activity  Putting on and taking off regular lower body clothing: A lot  Bathing (including washing, rinsing, drying): A lot  Putting on and taking off regular upper body clothing: A lot  Toileting, which includes  using toilet, bedpan or urinal: A lot  Taking care of personal grooming such as brushing teeth: A little  Eating Meals: None  Daily Activity - Total Score: 15        Education Documentation  Body Mechanics, taught by Vernon Mckeon OT at 12/28/2023  3:34 PM.  Learner: Patient  Readiness: Acceptance  Method: Demonstration, Explanation  Response: Verbalizes Understanding    Precautions, taught by Vernon Mckeon OT at 12/28/2023  3:34 PM.  Learner: Patient  Readiness: Acceptance  Method: Demonstration, Explanation  Response: Verbalizes Understanding    ADL Training, taught by Vernon Mckeon OT at 12/28/2023  3:34 PM.  Learner: Patient  Readiness: Acceptance  Method: Demonstration, Explanation  Response: Verbalizes Understanding    Education Comments  No comments found.        OP EDUCATION:       Goals:  Encounter Problems       Encounter Problems (Active)       ADLs       Patient with complete upper body dressing with minimal assist  level of assistance donning and doffing all UE clothes while edge of bed  (Progressing)       Start:  12/28/23    Expected End:  01/11/24            Patient with complete lower body dressing with minimal assist  level of assistance donning and doffing all LE clothes  with PRN adaptive equipment while edge of bed  (Progressing)       Start:  12/28/23    Expected End:  01/11/24            Patient will complete toileting including hygiene clothing management/hygiene with minimal assist  level of assistance and raised toilet seat and grab bars. (Progressing)       Start:  12/28/23    Expected End:  01/11/24               BALANCE       Pt will maintain dynamic standing balance during ADL task with contact guard assist level of assistance in order to demonstrate decreased risk of falling and improved postural control. (Progressing)       Start:  12/28/23    Expected End:  01/11/24               Safety       LTG - Patient will adhere to hip precautions during ADL's and transfers       Start:   12/28/23            LTG - Patient will demonstrate safety requirements appropriate to situation/environment       Start:  12/28/23            LTG - Patient will utilize safety techniques       Start:  12/28/23            STG - Patient uses call light consistently to request assistance with transfers       Start:  12/28/23            STG - Patient uses gait belt during all transfers       Start:  12/28/23               TRANSFERS       Patient will complete functional transfer to commode/toilet with least restrictive device with contact guard assist level of assistance. (Progressing)       Start:  12/28/23    Expected End:  01/11/24            Patient will complete sit to stand transfer with contact guard assist level of assistance and least restrictive device in order to improve safety and prepare for out of bed mobility. (Progressing)       Start:  12/28/23    Expected End:  01/11/24

## 2023-12-28 NOTE — PROGRESS NOTES
Physical Therapy    Physical Therapy Evaluation    Patient Name: Genesis Garcia  MRN: [id number]  Today's Date: 12/28/2023   Time Calculation  Start Time: 1559  Stop Time: 1613  Time Calculation (min): 14 min    Assessment/Plan   PT Assessment  PT Assessment Results: Decreased strength, Decreased endurance, Decreased range of motion, Impaired balance, Decreased mobility  Rehab Prognosis: Good  End of Session Communication: Bedside nurse  Assessment Comment: PT Evaluation Completed. The patient presented with decreased mobility, balance, endurance and increased pain and fatigue. These impairments are negatively impacting her ability to perform at baseline functional level, in home environment. The patient is at risk of falls & injury. This patient would benefit from skilled therapy intervention to address limitations and progress towards the PT goals.  Anticipate moderate frequency PT needs at discharge  End of Session Patient Position: Up in chair, Alarm on  IP OR SWING BED PT PLAN  Inpatient or Swing Bed: Inpatient  PT Plan  Treatment/Interventions: Bed mobility, Transfer training, Gait training, Balance training, Strengthening, Endurance training, Range of motion, Therapeutic exercise, Therapeutic activity, Home exercise program  PT Plan: Skilled PT  PT Frequency: 3 times per week  PT Discharge Recommendations: Moderate intensity level of continued care  PT Recommended Transfer Status: Assist x1  PT - OK to Discharge: Yes (PT POC established)      Subjective   General Visit Information:  General  Reason for Referral: 78 yr old female admitted with sepsis secondary to pneumonia/cellulitis and acute respiratory failure.  Was discharge from [facility] less than 24 hours ago and refused SNF however upon home going pt was unable to take care of self and came back.  Referred By: Estefania Mojica, APRN-CNP  Past Medical History Relevant to Rehab: history of COPD asthma hypertension dyslipidemia atrial fibrillation chronic  diastolic congestive heart failure hip replacement surgery,  Prior to Session Communication: Bedside nurse  Patient Position Received: Up in chair, Alarm on  General Comment: Pt pleasant and agreeable to eval.  Home Living:  Home Living  Type of Home: Condo  Lives With: Friends, Dependent children (Friend/roommate & great grandchildren (6yo & 11yo))  Home Adaptive Equipment: Walker rolling or standard, Reacher, Sock aid (hip kit)  Home Layout: One level  Home Access: Level entry  Prior Level of Function:  Prior Function Per Pt/Caregiver Report  Level of [place]: Independent with ADLs and functional transfers, Independent with homemaking with ambulation  ADL Assistance: Independent  Homemaking Assistance: Independent  Ambulatory Assistance: Independent (RW)  Prior Function Comments: Reports 1 recent fall. Drives short distances.  Precautions:  Precautions  Medical Precautions: Fall precautions, Oxygen therapy device and L/min  Vital Signs:  Vital Signs  BP: 159/80    Objective   Pain:  Pain Assessment  Pain Assessment: 0-10  Pain Score: 8  Pain Location:  (back pain)  Cognition:  Cognition  Overall Cognitive Status: Within Functional Limits  Orientation Level: Oriented X4    General Assessments:  Activity Tolerance  Endurance: Tolerates 10 - 20 min exercise with multiple rests    Sensation  Light Touch: No apparent deficits    Postural Control  Postural Control: Within Functional Limits    Static Sitting Balance  Static Sitting-Balance Support: Feet supported, Bilateral upper extremity supported  Static Sitting-Level of Assistance: Close supervision  Dynamic Sitting Balance  Dynamic Sitting-Balance Support: Feet supported, Bilateral upper extremity supported  Dynamic Sitting-Comments: Close supervision    Static Standing Balance  Static Standing-Balance Support: Bilateral upper extremity supported  Static Standing-Level of Assistance: Contact guard  Dynamic Standing Balance  Dynamic Standing-Balance Support:  Bilateral upper extremity supported  Dynamic Standing-Comments: Contact guard  Functional Assessments:  Bed Mobility  Bed Mobility: No    Transfers  Transfer: Yes  Transfer 1  Technique 1: Sit to stand, Stand to sit  Transfer Device 1: Walker  Transfer Level of Assistance 1: Moderate assistance  Trials/Comments 1: Cues for hand placement.    Ambulation/Gait Training  Ambulation/Gait Training Performed: Yes  Ambulation/Gait Training 1  Surface 1: Level tile  Device 1: Rolling walker  Assistance 1: Contact guard  Comments/Distance (ft) 1: Decreased renzo and step length. Decreased WB on the R LE with antalgic gait. Moderately unsteady. Reports fatigue after ambulation. (10 ft)  Extremity/Trunk Assessments:  RUE   RUE :  (R shoulder flexion AROM ~15 degrees, PROM 80 degrees. elbow, wrist, hand ROM WFL. Elbow  and hand strength >3/5.)  LUE   LUE:  (L shoulder flexion AROM ~20 degrees, PROM ~100 degrees. elbow, wrist, hand ROM WFL. Elbow and hand strength >3/5.)  RLE   RLE :  (lorenzo LEs >3/5 based on observation of functional mobility. PF ROM limited, otherwise ROM WFL.)  LLE   LLE :  (lorenzo LEs >3/5 based on observation of functional mobility. PF ROM limited, otherwise ROM WFL.)  Outcome Measures:  [facility] Basic Mobility  Turning from your back to your side while in a flat bed without using bedrails: A little  Moving from lying on your back to sitting on the side of a flat bed without using bedrails: A lot  Moving to and from bed to chair (including a wheelchair): A lot  Standing up from a chair using your arms (e.g. wheelchair or bedside chair): A lot  To walk in hospital room: A little  Climbing 3-5 steps with railing: Total  Basic Mobility - Total Score: 13    Encounter Problems       Encounter Problems (Active)       Balance       Pt will tolerate 5+ mins dynamic standing balance activities with CGA or less and no LOB using a RW.        Start:  12/28/23    Expected End:  01/11/24               Mobility       STG -  Patient will ambulate 50 ft with CGA using a RW with minimal SOB or fatigue.        Start:  12/28/23    Expected End:  01/11/24               Safety       LTG - Patient will adhere to hip precautions during ADL's and transfers       Start:  12/28/23            LTG - Patient will demonstrate safety requirements appropriate to situation/environment       Start:  12/28/23            LTG - Patient will utilize safety techniques       Start:  12/28/23            STG - Patient uses call light consistently to request assistance with transfers       Start:  12/28/23            STG - Patient uses gait belt during all transfers       Start:  12/28/23               Transfers       STG - Patient will perform bed mobility with CGA or less.        Start:  12/28/23    Expected End:  01/11/24            STG - Patient will transfer sit to and from stand with CGA or less using a RW.        Start:  12/28/23    Expected End:  01/11/24                   Education Documentation  Body Mechanics, taught by Olimpia Lr, PT at 12/28/2023  4:38 PM.  Learner: Patient  Readiness: Acceptance  Method: Explanation  Response: Verbalizes Understanding    Mobility Training, taught by Olimpia Lr, PT at 12/28/2023  4:38 PM.  Learner: Patient  Readiness: Acceptance  Method: Explanation  Response: Verbalizes Understanding    Education Comments  No comments found.

## 2023-12-28 NOTE — H&P
Genesis Garcia is a 78 y.o. female   Weakness, Gen       Patient with a past medical history of COPD asthma hypertension dyslipidemia atrial fibrillation chronic diastolic congestive heart failure recent hip replacement surgery who was admitted to the facility with sepsis secondary to pneumonia/cellulitis and acute respiratory failure  Was treated with IV antibiotics DuoNebs  Weaned off oxygen and discharged home with home health care  (Patient had declined skilled nursing facility)  When she got home she found that she was not able to get up and around  Had bilateral shoulder pain difficulty ambulating  And worsening shortness of breath so she came back to the emergency room  Currently the patient complains of bilateral shoulder pain  She complains of oral thrush which is painful  And is now back on oxygen at 3 L    Past Medical History  Past Medical History:   Diagnosis Date    Cellulitis, unspecified 08/24/2020    Cellulitis of skin    Displaced fracture of lateral malleolus of unspecified fibula, initial encounter for closed fracture 07/22/2021    Closed fracture of lateral malleolus    Encounter for follow-up examination after completed treatment for conditions other than malignant neoplasm 11/30/2022    Hospital discharge follow-up    Encounter for other preprocedural examination 10/18/2022    Pre-op examination    Essential (primary) hypertension 01/04/2023    Hypertension    Other fracture of upper and lower end of unspecified fibula, initial encounter for closed fracture 05/21/2021    Fracture of distal fibula    Other specified soft tissue disorders 09/19/2022    Leg swelling    Pain in left ankle and joints of left foot 05/03/2021    Left ankle pain, unspecified chronicity    Pain in right ankle and joints of right foot 05/03/2021    Ankle pain, right    Pain in right hip 05/03/2021    Hip pain, right    Personal history of (healed) stress fracture 06/26/2021    History of stress fracture    Personal  history of diseases of the skin and subcutaneous tissue 11/19/2022    History of cellulitis    Personal history of other diseases of the circulatory system     History of atrial fibrillation    Personal history of other diseases of the musculoskeletal system and connective tissue 10/20/2016    History of arthritis    Personal history of other diseases of urinary system     History of acute renal failure    Personal history of other specified conditions 11/19/2022    History of tachycardia    Unspecified asthma, uncomplicated 12/01/2022    Asthma    Vitamin D deficiency, unspecified 10/20/2016    Vitamin D deficiency       Surgical History  Past Surgical History:   Procedure Laterality Date    BACK SURGERY  06/20/2016    Back Surgery    CHOLECYSTECTOMY  04/15/2016    Cholecystectomy    OTHER SURGICAL HISTORY  05/03/2021    Cataract surgery    TOTAL KNEE ARTHROPLASTY  12/01/2022    Knee Replacement        Social History  She reports that she quit smoking about 32 years ago. Her smoking use included cigarettes. She has a 3.60 pack-year smoking history. She has never used smokeless tobacco. She reports that she does not currently use alcohol. She reports that she does not currently use drugs.    Family History  Family History   Problem Relation Name Age of Onset    Diabetes Mother          type 2    Heart disease Father      Hypertension Father      Other (cardiac disorder) Father          Allergies  Sulfur, Cefprozil, and Sulfa (sulfonamide antibiotics)    Review of Systems     Constitutional: Feels weak  Eyes: no blurred vision and no diplopia.   ENT: no hearing loss, no tinnitus, no earache, no sore throat, no hoarseness and no swollen glands in the neck.    Respiratory: no cough, wheezing or shortness of breath at rest or exertion  Gastrointestinal: no change in bowel habits, no diarrhea, no constipation, no bloody stools, no nausea, no vomiting, no abdominal pain, no signs and symptoms of ulcer disease, no magdy  colored stools and no intolerance to fatty foods.   Genitourinary: no urinary frequency, no dysuria, no hematuria, no burning sensation during urination, urinary stream is not smaller and urinary stream does not start and stop.   Musculoskeletal: Lateral shoulder pain with difficulty walking  Skin: no rashes, no change in skin color and pigmentation, no skin lesions and no skin lumps.   Neurological: no headaches, no dizziness, no seizures, no tingling, no numbness, no signs and symptoms of stroke and no limb weakness.   Psychiatric: no confusion, no memory lapses or loss, no depression and no sleep disturbances.   Endocrine: no excessive thirst, no dry skin, no cold intolerance, no heat intolerance and no increased urinary frequency.   Hematologic/Lymphatic: is not slow to heal, does not bleed easily, does not bruise easily, no thrombophlebitis, no anemia and no history of blood transfusion.   All other systems have been reviewed and are negative for complaint.     Vitals:    12/28/23 1227   BP: 132/81   Pulse: 71   Resp: 16   Temp: 36.6 °C (97.9 °F)   SpO2: 92%        Scheduled medications  amoxicillin-pot clavulanate, 1 tablet, oral, BID  apixaban, 5 mg, oral, BID  B complex-vitamin C, 1 tablet, oral, Daily  budesonide, 0.5 mg, nebulization, BID  [START ON 12/29/2023] fluconazole, 100 mg, oral, Daily  formoterol, 20 mcg, nebulization, BID  gabapentin, 400 mg, oral, TID  hydroxychloroquine, 200 mg, oral, BID  lactobacillus acidophilus, 1 capsule, oral, BID  losartan, 100 mg, oral, Daily  melatonin, 3 mg, oral, Nightly  pantoprazole, 40 mg, oral, Daily before breakfast   Or  pantoprazole, 40 mg, intravenous, Daily before breakfast  polyethylene glycol, 17 g, oral, Daily  psyllium, 1 packet, oral, Daily  spironolactone, 25 mg, oral, Daily  torsemide, 40 mg, oral, Daily      Continuous medications     PRN medications  PRN medications: acetaminophen **OR** acetaminophen **OR** acetaminophen, albuterol, guaiFENesin,  ondansetron **OR** ondansetron, oxyCODONE, oxygen    Results from last 7 days   Lab Units 12/28/23  0617 12/27/23  1215 12/26/23  0550   WBC AUTO x10*3/uL 11.7* 12.8* 10.6   HEMOGLOBIN g/dL 7.7* 7.8* 7.6*   HEMATOCRIT % 25.6* 26.0* 25.4*   PLATELETS AUTO x10*3/uL 485* 429 395     Results from last 7 days   Lab Units 12/28/23  0617 12/27/23  1749 12/27/23  1215   SODIUM mmol/L 137 136 136   POTASSIUM mmol/L 3.7 3.7 3.2*   CHLORIDE mmol/L 101 99 97*   CO2 mmol/L 26 27 25   BUN mg/dL 40* 43* 51*   CREATININE mg/dL 1.65* 1.95* 2.25*   CALCIUM mg/dL 8.0* 8.0* 8.2*   PROTEIN TOTAL g/dL  --   --  6.0*   BILIRUBIN TOTAL mg/dL  --   --  0.5   ALK PHOS U/L  --   --  73   ALT U/L  --   --  14   AST U/L  --   --  17   GLUCOSE mg/dL 83 109* 86            XR chest 2 views    (Results Pending)       Physical Exam     Constitutional   General appearance: Alert   Eyes   Inspection of eyes: Sclera and conjunctiva were normal.    Pupil exam: Pupils were equal in size. Extraocular movements were intact.   Pulmonary   Respiratory assessment: No respiratory distress, normal respiratory rhythm and effort.    Auscultation of Lungs:  Crackles  Cardiovascular   Auscultation of heart: Apical pulse normal, heart rate and rhythm normal, normal S1 and S2, no murmurs and no pericardial rub.    Exam for edema: Plus edema plus Ace wrap  Abdomen   Abdominal Exam: No bruits, normal bowel sounds, soft, non-tender, no abdominal mass palpated.    Liver and Spleen exam: No hepato-splenomegaly.   Musculoskeletal     Inspection of digits and nails: No clubbing or cyanosis of the fingernails.    Inspection/palpation of joints, bones and muscles: No joint swelling. Normal movement of all extremities.   Skin   Skin inspection: Stasis dermatitis  Neurologic   Cranial nerves: Nerves 2-12 were intact, no focal neuro defects.   Psychiatric   Orientation: Oriented to person, place, and time.    Mood and affect: Normal.       Assessment/Plan      #Pneumonia  #Acute  respiratory failure  Continue oral antibiotics to complete the course of treatment  Repeat chest x-ray as she is now back on oxygen    #Oral thrush  Complains of pain from nystatin swish and swallow  Will use Diflucan instead    #Bilateral shoulder pain  History of rheumatoid arthritis currently on Plaquenil  Check ESR/CRP  Might need steroids  Physical therapy and Occupational Therapy    #Chronic diastolic congestive heart failure  #Hypertension  Continue home medications    #Atrial fibrillation  Continue Eliquis    #Osteoarthritis  #Chronic back pain  Continue pain medications

## 2023-12-29 ENCOUNTER — APPOINTMENT (OUTPATIENT)
Dept: RADIOLOGY | Facility: HOSPITAL | Age: 78
End: 2023-12-29
Payer: MEDICARE

## 2023-12-29 VITALS
SYSTOLIC BLOOD PRESSURE: 146 MMHG | RESPIRATION RATE: 18 BRPM | TEMPERATURE: 98.3 F | HEART RATE: 85 BPM | OXYGEN SATURATION: 96 % | WEIGHT: 227.5 LBS | BODY MASS INDEX: 41.86 KG/M2 | DIASTOLIC BLOOD PRESSURE: 69 MMHG | HEIGHT: 62 IN

## 2023-12-29 LAB
ANION GAP SERPL CALC-SCNC: 14 MMOL/L (ref 10–20)
BUN SERPL-MCNC: 35 MG/DL (ref 6–23)
CALCIUM SERPL-MCNC: 8.5 MG/DL (ref 8.6–10.3)
CHLORIDE SERPL-SCNC: 98 MMOL/L (ref 98–107)
CO2 SERPL-SCNC: 28 MMOL/L (ref 21–32)
CREAT SERPL-MCNC: 1.48 MG/DL (ref 0.5–1.05)
ERYTHROCYTE [DISTWIDTH] IN BLOOD BY AUTOMATED COUNT: 18.1 % (ref 11.5–14.5)
GFR SERPL CREATININE-BSD FRML MDRD: 37 ML/MIN/1.73M*2
GLUCOSE SERPL-MCNC: 95 MG/DL (ref 74–99)
HCT VFR BLD AUTO: 25.5 % (ref 36–46)
HGB BLD-MCNC: 7.5 G/DL (ref 12–16)
MCH RBC QN AUTO: 21.3 PG (ref 26–34)
MCHC RBC AUTO-ENTMCNC: 29.4 G/DL (ref 32–36)
MCV RBC AUTO: 72 FL (ref 80–100)
NRBC BLD-RTO: 0 /100 WBCS (ref 0–0)
PLATELET # BLD AUTO: 469 X10*3/UL (ref 150–450)
POTASSIUM SERPL-SCNC: 3.6 MMOL/L (ref 3.5–5.3)
RBC # BLD AUTO: 3.52 X10*6/UL (ref 4–5.2)
SODIUM SERPL-SCNC: 136 MMOL/L (ref 136–145)
WBC # BLD AUTO: 10.2 X10*3/UL (ref 4.4–11.3)

## 2023-12-29 PROCEDURE — 36415 COLL VENOUS BLD VENIPUNCTURE: CPT | Performed by: INTERNAL MEDICINE

## 2023-12-29 PROCEDURE — 2500000001 HC RX 250 WO HCPCS SELF ADMINISTERED DRUGS (ALT 637 FOR MEDICARE OP): Performed by: NURSE PRACTITIONER

## 2023-12-29 PROCEDURE — 2500000004 HC RX 250 GENERAL PHARMACY W/ HCPCS (ALT 636 FOR OP/ED): Performed by: INTERNAL MEDICINE

## 2023-12-29 PROCEDURE — 80048 BASIC METABOLIC PNL TOTAL CA: CPT | Performed by: INTERNAL MEDICINE

## 2023-12-29 PROCEDURE — 2500000002 HC RX 250 W HCPCS SELF ADMINISTERED DRUGS (ALT 637 FOR MEDICARE OP, ALT 636 FOR OP/ED): Mod: MUE | Performed by: INTERNAL MEDICINE

## 2023-12-29 PROCEDURE — 85027 COMPLETE CBC AUTOMATED: CPT | Performed by: INTERNAL MEDICINE

## 2023-12-29 PROCEDURE — 94640 AIRWAY INHALATION TREATMENT: CPT

## 2023-12-29 PROCEDURE — 2500000001 HC RX 250 WO HCPCS SELF ADMINISTERED DRUGS (ALT 637 FOR MEDICARE OP): Performed by: INTERNAL MEDICINE

## 2023-12-29 PROCEDURE — 71046 X-RAY EXAM CHEST 2 VIEWS: CPT

## 2023-12-29 PROCEDURE — 71046 X-RAY EXAM CHEST 2 VIEWS: CPT | Mod: FOREIGN READ | Performed by: RADIOLOGY

## 2023-12-29 PROCEDURE — 99238 HOSP IP/OBS DSCHRG MGMT 30/<: CPT | Performed by: INTERNAL MEDICINE

## 2023-12-29 PROCEDURE — G0378 HOSPITAL OBSERVATION PER HR: HCPCS

## 2023-12-29 RX ORDER — LIDOCAINE HYDROCHLORIDE 20 MG/ML
1.25 SOLUTION OROPHARYNGEAL AS NEEDED
Qty: 100 ML | Refills: 0
Start: 2023-12-29 | End: 2024-02-26 | Stop reason: ALTCHOICE

## 2023-12-29 RX ORDER — ALBUTEROL SULFATE 0.83 MG/ML
2.5 SOLUTION RESPIRATORY (INHALATION) EVERY 4 HOURS PRN
Qty: 3 ML | Refills: 1 | Status: SHIPPED | OUTPATIENT
Start: 2023-12-29 | End: 2024-02-26 | Stop reason: SDUPTHER

## 2023-12-29 RX ORDER — ALBUTEROL SULFATE 0.83 MG/ML
2.5 SOLUTION RESPIRATORY (INHALATION) EVERY 2 HOUR PRN
Qty: 75 ML | Refills: 11 | Status: SHIPPED | OUTPATIENT
Start: 2023-12-29

## 2023-12-29 RX ORDER — FLUCONAZOLE 100 MG/1
100 TABLET ORAL DAILY
Qty: 6 TABLET | Refills: 0 | Status: SHIPPED | OUTPATIENT
Start: 2023-12-30 | End: 2024-01-05

## 2023-12-29 RX ORDER — ALBUTEROL SULFATE 0.83 MG/ML
2.5 SOLUTION RESPIRATORY (INHALATION)
Status: DISCONTINUED | OUTPATIENT
Start: 2023-12-29 | End: 2023-12-29 | Stop reason: HOSPADM

## 2023-12-29 RX ORDER — ALBUTEROL SULFATE 0.83 MG/ML
2.5 SOLUTION RESPIRATORY (INHALATION) EVERY 2 HOUR PRN
Status: DISCONTINUED | OUTPATIENT
Start: 2023-12-29 | End: 2023-12-29 | Stop reason: HOSPADM

## 2023-12-29 RX ADMIN — GUAIFENESIN 600 MG: 600 TABLET ORAL at 15:21

## 2023-12-29 RX ADMIN — FORMOTEROL FUMARATE DIHYDRATE 20 MCG: 20 SOLUTION RESPIRATORY (INHALATION) at 07:33

## 2023-12-29 RX ADMIN — SPIRONOLACTONE 25 MG: 25 TABLET ORAL at 10:39

## 2023-12-29 RX ADMIN — GABAPENTIN 400 MG: 400 CAPSULE ORAL at 10:39

## 2023-12-29 RX ADMIN — Medication 1 TABLET: at 10:39

## 2023-12-29 RX ADMIN — ACETAMINOPHEN 650 MG: 325 TABLET ORAL at 15:21

## 2023-12-29 RX ADMIN — APIXABAN 5 MG: 5 TABLET, FILM COATED ORAL at 10:40

## 2023-12-29 RX ADMIN — TORSEMIDE 40 MG: 20 TABLET ORAL at 10:40

## 2023-12-29 RX ADMIN — BUDESONIDE INHALATION 0.5 MG: 0.5 SUSPENSION RESPIRATORY (INHALATION) at 07:33

## 2023-12-29 RX ADMIN — FLUCONAZOLE 100 MG: 100 TABLET ORAL at 10:39

## 2023-12-29 RX ADMIN — ALBUTEROL SULFATE 2.5 MG: 2.5 SOLUTION RESPIRATORY (INHALATION) at 14:18

## 2023-12-29 RX ADMIN — GABAPENTIN 400 MG: 400 CAPSULE ORAL at 15:19

## 2023-12-29 RX ADMIN — AMOXICILLIN AND CLAVULANATE POTASSIUM 500 MG: 500; 125 TABLET, FILM COATED ORAL at 10:39

## 2023-12-29 RX ADMIN — HYDROXYCHLOROQUINE SULFATE 200 MG: 200 TABLET, FILM COATED ORAL at 10:40

## 2023-12-29 RX ADMIN — Medication 1 CAPSULE: at 09:00

## 2023-12-29 RX ADMIN — LOSARTAN POTASSIUM 100 MG: 50 TABLET, FILM COATED ORAL at 10:40

## 2023-12-29 RX ADMIN — OXYCODONE HYDROCHLORIDE 5 MG: 5 TABLET ORAL at 03:01

## 2023-12-29 RX ADMIN — ALBUTEROL SULFATE 2.5 MG: 2.5 SOLUTION RESPIRATORY (INHALATION) at 07:33

## 2023-12-29 RX ADMIN — PANTOPRAZOLE SODIUM 40 MG: 40 TABLET, DELAYED RELEASE ORAL at 06:09

## 2023-12-29 ASSESSMENT — COGNITIVE AND FUNCTIONAL STATUS - GENERAL
HELP NEEDED FOR BATHING: A LOT
DRESSING REGULAR UPPER BODY CLOTHING: A LOT
MOBILITY SCORE: 15
STANDING UP FROM CHAIR USING ARMS: A LOT
EATING MEALS: A LOT
MOVING TO AND FROM BED TO CHAIR: A LOT
DAILY ACTIVITIY SCORE: 12
TURNING FROM BACK TO SIDE WHILE IN FLAT BAD: A LOT
DRESSING REGULAR LOWER BODY CLOTHING: A LOT
TOILETING: A LOT
PERSONAL GROOMING: A LOT
WALKING IN HOSPITAL ROOM: A LITTLE
CLIMB 3 TO 5 STEPS WITH RAILING: A LOT

## 2023-12-29 ASSESSMENT — PAIN SCALES - GENERAL
PAINLEVEL_OUTOF10: 7
PAINLEVEL_OUTOF10: 2
PAINLEVEL_OUTOF10: 0 - NO PAIN
PAINLEVEL_OUTOF10: 0 - NO PAIN

## 2023-12-29 ASSESSMENT — PAIN - FUNCTIONAL ASSESSMENT
PAIN_FUNCTIONAL_ASSESSMENT: 0-10

## 2023-12-29 NOTE — CARE PLAN
Problem: Fall/Injury  Goal: Not fall by end of shift  Outcome: Progressing  Goal: Be free from injury by end of the shift  Outcome: Progressing  Goal: Verbalize understanding of personal risk factors for fall in the hospital  Outcome: Progressing  Goal: Verbalize understanding of risk factor reduction measures to prevent injury from fall in the home  Outcome: Progressing  Goal: Use assistive devices by end of the shift  Outcome: Progressing  Goal: Pace activities to prevent fatigue by end of the shift  Outcome: Progressing     Problem: Skin  Goal: Decreased wound size/increased tissue granulation at next dressing change  Outcome: Progressing  Goal: Participates in plan/prevention/treatment measures  Outcome: Progressing  Goal: Prevent/manage excess moisture  Outcome: Progressing  Goal: Prevent/minimize sheer/friction injuries  Outcome: Progressing  Goal: Promote/optimize nutrition  Outcome: Progressing  Goal: Promote skin healing  Outcome: Progressing     Problem: Pain  Goal: My pain/discomfort is manageable  Outcome: Progressing     Problem: Safety  Goal: Patient will be injury free during hospitalization  Outcome: Progressing  Goal: I will remain free of falls  Outcome: Progressing     Problem: Daily Care  Goal: Daily care needs are met  Outcome: Progressing     Problem: Psychosocial Needs  Goal: Demonstrates ability to cope with hospitalization/illness  Outcome: Progressing  Goal: Collaborate with me, my family, and caregiver to identify my specific goals  Outcome: Progressing     Problem: Discharge Barriers  Goal: My discharge needs are met  Outcome: Progressing     Problem: Pain - Adult  Goal: Verbalizes/displays adequate comfort level or baseline comfort level  Outcome: Progressing     Problem: Safety - Adult  Goal: Free from fall injury  Outcome: Progressing     Problem: Discharge Planning  Goal: Discharge to home or other facility with appropriate resources  Outcome: Progressing     Problem: Chronic  Conditions and Co-morbidities  Goal: Patient's chronic conditions and co-morbidity symptoms are monitored and maintained or improved  Outcome: Progressing     Problem: Pain  Goal: Takes deep breaths with improved pain control throughout the shift  Outcome: Progressing  Goal: Turns in bed with improved pain control throughout the shift  Outcome: Progressing  Goal: Walks with improved pain control throughout the shift  Outcome: Progressing  Goal: Performs ADL's with improved pain control throughout shift  Outcome: Progressing  Goal: Participates in PT with improved pain control throughout the shift  Outcome: Progressing  Goal: Free from opioid side effects throughout the shift  Outcome: Progressing  Goal: Free from acute confusion related to pain meds throughout the shift  Outcome: Progressing

## 2023-12-29 NOTE — PROGRESS NOTES
Patient is in observation status.  Waiting for insurance pre-cert for [employer].  Currently on 3L O2, CXR ordered.  Not medically ready at this time.  Tamra Fontana RN    Transitional Care Coordinator Note @ 2671  Patient has auth for [employer]  Waiting on CXR read results.  If CXR ok, then can be discharged.  Tamra Fontana RN

## 2023-12-29 NOTE — CARE PLAN
The patient's goals for the shift include decrease pain    The clinical goals for the shift include Patient will remain safe this shift    Over the shift, the patient did not make progress toward the following goals. Barriers to progression include   Problem: Fall/Injury  Goal: Not fall by end of shift  Outcome: Progressing  Goal: Be free from injury by end of the shift  Outcome: Progressing  Goal: Verbalize understanding of personal risk factors for fall in the hospital  Outcome: Progressing  Goal: Verbalize understanding of risk factor reduction measures to prevent injury from fall in the home  Outcome: Progressing  Goal: Use assistive devices by end of the shift  Outcome: Progressing  Goal: Pace activities to prevent fatigue by end of the shift  Outcome: Progressing     Problem: Skin  Goal: Decreased wound size/increased tissue granulation at next dressing change  Outcome: Progressing  Goal: Participates in plan/prevention/treatment measures  Outcome: Progressing  Goal: Prevent/manage excess moisture  Outcome: Progressing  Goal: Prevent/minimize sheer/friction injuries  Outcome: Progressing  Goal: Promote/optimize nutrition  Outcome: Progressing  Goal: Promote skin healing  Outcome: Progressing     Problem: Pain  Goal: My pain/discomfort is manageable  Outcome: Progressing     Problem: Safety  Goal: Patient will be injury free during hospitalization  Outcome: Progressing  Goal: I will remain free of falls  Outcome: Progressing     Problem: Daily Care  Goal: Daily care needs are met  Outcome: Progressing     Problem: Psychosocial Needs  Goal: Demonstrates ability to cope with hospitalization/illness  Outcome: Progressing  Goal: Collaborate with me, my family, and caregiver to identify my specific goals  Outcome: Progressing     Problem: Discharge Barriers  Goal: My discharge needs are met  Outcome: Progressing     Problem: Pain - Adult  Goal: Verbalizes/displays adequate comfort level or baseline comfort  level  Outcome: Progressing     Problem: Safety - Adult  Goal: Free from fall injury  Outcome: Progressing     Problem: Discharge Planning  Goal: Discharge to home or other facility with appropriate resources  Outcome: Progressing     Problem: Chronic Conditions and Co-morbidities  Goal: Patient's chronic conditions and co-morbidity symptoms are monitored and maintained or improved  Outcome: Progressing     Problem: Pain  Goal: Takes deep breaths with improved pain control throughout the shift  Outcome: Progressing  Goal: Turns in bed with improved pain control throughout the shift  Outcome: Progressing  Goal: Walks with improved pain control throughout the shift  Outcome: Progressing  Goal: Performs ADL's with improved pain control throughout shift  Outcome: Progressing  Goal: Participates in PT with improved pain control throughout the shift  Outcome: Progressing  Goal: Free from opioid side effects throughout the shift  Outcome: Progressing  Goal: Free from acute confusion related to pain meds throughout the shift  Outcome: Progressing   . Recommendations to address these barriers include .

## 2023-12-29 NOTE — DISCHARGE INSTRUCTIONS
Please check CBC/BMP 3 days after admission to facility, call/fax results to the facility attending    Please schedule follow up with PCP within 1 week after discharge from the facility

## 2023-12-29 NOTE — PROGRESS NOTES
Genesis Garcia is a 78 y.o. female       Wheezing this morning  Chest x-ray pending  Review of Systems     Constitutional: no fever, no chill  Cardiovascular: no chest pain   Respiratory: Wheezing  Gastrointestinal: no abdominal pain, no constipation, no melena, no nausea, no diarrhea, no vomiting and no blood in stools.   Neurological: no headache,   All other systems have been reviewed and are negative for complaint.       Vitals:    12/29/23 0733   BP:    Pulse:    Resp:    Temp:    SpO2: 99%        Scheduled medications  albuterol, 2.5 mg, nebulization, TID  amoxicillin-pot clavulanate, 1 tablet, oral, BID  apixaban, 5 mg, oral, BID  B complex-vitamin C, 1 tablet, oral, Daily  budesonide, 0.5 mg, nebulization, BID  fluconazole, 100 mg, oral, Daily  formoterol, 20 mcg, nebulization, BID  gabapentin, 400 mg, oral, TID  hydroxychloroquine, 200 mg, oral, BID  lactobacillus acidophilus, 1 capsule, oral, BID  losartan, 100 mg, oral, Daily  melatonin, 3 mg, oral, Nightly  pantoprazole, 40 mg, oral, Daily before breakfast   Or  pantoprazole, 40 mg, intravenous, Daily before breakfast  [Held by provider] polyethylene glycol, 17 g, oral, Daily  [Held by provider] psyllium, 1 packet, oral, Daily  spironolactone, 25 mg, oral, Daily  torsemide, 40 mg, oral, Daily      Continuous medications     PRN medications  PRN medications: acetaminophen **OR** acetaminophen **OR** acetaminophen, albuterol, guaiFENesin, lidocaine, ondansetron **OR** ondansetron, oxyCODONE, oxygen    Lab Review   Results from last 7 days   Lab Units 12/29/23  0633 12/28/23  0617 12/27/23  1215   WBC AUTO x10*3/uL 10.2 11.7* 12.8*   HEMOGLOBIN g/dL 7.5* 7.7* 7.8*   HEMATOCRIT % 25.5* 25.6* 26.0*   PLATELETS AUTO x10*3/uL 469* 485* 429     Results from last 7 days   Lab Units 12/29/23  0633 12/28/23  0617 12/27/23  1749 12/27/23  1215   SODIUM mmol/L 136 137 136 136   POTASSIUM mmol/L 3.6 3.7 3.7 3.2*   CHLORIDE mmol/L 98 101 99 97*   CO2 mmol/L 28 26  27 25   BUN mg/dL 35* 40* 43* 51*   CREATININE mg/dL 1.48* 1.65* 1.95* 2.25*   CALCIUM mg/dL 8.5* 8.0* 8.0* 8.2*   PROTEIN TOTAL g/dL  --   --   --  6.0*   BILIRUBIN TOTAL mg/dL  --   --   --  0.5   ALK PHOS U/L  --   --   --  73   ALT U/L  --   --   --  14   AST U/L  --   --   --  17   GLUCOSE mg/dL 95 83 109* 86            XR chest 2 views    (Results Pending)         Physical Exam     Constitutional   General appearance: Alert and in no acute distress.     Pulmonary   Respiratory assessment: N on oxygen, rhonchi  Cardiovascular   Auscultation of heart: Apical pulse normal, heart rate and rhythm normal, normal S1 and S2, no murmurs and no pericardial rub.    Exam for edema: Plus edema Ace wrap's  Abdomen   Abdominal Exam: No bruits, normal bowel sounds, soft, non-tender, no abdominal mass palpated.    Liver and Spleen exam: No hepato-splenomegaly.   Musculoskeletal   Examination of gait: ROM intact  Skin inspection: Normal skin color and pigmentation, normal skin turgor and no visible rash.   Neurologic   Cranial nerves: Nerves 2-12 were intact, no focal neuro defects.     Assessment/Plan      #Pneumonia  #Acute respiratory failure  DuoNebs  Continue antibiotics  Chest x-ray still pending     #Oral thrush  Complains of pain from nystatin swish and swallow  Continue Diflucan     #Bilateral shoulder pain  History of rheumatoid arthritis currently on Plaquenil  Check ESR/CRP  Might need steroids  Physical therapy and Occupational Therapy     #Chronic diastolic congestive heart failure  #Hypertension  Continue home medications     #Atrial fibrillation  Continue Eliquis     #Osteoarthritis  #Chronic back pain  Continue pain medications

## 2023-12-29 NOTE — PROGRESS NOTES
Genesis Garcia is a 78 y.o. female on day 0 of admission presenting with Generalized weakness. Pt for transport to [facility] at 6:30pm. Friend/EC Cheryl updated along with team.

## 2023-12-30 LAB — BACTERIA UR CULT: NORMAL

## 2023-12-30 NOTE — DISCHARGE SUMMARY
Discharge Diagnosis  Generalized weakness    Issues Requiring Follow-Up  Repeat chest x-ray    Test Results Pending At Discharge  Pending Labs       Order Current Status    Urinalysis with Reflex Culture and Microscopic In process            Hospital Course     Patient with a past medical history of COPD asthma hypertension dyslipidemia atrial fibrillation chronic diastolic congestive heart failure recent hip replacement surgery who was admitted to the facility with sepsis secondary to pneumonia/cellulitis and acute respiratory failure  Was treated with IV antibiotics DuoNebs  Weaned off oxygen and discharged home with home health care  (Patient had declined skilled nursing facility)  When she got home she found that she was not able to get up and around  Had bilateral shoulder pain difficulty ambulating  And worsening shortness of breath so she came back to the emergency room  Currently the patient complains of bilateral shoulder pain  She complains of oral thrush which is painful  And is now back on oxygen at 3 L  Pertinent Physical Exam At Time of Discharge  Physical Exam    Constitutional   General appearance: Alert and in no acute distress.     Pulmonary   Respiratory assessment: No respiratory distress, normal respiratory rhythm and effort.    Auscultation of Lungs: Clear bilateral breath sounds.   Cardiovascular   Auscultation of heart: Apical pulse normal, heart rate and rhythm normal, normal S1 and S2, no murmurs and no pericardial rub.    Exam for edema: No peripheral edema.   Abdomen   Abdominal Exam: No bruits, normal bowel sounds, soft, non-tender, no abdominal mass palpated.    Liver and Spleen exam: No hepato-splenomegaly.   Musculoskeletal   Examination of gait: Normal.    Inspection of digits and nails: No clubbing or cyanosis of the fingernails.    Inspection/palpation of joints, bones and muscles: No joint swelling. Normal movement of all extremities.   Skin   Skin inspection: Normal skin color  and pigmentation, normal skin turgor and no visible rash.   Neurologic   Cranial nerves: Nerves 2-12 were intact, no focal neuro defects.       Home Medications     Medication List      START taking these medications     fluconazole 100 mg tablet; Commonly known as: Diflucan; Take 1 tablet   (100 mg) by mouth once daily for 6 doses. Do not start before December 30, 2023.   lidocaine 2 % solution; Commonly known as: Xylocaine; Take 1.25 mL by   mouth if needed (mucous membraine pain).     CHANGE how you take these medications     * albuterol 2.5 mg /3 mL (0.083 %) nebulizer solution; What changed:   Another medication with the same name was added. Make sure you understand   how and when to take each.   * albuterol 2.5 mg /3 mL (0.083 %) nebulizer solution; Take 3 mL (2.5   mg) by nebulization every 4 hours if needed for wheezing.; What changed:   You were already taking a medication with the same name, and this   prescription was added. Make sure you understand how and when to take   each.   * albuterol 2.5 mg /3 mL (0.083 %) nebulizer solution; Take 3 mL (2.5   mg) by nebulization every 2 hours if needed for wheezing.; What changed:   You were already taking a medication with the same name, and this   prescription was added. Make sure you understand how and when to take   each.  * This list has 3 medication(s) that are the same as other medications   prescribed for you. Read the directions carefully, and ask your doctor or   other care provider to review them with you.     CONTINUE taking these medications     acetaminophen 325 mg tablet; Commonly known as: Tylenol; Take 2 tablets   (650 mg) by mouth every 4 hours if needed for mild pain (1 - 3) or   moderate pain (4 - 6).   amoxicillin-pot clavulanate 500-125 mg tablet; Commonly known as:   Augmentin; Take 1 tablet (500 mg) by mouth 2 times a day for 4 days.   B complex-vitamin C tablet   calcium carbonate 600 mg calcium (1,500 mg) tablet   cholecalciferol 50 MCG  (2000 UT) tablet; Commonly known as: Vitamin D-3   Eliquis 5 mg tablet; Generic drug: apixaban   fluticasone propion-salmeteroL 250-50 mcg/dose diskus inhaler; Commonly   known as: Advair Diskus   gabapentin 400 mg capsule; Commonly known as: Neurontin   guaiFENesin 600 mg 12 hr tablet; Commonly known as: Mucinex; Take 1   tablet (600 mg) by mouth every 12 hours if needed for congestion for up to   5 days. Do not crush, chew, or split.   hydroxychloroquine 200 mg tablet; Commonly known as: Plaquenil   lactobacillus acidophilus capsule; Take 1 capsule by mouth 2 times a   day.   losartan 100 mg tablet; Commonly known as: Cozaar   multivitamin capsule   oxyCODONE 5 mg immediate release tablet; Commonly known as: Roxicodone;   Take 1 tablet (5 mg) by mouth every 6 hours if needed for severe pain (7 -   10) for up to 15 doses.   spironolactone 25 mg tablet; Commonly known as: Aldactone   torsemide 40 mg tablet; Take 40 mg by mouth once daily. Do not start   before December 27, 2023.     STOP taking these medications     nystatin 100,000 unit/mL suspension; Commonly known as: Mycostatin       Outpatient Follow-Up  Future Appointments   Date Time Provider Department Center   1/19/2024  9:45 AM Pedro Dawkins MD [id number] [facility]   1/23/2024  9:20 AM Shine Wakefield MD [id number] [facility]   3/18/2024  9:40 AM Brent Sheridan MD [id number] [facility]   4/8/2024 10:00 AM Rosanne M Casal, APRN-CNP, DNP [id number] [facility]   6/21/2024 10:40 AM SREEDHAR Ramírez [id number] [facility]     Patient seen at bedside. Events from the last visit reviewed. Discussed with staff. Results of tests and investigations from last visit reviewed and discussed with patient/Family. Electronic chart on [facility] reviewed. Input / Recommendations  from consultants  appreciated and reviewed and agreed with.     discharge summary and profile completed. medications reviewed and discussed with patient and family.  scripts completed and signed.     total discharge time in  excess of 30 minutes.      Chon Lind MD

## 2023-12-30 NOTE — NURSING NOTE
Pt discharged in satisfactory condition. Hep lock d/c'd. Unable to give report to facility; no answer

## 2024-01-03 NOTE — DISCHARGE SUMMARY
Discharge Diagnosis  Acute pneumonia    Issues Requiring Follow-Up  pcp    Test Results Pending At Discharge  Pending Labs       No current pending labs.            Hospital Course     #Acute respiratory failure with hypoxia  #Community-acquired pneumonia  #COPD  Continue antibiotics and DuoNebs  Improved with medications     #Acute on chronic diastolic congestive heart failure  Improved  #Acute back pain s/p fall  CAT scan shows loosening of the screws  Will get orthopedic input  Pain control     #Possible cellulitis of left leg  Already improved         #Atrial fibrillation  Rate controlled  Continue Eliquis     #Hypertension  Continue home medications and hold for systolic less than 100     #Dyslipidemia  Continue statins with a target LDL of less than 70  Pertinent Physical Exam At Time of Discharge  Physical Exam    Home Medications     Medication List      START taking these medications     acetaminophen 325 mg tablet; Commonly known as: Tylenol; Take 2 tablets   (650 mg) by mouth every 4 hours if needed for mild pain (1 - 3) or   moderate pain (4 - 6).   lactobacillus acidophilus capsule; Take 1 capsule by mouth 2 times a   day.   oxyCODONE 5 mg immediate release tablet; Commonly known as: Roxicodone;   Take 1 tablet (5 mg) by mouth every 6 hours if needed for severe pain (7 -   10) for up to 15 doses.     CHANGE how you take these medications     torsemide 40 mg tablet; Take 40 mg by mouth once daily. Do not start   before December 27, 2023.; What changed: medication strength, how much to   take     CONTINUE taking these medications     albuterol 2.5 mg /3 mL (0.083 %) nebulizer solution   B complex-vitamin C tablet   calcium carbonate 600 mg calcium (1,500 mg) tablet   cholecalciferol 50 MCG (2000 UT) tablet; Commonly known as: Vitamin D-3   Eliquis 5 mg tablet; Generic drug: apixaban   fluticasone propion-salmeteroL 250-50 mcg/dose diskus inhaler; Commonly   known as: Advair Diskus   gabapentin 400 mg  capsule; Commonly known as: Neurontin   hydroxychloroquine 200 mg tablet; Commonly known as: Plaquenil   losartan 100 mg tablet; Commonly known as: Cozaar   multivitamin capsule   spironolactone 25 mg tablet; Commonly known as: Aldactone     ASK your doctor about these medications     amoxicillin-pot clavulanate 500-125 mg tablet; Commonly known as:   Augmentin; Take 1 tablet (500 mg) by mouth 2 times a day for 4 days.; Ask   about: Should I take this medication?   guaiFENesin 600 mg 12 hr tablet; Commonly known as: Mucinex; Take 1   tablet (600 mg) by mouth every 12 hours if needed for congestion for up to   5 days. Do not crush, chew, or split.; Ask about: Should I take this   medication?       Outpatient Follow-Up  Future Appointments   Date Time Provider Department Center   1/19/2024  9:45 AM Pedro Dawkins MD [id number] [facility]   1/23/2024  9:20 AM Shine Wakefield MD [id number] [facility]   3/18/2024  9:40 AM Brent Sheridan MD [id number] [facility]   4/8/2024 10:00 AM Rosanne M Casal, APRN-CNP, DNP [id number] [facility]   6/21/2024 10:40 AM SREEDHAR Ramírez [id number] [facility]       Chon Lind MD

## 2024-01-05 ENCOUNTER — APPOINTMENT (OUTPATIENT)
Dept: WOUND CARE | Facility: HOSPITAL | Age: 79
End: 2024-01-05
Payer: MEDICARE

## 2024-01-15 ENCOUNTER — APPOINTMENT (OUTPATIENT)
Dept: HEMATOLOGY/ONCOLOGY | Facility: CLINIC | Age: 79
End: 2024-01-15
Payer: MEDICARE

## 2024-01-19 ENCOUNTER — OFFICE VISIT (OUTPATIENT)
Dept: OTOLARYNGOLOGY | Facility: CLINIC | Age: 79
End: 2024-01-19
Payer: MEDICARE

## 2024-01-19 ENCOUNTER — LAB (OUTPATIENT)
Dept: LAB | Facility: LAB | Age: 79
End: 2024-01-19
Payer: MEDICARE

## 2024-01-19 VITALS — BODY MASS INDEX: 39.01 KG/M2 | HEIGHT: 62 IN | WEIGHT: 212 LBS

## 2024-01-19 DIAGNOSIS — E04.1 THYROID NODULE: Primary | ICD-10-CM

## 2024-01-19 DIAGNOSIS — R49.0 HOARSENESS: ICD-10-CM

## 2024-01-19 DIAGNOSIS — E04.1 THYROID NODULE: ICD-10-CM

## 2024-01-19 LAB — TSH SERPL-ACNC: 5.09 MIU/L (ref 0.44–3.98)

## 2024-01-19 PROCEDURE — 99213 OFFICE O/P EST LOW 20 MIN: CPT | Performed by: OTOLARYNGOLOGY

## 2024-01-19 PROCEDURE — 1111F DSCHRG MED/CURRENT MED MERGE: CPT | Performed by: OTOLARYNGOLOGY

## 2024-01-19 PROCEDURE — 36415 COLL VENOUS BLD VENIPUNCTURE: CPT

## 2024-01-19 PROCEDURE — 1159F MED LIST DOCD IN RCRD: CPT | Performed by: OTOLARYNGOLOGY

## 2024-01-19 PROCEDURE — 1160F RVW MEDS BY RX/DR IN RCRD: CPT | Performed by: OTOLARYNGOLOGY

## 2024-01-19 PROCEDURE — 1126F AMNT PAIN NOTED NONE PRSNT: CPT | Performed by: OTOLARYNGOLOGY

## 2024-01-19 PROCEDURE — 1036F TOBACCO NON-USER: CPT | Performed by: OTOLARYNGOLOGY

## 2024-01-19 PROCEDURE — 84443 ASSAY THYROID STIM HORMONE: CPT

## 2024-01-19 PROCEDURE — 31575 DIAGNOSTIC LARYNGOSCOPY: CPT | Performed by: OTOLARYNGOLOGY

## 2024-01-19 NOTE — PROGRESS NOTES
Here for follow up    Surgery right thyroid 9/23        No swallowing issues    Neck icision ok, no neck pain    In assisted living     Left leg infection and pneumonia            Level energy down,       Physical Exam:  CONSTITUTIONAL:  No acute distress  VOICE:  No hoarseness or other abnormality  RESPIRATION:  Breathing comfortably, no stridor  CV:  No clubbing/cyanosis/edema in hands  EYES:  EOM intact, sclera normal  NEURO:  Alert and oriented times 3, Cranial nerves II-XII grossly intact and symmetric bilaterally  HEAD AND FACE:  Symmetric facial features, no masses or lesions, sinuses non-tender to palpation  SALIVARY GLANDS:  Parotid and submandibular glands normal bilaterally  EARS:  Normal external ears, external auditory canals, and TMs to otoscopy, normal hearing to whispered voice.  NOSE:  External nose midline, anterior rhinoscopy is normal with limited visualization to the anterior aspect of the interior turbinates, no bleeding or drainage, no lesions  ORAL CAVITY/OROPHARYNX/LIPS:  Normal mucous membranes, normal floor of mouth/tongue/OP, no masses or lesions  PHARYNGEAL WALLS:  No masses or lesions  NECK/LYMPH:  No LAD, no thyroid masses, trachea midline  SKIN:  incision ok  PSYCH:  Alert and oriented with appropriate mood and affect     Flexible fiberoptic laryngopharyngoscopy was performed via the nares. After topical anesthesia and decongestant was instilled:    Nasopharynx:Eustachian tube orifice normal, fossa of Rosenmueller clear, mucosa clean, no masses appreciated  Palate: Nasopharyngeal surface of palate clear  Tongue base vallecula clear, lingual surface of epiglottis normal, oropharynx clear  Larynx laryngeal surface of epiglottis, clear, area epiglottic folds, normal, false cords clear, arytenoids, clear, vocal cords. Normal mobility, no lesions, mild reflux changes, moderate irritation posterior glottis consistent with reflux  Hypopharynx pyriform sinuses, clear, post cricoid area  clear    Scope was removed, patient tolerated the procedure well        Imp:  stable hoarseness, cords ok      Will check a TSH today we will also check an ultrasound of the residual thyroid in 3 months with a virtual visit        She does have some irritation of her vocal cords which has been stable for her I do not see any signs of paresis or anything that could be contributing to aspiration so I believe the pneumonia is not related    She will call if anything changes    Reflux management reviewed

## 2024-01-22 RX ORDER — LEVOTHYROXINE SODIUM 25 UG/1
25 TABLET ORAL
Qty: 30 TABLET | Refills: 1 | Status: SHIPPED | OUTPATIENT
Start: 2024-01-22 | End: 2024-04-05 | Stop reason: SDUPTHER

## 2024-01-23 ENCOUNTER — OFFICE VISIT (OUTPATIENT)
Dept: PAIN MEDICINE | Facility: CLINIC | Age: 79
End: 2024-01-23
Payer: MEDICARE

## 2024-01-23 DIAGNOSIS — M96.1 LUMBAR POST-LAMINECTOMY SYNDROME: Primary | ICD-10-CM

## 2024-01-23 DIAGNOSIS — M19.90 ARTHRITIS: ICD-10-CM

## 2024-01-23 DIAGNOSIS — M19.079 ANKLE ARTHRITIS: ICD-10-CM

## 2024-01-23 PROCEDURE — 1036F TOBACCO NON-USER: CPT | Performed by: PHYSICAL MEDICINE & REHABILITATION

## 2024-01-23 PROCEDURE — 1111F DSCHRG MED/CURRENT MED MERGE: CPT | Performed by: PHYSICAL MEDICINE & REHABILITATION

## 2024-01-23 PROCEDURE — 1157F ADVNC CARE PLAN IN RCRD: CPT | Performed by: PHYSICAL MEDICINE & REHABILITATION

## 2024-01-23 PROCEDURE — 1160F RVW MEDS BY RX/DR IN RCRD: CPT | Performed by: PHYSICAL MEDICINE & REHABILITATION

## 2024-01-23 PROCEDURE — 99214 OFFICE O/P EST MOD 30 MIN: CPT | Performed by: PHYSICAL MEDICINE & REHABILITATION

## 2024-01-23 PROCEDURE — 1159F MED LIST DOCD IN RCRD: CPT | Performed by: PHYSICAL MEDICINE & REHABILITATION

## 2024-01-23 PROCEDURE — 1126F AMNT PAIN NOTED NONE PRSNT: CPT | Performed by: PHYSICAL MEDICINE & REHABILITATION

## 2024-01-23 RX ORDER — TIZANIDINE 2 MG/1
2 TABLET ORAL EVERY 12 HOURS PRN
Qty: 60 TABLET | Refills: 0 | Status: SHIPPED | OUTPATIENT
Start: 2024-01-23 | End: 2024-02-06 | Stop reason: SDUPTHER

## 2024-01-23 NOTE — PROGRESS NOTES
Chief complaint  Back pain     History  Ms Garcia is back for a visit  She is in assistant living and meds for oxycodone and gabapentin by the doctor on site  She fill before Christmas and went to ER and had CT scan showed possible loose screws at S1  Pain in he back no radiation   The pain in the back is deep achy worse in the mid back area.  This is associated with tight muscle bands.  This limits the range of motion of the lumbar spine mainly in forward flexion.  The pain in the back is radiating around the middle to the buttock but no overt radiation   No bowel and bladder incontinence   She has oxycodone after fall and that is constipating  She has collpased feet no feet ulcers but has ulcers on the legs      Pain level without medication is 8/10 , with the medication pain level 4/10.     The pain meds are helping control the pain and improving Activities of Daily living and quality of life and quality of sleep.    Opioids maintanined at assisted living. Ms Garcia understands that I am not managing her pain meds     Denied any fever or chills. No weight loss and no night sweats. No cough or sputum production. No diarrhea   The constipation has been responding to fibers and over the counter medications.     No bladder and bowel incontinence and no other changes in bladder and bowel. No skin changes.  Reports tiredness and fatigability only if the pain is not controlled.   Denied opioids diversion and abuse and denies alcoholism. Denies overuse of the pain medications.    The control of the pain with the pain medications is helping the control of the symptoms and allowing the function and activities of daily living, enjoyment of life, improving the quality of life and sleep with less interruption by the pain. The goal is symptomatic control of the nonmalignant chronic pain and not to repair the permanent damage in the tissues inducing the chronic pain conditions. We are aiming to shift the focus from the  nonmalignant chronic pain to other aspects of life by symptomatically treating this chronic pain. If this pain is not treated it will lead to major morbidity and it is also associated with increased risks of mortality. The patient understands those very clearly and also understand high risks of morbidity and mortality if not strictly adherent to the treatment recommendations and reporting any associated side effects. Also patient understand the full responsibility associated with these medications to avoid abuse or overuse or any use of these medications for anything besides treating the patient's own chronic pain and nothing else under any circumstances.        Physical examination  Awake, alert and oriented for time place and persons   declined Chaperone for the visit and was adequately  draped for the exam.      Pain is from spasms over the right paraspinals  No pain on spine percussion   No scral pain   Collapsed feet bilaterally    Diagnosis  Problem List Items Addressed This Visit       Arthritis    Relevant Medications    tiZANidine (Zanaflex) 2 mg tablet    Ankle arthritis    Relevant Medications    tiZANidine (Zanaflex) 2 mg tablet    Lumbar post-laminectomy syndrome - Primary    Relevant Medications    tiZANidine (Zanaflex) 2 mg tablet        Plan  Reviewed the pain generators.  Went over the types of pain with neuropathic and nociceptive and different pathologies and therapeutic modalities. Discussed the mechanism of action of interventions from acupuncture, physical therapy , regular exercises, injections, botox, spinal cord stimulation, and role of surgery     Went over pathology of the intervertebral disc displacement and the anatomical relation to the Nerve roots and relation to the radicular symptoms. Went over treatment modalities with conservative treatment including acupuncture   and epidural steroid injection with fluoroscopy guidance and last resort of surgery    Dw her and her friend about CT  and pain   The CT showed loose screw at S`1 but no pain over that area  Pain on PE is over R paraspinal likely related to bruise .DW her about muscle relaxers  Went over the CT scan and fusion and source of pain not related to the fusion at this time  Continue with skin care  Dw her about daniel and oxycodone and that is bc of assisted living facility has multiple doctor    The level of clinical decision making in this office visit,  is high, given the high risks of complications with the morbidity and mortality due to the fact that acute and chronic pain may pose a threat to life and bodily function, if under treated, poorly treated, or with failure to maintain adequate treatment and timely medical follow up. Additionally over treatment has its own set of complications including overdosing on the pain medications and also the habit forming potentials with the use of the medications used to treat chronic painful conditions including therapeutic classes classified as dangerous medications. Given the serious and fluctuating nature of pain level and instensity with extensive consideration for whenever pain changes, there is always the risk of prolonged functional impairment requiring close patient monitoring with regular assessments and reassessments and high level medical decision making at every office visit. The amount and complexity of data reviewed is high given the patient clinical presentation, labs,  data, radiology reports, and other tests as discussed during office visits. Pertinent data whether positive or negative were taken in consideration in the process of making this high level medical decision.

## 2024-01-29 ENCOUNTER — OFFICE VISIT (OUTPATIENT)
Dept: WOUND CARE | Facility: CLINIC | Age: 79
End: 2024-01-29
Payer: MEDICARE

## 2024-01-29 VITALS — HEIGHT: 62 IN | BODY MASS INDEX: 36.07 KG/M2 | WEIGHT: 196 LBS

## 2024-01-29 DIAGNOSIS — I87.2 CHRONIC VENOUS INSUFFICIENCY: ICD-10-CM

## 2024-01-29 DIAGNOSIS — L97.221 CHRONIC ULCER OF LEFT CALF LIMITED TO BREAKDOWN OF SKIN (MULTI): Primary | ICD-10-CM

## 2024-01-29 PROCEDURE — 1157F ADVNC CARE PLAN IN RCRD: CPT | Performed by: INTERNAL MEDICINE

## 2024-01-29 PROCEDURE — 1159F MED LIST DOCD IN RCRD: CPT | Performed by: INTERNAL MEDICINE

## 2024-01-29 PROCEDURE — 1036F TOBACCO NON-USER: CPT | Performed by: INTERNAL MEDICINE

## 2024-01-29 PROCEDURE — 99214 OFFICE O/P EST MOD 30 MIN: CPT | Performed by: INTERNAL MEDICINE

## 2024-01-29 PROCEDURE — 1160F RVW MEDS BY RX/DR IN RCRD: CPT | Performed by: INTERNAL MEDICINE

## 2024-01-29 PROCEDURE — 1125F AMNT PAIN NOTED PAIN PRSNT: CPT | Performed by: INTERNAL MEDICINE

## 2024-01-29 ASSESSMENT — ENCOUNTER SYMPTOMS
OCCASIONAL FEELINGS OF UNSTEADINESS: 1
LOSS OF SENSATION IN FEET: 1

## 2024-01-29 ASSESSMENT — PATIENT HEALTH QUESTIONNAIRE - PHQ9: 2. FEELING DOWN, DEPRESSED OR HOPELESS: NOT AT ALL

## 2024-01-29 ASSESSMENT — PAIN SCALES - GENERAL: PAINLEVEL: 2

## 2024-01-29 NOTE — PATIENT INSTRUCTIONS
Assessment/Plan   Legs doing fairly well. Ulcer improving but still open. Discussed using the argenis or promogran to the base with the xeroform. Continue the compression. Keep pressure off the ulcer at night. Follow up 4 weeks.    VISIT ORDERS:  Vascular Study Required: n  Labs Required: n  Radiology Imaging Required: n  Consultation Required: n  Rx Provided:   Changes to Plan of Care: y      NEW ORDERS:  Wash: kelsey or soap and water wash daily  Irrigate/Soak:  Skin Care: A&D  Dressing:  moist promogran or argenis to the base cover with xeroform and dsd change every 3 days.   Compression: double tubigrip at all times   Offloading:     DISCHARGE PLANNING:    Follow Up: 4 weeks  Nurse Visit: prn

## 2024-01-29 NOTE — PROGRESS NOTES
"REFERRAL REQUESTED BY:  Dr. Albert Garner    LAST SEEN:  11/17/2023  Dr. Albert Garner  Patient ID: Genesis Garcia is a 78 y.o. female     HPI:   Here for follow up CVHTN and stasis ulcers/dermatitis. Reports currently in SNF - had a fall 3-4 weeks ago. Reports her ankle gave out - nothing broken but a few screws loose from prior back surgery - pending eval for this. Last seen in Nov.     CURRENT DRESSING:  Who is performing the dressing change:  [facility]  Dressing applied: Xeroform, 4x4, kerlix  Dressing Frequency: twice weekly    EDEMA MANAGEMENT:  Compression:  Right: Double Tubigrip CALF 41.5   Left: Double Tubigrip   CALF 43.5     Other Modality  Sleeping in Bed: yes  Elevating FOB:  yes    Offloading/Pressure Relief  Activity Level:  ad travon  Protection Devices:  none    Offloading/Pressure Relief Effective?     ROS:      Objective     VITALS:  Ht 1.575 m (5' 2\")   Wt 88.9 kg (196 lb)   BMI 35.85 kg/m²       Physical Exam    ARRIVAL:  Wheelchair  TRANSFER:  One Assist, Minimal    PSYCHIATRIC:  Oriented to person, place and time: A & O x 3    CONSTITUTIONAL:    Appearance:  Well Groomed    SKIN:   chronic stasis changes     PULSES:     EXTREMITY TEMPERATURE:    RIGHT: normal  LEFT: normal    EDEMA: mild    WOUND ASSESSMENT:    Wound 04/28/23 Venous Ulcer Leg Dorsal;Left;Lateral;Lower (Active)   Date First Assessed: 04/28/23   Present on Original Admission: Yes  Primary Wound Type: Venous Ulcer  Location: (c) Leg  Wound Location Orientation: (c) Dorsal;Left;Lateral;Lower      Assessments 9/13/2023  3:13 PM 1/29/2024  9:38 AM   Wound Image      Site Assessment Fibrinous Pink;Red;Fibrinous   Jackie-Wound Assessment Erythematous Intact;Dry   Non-staged Wound Description Full thickness Full thickness   Wound Length (cm) 2 cm 2.5 cm   Wound Width (cm) 1.7 cm 1 cm   Wound Surface Area (cm^2) 3.4 cm^2 2.5 cm^2   Wound Depth (cm) 0.2 cm 0.2 cm   Wound Volume (cm^3) 0.68 cm^3 0.5 cm^3   Wound " Healing % -- 26   Wound Bed Granulation (%) 20 % 10 %   Drainage Description Serosanguineous Serosanguineous   Drainage Amount Scant Small       Inactive Orders   Date Order Priority Status Authorizing Provider   12/28/23 1447 Wound Care Routine Discontinued Chon Lind MD     - Wound Complexity:    Simple   12/28/23 0110 Inpatient Consult to Wound and Ostomy Nurse Routine Completed Chon Lind MD     - Reason for Consult?:    Wound   12/21/23 1617 Wound Care Routine Discontinued Chon Lind MD     - Wound Complexity:    Simple   10/16/23 1107 Debridement Routine Completed Rosy Atkinson MD       Wound 12/28/23 Toe (Comment  which one) Dorsal foot;Left (Active)   Date First Assessed/Time First Assessed: 12/28/23 0106   Present on Original Admission: Yes  Hand Hygiene Completed: Yes  Location: (c) Toe (Comment  which one)  Wound Location Orientation: Dorsal foot;Left      Assessments 12/28/2023  1:07 AM 12/28/2023 10:27 AM   Wound Image      Wound Length (cm) 3 cm 3 cm   Wound Width (cm) 4 cm 3 cm   Wound Surface Area (cm^2) 12 cm^2 9 cm^2       Inactive Orders   Date Order Priority Status Authorizing Provider   12/28/23 1447 Wound Care Routine Discontinued Chon Lind MD     - Wound Complexity:    Simple          Procedures    Assessment/Plan   Legs doing fairly well. Ulcer improving but still open. Discussed using the argenis or promogran to the base with the xeroform. Continue the compression. Keep pressure off the ulcer at night. Follow up 4 weeks.    VISIT ORDERS:  Vascular Study Required: n  Labs Required: n  Radiology Imaging Required: n  Consultation Required: n  Rx Provided:   Changes to Plan of Care: y      NEW ORDERS:  Wash: kelsey or soap and water wash daily  Irrigate/Soak:  Skin Care: A&D  Dressing:  moist promogran or argenis to the base cover with xeroform and dsd change every 3 days.   Compression: double tubigrip at all times   Offloading:     DISCHARGE PLANNING:    Follow Up: 4  weeks  Nurse Visit: prn    General Instructions:  Center RN to apply EMLA/Lidocaine 1% jelly/LMX  topically to wound(s) prior to debridement by physician.  Center RN my see patient as a nurse consult in my absence.      RN Post Procedure Note:    Moist promogran and xeroform to LLE wound with DSD. Double tubigrip to BLE. Pt tolerated well. Nilda WELDON.     HCC/ECF/Assisted Living  Agency/Facility:  [facility]  days/week: 7  Contacted Regarding Changes: Yes

## 2024-02-06 ENCOUNTER — TELEPHONE (OUTPATIENT)
Dept: PAIN MEDICINE | Facility: CLINIC | Age: 79
End: 2024-02-06
Payer: MEDICARE

## 2024-02-06 DIAGNOSIS — M19.079 ANKLE ARTHRITIS: ICD-10-CM

## 2024-02-06 DIAGNOSIS — M96.1 LUMBAR POST-LAMINECTOMY SYNDROME: ICD-10-CM

## 2024-02-06 DIAGNOSIS — M19.90 ARTHRITIS: ICD-10-CM

## 2024-02-06 RX ORDER — TIZANIDINE 2 MG/1
TABLET ORAL
Qty: 180 TABLET | Refills: 1 | Status: SHIPPED | OUTPATIENT
Start: 2024-02-06 | End: 2024-02-20 | Stop reason: SDUPTHER

## 2024-02-06 NOTE — TELEPHONE ENCOUNTER
Pt calling to have tizanidine increased from 2mg. She said that you told her that if she needed it increased, you would increase it for her

## 2024-02-16 ENCOUNTER — HOSPITAL ENCOUNTER (OUTPATIENT)
Dept: RADIOLOGY | Facility: CLINIC | Age: 79
Discharge: HOME | End: 2024-02-16
Payer: MEDICARE

## 2024-02-16 ENCOUNTER — OFFICE VISIT (OUTPATIENT)
Dept: PRIMARY CARE | Facility: CLINIC | Age: 79
End: 2024-02-16
Payer: MEDICARE

## 2024-02-16 VITALS
SYSTOLIC BLOOD PRESSURE: 121 MMHG | BODY MASS INDEX: 39.56 KG/M2 | HEIGHT: 62 IN | HEART RATE: 78 BPM | RESPIRATION RATE: 16 BRPM | WEIGHT: 215 LBS | TEMPERATURE: 96.8 F | DIASTOLIC BLOOD PRESSURE: 77 MMHG | OXYGEN SATURATION: 97 %

## 2024-02-16 DIAGNOSIS — Z09 HOSPITAL DISCHARGE FOLLOW-UP: Primary | ICD-10-CM

## 2024-02-16 DIAGNOSIS — J18.9 PNEUMONIA DUE TO INFECTIOUS ORGANISM, UNSPECIFIED LATERALITY, UNSPECIFIED PART OF LUNG: ICD-10-CM

## 2024-02-16 DIAGNOSIS — I50.32 CHRONIC DIASTOLIC HEART FAILURE (MULTI): ICD-10-CM

## 2024-02-16 DIAGNOSIS — L03.119 CELLULITIS OF LOWER EXTREMITY, UNSPECIFIED LATERALITY: ICD-10-CM

## 2024-02-16 PROCEDURE — 1036F TOBACCO NON-USER: CPT | Performed by: FAMILY MEDICINE

## 2024-02-16 PROCEDURE — 1160F RVW MEDS BY RX/DR IN RCRD: CPT | Performed by: FAMILY MEDICINE

## 2024-02-16 PROCEDURE — 3078F DIAST BP <80 MM HG: CPT | Performed by: FAMILY MEDICINE

## 2024-02-16 PROCEDURE — 1125F AMNT PAIN NOTED PAIN PRSNT: CPT | Performed by: FAMILY MEDICINE

## 2024-02-16 PROCEDURE — 1157F ADVNC CARE PLAN IN RCRD: CPT | Performed by: FAMILY MEDICINE

## 2024-02-16 PROCEDURE — 99214 OFFICE O/P EST MOD 30 MIN: CPT | Performed by: FAMILY MEDICINE

## 2024-02-16 PROCEDURE — 1159F MED LIST DOCD IN RCRD: CPT | Performed by: FAMILY MEDICINE

## 2024-02-16 PROCEDURE — 71046 X-RAY EXAM CHEST 2 VIEWS: CPT

## 2024-02-16 PROCEDURE — 3074F SYST BP LT 130 MM HG: CPT | Performed by: FAMILY MEDICINE

## 2024-02-16 PROCEDURE — 71046 X-RAY EXAM CHEST 2 VIEWS: CPT | Performed by: RADIOLOGY

## 2024-02-16 NOTE — PROGRESS NOTES
"Subjective   Patient ID: Genesis Garcia is a 78 y.o. female who presents for Hospital Follow-up (Discharge from rehab and hospital ).    HPI   Hospitalized 12/20/23 to 12/26/23 for pneumonia, acute on chronic diastolic heart failure, LLE cellulitis.  Treatments: Abx  Procedures: N/A  Medication changes: Torsemide dose adjusted.  Advised to follow up with: Cardiology, home health.  Outpatient recommendations: N/A  Declined skilled nursing facility at time of discharge.    Returned to ER 1 day after discharge.    Hospitalized 12/27/23 to 12/29/23 for generalized weakness.  Treatments: Diflucan for oral thrush, Lidocaine for shoulder pain.  Procedures: N/A  Medication changes: Albuterol adjusted  Discharged to skilled nursing facility (SNF).    SNF records not available.  States she was at SNF x 3 wks.  States she was discharged from SNF 2 wks ago.  Reports back pain as only complaint (seeing pain management).  Has home PT/OT (will need orders signed).  Has not had repeat CXR since hospital discharge.    Review of Systems  No other complaints.     Objective   /77   Pulse 78   Temp 36 °C (96.8 °F)   Resp 16   Ht 1.575 m (5' 2\")   Wt 97.5 kg (215 lb)   SpO2 97%   BMI 39.32 kg/m²     Physical Exam  Constitutional:       General: She is not in acute distress.     Appearance: She is obese.   Cardiovascular:      Rate and Rhythm: Normal rate and regular rhythm.      Heart sounds: Murmur (3/6 systolic) heard.   Pulmonary:      Effort: Pulmonary effort is normal.      Breath sounds: Normal breath sounds. No wheezing, rhonchi or rales.   Musculoskeletal:      Comments: Ambulating w/[facility] wheelchair   Neurological:      Mental Status: She is oriented to person, place, and time.   Psychiatric:         Mood and Affect: Mood normal.         Behavior: Behavior normal.     Assessment/Plan   Diagnoses and all orders for this visit:  Hospital discharge follow-up  Pneumonia due to infectious organism, unspecified " laterality, unspecified part of lung  -     XR chest 2 views; Future  Chronic diastolic heart failure (CMS/HCC)  Cellulitis of lower extremity, unspecified laterality    Chest x-ray ordered.  Follow up with specialists as directed.    Schedule annual wellness visit.

## 2024-02-20 ENCOUNTER — OFFICE VISIT (OUTPATIENT)
Dept: PAIN MEDICINE | Facility: CLINIC | Age: 79
End: 2024-02-20
Payer: MEDICARE

## 2024-02-20 DIAGNOSIS — M19.079 ANKLE ARTHRITIS: ICD-10-CM

## 2024-02-20 DIAGNOSIS — M46.1 SACROILIAC INFLAMMATION (CMS-HCC): ICD-10-CM

## 2024-02-20 DIAGNOSIS — M96.1 LUMBAR POST-LAMINECTOMY SYNDROME: Primary | ICD-10-CM

## 2024-02-20 DIAGNOSIS — M19.90 ARTHRITIS: ICD-10-CM

## 2024-02-20 PROCEDURE — 99214 OFFICE O/P EST MOD 30 MIN: CPT | Performed by: PHYSICAL MEDICINE & REHABILITATION

## 2024-02-20 PROCEDURE — 1160F RVW MEDS BY RX/DR IN RCRD: CPT | Performed by: PHYSICAL MEDICINE & REHABILITATION

## 2024-02-20 PROCEDURE — 1157F ADVNC CARE PLAN IN RCRD: CPT | Performed by: PHYSICAL MEDICINE & REHABILITATION

## 2024-02-20 PROCEDURE — 1125F AMNT PAIN NOTED PAIN PRSNT: CPT | Performed by: PHYSICAL MEDICINE & REHABILITATION

## 2024-02-20 PROCEDURE — 1159F MED LIST DOCD IN RCRD: CPT | Performed by: PHYSICAL MEDICINE & REHABILITATION

## 2024-02-20 PROCEDURE — 1036F TOBACCO NON-USER: CPT | Performed by: PHYSICAL MEDICINE & REHABILITATION

## 2024-02-20 RX ORDER — TIZANIDINE 2 MG/1
TABLET ORAL
Qty: 180 TABLET | Refills: 1 | Status: SHIPPED | OUTPATIENT
Start: 2024-02-20 | End: 2024-04-22 | Stop reason: WASHOUT

## 2024-02-20 RX ORDER — GABAPENTIN 400 MG/1
400 CAPSULE ORAL 3 TIMES DAILY
Qty: 90 CAPSULE | Refills: 2 | Status: SHIPPED | OUTPATIENT
Start: 2024-02-20

## 2024-02-20 NOTE — PROGRESS NOTES
Chief complaint  Lower back pain     History  Ms Garcia is back for pain mgmt visit  Continue with pain in the back no radiation  to the lower limbs  The pain in the back is deep on the right  side.  The pain is below the belt line, it is continuous but it gets worse with extension of the lumbar spine and with leading for the right leg.  It improves with leaning forward.  Sitting up increases the pain. Improves with laying on the side. There are no reported radiation of the pain to the lower limbs.     No bowel or bladder incontinence.  No sensory or motor changes in the lower limbs.    No changes or in the color or textures of the skin of the lower limbs.     She is now at home and not having oxycodone. But using gabapentin and tizanidine     Pain level without medication is 8/10 , with the medication pain level 7/10. With OTC    The pain meds are helping control the pain and improving Activities of Daily living and quality of life and quality of sleep.       Denied any fever or chills. No weight loss and no night sweats. No cough or sputum production. No diarrhea   The constipation has been responding to fibers and over the counter medications.     No bladder and bowel incontinence and no other changes in bladder and bowel. No skin changes.  Reports tiredness and fatigability only if the pain is not controlled.   Denied opioids diversion and abuse and denies alcoholism. Denies overuse of the pain medications.       Physical examination  Awake, alert and oriented for time place and persons   declined Chaperone for the visit and was adequately  draped for the exam. But her friend came with her    Examination of the lumbar spine and the SIJ area showed mild reversal of the lumbar lordosis with slight scoliosis with right-sided concavity.  The scoliosis slightly corrected upon bending of the lumbar spine.  Tight muscle bands over the right  lower lumbar area and over the upper buttock area.  Hua are  positive on the right side. Also compression test of the right SIJ is positive increasing the pain.   Benedict test negative with no pain upon provocative testing of the lower facet joints  Straight leg raising was negative on both sides.  No sensorimotor deficits in the lower limbs.  Beverly testing were negative for axial loading and log rotation.  No aberrant pain behavior.      Diagnosis  Problem List Items Addressed This Visit       Arthritis    Relevant Medications    tiZANidine (Zanaflex) 2 mg tablet    gabapentin (Neurontin) 400 mg capsule    Ankle arthritis    Relevant Medications    tiZANidine (Zanaflex) 2 mg tablet    gabapentin (Neurontin) 400 mg capsule    Lumbar post-laminectomy syndrome - Primary    Relevant Medications    tiZANidine (Zanaflex) 2 mg tablet    gabapentin (Neurontin) 400 mg capsule    Other Relevant Orders    Referral to Spine Surgery    Sacroiliac inflammation (CMS/HCC)    Relevant Medications    tiZANidine (Zanaflex) 2 mg tablet    gabapentin (Neurontin) 400 mg capsule    Other Relevant Orders    Sacroiliac Joint Injection        Plan  Reviewed the pain generators.  Went over the types of pain with neuropathic and nociceptive and different pathologies and therapeutic modalities. Discussed the mechanism of action of interventions from acupuncture, physical therapy , regular exercises, injections, botox, spinal cord stimulation, and role of surgery     Went over pathology of the intervertebral disc displacement and the anatomical relation to the Nerve roots and relation to the radicular symptoms. Went over treatment modalities with conservative treatment including acupuncture   and epidural steroid injection with fluoroscopy guidance and last resort of surgery     Dw her about broken hardware and will obtain surgical opinion  Consider SIJ intraarticular steroid injection but she wanted to hold for now  She wanted to proceed.Risks not limited to infection, sepsis, abscess, bleeding , nerve  injury, paralysis, and death...   She is on elaquis will need to stop 2 days before procedures. Check with cardiology    Discussed about NSAIDS and I explained about the opioids sparing effect to allow keeping the opioids dose at minimal effective dose.   I went over the potential side effects of the NSAIDS on the gastrointestinal, renal and cardiovascular systems.      I detailed the side effects from the acetaminophen in the medication and made aware of those. I also explained about the cumulative effects on the organs and mainly the liver.     Given the opioids therapy , we discussed about the risk for accidental over dose on the pain medications, either for patient or other household. I went over the mechanism of action and mode of use of the Naloxone according to the  recommendations. I will provide a prescription for a kit.     Follow-up after above or earlier if needed     The level of clinical decision making in this office visit,  is high, given the high risks of complications with the morbidity and mortality due to the fact that acute and chronic pain may pose a threat to life and bodily function, if under treated, poorly treated, or with failure to maintain adequate treatment and timely medical follow up. Additionally over treatment has its own set of complications including overdosing on the pain medications and also the habit forming potentials with the use of the medications used to treat chronic painful conditions including therapeutic classes classified as dangerous medications. Given the serious and fluctuating nature of pain level and instensity with extensive consideration for whenever pain changes, there is always the risk of prolonged functional impairment requiring close patient monitoring with regular assessments and reassessments and high level medical decision making at every office visit. The amount and complexity of data reviewed is high given the patient clinical presentation,  labs,  data, radiology reports, and other tests as discussed during office visits. Pertinent data whether positive or negative were taken in consideration in the process of making this high level medical decision.

## 2024-02-22 ENCOUNTER — APPOINTMENT (OUTPATIENT)
Dept: PAIN MEDICINE | Facility: CLINIC | Age: 79
End: 2024-02-22
Payer: MEDICARE

## 2024-02-26 ENCOUNTER — OFFICE VISIT (OUTPATIENT)
Dept: WOUND CARE | Facility: CLINIC | Age: 79
End: 2024-02-26
Payer: MEDICARE

## 2024-02-26 ENCOUNTER — OFFICE VISIT (OUTPATIENT)
Dept: PRIMARY CARE | Facility: CLINIC | Age: 79
End: 2024-02-26
Payer: MEDICARE

## 2024-02-26 VITALS
WEIGHT: 215 LBS | SYSTOLIC BLOOD PRESSURE: 129 MMHG | HEART RATE: 84 BPM | BODY MASS INDEX: 39.56 KG/M2 | HEIGHT: 62 IN | OXYGEN SATURATION: 96 % | TEMPERATURE: 97.1 F | RESPIRATION RATE: 16 BRPM | DIASTOLIC BLOOD PRESSURE: 64 MMHG

## 2024-02-26 VITALS
DIASTOLIC BLOOD PRESSURE: 87 MMHG | HEIGHT: 62 IN | WEIGHT: 215 LBS | BODY MASS INDEX: 39.56 KG/M2 | SYSTOLIC BLOOD PRESSURE: 155 MMHG | HEART RATE: 76 BPM

## 2024-02-26 DIAGNOSIS — M35.9 CONNECTIVE TISSUE DISEASE, UNDIFFERENTIATED (MULTI): ICD-10-CM

## 2024-02-26 DIAGNOSIS — M13.0 POLYARTHRITIS: ICD-10-CM

## 2024-02-26 DIAGNOSIS — Z79.01 CHRONIC ANTICOAGULATION: ICD-10-CM

## 2024-02-26 DIAGNOSIS — E66.01 CLASS 2 SEVERE OBESITY DUE TO EXCESS CALORIES WITH SERIOUS COMORBIDITY AND BODY MASS INDEX (BMI) OF 39.0 TO 39.9 IN ADULT (MULTI): ICD-10-CM

## 2024-02-26 DIAGNOSIS — J44.9 CHRONIC OBSTRUCTIVE PULMONARY DISEASE, UNSPECIFIED COPD TYPE (MULTI): ICD-10-CM

## 2024-02-26 DIAGNOSIS — Z11.59 NEED FOR HEPATITIS C SCREENING TEST: ICD-10-CM

## 2024-02-26 DIAGNOSIS — I87.2 CHRONIC VENOUS INSUFFICIENCY: ICD-10-CM

## 2024-02-26 DIAGNOSIS — E03.9 HYPOTHYROIDISM (ACQUIRED): ICD-10-CM

## 2024-02-26 DIAGNOSIS — I10 ESSENTIAL HYPERTENSION: Chronic | ICD-10-CM

## 2024-02-26 DIAGNOSIS — M46.1 SACROILIAC INFLAMMATION (CMS-HCC): ICD-10-CM

## 2024-02-26 DIAGNOSIS — L97.221 CHRONIC ULCER OF LEFT CALF LIMITED TO BREAKDOWN OF SKIN (MULTI): ICD-10-CM

## 2024-02-26 DIAGNOSIS — J42 CHRONIC BRONCHITIS, UNSPECIFIED CHRONIC BRONCHITIS TYPE (MULTI): Chronic | ICD-10-CM

## 2024-02-26 DIAGNOSIS — N18.32 STAGE 3B CHRONIC KIDNEY DISEASE (MULTI): ICD-10-CM

## 2024-02-26 DIAGNOSIS — I48.91 ATRIAL FIBRILLATION, UNSPECIFIED TYPE (MULTI): ICD-10-CM

## 2024-02-26 DIAGNOSIS — L97.221 CHRONIC ULCER OF LEFT CALF LIMITED TO BREAKDOWN OF SKIN (MULTI): Primary | ICD-10-CM

## 2024-02-26 DIAGNOSIS — I82.5Y2 CHRONIC DEEP VEIN THROMBOSIS (DVT) OF PROXIMAL VEIN OF LEFT LOWER EXTREMITY (MULTI): ICD-10-CM

## 2024-02-26 DIAGNOSIS — Z00.00 MEDICARE ANNUAL WELLNESS VISIT, SUBSEQUENT: Primary | ICD-10-CM

## 2024-02-26 PROBLEM — E04.1 THYROID NODULE: Status: RESOLVED | Noted: 2024-01-19 | Resolved: 2024-02-26

## 2024-02-26 PROBLEM — M84.30XA STRESS FRACTURE: Status: RESOLVED | Noted: 2023-08-23 | Resolved: 2024-02-26

## 2024-02-26 PROBLEM — L02.416 ABSCESS OF HIP, LEFT: Status: RESOLVED | Noted: 2023-08-23 | Resolved: 2024-02-26

## 2024-02-26 PROBLEM — I82.403 ACUTE DEEP VEIN THROMBOSIS (DVT) OF BOTH LOWER EXTREMITIES (MULTI): Status: RESOLVED | Noted: 2023-08-23 | Resolved: 2024-02-26

## 2024-02-26 PROBLEM — L97.919 ULCER OF RIGHT LOWER EXTREMITY (MULTI): Status: RESOLVED | Noted: 2023-08-23 | Resolved: 2024-02-26

## 2024-02-26 PROBLEM — R06.09 DYSPNEA ON EXERTION: Status: RESOLVED | Noted: 2023-08-23 | Resolved: 2024-02-26

## 2024-02-26 PROBLEM — S82.839A FRACTURE OF DISTAL FIBULA: Status: RESOLVED | Noted: 2023-08-23 | Resolved: 2024-02-26

## 2024-02-26 PROBLEM — I83.018: Status: ACTIVE | Noted: 2024-02-26

## 2024-02-26 PROBLEM — R49.0 HOARSENESS: Status: RESOLVED | Noted: 2023-08-23 | Resolved: 2024-02-26

## 2024-02-26 PROBLEM — I82.409 DVT (DEEP VENOUS THROMBOSIS) (MULTI): Status: RESOLVED | Noted: 2023-08-23 | Resolved: 2024-02-26

## 2024-02-26 PROBLEM — E04.2 MULTINODULAR GOITER: Status: RESOLVED | Noted: 2023-08-23 | Resolved: 2024-02-26

## 2024-02-26 PROBLEM — R94.4 DECREASED GFR: Status: RESOLVED | Noted: 2023-08-23 | Resolved: 2024-02-26

## 2024-02-26 PROBLEM — R49.9 VOICE DISTURBANCE: Status: RESOLVED | Noted: 2023-08-23 | Resolved: 2024-02-26

## 2024-02-26 PROBLEM — L03.119 CELLULITIS, LEG: Status: RESOLVED | Noted: 2023-08-23 | Resolved: 2024-02-26

## 2024-02-26 PROBLEM — S82.63XA CLOSED FRACTURE OF LATERAL MALLEOLUS: Status: RESOLVED | Noted: 2023-08-23 | Resolved: 2024-02-26

## 2024-02-26 PROBLEM — J18.9 ACUTE PNEUMONIA: Status: RESOLVED | Noted: 2023-12-20 | Resolved: 2024-02-26

## 2024-02-26 PROBLEM — E43 UNSPECIFIED SEVERE PROTEIN-CALORIE MALNUTRITION (MULTI): Status: ACTIVE | Noted: 2024-02-26

## 2024-02-26 PROCEDURE — 1159F MED LIST DOCD IN RCRD: CPT | Performed by: FAMILY MEDICINE

## 2024-02-26 PROCEDURE — G0439 PPPS, SUBSEQ VISIT: HCPCS | Performed by: FAMILY MEDICINE

## 2024-02-26 PROCEDURE — 99214 OFFICE O/P EST MOD 30 MIN: CPT | Performed by: INTERNAL MEDICINE

## 2024-02-26 PROCEDURE — 1160F RVW MEDS BY RX/DR IN RCRD: CPT | Performed by: FAMILY MEDICINE

## 2024-02-26 PROCEDURE — 3079F DIAST BP 80-89 MM HG: CPT | Performed by: INTERNAL MEDICINE

## 2024-02-26 PROCEDURE — 3077F SYST BP >= 140 MM HG: CPT | Performed by: INTERNAL MEDICINE

## 2024-02-26 PROCEDURE — 1125F AMNT PAIN NOTED PAIN PRSNT: CPT | Performed by: FAMILY MEDICINE

## 2024-02-26 PROCEDURE — 3078F DIAST BP <80 MM HG: CPT | Performed by: FAMILY MEDICINE

## 2024-02-26 PROCEDURE — 1160F RVW MEDS BY RX/DR IN RCRD: CPT | Performed by: INTERNAL MEDICINE

## 2024-02-26 PROCEDURE — 1170F FXNL STATUS ASSESSED: CPT | Performed by: FAMILY MEDICINE

## 2024-02-26 PROCEDURE — 1157F ADVNC CARE PLAN IN RCRD: CPT | Performed by: INTERNAL MEDICINE

## 2024-02-26 PROCEDURE — 1036F TOBACCO NON-USER: CPT | Performed by: FAMILY MEDICINE

## 2024-02-26 PROCEDURE — 1125F AMNT PAIN NOTED PAIN PRSNT: CPT | Performed by: INTERNAL MEDICINE

## 2024-02-26 PROCEDURE — 1036F TOBACCO NON-USER: CPT | Performed by: INTERNAL MEDICINE

## 2024-02-26 PROCEDURE — 3074F SYST BP LT 130 MM HG: CPT | Performed by: FAMILY MEDICINE

## 2024-02-26 PROCEDURE — 11042 DBRDMT SUBQ TIS 1ST 20SQCM/<: CPT | Performed by: INTERNAL MEDICINE

## 2024-02-26 PROCEDURE — 1157F ADVNC CARE PLAN IN RCRD: CPT | Performed by: FAMILY MEDICINE

## 2024-02-26 PROCEDURE — 1159F MED LIST DOCD IN RCRD: CPT | Performed by: INTERNAL MEDICINE

## 2024-02-26 RX ORDER — METHOCARBAMOL 500 MG/1
TABLET, FILM COATED ORAL AS NEEDED
COMMUNITY
Start: 2024-02-02 | End: 2024-04-22 | Stop reason: ALTCHOICE

## 2024-02-26 RX ORDER — TRIAMCINOLONE ACETONIDE 1 MG/G
CREAM TOPICAL AS NEEDED
COMMUNITY
End: 2024-02-26 | Stop reason: WASHOUT

## 2024-02-26 RX ORDER — OXYCODONE HYDROCHLORIDE 5 MG/1
CAPSULE ORAL
COMMUNITY
Start: 2024-01-24 | End: 2024-04-22 | Stop reason: WASHOUT

## 2024-02-26 ASSESSMENT — PATIENT HEALTH QUESTIONNAIRE - PHQ9
SUM OF ALL RESPONSES TO PHQ9 QUESTIONS 1 AND 2: 0
2. FEELING DOWN, DEPRESSED OR HOPELESS: NOT AT ALL
1. LITTLE INTEREST OR PLEASURE IN DOING THINGS: NOT AT ALL
SUM OF ALL RESPONSES TO PHQ9 QUESTIONS 1 AND 2: 0
2. FEELING DOWN, DEPRESSED OR HOPELESS: NOT AT ALL
1. LITTLE INTEREST OR PLEASURE IN DOING THINGS: NOT AT ALL

## 2024-02-26 ASSESSMENT — ACTIVITIES OF DAILY LIVING (ADL)
MANAGING_FINANCES: INDEPENDENT
DOING_HOUSEWORK: INDEPENDENT
BATHING: NEEDS ASSISTANCE
GROCERY_SHOPPING: INDEPENDENT
DRESSING: INDEPENDENT
TAKING_MEDICATION: INDEPENDENT

## 2024-02-26 NOTE — PATIENT INSTRUCTIONS
Risk factors identified during visit:   BMI<18.5 or >25: Recommend weight loss efforts (see [email or web address]/category/nutrition for ideas), BMI<18.5 or >25: Patient declines nutrition referrals    Flu shot: Up to date.  PPSV23: Up to date.  PCV13: Up to date.  PCV20: N/A.  Shingrix: Up to date.  Colon cancer screening: States she is due next year.   HIV screening: N/A.  Hepatitis C screening: Ordered.  Breast cancer screening: Patient declined.  Cervical cancer screening: N/A.  Osteoporosis screening: Patient declined.    Fasting labs.  Follow up with specialists as directed.    F/U for med refills (asthma/COPD, hypertension) when needed (bring medication bottles).

## 2024-02-26 NOTE — PROGRESS NOTES
Subjective   Reason for Visit: Genesis Garcia is an 78 y.o. female here for a Medicare Wellness visit.     Past Medical, Surgical, and Family History reviewed and updated in chart.    Reviewed all medications by prescribing practitioner or clinical pharmacist (such as prescriptions, OTCs, herbal therapies and supplements) and documented in the medical record.    HPI    Patient Self Assessment of Health Status  Patient Self Assessment: Good    Nutrition and Exercise  Current Diet: Heart Healthy Diet  Adequate Fluid Intake: Yes  Caffeine: Yes  Exercise Frequency: Infrequently    Functional Ability/Level of Safety  Cognitive Impairment Observed: No cognitive impairment observed  Cognitive Impairment Reported: No cognitive impairment reported by patient or family    Home Safety Risk Factors: None    H/O Asthma, HTN.  I was previously providing meds, but she states she has been getting them through [facility] [place] [facility] provider, then at assisted living.  Does not need me refills at this time.    Medicare Wellness Billing Compliance Satisfied    *This is a visual tool to show completion of required items on the day of the visit. Green checks will only appear on the date of visit.    Review all medications by prescribing practitioner or clinical pharmacist (such as prescriptions, OTCs, herbal therapies and supplements) documented in the medical record    Past Medical, Surgical, and Family History reviewed and updated in chart    Tobacco Use Reviewed    Alcohol Use Reviewed    Illicit Drug Use Reviewed    PHQ2/9    Falls in Last Year Reviewed    Home Safety Risk Factors Reviewed    Cognitive Impairment Reviewed    Patient Self Assessment and Health Status    Current Diet Reviewed    Exercise Frequency    ADL - Hearing Impairment    ADL - Bathing    ADL - Dressing    ADL - Walks in Home    IADL - Managing Finances    IADL - Grocery Shopping    IADL - Taking Medications    IADL - Doing Housework   "    Patient Care Team:  Albert Garner MD as PCP - General  Albert Garner MD as PCP - [employer] Medicare Advantage PCP   Specialists: pain management, nephrology, rheumatology, vascular, endo    Review of Systems  No other complaints.     Objective   Vitals:  /64   Pulse 84   Temp 36.2 °C (97.1 °F)   Resp 16   Ht 1.575 m (5' 2\")   Wt 97.5 kg (215 lb)   SpO2 96%   BMI 39.32 kg/m²       Physical Exam  Constitutional:       General: She is not in acute distress.     Appearance: She is obese.   Musculoskeletal:      Comments: Ambulating w/[facility] wheelchair    Neurological:      Mental Status: She is oriented to person, place, and time.   Psychiatric:         Mood and Affect: Mood normal.         Behavior: Behavior normal.     Assessment/Plan   Diagnoses and all orders for this visit:  Medicare annual wellness visit, subsequent  Essential hypertension  -     CBC; Future  -     Basic Metabolic Panel; Future  -     Lipid Panel; Future  -     Aspartate Aminotransferase; Future  -     Alanine Aminotransferase; Future  -     TSH with reflex to Free T4 if abnormal; Future  Need for hepatitis C screening test  -     Hepatitis C Antibody; Future  Chronic obstructive pulmonary disease, unspecified COPD type (CMS/HCC)        -     Does not need med refills at this time  Chronic bronchitis, unspecified chronic bronchitis type (CMS/HCC)        -     Does not need med refills at this time  Stage 3b chronic kidney disease (CMS/HCC)  -     CBC; Future  -     Basic Metabolic Panel; Future  -     Albumin , Urine Random; Future  -     Followed by nephrology  Hypothyroidism (acquired)        -     Followed by endocrine  Atrial fibrillation, unspecified type (CMS/HCC)        -     On chronic anticoagulation  Chronic ulcer of left calf limited to breakdown of skin (CMS/HCC)        -     Followed by vascular  Chronic venous insufficiency        -     Followed by vascular  Connective tissue disease, undifferentiated (CMS/HCC)        -  "    Followed by rheumatology  Sacroiliac inflammation (CMS/HCC)        -     Followed by pain management  Polyarthritis        -     Followed by rheumatology  Class 2 severe obesity due to excess calories with serious comorbidity and body mass index (BMI) of 39.0 to 39.9 in adult (CMS/HCC)       -     Declines nutrition referral    Risk factors identified during visit:   BMI<18.5 or >25: Recommend weight loss efforts (see [email or web address]/category/nutrition for ideas)  BMI<18.5 or >25: Patient declines nutrition referrals    Flu shot: Up to date.  PPSV23: Up to date.  PCV13: Up to date.  PCV20: N/A.  Shingrix: Up to date.  Colon cancer screening: States she is due next year.   HIV screening: N/A.  Hepatitis C screening: Ordered.  Breast cancer screening: Patient declined.  Cervical cancer screening: N/A.  Osteoporosis screening: Patient declined.    Fasting labs.  Follow up with specialists as directed.    F/U for med refills (asthma/COPD, hypertension) when needed (bring medication bottles).

## 2024-02-26 NOTE — PATIENT INSTRUCTIONS
Assessment/Plan   Ulcer improving. Skin dry but limited dermatitis. DC TAC. Use the A&D for skin care. Continue the promogran change top layer to duoderm, Diuble tubigrip for compression. Avoid pressure /contact. Follow up 3 weeks.    Reports pending back injection/surgery. On eliquis 5 mg q 12 hours. OK to hold for 3 days before any injection etc.       VISIT ORDERS:  Vascular Study Required: n   Labs Required: n  Radiology Imaging Required: n  Consultation Required: n  Rx Provided:   Changes to Plan of Care: y      NEW ORDERS:  Wash: wash well with soap and water or kelsey  Irrigate/Soak:  Skin Care: A&D   Dressing:  moist promogran and duoderm change every 3-4 days  Compression:  double tubigrip  Offloading: keep pressure off the ulcers at all times    DISCHARGE PLANNING:    Follow Up: 3 weeks  Nurse Visit: prn

## 2024-02-26 NOTE — PROGRESS NOTES
"REFERRAL REQUESTED BY:  Dr. Albert Garner    LAST SEEN:  1/29/24    Patient ID: Genesis Garcia is a 78 y.o. female     HPI:   Here for follow up left calf ulcer. Last visit had a fall was admitted x 1 week then in rehab/assisted living for 3 weeks. Now doing HHC and PT.     CURRENT DRESSING:  Who is performing the dressing change:  [facility]  Dressing applied: moist promogran, xeroform, DSD  Dressing Frequency: twice weekly    EDEMA MANAGEMENT:  Compression:  Right: Double Tubigrip CALF 36.0   Left: Double Tubigrip   CALF 38.5     Other Modality  Sleeping in Bed: yes  Elevating FOB:  yes    Offloading/Pressure Relief  Activity Level:  ad travon  Protection Devices:  none    Offloading/Pressure Relief Effective?     ROS:      Objective     VITALS:  /87 (BP Location: Left arm)   Pulse 76   Ht 1.575 m (5' 2\")   Wt 97.5 kg (215 lb)   BMI 39.32 kg/m²       Physical Exam    ARRIVAL:  Ambulating,wheelchair  TRANSFER:  One Assist, Minimal    PSYCHIATRIC:  Oriented to person, place and time: A & O x 3    CONSTITUTIONAL:    Appearance:  Well Groomed    SKIN:        PULSES:     EXTREMITY TEMPERATURE:    RIGHT: normal  LEFT: normal    EDEMA:     WOUND ASSESSMENT:    Wound 04/28/23 Venous Ulcer Leg Left;Lateral (Active)   Date First Assessed: 04/28/23   Present on Original Admission: Yes  Primary Wound Type: Venous Ulcer  Location: (c) Leg  Wound Location Orientation: (c) Left;Lateral      Assessments 9/13/2023  3:13 PM 2/26/2024 10:04 AM   Wound Image      Site Assessment Fibrinous --   Jackie-Wound Assessment Erythematous --   Non-staged Wound Description Full thickness --   Wound Length (cm) 2 cm --   Wound Width (cm) 1.7 cm --   Wound Surface Area (cm^2) 3.4 cm^2 --   Wound Depth (cm) 0.2 cm --   Wound Volume (cm^3) 0.68 cm^3 --   Wound Bed Granulation (%) 20 % --   Drainage Description Serosanguineous --   Drainage Amount Scant --       Active Orders   Date Order Priority Status " Authorizing Provider   02/26/24 1003 Debridement Routine Active Rosy Atkinson MD       Inactive Orders   Date Order Priority Status Authorizing Provider   12/28/23 1447 Wound Care Routine Discontinued Chon Lind MD     - Wound Complexity:    Simple   12/28/23 0110 Inpatient Consult to Wound and Ostomy Nurse Routine Completed Chon Lind MD     - Reason for Consult?:    Wound   12/21/23 1617 Wound Care Routine Discontinued Chon Lind MD     - Wound Complexity:    Simple   10/16/23 1107 Debridement Routine Completed Rosy Atkinson MD       Wound 02/26/24 Venous Ulcer Leg Left;Proximal;Lateral (Active)   Date First Assessed: 02/26/24   Primary Wound Type: Venous Ulcer  Location: Leg  Wound Location Orientation: Left;Proximal;Lateral      Assessments 2/26/2024 10:04 AM   Site Assessment Fibrinous;Granulation   Jackie-Wound Assessment Intact;Dry   Wound Length (cm) 0.5 cm   Wound Width (cm) 0.4 cm   Wound Surface Area (cm^2) 0.2 cm^2   Wound Depth (cm) 0.1 cm   Wound Volume (cm^3) 0.02 cm^3   Drainage Description Serosanguineous   Drainage Amount Scant       No associated orders.          Debridement   Wound 04/28/23 Venous Ulcer Leg Dorsal;Left;Lateral;Lower    Consent obtained? verbal  Consent given by: patient  Performed by: resident  Debridement type: surgical  Level of debridement: subcutaneous tissue  Pain control: lidocaine 2%  Post-debridement measurements  Length (cm): 1  Width (cm): 0.6  Depth (cm): 0.2  Percent debrided: 100%  Surface Area (cm^2): 0.6  Area debrided (cm^2): 0.6  Volume (cm^3): 0.12  Tissue and other material debrided: subcutaneous tissue  Devitalized tissue debrided: fibrin and slough  Instrument(s) utilized: curette  Bleeding: small  Hemostasis obtained with: not applicable  Response to treatment: procedure was tolerated well        Assessment/Plan   Ulcer improving. Skin dry but limited dermatitis. DC TAC. Use the A&D for skin care. Continue the promogran change top layer to  duoderm, Diuble tubigrip for compression. Avoid pressure /contact. Follow up 3 weeks.    Reports pending back injection/surgery. On eliquis 5 mg q 12 hours. OK to hold for 3 days before any injection etc.       VISIT ORDERS:  Vascular Study Required: n   Labs Required: n  Radiology Imaging Required: n  Consultation Required: n  Rx Provided:   Changes to Plan of Care: y      NEW ORDERS:  Wash: wash well with soap and water or kelsey  Irrigate/Soak:  Skin Care: A&D   Dressing:  moist promogran and duoderm change every 3-4 days  Compression:  double tubigrip  Offloading: keep pressure off the ulcers at all times    DISCHARGE PLANNING:    Follow Up: 3 weeks  Nurse Visit: prn    General Instructions:  Center RN to apply EMLA/Lidocaine 1% jelly/LMX  topically to wound(s) prior to debridement by physician.  Center RN my see patient as a nurse consult in my absence.      RN Post Procedure Note:      Left lateral leg wounds covered with moist promogran, duoderm.  A & D applied to skin with double tubigrip.   Revision sent. RICHIE Mondragon RN   [facility]  Agency/Facility: [facility]  days/week: 2  Contacted Regarding Changes:

## 2024-03-01 ENCOUNTER — LAB (OUTPATIENT)
Dept: LAB | Facility: LAB | Age: 79
End: 2024-03-01
Payer: MEDICARE

## 2024-03-01 DIAGNOSIS — Z11.59 NEED FOR HEPATITIS C SCREENING TEST: ICD-10-CM

## 2024-03-01 DIAGNOSIS — N18.32 STAGE 3B CHRONIC KIDNEY DISEASE (MULTI): ICD-10-CM

## 2024-03-01 DIAGNOSIS — I10 ESSENTIAL HYPERTENSION: Chronic | ICD-10-CM

## 2024-03-01 LAB
ALT SERPL W P-5'-P-CCNC: 5 U/L (ref 7–45)
ANION GAP SERPL CALC-SCNC: 15 MMOL/L (ref 10–20)
AST SERPL W P-5'-P-CCNC: 12 U/L (ref 9–39)
BUN SERPL-MCNC: 22 MG/DL (ref 6–23)
CALCIUM SERPL-MCNC: 9.4 MG/DL (ref 8.6–10.6)
CHLORIDE SERPL-SCNC: 104 MMOL/L (ref 98–107)
CHOLEST SERPL-MCNC: 153 MG/DL (ref 0–199)
CHOLESTEROL/HDL RATIO: 3.4
CO2 SERPL-SCNC: 28 MMOL/L (ref 21–32)
CREAT SERPL-MCNC: 1.25 MG/DL (ref 0.5–1.05)
CREAT UR-MCNC: 13.3 MG/DL (ref 20–320)
EGFRCR SERPLBLD CKD-EPI 2021: 45 ML/MIN/1.73M*2
ERYTHROCYTE [DISTWIDTH] IN BLOOD BY AUTOMATED COUNT: 18.3 % (ref 11.5–14.5)
GLUCOSE SERPL-MCNC: 84 MG/DL (ref 74–99)
HCT VFR BLD AUTO: 30 % (ref 36–46)
HCV AB SER QL: NONREACTIVE
HDLC SERPL-MCNC: 44.7 MG/DL
HGB BLD-MCNC: 8.7 G/DL (ref 12–16)
LDLC SERPL CALC-MCNC: 96 MG/DL
MCH RBC QN AUTO: 21.7 PG (ref 26–34)
MCHC RBC AUTO-ENTMCNC: 29 G/DL (ref 32–36)
MCV RBC AUTO: 75 FL (ref 80–100)
MICROALBUMIN UR-MCNC: <7 MG/L
MICROALBUMIN/CREAT UR: ABNORMAL MG/G{CREAT}
NON HDL CHOLESTEROL: 108 MG/DL (ref 0–149)
NRBC BLD-RTO: 0 /100 WBCS (ref 0–0)
PLATELET # BLD AUTO: 387 X10*3/UL (ref 150–450)
POTASSIUM SERPL-SCNC: 4.1 MMOL/L (ref 3.5–5.3)
RBC # BLD AUTO: 4.01 X10*6/UL (ref 4–5.2)
SODIUM SERPL-SCNC: 143 MMOL/L (ref 136–145)
TRIGL SERPL-MCNC: 63 MG/DL (ref 0–149)
TSH SERPL-ACNC: 2.77 MIU/L (ref 0.44–3.98)
VLDL: 13 MG/DL (ref 0–40)
WBC # BLD AUTO: 8.5 X10*3/UL (ref 4.4–11.3)

## 2024-03-01 PROCEDURE — 85027 COMPLETE CBC AUTOMATED: CPT

## 2024-03-01 PROCEDURE — 82570 ASSAY OF URINE CREATININE: CPT

## 2024-03-01 PROCEDURE — 82043 UR ALBUMIN QUANTITATIVE: CPT

## 2024-03-01 PROCEDURE — 84450 TRANSFERASE (AST) (SGOT): CPT

## 2024-03-01 PROCEDURE — 84443 ASSAY THYROID STIM HORMONE: CPT

## 2024-03-01 PROCEDURE — 80048 BASIC METABOLIC PNL TOTAL CA: CPT

## 2024-03-01 PROCEDURE — 80061 LIPID PANEL: CPT

## 2024-03-01 PROCEDURE — 86803 HEPATITIS C AB TEST: CPT

## 2024-03-01 PROCEDURE — 36415 COLL VENOUS BLD VENIPUNCTURE: CPT

## 2024-03-01 PROCEDURE — 84460 ALANINE AMINO (ALT) (SGPT): CPT

## 2024-03-04 ENCOUNTER — OFFICE VISIT (OUTPATIENT)
Dept: ORTHOPEDIC SURGERY | Facility: CLINIC | Age: 79
End: 2024-03-04
Payer: MEDICARE

## 2024-03-04 VITALS — HEIGHT: 62 IN | WEIGHT: 215 LBS | BODY MASS INDEX: 39.56 KG/M2

## 2024-03-04 DIAGNOSIS — S32.010A CLOSED COMPRESSION FRACTURE OF BODY OF L1 VERTEBRA (MULTI): Primary | ICD-10-CM

## 2024-03-04 DIAGNOSIS — M96.1 LUMBAR POST-LAMINECTOMY SYNDROME: ICD-10-CM

## 2024-03-04 PROCEDURE — 99204 OFFICE O/P NEW MOD 45 MIN: CPT | Performed by: PHYSICIAN ASSISTANT

## 2024-03-04 PROCEDURE — 1125F AMNT PAIN NOTED PAIN PRSNT: CPT | Performed by: PHYSICIAN ASSISTANT

## 2024-03-04 PROCEDURE — 1159F MED LIST DOCD IN RCRD: CPT | Performed by: PHYSICIAN ASSISTANT

## 2024-03-04 PROCEDURE — 1160F RVW MEDS BY RX/DR IN RCRD: CPT | Performed by: PHYSICIAN ASSISTANT

## 2024-03-04 PROCEDURE — 1157F ADVNC CARE PLAN IN RCRD: CPT | Performed by: PHYSICIAN ASSISTANT

## 2024-03-04 PROCEDURE — 1036F TOBACCO NON-USER: CPT | Performed by: PHYSICIAN ASSISTANT

## 2024-03-04 ASSESSMENT — PAIN DESCRIPTION - DESCRIPTORS: DESCRIPTORS: STABBING

## 2024-03-04 ASSESSMENT — PAIN - FUNCTIONAL ASSESSMENT: PAIN_FUNCTIONAL_ASSESSMENT: 0-10

## 2024-03-04 NOTE — PROGRESS NOTES
Dolores is a 78-year-old female reporting clinic today for evaluation of her viri low back pain.    She suffers from chronic lower extremity cellulitis, she has been finding this for approximately 2 years.  When her cellulitis is flared up her left leg gives out.  She suffered from a fall in early December.  Since the fall she has suffered from constant daily low back pain.  She does not have pain radiating down her legs.  Her cellulitis has significantly improved, she does have a small dime sized ulcer on the outside of her left leg.  She has PT and OT coming to the house.  She is able to ambulate with a walker.  She is established with Dr. Wakefield from pain management, he currently has her on muscle relaxers.  She has an injection scheduled next week.    A previous lumbar surgery with Dr. Spangler around 2000/2001.  She has a L4-S1 fusion.    Family, social, and medical histories are obtained and reviewed.      ROS: All other systems have been reviewed and are negative except as previously noted in history of present illness.    Physical Exam:  Const: Well-appearing, well-nourished female in no distress.  Eyes: Normal appearing sclera and conjunctiva, no jaundice, pupils normal in appearance.  Resp: breathing comfortably, normal respiratory rate.  CV: No upper or lower extremity edema.  Musculoskeletal: Examined in her wheelchair.  Strength exam of the lower extremities reveals 5/5 strength in all major muscle groups.  Negative straight leg raise bilaterally.  Neuro: Sensation is intact and equal bilaterally. Deep tendon reflexes are normal and symmetric.  No clonus.  Skin: Intact without any lesions, normal turgor.  Lower extremity skin not examined, double compression wraps in place.  Psych: Alert and oriented x3, normal mood and affect.    I personally reviewed the CT scan of the lumbar spine from 12/22.  There are postsurgical changes from L4-S1.  There is lucency around the S1 pedicle screws bilaterally.  There is  a L1 compression fracture.    Plan moving forward is to further evaluate her chronic back pain with evidence of an L1 compression fracture.  An MRI of the lumbar spine was ordered today.  Has been administered to assist with the healing of the compression fracture.  Will also assess for recurrent or adjacent level stenosis after previous fusion.  She can keep her appointment with pain management for an injection.  She will follow-up with me once the MRI is complete.    **This note was dictated using speech recognition software and was not corrected for spelling or grammatical errors**

## 2024-03-11 ENCOUNTER — HOSPITAL ENCOUNTER (OUTPATIENT)
Dept: RADIOLOGY | Facility: CLINIC | Age: 79
Discharge: HOME | End: 2024-03-11
Payer: MEDICARE

## 2024-03-11 ENCOUNTER — HOSPITAL ENCOUNTER (OUTPATIENT)
Dept: OPERATING ROOM | Facility: CLINIC | Age: 79
Setting detail: OUTPATIENT SURGERY
Discharge: HOME | End: 2024-03-11
Payer: MEDICARE

## 2024-03-11 VITALS
BODY MASS INDEX: 39.56 KG/M2 | WEIGHT: 215 LBS | DIASTOLIC BLOOD PRESSURE: 67 MMHG | HEIGHT: 62 IN | OXYGEN SATURATION: 95 % | SYSTOLIC BLOOD PRESSURE: 144 MMHG | TEMPERATURE: 99.1 F | HEART RATE: 90 BPM

## 2024-03-11 DIAGNOSIS — M46.1 SACROILIAC INFLAMMATION (CMS-HCC): ICD-10-CM

## 2024-03-11 PROCEDURE — G0260 INJ FOR SACROILIAC JT ANESTH: HCPCS | Mod: RT

## 2024-03-11 PROCEDURE — 27096 INJECT SACROILIAC JOINT: CPT | Mod: RT

## 2024-03-11 PROCEDURE — 2500000004 HC RX 250 GENERAL PHARMACY W/ HCPCS (ALT 636 FOR OP/ED): Performed by: PHYSICAL MEDICINE & REHABILITATION

## 2024-03-11 PROCEDURE — 2500000005 HC RX 250 GENERAL PHARMACY W/O HCPCS: Performed by: PHYSICAL MEDICINE & REHABILITATION

## 2024-03-11 PROCEDURE — 27096 INJECT SACROILIAC JOINT: CPT | Performed by: PHYSICAL MEDICINE & REHABILITATION

## 2024-03-11 PROCEDURE — 2550000001 HC RX 255 CONTRASTS: Performed by: PHYSICAL MEDICINE & REHABILITATION

## 2024-03-11 RX ORDER — LIDOCAINE HYDROCHLORIDE 5 MG/ML
INJECTION, SOLUTION INFILTRATION; PERINEURAL AS NEEDED
Status: COMPLETED | OUTPATIENT
Start: 2024-03-11 | End: 2024-03-11

## 2024-03-11 RX ORDER — TRIAMCINOLONE ACETONIDE 40 MG/ML
INJECTION, SUSPENSION INTRA-ARTICULAR; INTRAMUSCULAR AS NEEDED
Status: COMPLETED | OUTPATIENT
Start: 2024-03-11 | End: 2024-03-11

## 2024-03-11 RX ADMIN — IOHEXOL 0.5 ML: 240 INJECTION, SOLUTION INTRATHECAL; INTRAVASCULAR; INTRAVENOUS; ORAL at 09:54

## 2024-03-11 RX ADMIN — LIDOCAINE HYDROCHLORIDE 1 ML: 5 INJECTION, SOLUTION INFILTRATION; PERINEURAL at 09:54

## 2024-03-11 RX ADMIN — TRIAMCINOLONE ACETONIDE 40 MG: 40 INJECTION, SUSPENSION INTRA-ARTICULAR; INTRAMUSCULAR at 09:55

## 2024-03-11 ASSESSMENT — PAIN - FUNCTIONAL ASSESSMENT
PAIN_FUNCTIONAL_ASSESSMENT: 0-10
PAIN_FUNCTIONAL_ASSESSMENT: 0-10

## 2024-03-11 ASSESSMENT — PAIN SCALES - GENERAL
PAINLEVEL_OUTOF10: 0 - NO PAIN
PAINLEVEL_OUTOF10: 8
PAINLEVEL_OUTOF10: 8

## 2024-03-11 ASSESSMENT — COLUMBIA-SUICIDE SEVERITY RATING SCALE - C-SSRS
1. IN THE PAST MONTH, HAVE YOU WISHED YOU WERE DEAD OR WISHED YOU COULD GO TO SLEEP AND NOT WAKE UP?: NO
2. HAVE YOU ACTUALLY HAD ANY THOUGHTS OF KILLING YOURSELF?: NO
6. HAVE YOU EVER DONE ANYTHING, STARTED TO DO ANYTHING, OR PREPARED TO DO ANYTHING TO END YOUR LIFE?: NO

## 2024-03-11 NOTE — OP NOTE
Operative Note     Date: 3/11/2024  OR Location: [facility] NON-OR PROCEDURES    Name: Genesis Garcia, : 1945, Age: 78 y.o., MRN: [id number], Sex: female    Diagnosis  Right sacroillitis M46.1 Right sacroillitis M46.1     Procedures  Right sacroilliac joint injection with fluoroscopy  Surgeons   Shine Wakefield MD     Resident/Fellow/Other Assistant:  * No surgeons found in log *    Procedure Summary  Anesthesia: * No anesthesia type entered *  ASA: ASA status not filed in the log.  Anesthesia Staff: No anesthesia staff entered.  Estimated Blood Loss: 0mL  Intra-op Medications: * Intraprocedure medication information is unavailable because the case start and end events have not been set *      Intraprocedure I/O Totals       None           Specimen: No specimens collected     Staff:   Circulator: Gi Chadwick RN  Scrub Person: Alonso Price RN         Drains and/or Catheters: * None in log *    Tourniquet Times:         Implants:     Findings: arthritis of SIJ    Indications: Genesis Garcia is an 78 y.o. female who is having Right sacroilliac jiont intraarticular steroid injection     The patient was seen in the preoperative area. The risks, benefits, complications, treatment options, non-operative alternatives, expected recovery and outcomes were discussed with the patient. The possibilities of reaction to medication, pulmonary aspiration, injury to surrounding structures, bleeding, recurrent infection, the need for additional procedures, failure to diagnose a condition, and creating a complication requiring transfusion or operation were discussed with the patient. The patient concurred with the proposed plan, giving informed consent.  The site of surgery was properly noted/marked if necessary per policy. The patient has been actively warmed in preoperative area. Preoperative antibiotics are not indicated. Venous thrombosis prophylaxis are not indicated.    Procedure Details: Time-in: 940 am    time-out: 959 am     Sedation: none     Side:  right SIJ     Indication: Failure to respond to conservative treatment with therapy and medications.     PROCEDURE:    The risks, benefits and alternatives of the procedure were discussed with the patient and agreed to proceed. The risks included but not limited to: infection, bleeding, paralysis, nerve injury sepsis and remotely death were discussed with the patient during the office and again in the pre procedure area.  The patient signed informed consent in the pre procedure area.     The patient was brought to procedure room and time out for the procedure was performed with the procedure room staff present.    Patient placed in prone position on the procedure table and draped and covered appropriately.      Fluoroscopy machine was used to identify the lumbo sacral area over the  Right SIJ    Skin was prepped and draped in sterile fashion over the SIJ area.  Skin was infiltrated with local anesthetic with lidocaien 0.5%    Spinal needle was to the lower end of the SIJ, tip of the needle position was verified with adequate flow of contrast dye 0.5cc of Isovue in the joint space.  At that point, 10 mg dexamethasone mixed with 1 mL of lidocaine 0.5% were injected.      Procedure tolerated very well.  No complications encountered.  Post procedure care discussed with the patient, agreed to proceed.   Patient instructed on keeping track of the pain level after discharge.   Patient discharged home, from the recovery room, in stable condition.   Complications:  None; patient tolerated the procedure well.    Disposition: PACU - hemodynamically stable.  Condition: stable         Additional Details: as above     Attending Attestation: I performed the procedure.    Shine Wakefield MD

## 2024-03-11 NOTE — H&P
Chief complaint  Lower back pain      History  Ms Garcia is back for pain mgmt visit  Continue with pain in the back no radiation  to the lower limbs  The pain in the back is deep on the right  side.  The pain is below the belt line, it is continuous but it gets worse with extension of the lumbar spine and with leading for the right leg.  It improves with leaning forward.  Sitting up increases the pain. Improves with laying on the side. There are no reported radiation of the pain to the lower limbs.     No bowel or bladder incontinence.  No sensory or motor changes in the lower limbs.    No changes or in the color or textures of the skin of the lower limbs.      She is now at home and not having oxycodone. But using gabapentin and tizanidine      Pain level without medication is 8/10 , with the medication pain level 7/10. With OTC     The pain meds are helping control the pain and improving Activities of Daily living and quality of life and quality of sleep.        Denied any fever or chills. No weight loss and no night sweats. No cough or sputum production. No diarrhea   The constipation has been responding to fibers and over the counter medications.      No bladder and bowel incontinence and no other changes in bladder and bowel. No skin changes.  Reports tiredness and fatigability only if the pain is not controlled.   Denied opioids diversion and abuse and denies alcoholism. Denies overuse of the pain medications.        Physical examination  Awake, alert and oriented for time place and persons   declined Chaperone for the visit and was adequately  draped for the exam. But her friend came with her     Examination of the lumbar spine and the SIJ area showed mild reversal of the lumbar lordosis with slight scoliosis with right-sided concavity.  The scoliosis slightly corrected upon bending of the lumbar spine.  Tight muscle bands over the right  lower lumbar area and over the upper buttock area.  Gaenslen and  Ronald are positive on the right side. Also compression test of the right SIJ is positive increasing the pain.   Benedict test negative with no pain upon provocative testing of the lower facet joints  Straight leg raising was negative on both sides.  No sensorimotor deficits in the lower limbs.  Beverly testing were negative for axial loading and log rotation.  No aberrant pain behavior.       Diagnosis  Problem List Items Addressed This Visit         Arthritis     Relevant Medications     tiZANidine (Zanaflex) 2 mg tablet     gabapentin (Neurontin) 400 mg capsule     Ankle arthritis     Relevant Medications     tiZANidine (Zanaflex) 2 mg tablet     gabapentin (Neurontin) 400 mg capsule     Lumbar post-laminectomy syndrome - Primary     Relevant Medications     tiZANidine (Zanaflex) 2 mg tablet     gabapentin (Neurontin) 400 mg capsule     Other Relevant Orders     Referral to Spine Surgery     Sacroiliac inflammation (CMS/HCC)     Relevant Medications     tiZANidine (Zanaflex) 2 mg tablet     gabapentin (Neurontin) 400 mg capsule     Other Relevant Orders     Sacroiliac Joint Injection         Plan  Reviewed the pain generators.  Went over the types of pain with neuropathic and nociceptive and different pathologies and therapeutic modalities. Discussed the mechanism of action of interventions from acupuncture, physical therapy , regular exercises, injections, botox, spinal cord stimulation, and role of surgery      Went over pathology of the intervertebral disc displacement and the anatomical relation to the Nerve roots and relation to the radicular symptoms. Went over treatment modalities with conservative treatment including acupuncture   and epidural steroid injection with fluoroscopy guidance and last resort of surgery      Dw her about broken hardware and will obtain surgical opinion  Consider SIJ intraarticular steroid injection but she wanted to hold for now  She wanted to proceed.Risks not limited to  infection, sepsis, abscess, bleeding , nerve injury, paralysis, and death...   She is on elaquis will need to stop 2 days before procedures. Check with cardiology     Discussed about NSAIDS and I explained about the opioids sparing effect to allow keeping the opioids dose at minimal effective dose.   I went over the potential side effects of the NSAIDS on the gastrointestinal, renal and cardiovascular systems.       I detailed the side effects from the acetaminophen in the medication and made aware of those. I also explained about the cumulative effects on the organs and mainly the liver.      Given the opioids therapy , we discussed about the risk for accidental over dose on the pain medications, either for patient or other household. I went over the mechanism of action and mode of use of the Naloxone according to the  recommendations. I will provide a prescription for a kit.      Follow-up after above or earlier if needed      The level of clinical decision making in this office visit,  is high, given the high risks of complications with the morbidity and mortality due to the fact that acute and chronic pain may pose a threat to life and bodily function, if under treated, poorly treated, or with failure to maintain adequate treatment and timely medical follow up. Additionally over treatment has its own set of complications including overdosing on the pain medications and also the habit forming potentials with the use of the medications used to treat chronic painful conditions including therapeutic classes classified as dangerous medications. Given the serious and fluctuating nature of pain level and instensity with extensive consideration for whenever pain changes, there is always the risk of prolonged functional impairment requiring close patient monitoring with regular assessments and reassessments and high level medical decision making at every office visit. The amount and complexity of data  reviewed is high given the patient clinical presentation, labs,  data, radiology reports, and other tests as discussed during office visits. Pertinent data whether positive or negative were taken in consideration in the process of making this high level medical decision.

## 2024-03-18 ENCOUNTER — APPOINTMENT (OUTPATIENT)
Dept: RHEUMATOLOGY | Facility: CLINIC | Age: 79
End: 2024-03-18
Payer: MEDICARE

## 2024-03-18 ENCOUNTER — OFFICE VISIT (OUTPATIENT)
Dept: WOUND CARE | Facility: CLINIC | Age: 79
End: 2024-03-18
Payer: MEDICARE

## 2024-03-18 VITALS
SYSTOLIC BLOOD PRESSURE: 118 MMHG | HEART RATE: 85 BPM | HEIGHT: 62 IN | DIASTOLIC BLOOD PRESSURE: 76 MMHG | BODY MASS INDEX: 37.17 KG/M2 | WEIGHT: 202 LBS

## 2024-03-18 DIAGNOSIS — L97.221 CHRONIC ULCER OF LEFT CALF LIMITED TO BREAKDOWN OF SKIN (MULTI): Primary | ICD-10-CM

## 2024-03-18 DIAGNOSIS — I82.5Y2 CHRONIC DEEP VEIN THROMBOSIS (DVT) OF PROXIMAL VEIN OF LEFT LOWER EXTREMITY (MULTI): ICD-10-CM

## 2024-03-18 DIAGNOSIS — I87.2 CHRONIC VENOUS INSUFFICIENCY: ICD-10-CM

## 2024-03-18 PROCEDURE — 3074F SYST BP LT 130 MM HG: CPT | Performed by: INTERNAL MEDICINE

## 2024-03-18 PROCEDURE — 99214 OFFICE O/P EST MOD 30 MIN: CPT | Performed by: INTERNAL MEDICINE

## 2024-03-18 PROCEDURE — 1159F MED LIST DOCD IN RCRD: CPT | Performed by: INTERNAL MEDICINE

## 2024-03-18 PROCEDURE — 1036F TOBACCO NON-USER: CPT | Performed by: INTERNAL MEDICINE

## 2024-03-18 PROCEDURE — 1125F AMNT PAIN NOTED PAIN PRSNT: CPT | Performed by: INTERNAL MEDICINE

## 2024-03-18 PROCEDURE — 1160F RVW MEDS BY RX/DR IN RCRD: CPT | Performed by: INTERNAL MEDICINE

## 2024-03-18 PROCEDURE — 3078F DIAST BP <80 MM HG: CPT | Performed by: INTERNAL MEDICINE

## 2024-03-18 PROCEDURE — 1157F ADVNC CARE PLAN IN RCRD: CPT | Performed by: INTERNAL MEDICINE

## 2024-03-18 ASSESSMENT — PAIN SCALES - GENERAL: PAINLEVEL: 8

## 2024-03-18 NOTE — PROGRESS NOTES
"REFERRAL REQUESTED BY:  Dr Albert Garner    LAST SEEN:  2/26/24    Patient ID: Genesis Garcia is a 78 y.o. female     HPI:   Here for follow up right calf ulcer. Using the double tubigrip. Reports walking a lot.     CURRENT DRESSING:  Who is performing the dressing change:  [facility]  Dressing applied: promogram and duoderm  Dressing Frequency: twice weekly    EDEMA MANAGEMENT:  Compression:  Right: Double Tubigrip CALF 35.5   Left: Double Tubigrip   CALF 37.5     Other Modality  Sleeping in Bed: yes  Elevating FOB:  yes    Offloading/Pressure Relief  Activity Level:  ad travon  Protection Devices:  none    Offloading/Pressure Relief Effective?     ROS:      Objective     VITALS:  /76 (BP Location: Left arm, Patient Position: Sitting)   Pulse 85   Ht 1.575 m (5' 2\")   Wt 91.6 kg (202 lb)   BMI 36.95 kg/m²       Physical Exam    ARRIVAL:  Wheelchair  TRANSFER:  One Assist, Minimal    PSYCHIATRIC:  Oriented to person, place and time: A & O x 3    CONSTITUTIONAL:    Appearance:  Well Groomed    SKIN:   chronic skin changes     PULSES:     EXTREMITY TEMPERATURE:    RIGHT: normal  LEFT: normal    EDEMA: mild     WOUND ASSESSMENT:    Wound 04/28/23 Venous Ulcer Leg Left;Lateral (Active)   Date First Assessed: 04/28/23   Present on Original Admission: Yes  Primary Wound Type: Venous Ulcer  Location: (c) Leg  Wound Location Orientation: (c) Left;Lateral      Assessments 9/13/2023  3:13 PM 3/18/2024 10:01 AM   Wound Image      Site Assessment Fibrinous Eschar   Jackie-Wound Assessment Erythematous Intact;Dry   Non-staged Wound Description Full thickness --   Wound Length (cm) 2 cm 0.9 cm   Wound Width (cm) 1.7 cm 0.5 cm   Wound Surface Area (cm^2) 3.4 cm^2 0.45 cm^2   Wound Depth (cm) 0.2 cm --   Wound Volume (cm^3) 0.68 cm^3 --   Wound Bed Granulation (%) 20 % --   Drainage Description Serosanguineous None   Drainage Amount Scant --       Inactive Orders   Date Order Priority Status Authorizing Provider "   02/26/24 1003 Debridement Routine Completed Rosy Atkinson MD   12/28/23 1447 Wound Care Routine Discontinued Chon Lind MD     - Wound Complexity:    Simple   12/28/23 0110 Inpatient Consult to Wound and Ostomy Nurse Routine Completed Chon Lind MD     - Reason for Consult?:    Wound   12/21/23 1617 Wound Care Routine Discontinued Chon Lind MD     - Wound Complexity:    Simple   10/16/23 1107 Debridement Routine Completed Rosy Atkinson MD       Wound 02/26/24 Venous Ulcer Leg Left;Proximal;Lateral (Active)   Date First Assessed: 02/26/24   Primary Wound Type: Venous Ulcer  Location: Leg  Wound Location Orientation: Left;Proximal;Lateral      Assessments 2/26/2024 10:04 AM 3/18/2024 10:00 AM   Wound Image      Site Assessment Fibrinous;Granulation Eschar   Jackie-Wound Assessment Intact;Dry Intact;Dry   Wound Length (cm) 0.5 cm 0.3 cm   Wound Width (cm) 0.4 cm 0.3 cm   Wound Surface Area (cm^2) 0.2 cm^2 0.09 cm^2   Wound Depth (cm) 0.1 cm --   Wound Volume (cm^3) 0.02 cm^3 --   Drainage Description Serosanguineous None   Drainage Amount Scant --       No associated orders.       Wound 03/11/24 Incision Buttocks Right (Active)   Date First Assessed/Time First Assessed: 03/11/24 0953   Present on Original Admission: No  Primary Wound Type: (c) Incision  Location: Buttocks  Wound Location Orientation: Right      Assessments 3/11/2024  9:56 AM 3/11/2024  9:59 AM   Site Assessment Clean;Dry;Intact Unable to assess   Drainage Description -- None   Drainage Amount -- None   Dressing Dry dressing Other (Comment)   Dressing Changed New --   Dressing Status Clean;Dry --       No associated orders.          Procedures    Assessment/Plan   RLE ulcer - healed. Continue the compression. Eucerin for skin care. Tubigrip and circaid. Follow up 4 weeks. Elevate at night and at rest. Continue to increase walking.     VISIT ORDERS:  Vascular Study Required: n  Labs Required: n  Radiology Imaging Required:  n  Consultation Required: n  Rx Provided:   Changes to Plan of Care: y      NEW ORDERS:  Wash: soap and water  Irrigate/Soak:  Skin Care: eucerin daily  Dressing: duoderm for protection change very 3 days  Compression: tubigrip and circaid  Offloading:     DISCHARGE PLANNING:    Follow Up: 4 weeks  Nurse Visit: prn    General Instructions:  Center RN to apply EMLA/Lidocaine 1% jelly/LMX  topically to wound(s) prior to debridement by physician.  Center RN my see patient as a nurse consult in my absence.      RN Post Procedure Note:    Duoderm applied to LLE for protection and patient instructed to do same at home, double tubigrip applied bilat.,  Pt will use single tubigrip and Circaid at home. Will order Circaid for right leg.Enrique WELDON    [facility]isted [facility]  Agency/Facility:   days/week:  Contacted Regarding Changes:

## 2024-03-18 NOTE — PATIENT INSTRUCTIONS
Assessment/Plan   RLE ulcer - healed. Continue the compression. Eucerin for skin care. Tubigrip and circaid. Follow up 4 weeks. Elevate at night and at rest. Continue to increase walking.     VISIT ORDERS:  Vascular Study Required: n  Labs Required: n  Radiology Imaging Required: n  Consultation Required: n  Rx Provided:   Changes to Plan of Care: y      NEW ORDERS:  Wash: soap and water  Irrigate/Soak:  Skin Care: eucerin daily  Dressing: duoderm for protection change very 3 days  Compression: tubigrip and circaid  Offloading:     DISCHARGE PLANNING:    Follow Up: 4 weeks  Nurse Visit: prn

## 2024-03-22 ENCOUNTER — APPOINTMENT (OUTPATIENT)
Dept: RADIOLOGY | Facility: CLINIC | Age: 79
End: 2024-03-22
Payer: MEDICARE

## 2024-03-22 ENCOUNTER — HOSPITAL ENCOUNTER (OUTPATIENT)
Dept: RADIOLOGY | Facility: CLINIC | Age: 79
Discharge: HOME | End: 2024-03-22
Payer: MEDICARE

## 2024-03-22 DIAGNOSIS — S32.010A CLOSED COMPRESSION FRACTURE OF BODY OF L1 VERTEBRA (MULTI): ICD-10-CM

## 2024-03-22 DIAGNOSIS — M96.1 LUMBAR POST-LAMINECTOMY SYNDROME: ICD-10-CM

## 2024-03-22 PROCEDURE — 72148 MRI LUMBAR SPINE W/O DYE: CPT

## 2024-03-22 PROCEDURE — 72148 MRI LUMBAR SPINE W/O DYE: CPT | Performed by: RADIOLOGY

## 2024-03-25 ENCOUNTER — OFFICE VISIT (OUTPATIENT)
Dept: RHEUMATOLOGY | Facility: CLINIC | Age: 79
End: 2024-03-25
Payer: MEDICARE

## 2024-03-25 VITALS — BODY MASS INDEX: 36.95 KG/M2 | WEIGHT: 202 LBS | SYSTOLIC BLOOD PRESSURE: 132 MMHG | DIASTOLIC BLOOD PRESSURE: 71 MMHG

## 2024-03-25 DIAGNOSIS — M35.9 CONNECTIVE TISSUE DISEASE, UNDIFFERENTIATED (MULTI): Primary | ICD-10-CM

## 2024-03-25 PROCEDURE — 3078F DIAST BP <80 MM HG: CPT | Performed by: INTERNAL MEDICINE

## 2024-03-25 PROCEDURE — 1159F MED LIST DOCD IN RCRD: CPT | Performed by: INTERNAL MEDICINE

## 2024-03-25 PROCEDURE — 1036F TOBACCO NON-USER: CPT | Performed by: INTERNAL MEDICINE

## 2024-03-25 PROCEDURE — 1160F RVW MEDS BY RX/DR IN RCRD: CPT | Performed by: INTERNAL MEDICINE

## 2024-03-25 PROCEDURE — 99213 OFFICE O/P EST LOW 20 MIN: CPT | Performed by: INTERNAL MEDICINE

## 2024-03-25 PROCEDURE — 1157F ADVNC CARE PLAN IN RCRD: CPT | Performed by: INTERNAL MEDICINE

## 2024-03-25 PROCEDURE — 3075F SYST BP GE 130 - 139MM HG: CPT | Performed by: INTERNAL MEDICINE

## 2024-03-25 NOTE — PROGRESS NOTES
Subjective . Genesis Garcia is a 79 y.o. female who presents for Follow-up and Rheumatoid Arthritis.    HPI . 79-year-old female with history of UCTD (positive MAURICE, anticentromere antibody and arthralgia), OA s/p bilateral TKR, HTN, DVT, chronic venous insufficiency, and asthma presented for follow-up.     She stated that she is doing well.  She was hospitalized in December for sepsis secondary to pneumonia and cellulitis.    Follows with pain management.  She is maintained on gabapentin, tizanidine and oxycodone.    She has chronic venous insufficiency ulcer and follows the neck.    Immunosuppression: Hydroxychloroquine     Review of Systems   All other systems reviewed and are negative.    Objective     Blood pressure 132/71, weight 91.6 kg (202 lb).    Physical Exam.  Gen. AAO x3, NAD.  HEENT: No pallor or icterus, PERRLA, EOMI. Parotid glands  not enlarged. No cervical lymphadenopathy .  Skin: No rashes.  Heart: S1, S2/ RRR.   Lungs: CTA B.  Abdomen: Soft, NT/ND, BS regular.  MSK: Chronic synovial proliferation of the right hand MCP joints without erythema or tenderness. Bilateral pes planus with bilateral ankle valgus deformity.   Neuro: Sensation to touch intact.  Psych:Appropriate mood and behavior  EXT: 2+ ankle edema.      Assessment/Plan  . 79-year-old female with history of UCTD (positive MAURICE, anticentromere antibody and arthralgia), OA s/p bilateral TKR, HTN, DVT, chronic venous insufficiency, and asthma presented for follow-up.     #1: UCTD.  Stable.  Degenerative arthritic changes in hands.  -Continue hydroxychloroquine twice a day.  Annual eye exam discussed.  -Recent labs reviewed.    #2: Normocytic anemia.  She has an appointment with hematology.        Follow-up in 4 months.      Problem List Items Addressed This Visit    None    Active Ambulatory Problems     Diagnosis Date Noted    DVT, bilateral lower limbs (CMS/HCC) 03/10/2023    Vitamin D deficiency 07/27/2023    Heart murmur 07/27/2023     Diverticulosis 07/27/2023    COPD (chronic obstructive pulmonary disease) (CMS/[facility]) 07/27/2023    Arthritis 07/27/2023    Asthma 07/27/2023    Atrial fibrillation (CMS/[facility]) 07/27/2023    Anemia 07/27/2023    Abnormal mammogram 07/27/2023    MAURICE positive 10/07/2020    Ankle arthritis 08/23/2023    Arthritis of right hip 08/23/2023    Carpal tunnel syndrome 06/07/2012    Cataract 08/23/2023    Chronic bronchitis (CMS/[facility]) 01/18/2012    Chronic low back pain 05/18/2015    Chronic ulcer of left calf limited to breakdown of skin (CMS/[facility]) 08/23/2023    Chronic venous insufficiency 08/23/2023    Connective tissue disease, undifferentiated (CMS/[facility]) 08/23/2023    Essential hypertension 06/15/2012    Flat foot 10/07/2020    Gastroesophageal reflux disease with esophagitis without hemorrhage 08/23/2023    Hypokalemia 08/23/2023    Left breast mass 08/23/2023    Localized swelling of both lower legs 08/23/2023    Morbid obesity due to excess calories (CMS/[facility]) 11/16/2015    Osteopenia, senile 12/29/2020    Postoperative incisional hernia 08/23/2023    Primary osteoarthritis of right knee 09/25/2013    Rosacea 02/13/2020    Umbilical hernia 08/23/2023    Ventral hernia 08/23/2023    Polyarthritis 10/07/2020    Lumbar post-laminectomy syndrome 11/28/2023    Lumbar radiculitis 11/28/2023    Impaired mobility and ADLs 12/23/2023    Generalized weakness 12/27/2023    Sacroiliac inflammation (CMS/[facility]) 02/20/2024    Chronic anticoagulation 02/26/2024    Stage 3b chronic kidney disease (CMS/[facility]) 02/26/2024    Hypothyroidism (acquired) 02/26/2024    Unspecified severe protein-calorie malnutrition (CMS/[facility]) 02/26/2024    Varicose veins of right lower extremity with ulcer other part of lower leg (CODE) (CMS/[facility]) 02/26/2024     Resolved Ambulatory Problems     Diagnosis Date Noted    Abscess of hip, left 08/23/2023    Cellulitis, leg 08/23/2023    Ulcer of right lower extremity (CMS/[facility]) 08/23/2023    Closed fracture of lateral  malleolus 08/23/2023    Decreased GFR 08/23/2023    DVT (deep venous thrombosis) (CMS/HCC) 08/23/2023    Dyspnea on exertion 08/23/2023    Fracture of distal fibula 08/23/2023    Hoarseness 08/23/2023    Multinodular goiter 08/23/2023    Stress fracture 08/23/2023    Voice disturbance 08/23/2023    Acute deep vein thrombosis (DVT) of both lower extremities (CMS/HCC) 08/23/2023    Acute pneumonia 12/20/2023    Thyroid nodule 01/19/2024     Past Medical History:   Diagnosis Date    Cellulitis, unspecified 08/24/2020    Displaced fracture of lateral malleolus of unspecified fibula, initial encounter for closed fracture 07/22/2021    Encounter for follow-up examination after completed treatment for conditions other than malignant neoplasm 11/30/2022    Encounter for other preprocedural examination 10/18/2022    Essential (primary) hypertension 01/04/2023    Other fracture of upper and lower end of unspecified fibula, initial encounter for closed fracture 05/21/2021    Other specified soft tissue disorders 09/19/2022    Pain in left ankle and joints of left foot 05/03/2021    Pain in right ankle and joints of right foot 05/03/2021    Pain in right hip 05/03/2021    Personal history of (healed) stress fracture 06/26/2021    Personal history of diseases of the skin and subcutaneous tissue 11/19/2022    Personal history of other diseases of the circulatory system     Personal history of other diseases of the musculoskeletal system and connective tissue 10/20/2016    Personal history of other diseases of urinary system     Personal history of other specified conditions 11/19/2022    Unspecified asthma, uncomplicated 12/01/2022    Vitamin D deficiency, unspecified 10/20/2016       Family History   Problem Relation Name Age of Onset    Diabetes Mother          type 2    Heart disease Father      Hypertension Father         Past Surgical History:   Procedure Laterality Date    BACK SURGERY  06/20/2016    Back Surgery     CHOLECYSTECTOMY  04/15/2016    Cholecystectomy    OTHER SURGICAL HISTORY  2021    Cataract surgery    THYROIDECTOMY, PARTIAL      TOTAL KNEE ARTHROPLASTY  2022    Knee Replacement       Social History     Tobacco Use   Smoking Status Former    Packs/day: 0.15    Years: 24.00    Additional pack years: 0.00    Total pack years: 3.60    Types: Cigarettes    Quit date:     Years since quittin.2   Smokeless Tobacco Never       Allergies  Sulfur, Cefprozil, and Sulfa (sulfonamide antibiotics)    Current Meds  Current Outpatient Medications   Medication Instructions    acetaminophen (TYLENOL) 650 mg, oral, Every 4 hours PRN    albuterol 2.5 mg, nebulization, Every 2 hour PRN    b complex-vitamin c tablet 1 tablet, oral, Daily    calcium carbonate 600 mg calcium (1,500 mg) tablet 1 tablet, oral, Daily    cholecalciferol (Vitamin D-3) 50 MCG (2000 UT) tablet oral    Eliquis 5 mg tablet 1 tablet, oral, 2 times daily    fluticasone propion-salmeteroL (Advair Diskus) 250-50 mcg/dose diskus inhaler 1 puff, inhalation, 2 times daily RT, Rinse mouth with water after use to reduce aftertaste and incidence of candidiasis. Do not swallow.    gabapentin (NEURONTIN) 400 mg, oral, 3 times daily    hydroxychloroquine (Plaquenil) 200 mg tablet 1 tablet, oral, 2 times daily    levothyroxine (SYNTHROID) 25 mcg, oral, Daily before breakfast    losartan (Cozaar) 100 mg tablet 1 tablet, oral, Daily    methocarbamol (Robaxin) 500 mg tablet if needed.    multivitamin capsule oral    oxyCODONE (Oxy-IR) 5 mg immediate release capsule     spironolactone (Aldactone) 25 mg tablet 1 tablet, oral, Daily    tiZANidine (Zanaflex) 2 mg tablet Take two tablets orally , 3 times a day . Take 2 hours before meals to avoid somnolence.    torsemide 40 mg, oral, Daily        Lab Results   Component Value Date    SEDRATE 36 (H) 04/15/2022    CRP 15.42 (H) 2023    DNADS 2.0 2023    CKTOTAL 351 (H) 04/15/2022         Brent Sheridan  MD

## 2024-04-02 ENCOUNTER — OFFICE VISIT (OUTPATIENT)
Dept: PAIN MEDICINE | Facility: CLINIC | Age: 79
End: 2024-04-02
Payer: MEDICARE

## 2024-04-02 DIAGNOSIS — M80.08XA FRACTURE OF VERTEBRA DUE TO OSTEOPOROSIS, INITIAL ENCOUNTER (MULTI): ICD-10-CM

## 2024-04-02 DIAGNOSIS — M96.1 LUMBAR POST-LAMINECTOMY SYNDROME: Primary | ICD-10-CM

## 2024-04-02 PROCEDURE — 1160F RVW MEDS BY RX/DR IN RCRD: CPT | Performed by: PHYSICAL MEDICINE & REHABILITATION

## 2024-04-02 PROCEDURE — 1159F MED LIST DOCD IN RCRD: CPT | Performed by: PHYSICAL MEDICINE & REHABILITATION

## 2024-04-02 PROCEDURE — 99214 OFFICE O/P EST MOD 30 MIN: CPT | Performed by: PHYSICAL MEDICINE & REHABILITATION

## 2024-04-02 PROCEDURE — 1157F ADVNC CARE PLAN IN RCRD: CPT | Performed by: PHYSICAL MEDICINE & REHABILITATION

## 2024-04-02 NOTE — PROGRESS NOTES
Chief complaint  Back pain     History  Genesis Garcia is back for pain management office visit  Had SIJ did not have long lasting relief  Still having mid back pain   Pain deep and stabbing   MRI showing T12 and L1 collapse . Going to see spine surgeon today and dw her about possible kyphoplasty for the vertebra   No radicular 0pain       Pain level without medication is 8/10 , with the medication pain level 6/10. With OTC       Denied any fever or chills. No weight loss and no night sweats. No cough or sputum production. No diarrhea   The constipation has been responding to fibers and over the counter medications.     No bladder and bowel incontinence and no other changes in bladder and bowel. No skin changes.  Reports tiredness and fatigability only if the pain is not controlled.   Denied opioids diversion and abuse and denies alcoholism. Denies overuse of the pain medications.       Physical examination  Awake, alert and oriented for time place and persons   declined Chaperone for the visit and was adequately  draped for the exam.    Positive percussion of hte middle of back  Skin intact     Diagnosis  Problem List Items Addressed This Visit       Lumbar post-laminectomy syndrome - Primary    Fracture of vertebra due to osteoporosis (CMS/[facility])        Plan  Reviewed the pain generators.  Went over the types of pain with neuropathic and nociceptive and different pathologies and therapeutic modalities. Discussed the mechanism of action of interventions from acupuncture, physical therapy , regular exercises, injections, botox, spinal cord stimulation, and role of surgery     Went over pathology of the intervertebral disc displacement and the anatomical relation to the Nerve roots and relation to the radicular symptoms. Went over treatment modalities with conservative treatment including acupuncture   and epidural steroid injection with fluoroscopy guidance and last resort of surgery     Also dw her about V  fracture and possibility of kyphoplasty will leave that to spine surgeon  She has appt this afternoon     Discussed about NSAIDS and I explained about the opioids sparing effect to allow keeping the opioids dose at minimal effective dose.   I went over the potential side effects of the NSAIDS on the gastrointestinal, renal and cardiovascular systems.      I detailed the side effects from the acetaminophen in the medication and made aware of those. I also explained about the cumulative effects on the organs and mainly the liver.      She has gabapentin    Follow-up after above or earlier if needed     The level of clinical decision making in this office visit,  is high, given the high risks of complications with the morbidity and mortality due to the fact that acute and chronic pain may pose a threat to life and bodily function, if under treated, poorly treated, or with failure to maintain adequate treatment and timely medical follow up. Additionally over treatment has its own set of complications including overdosing on the pain medications and also the habit forming potentials with the use of the medications used to treat chronic painful conditions including therapeutic classes classified as dangerous medications. Given the serious and fluctuating nature of pain level and instensity with extensive consideration for whenever pain changes, there is always the risk of prolonged functional impairment requiring close patient monitoring with regular assessments and reassessments and high level medical decision making at every office visit. The amount and complexity of data reviewed is high given the patient clinical presentation, labs,  data, radiology reports, and other tests as discussed during office visits. Pertinent data whether positive or negative were taken in consideration in the process of making this high level medical decision.

## 2024-04-04 ENCOUNTER — APPOINTMENT (OUTPATIENT)
Dept: HEMATOLOGY/ONCOLOGY | Facility: CLINIC | Age: 79
End: 2024-04-04
Payer: MEDICARE

## 2024-04-04 ENCOUNTER — TELEMEDICINE (OUTPATIENT)
Dept: ORTHOPEDIC SURGERY | Facility: CLINIC | Age: 79
End: 2024-04-04
Payer: MEDICARE

## 2024-04-04 DIAGNOSIS — M46.46 DISCITIS OF LUMBAR REGION: Primary | ICD-10-CM

## 2024-04-04 PROCEDURE — 1159F MED LIST DOCD IN RCRD: CPT | Performed by: PHYSICIAN ASSISTANT

## 2024-04-04 PROCEDURE — 99443 PR PHYS/QHP TELEPHONE EVALUATION 21-30 MIN: CPT | Performed by: PHYSICIAN ASSISTANT

## 2024-04-04 PROCEDURE — 1157F ADVNC CARE PLAN IN RCRD: CPT | Performed by: PHYSICIAN ASSISTANT

## 2024-04-04 PROCEDURE — 1160F RVW MEDS BY RX/DR IN RCRD: CPT | Performed by: PHYSICIAN ASSISTANT

## 2024-04-05 ENCOUNTER — LAB (OUTPATIENT)
Dept: LAB | Facility: LAB | Age: 79
End: 2024-04-05
Payer: MEDICARE

## 2024-04-05 ENCOUNTER — HOSPITAL ENCOUNTER (OUTPATIENT)
Dept: RADIOLOGY | Facility: CLINIC | Age: 79
Discharge: HOME | End: 2024-04-05
Payer: MEDICARE

## 2024-04-05 DIAGNOSIS — E03.9 HYPOTHYROIDISM (ACQUIRED): ICD-10-CM

## 2024-04-05 DIAGNOSIS — M46.46 DISCITIS OF LUMBAR REGION: ICD-10-CM

## 2024-04-05 DIAGNOSIS — E04.1 THYROID NODULE: ICD-10-CM

## 2024-04-05 LAB
CRP SERPL-MCNC: 1.41 MG/DL
ERYTHROCYTE [SEDIMENTATION RATE] IN BLOOD BY WESTERGREN METHOD: 21 MM/H (ref 0–30)

## 2024-04-05 PROCEDURE — 86140 C-REACTIVE PROTEIN: CPT

## 2024-04-05 PROCEDURE — 36415 COLL VENOUS BLD VENIPUNCTURE: CPT

## 2024-04-05 PROCEDURE — 76536 US EXAM OF HEAD AND NECK: CPT | Performed by: RADIOLOGY

## 2024-04-05 PROCEDURE — 85652 RBC SED RATE AUTOMATED: CPT

## 2024-04-05 PROCEDURE — 76536 US EXAM OF HEAD AND NECK: CPT

## 2024-04-05 RX ORDER — LEVOTHYROXINE SODIUM 25 UG/1
25 TABLET ORAL
Qty: 90 TABLET | Refills: 3 | Status: SHIPPED | OUTPATIENT
Start: 2024-04-05 | End: 2025-03-31

## 2024-04-08 ENCOUNTER — OFFICE VISIT (OUTPATIENT)
Dept: HEMATOLOGY/ONCOLOGY | Facility: CLINIC | Age: 79
End: 2024-04-08
Payer: MEDICARE

## 2024-04-08 VITALS
DIASTOLIC BLOOD PRESSURE: 88 MMHG | OXYGEN SATURATION: 95 % | SYSTOLIC BLOOD PRESSURE: 151 MMHG | RESPIRATION RATE: 16 BRPM | HEIGHT: 62 IN | TEMPERATURE: 98.6 F | BODY MASS INDEX: 36.94 KG/M2

## 2024-04-08 DIAGNOSIS — D50.8 OTHER IRON DEFICIENCY ANEMIA: Primary | ICD-10-CM

## 2024-04-08 DIAGNOSIS — K90.9 MALABSORPTION OF IRON (HHS-HCC): ICD-10-CM

## 2024-04-08 DIAGNOSIS — S32.010A CLOSED COMPRESSION FRACTURE OF BODY OF L1 VERTEBRA (MULTI): Primary | ICD-10-CM

## 2024-04-08 LAB
ALBUMIN SERPL BCP-MCNC: 3.9 G/DL (ref 3.4–5)
ALP SERPL-CCNC: 92 U/L (ref 33–136)
ALT SERPL W P-5'-P-CCNC: 8 U/L (ref 7–45)
ANION GAP SERPL CALC-SCNC: 11 MMOL/L (ref 10–20)
AST SERPL W P-5'-P-CCNC: 14 U/L (ref 9–39)
BASOPHILS # BLD AUTO: 0.03 X10*3/UL (ref 0–0.1)
BASOPHILS NFR BLD AUTO: 0.6 %
BILIRUB SERPL-MCNC: 0.4 MG/DL (ref 0–1.2)
BUN SERPL-MCNC: 23 MG/DL (ref 6–23)
CALCIUM SERPL-MCNC: 8.8 MG/DL (ref 8.6–10.3)
CHLORIDE SERPL-SCNC: 106 MMOL/L (ref 98–107)
CO2 SERPL-SCNC: 25 MMOL/L (ref 21–32)
CREAT SERPL-MCNC: 1.15 MG/DL (ref 0.5–1.05)
EGFRCR SERPLBLD CKD-EPI 2021: 49 ML/MIN/1.73M*2
EOSINOPHIL # BLD AUTO: 0.21 X10*3/UL (ref 0–0.4)
EOSINOPHIL NFR BLD AUTO: 4.4 %
ERYTHROCYTE [DISTWIDTH] IN BLOOD BY AUTOMATED COUNT: 16.6 % (ref 11.5–14.5)
FERRITIN SERPL-MCNC: 18 NG/ML (ref 8–150)
FOLATE SERPL-MCNC: 20.4 NG/ML
GLUCOSE SERPL-MCNC: 73 MG/DL (ref 74–99)
HCT VFR BLD AUTO: 29 % (ref 36–46)
HGB BLD-MCNC: 8.7 G/DL (ref 12–16)
HGB RETIC QN: 21 PG (ref 28–38)
IMM GRANULOCYTES # BLD AUTO: 0.01 X10*3/UL (ref 0–0.5)
IMM GRANULOCYTES NFR BLD AUTO: 0.2 % (ref 0–0.9)
IMMATURE RETIC FRACTION: 18.5 %
IRON SATN MFR SERPL: 4 % (ref 25–45)
IRON SERPL-MCNC: 14 UG/DL (ref 35–150)
LDH SERPL L TO P-CCNC: 141 U/L (ref 84–246)
LYMPHOCYTES # BLD AUTO: 0.78 X10*3/UL (ref 0.8–3)
LYMPHOCYTES NFR BLD AUTO: 16.5 %
MCH RBC QN AUTO: 21.5 PG (ref 26–34)
MCHC RBC AUTO-ENTMCNC: 30 G/DL (ref 32–36)
MCV RBC AUTO: 72 FL (ref 80–100)
MONOCYTES # BLD AUTO: 0.58 X10*3/UL (ref 0.05–0.8)
MONOCYTES NFR BLD AUTO: 12.3 %
NEUTROPHILS # BLD AUTO: 3.12 X10*3/UL (ref 1.6–5.5)
NEUTROPHILS NFR BLD AUTO: 66 %
NRBC BLD-RTO: ABNORMAL /100{WBCS}
PLATELET # BLD AUTO: 196 X10*3/UL (ref 150–450)
POTASSIUM SERPL-SCNC: 3.9 MMOL/L (ref 3.5–5.3)
PROT SERPL-MCNC: 6.8 G/DL (ref 6.4–8.2)
PROT SERPL-MCNC: 6.8 G/DL (ref 6.4–8.2)
RBC # BLD AUTO: 4.04 X10*6/UL (ref 4–5.2)
RETICS #: 0.05 X10*6/UL (ref 0.02–0.11)
RETICS/RBC NFR AUTO: 1.2 % (ref 0.5–2)
SODIUM SERPL-SCNC: 138 MMOL/L (ref 136–145)
TIBC SERPL-MCNC: 394 UG/DL (ref 240–445)
TSH SERPL-ACNC: 2.55 MIU/L (ref 0.44–3.98)
UIBC SERPL-MCNC: 380 UG/DL (ref 110–370)
VIT B12 SERPL-MCNC: 713 PG/ML (ref 211–911)
WBC # BLD AUTO: 4.7 X10*3/UL (ref 4.4–11.3)

## 2024-04-08 PROCEDURE — 99205 OFFICE O/P NEW HI 60 MIN: CPT | Performed by: NURSE PRACTITIONER

## 2024-04-08 PROCEDURE — 84443 ASSAY THYROID STIM HORMONE: CPT | Performed by: NURSE PRACTITIONER

## 2024-04-08 PROCEDURE — 84165 PROTEIN E-PHORESIS SERUM: CPT | Mod: 91 | Performed by: NURSE PRACTITIONER

## 2024-04-08 PROCEDURE — 84155 ASSAY OF PROTEIN SERUM: CPT | Mod: 59 | Performed by: NURSE PRACTITIONER

## 2024-04-08 PROCEDURE — 36415 COLL VENOUS BLD VENIPUNCTURE: CPT | Performed by: NURSE PRACTITIONER

## 2024-04-08 PROCEDURE — 99215 OFFICE O/P EST HI 40 MIN: CPT | Mod: 25 | Performed by: NURSE PRACTITIONER

## 2024-04-08 PROCEDURE — 1036F TOBACCO NON-USER: CPT | Performed by: NURSE PRACTITIONER

## 2024-04-08 PROCEDURE — 82607 VITAMIN B-12: CPT | Performed by: NURSE PRACTITIONER

## 2024-04-08 PROCEDURE — 86334 IMMUNOFIX E-PHORESIS SERUM: CPT | Mod: PORLAB | Performed by: NURSE PRACTITIONER

## 2024-04-08 PROCEDURE — 83615 LACTATE (LD) (LDH) ENZYME: CPT | Performed by: NURSE PRACTITIONER

## 2024-04-08 PROCEDURE — 83550 IRON BINDING TEST: CPT | Mod: 91 | Performed by: NURSE PRACTITIONER

## 2024-04-08 PROCEDURE — 82668 ASSAY OF ERYTHROPOIETIN: CPT | Performed by: NURSE PRACTITIONER

## 2024-04-08 PROCEDURE — 82746 ASSAY OF FOLIC ACID SERUM: CPT | Performed by: NURSE PRACTITIONER

## 2024-04-08 PROCEDURE — 86320 SERUM IMMUNOELECTROPHORESIS: CPT | Performed by: NURSE PRACTITIONER

## 2024-04-08 PROCEDURE — 1126F AMNT PAIN NOTED NONE PRSNT: CPT | Performed by: NURSE PRACTITIONER

## 2024-04-08 PROCEDURE — 85025 COMPLETE CBC W/AUTO DIFF WBC: CPT | Performed by: NURSE PRACTITIONER

## 2024-04-08 PROCEDURE — 3077F SYST BP >= 140 MM HG: CPT | Performed by: NURSE PRACTITIONER

## 2024-04-08 PROCEDURE — 80053 COMPREHEN METABOLIC PANEL: CPT | Performed by: NURSE PRACTITIONER

## 2024-04-08 PROCEDURE — 85045 AUTOMATED RETICULOCYTE COUNT: CPT | Performed by: NURSE PRACTITIONER

## 2024-04-08 PROCEDURE — 1160F RVW MEDS BY RX/DR IN RCRD: CPT | Performed by: NURSE PRACTITIONER

## 2024-04-08 PROCEDURE — 83521 IG LIGHT CHAINS FREE EACH: CPT | Mod: PORLAB | Performed by: NURSE PRACTITIONER

## 2024-04-08 PROCEDURE — 1159F MED LIST DOCD IN RCRD: CPT | Performed by: NURSE PRACTITIONER

## 2024-04-08 PROCEDURE — 3079F DIAST BP 80-89 MM HG: CPT | Performed by: NURSE PRACTITIONER

## 2024-04-08 PROCEDURE — 82728 ASSAY OF FERRITIN: CPT | Performed by: NURSE PRACTITIONER

## 2024-04-08 PROCEDURE — 1157F ADVNC CARE PLAN IN RCRD: CPT | Performed by: NURSE PRACTITIONER

## 2024-04-08 RX ORDER — DIPHENHYDRAMINE HYDROCHLORIDE 50 MG/ML
50 INJECTION INTRAMUSCULAR; INTRAVENOUS AS NEEDED
Status: CANCELLED | OUTPATIENT
Start: 2024-04-18

## 2024-04-08 RX ORDER — ALBUTEROL SULFATE 0.83 MG/ML
3 SOLUTION RESPIRATORY (INHALATION) AS NEEDED
Status: CANCELLED | OUTPATIENT
Start: 2024-04-18

## 2024-04-08 RX ORDER — FAMOTIDINE 10 MG/ML
20 INJECTION INTRAVENOUS ONCE AS NEEDED
Status: CANCELLED | OUTPATIENT
Start: 2024-04-18

## 2024-04-08 RX ORDER — EPINEPHRINE 0.3 MG/.3ML
0.3 INJECTION SUBCUTANEOUS EVERY 5 MIN PRN
Status: CANCELLED | OUTPATIENT
Start: 2024-04-18

## 2024-04-08 ASSESSMENT — PAIN SCALES - GENERAL: PAINLEVEL: 0-NO PAIN

## 2024-04-08 NOTE — PROGRESS NOTES
I spoke with Veronica over the phone today to discuss her lab results.    Her ESR is within normal limits.  Her CRP is slightly elevated but significantly trending down from 3 months ago when she had active cellulitis in her lower extremities.    At this time I think she would be a candidate for a kyphoplasty for her L1 compression fracture.  A referral was placed for Dr. Reese, she will call to make an appointment.    **This note was dictated using speech recognition software and was not corrected for spelling or grammatical errors**

## 2024-04-08 NOTE — PATIENT INSTRUCTIONS
NPV with Patricia for Anemia.  Patricia ordered IV Feraheme to be administered here at [facility], to be started in 2 weeks  Genesis will return in 6 weeks for repeat lab work-- please george on calendar   [facility]  Labs same day in ons

## 2024-04-08 NOTE — PROGRESS NOTES
"Patient ID: Genesis Garcia is a 79 y.o. female.  Referring Physician: No referring provider defined for this encounter.  Primary Care Provider: Albert Garner MD  Visit Type: Initial Visit      Subjective    Location:  [facility] [place]  Initial consult: 4/8/2024  Reason: Microcytic Anemia  Referred by Dr Garner    Patient is a 80 yo woman with history of UCTD (positive MAURICE, anticentromere antibody and arthralgia), OA s/p bilateral TKR, HTN, DVT on Eliquis, stage 3 CKD, chronic venous insufficiency, recent cellulitis and leg ulcers, seeing the wound clinic under the care of Dr Rosy Atkinson.  She was hospitalized in December for sepsis secondary to pneumonia and cellulitis. She suffers from chronic lower extremity cellulitis, she has been finding this for approximately 2 years.  When her cellulitis is flared up her left leg gives out.  She suffered from a fall in early December.  Since the fall she has suffered from constant daily low back pain.  She does not have pain radiating down her legs.  Her cellulitis has significantly improved, she does have a small dime sized ulcer on the outside of her left leg.  She has PT and OT coming to the house.  She is able to ambulate with a walker.  She is established with Dr. Wakefield from pain management.  She is maintained on gabapentin, tizanidine and oxycodone.     Today, patient presents for initial consultation in a wheelchair. Denies abnormal weight loss, night sweats, fever. Patient does endorse fatigue, but denies chills, sob, cp, n/v/d, n/t. No PICA. Denies any abnormal bleeding or bruising from any sight. Up to date on GI screening. No recurrent infections or lymphadenopathy. No known blood disorders in family. Has had surgery in past w/o issue. Never had blood/blood products. Denies NSAID use.     10 point review of systems negative except as stated in HPI      Objective   BSA: 2 meters squared  /88   Temp 37 °C (98.6 °F)   Resp 16   Ht 1.575 m (5' 2.01\")  "  SpO2 95%   BMI 36.94 kg/m²      has a past medical history of Cellulitis, unspecified (2020), Displaced fracture of lateral malleolus of unspecified fibula, initial encounter for closed fracture (2021), Encounter for follow-up examination after completed treatment for conditions other than malignant neoplasm (2022), Encounter for other preprocedural examination (10/18/2022), Essential (primary) hypertension (2023), Other fracture of upper and lower end of unspecified fibula, initial encounter for closed fracture (2021), Other specified soft tissue disorders (2022), Pain in left ankle and joints of left foot (2021), Pain in right ankle and joints of right foot (2021), Pain in right hip (2021), Personal history of (healed) stress fracture (2021), Personal history of diseases of the skin and subcutaneous tissue (2022), Personal history of other diseases of the circulatory system, Personal history of other diseases of the musculoskeletal system and connective tissue (10/20/2016), Personal history of other diseases of urinary system, Personal history of other specified conditions (2022), Unspecified asthma, uncomplicated (2022), and Vitamin D deficiency, unspecified (10/20/2016).   has a past surgical history that includes Cholecystectomy (04/15/2016); Back surgery (2016); Total knee arthroplasty (2022); Other surgical history (2021); and Thyroidectomy, partial.  A previous lumbar surgery with Dr. Spangler around .  She has a L4-S1 fusion.   Family History   Problem Relation Name Age of Onset    Diabetes Mother          type 2    Heart disease Father      Hypertension Father       Mother  of a stroke at age 66, jimmyer  of an M1 aage 54, she had a daughter who  from lung cancer in her 50's. 2 other children with no significant health problems.     Genesis Garcia quit smoking about 35 years ago.  She   reports that she does not currently use alcohol.  She  reports that she does not currently use drugs.  Lives with a friend and 2 great-grandsons. Has good social and family support.    Physical Exam  Constitutional:       Appearance: She is obese.   HENT:      Head: Normocephalic.      Nose: Nose normal.      Mouth/Throat:      Mouth: Mucous membranes are moist.   Eyes:      Pupils: Pupils are equal, round, and reactive to light.   Cardiovascular:      Rate and Rhythm: Normal rate and regular rhythm.      Pulses: Normal pulses.      Heart sounds: Normal heart sounds.   Pulmonary:      Effort: Pulmonary effort is normal.      Breath sounds: Normal breath sounds.   Abdominal:      General: Bowel sounds are normal.      Palpations: Abdomen is soft.   Musculoskeletal:         General: Normal range of motion.   Skin:     General: Skin is warm and dry.   Neurological:      General: No focal deficit present.      Mental Status: She is alert and oriented to person, place, and time.   Psychiatric:         Mood and Affect: Mood normal.         Behavior: Behavior normal.     Patient is in a wheelchair and has bilateral lower extremity weakness.     WBC   Date/Time Value Ref Range Status   03/01/2024 09:03 AM 8.5 4.4 - 11.3 x10*3/uL Final   12/29/2023 06:33 AM 10.2 4.4 - 11.3 x10*3/uL Final   12/28/2023 06:17 AM 11.7 (H) 4.4 - 11.3 x10*3/uL Final     nRBC   Date Value Ref Range Status   03/01/2024 0.0 0.0 - 0.0 /100 WBCs Final   12/29/2023 0.0 0.0 - 0.0 /100 WBCs Final   12/28/2023 0.0 0.0 - 0.0 /100 WBCs Final     RBC   Date Value Ref Range Status   03/01/2024 4.01 4.00 - 5.20 x10*6/uL Final   12/29/2023 3.52 (L) 4.00 - 5.20 x10*6/uL Final   12/28/2023 3.63 (L) 4.00 - 5.20 x10*6/uL Final     Hemoglobin   Date Value Ref Range Status   03/01/2024 8.7 (L) 12.0 - 16.0 g/dL Final   12/29/2023 7.5 (L) 12.0 - 16.0 g/dL Final   12/28/2023 7.7 (L) 12.0 - 16.0 g/dL Final     Hematocrit   Date Value Ref Range Status   03/01/2024 30.0 (L)  "36.0 - 46.0 % Final   12/29/2023 25.5 (L) 36.0 - 46.0 % Final   12/28/2023 25.6 (L) 36.0 - 46.0 % Final     MCV   Date/Time Value Ref Range Status   03/01/2024 09:03 AM 75 (L) 80 - 100 fL Final   12/29/2023 06:33 AM 72 (L) 80 - 100 fL Final   12/28/2023 06:17 AM 71 (L) 80 - 100 fL Final     MCH   Date/Time Value Ref Range Status   03/01/2024 09:03 AM 21.7 (L) 26.0 - 34.0 pg Final   12/29/2023 06:33 AM 21.3 (L) 26.0 - 34.0 pg Final   12/28/2023 06:17 AM 21.2 (L) 26.0 - 34.0 pg Final     MCHC   Date/Time Value Ref Range Status   03/01/2024 09:03 AM 29.0 (L) 32.0 - 36.0 g/dL Final   12/29/2023 06:33 AM 29.4 (L) 32.0 - 36.0 g/dL Final   12/28/2023 06:17 AM 30.1 (L) 32.0 - 36.0 g/dL Final     RDW   Date/Time Value Ref Range Status   03/01/2024 09:03 AM 18.3 (H) 11.5 - 14.5 % Final   12/29/2023 06:33 AM 18.1 (H) 11.5 - 14.5 % Final   12/28/2023 06:17 AM 17.9 (H) 11.5 - 14.5 % Final     Platelets   Date/Time Value Ref Range Status   03/01/2024 09:03  150 - 450 x10*3/uL Final   12/29/2023 06:33  (H) 150 - 450 x10*3/uL Final   12/28/2023 06:17  (H) 150 - 450 x10*3/uL Final     No results found for: \"MPV\"  Neutrophils %   Date/Time Value Ref Range Status   12/27/2023 12:15 PM 75.5 40.0 - 80.0 % Final   08/25/2023 02:30 PM 61.4 40.0 - 80.0 % Final   08/25/2023 02:27 PM CANCELED       Comment:     Result canceled by the ancillary.   01/17/2023 01:45 PM 76.0 40.0 - 80.0 % Final     Immature Granulocytes %, Automated   Date/Time Value Ref Range Status   12/27/2023 12:15 PM 3.9 (H) 0.0 - 0.9 % Final     Comment:     Immature Granulocyte Count (IG) includes promyelocytes, myelocytes and metamyelocytes but does not include bands. Percent differential counts (%) should be interpreted in the context of the absolute cell counts (cells/UL).   08/25/2023 02:30 PM 0.3 0.0 - 0.9 % Final     Comment:      Immature Granulocyte Count (IG) includes promyelocytes,    myelocytes and metamyelocytes but does not include bands.   " Percent differential counts (%) should be interpreted in the   context of the absolute cell counts (cells/L).     08/25/2023 02:27 PM CANCELED       Comment:      Immature Granulocyte Count (IG) includes promyelocytes,    myelocytes and metamyelocytes but does not include bands.   Percent differential counts (%) should be interpreted in the   context of the absolute cell counts (cells/L).    Result canceled by the ancillary.     01/17/2023 01:45 PM 2.0 (H) 0.0 - 0.9 % Final     Comment:      Immature Granulocyte Count (IG) includes promyelocytes,    myelocytes and metamyelocytes but does not include bands.   Percent differential counts (%) should be interpreted in the   context of the absolute cell counts (cells/L).       Lymphocytes %   Date/Time Value Ref Range Status   12/27/2023 12:15 PM 9.7 13.0 - 44.0 % Final   08/25/2023 02:30 PM 17.4 13.0 - 44.0 % Final   08/25/2023 02:27 PM CANCELED       Comment:     Result canceled by the ancillary.   01/17/2023 01:45 PM 10.6 13.0 - 44.0 % Final     Monocytes %   Date/Time Value Ref Range Status   12/27/2023 12:15 PM 8.8 2.0 - 10.0 % Final   08/25/2023 02:30 PM 13.9 2.0 - 10.0 % Final   08/25/2023 02:27 PM CANCELED       Comment:     Result canceled by the ancillary.   01/17/2023 01:45 PM 9.3 2.0 - 10.0 % Final     Eosinophils %   Date/Time Value Ref Range Status   12/27/2023 12:15 PM 1.7 0.0 - 6.0 % Final   08/25/2023 02:30 PM 6.1 0.0 - 6.0 % Final   08/25/2023 02:27 PM CANCELED       Comment:     Result canceled by the ancillary.   01/17/2023 01:45 PM 1.8 0.0 - 6.0 % Final     Basophils %   Date/Time Value Ref Range Status   12/27/2023 12:15 PM 0.4 0.0 - 2.0 % Final   08/25/2023 02:30 PM 0.9 0.0 - 2.0 % Final   08/25/2023 02:27 PM CANCELED       Comment:     Result canceled by the ancillary.   01/17/2023 01:45 PM 0.3 0.0 - 2.0 % Final     Neutrophils Absolute   Date/Time Value Ref Range Status   12/27/2023 12:15 PM 9.64 (H) 1.60 - 5.50 x10*3/uL Final     Comment:      "Percent differential counts (%) should be interpreted in the context of the absolute cell counts (cells/uL).   08/25/2023 02:30 PM 3.94 1.60 - 5.50 x10E9/L Final   08/25/2023 02:27 PM CANCELED       Comment:     Result canceled by the ancillary.   01/17/2023 01:45 PM 6.87 (H) 1.60 - 5.50 x10E9/L Final     Immature Granulocytes Absolute, Automated   Date/Time Value Ref Range Status   12/27/2023 12:15 PM 0.50 0.00 - 0.50 x10*3/uL Final     Lymphocytes Absolute   Date/Time Value Ref Range Status   12/27/2023 12:15 PM 1.24 0.80 - 3.00 x10*3/uL Final   08/25/2023 02:30 PM 1.12 0.80 - 3.00 x10E9/L Final   08/25/2023 02:27 PM CANCELED       Comment:     Result canceled by the ancillary.   01/17/2023 01:45 PM 0.96 0.80 - 3.00 x10E9/L Final     Monocytes Absolute   Date/Time Value Ref Range Status   12/27/2023 12:15 PM 1.13 (H) 0.05 - 0.80 x10*3/uL Final   08/25/2023 02:30 PM 0.89 (H) 0.05 - 0.80 x10E9/L Final   08/25/2023 02:27 PM CANCELED       Comment:     Result canceled by the ancillary.   01/17/2023 01:45 PM 0.84 (H) 0.05 - 0.80 x10E9/L Final     Eosinophils Absolute   Date/Time Value Ref Range Status   12/27/2023 12:15 PM 0.22 0.00 - 0.40 x10*3/uL Final   08/25/2023 02:30 PM 0.39 0.00 - 0.40 x10E9/L Final   08/25/2023 02:27 PM CANCELED       Comment:     Result canceled by the ancillary.   01/17/2023 01:45 PM 0.16 0.00 - 0.40 x10E9/L Final     Basophils Absolute   Date/Time Value Ref Range Status   12/27/2023 12:15 PM 0.05 0.00 - 0.10 x10*3/uL Final   08/25/2023 02:30 PM 0.06 0.00 - 0.10 x10E9/L Final   08/25/2023 02:27 PM CANCELED       Comment:     Result canceled by the ancillary.   01/17/2023 01:45 PM 0.03 0.00 - 0.10 x10E9/L Final       No components found for: \"PT\"  aPTT   Date/Time Value Ref Range Status   08/01/2020 09:04 AM 29 25 - 35 sec Final     Comment:      Note new reference range as of 05/26/2020.    THE APTT IS NO LONGER USED FOR MONITORING     UNFRACTIONATED HEPARIN THERAPY.    FOR MONITORING HEPARIN " THERAPY,     USE THE HEPARIN ASSAY.     11/27/2017 09:33 AM 29 25 - 36 sec Final     Comment:       THE APTT IS NO LONGER USED FOR MONITORING     UNFRACTIONATED HEPARIN THERAPY.    FOR MONITORING HEPARIN THERAPY,     USE THE HEPARIN ASSAY.         Assessment/Plan    80 yo woman with history of UCTD (positive MAURICE, anticentromere antibody and arthralgia), OA s/p bilateral TKR, HTN, DVT on Eliquis, stage 3 CKD, chronic venous insufficiency, recent cellulitis and leg ulcers, seeing the wound clinic under the care of Dr Rosy Atkinson.  She was hospitalized in December for sepsis secondary to pneumonia and cellulitis. Referred for consult for microcytic anemia.     I reviewed the patients medical record for labs and noted that this patient has had significant microcytic anemia for  at least 2 years. She denies blood loss that she is aware of. Discussed possible etiologies including multifactorial, nutritional deficiencies, anemia of chronic disease, malabsorption, hemopathy, medications, bleeding, malignancy, etc. We noted that we know she is iron deficient and has stage 3 CKD, and inflammation that could be contributing to her anemia. Also could be B12, folic acid or other deficiencies and being on a blood thinner. Patient has had oral iron in the past and it caused severe constipation. We discussed IV iron to improve her anemia and quality of life and she agreed. We also discussed that once we replace iron and other vitamin deficiencies, we will discuss Procrit if her hemoglobin is still below 10g/dL. Patient acknowledged understanding.    IV iron without premedication, observe patient for hypersensitivity reaction for 30 minutes post infusion. Side effects of Feraheme were explained, including but are not limited to hypotension, edema, chest pain, hypertension, dizziness, headache, fatigue, pruritus, rash, diarrhea, nausea, constipation, vomiting, abdominal pain, hypersensitivity reaction which can be severe and  potentially life-threatening, pain, muscle spasm, shortness of breath, cough, fever, anaphylaxis, angioedema, arrhythmia, cardiac failure, ischemic heart disease, syncope, urticaria-patient agrees to proceed if needed.    -Avoid NSAIDs and ASA while on blood thinners as they affect platelet activity and increase risk of bleeding  -Use tylenol preferably for pain  -Avoid contact sports  -Avoid high speed rides at amusement turner like [place]  -Call or visit closest ER with any persistent headaches, visual disturbances, weakness, numbness, severe bleeding/bruising or any concerning signs or symptoms.  -May need to wear a medical alert bracelet stating the use of anticoagulants.  -To call and schedule appt with one of our physicians for pre-operative clearance and recommendations at least 1 month before any invasive procedures/surgeries     Plan:  1. Labs today: CBC/diff, CMP, retics, iron studies, B12, folate, EPO level, TSH, MGUS labs. Will call with abnormal findings  2. Start Feraheme 510 mg IV x 2 doses 1 week apart  3. Repeat CBCdiff, iron studies and B12 and folate in 6 weeks  4. RTC for inperson visit and repeat labs in 3 months.  5. Follow up with Dr Atkinson regarding wound care and Eliquis management.     I had an extensive discussion with the patient regarding the diagnosis and discussed the plan of therapy, including general considerations regarding side effects and outcomes. Pt understood and gave appropriate teach back about the plan of care. All questions were answered to the patient's satisfaction. The patient is instructed to contact us at any time if questions or problems arise. Thank you for the opportunity to participate in the care of this very pleasant patient.     Total time = 60 mins, of which >50% was spent in counseling and/or coordination of care including reviewing medical history/radiology/labs, examining patient, formulating outlined plan with team, and discussing plan with  patient/family.      Rosanne M Casal, APRN-CNP, DNP

## 2024-04-08 NOTE — PROGRESS NOTES
I spoke to Genesis over the phone the morning of 4/5.    She suffers from chronic lower extremity cellulitis, she has been fighting this for approximately 2 years.  When her cellulitis is flared up her left leg gives out.  She suffered from a fall in early December.  Since the fall she has suffered from constant daily low back pain.  She does not have pain radiating down her legs.  Her cellulitis has significantly improved, she does have a small dime sized ulcer on the outside of her left leg.  She has PT and OT coming to the house.  She is able to ambulate with a walker.  She is established with Dr. Wakefield from pain management, he currently has her on muscle relaxers.    A previous lumbar surgery with Dr. Spangler around 2000/2001.  She has a L4-S1 fusion.    Family, social, and medical histories are obtained and reviewed.    ROS: All other systems have been reviewed and are negative except as previously noted in history of present illness.    Physical exam: Not completed due to telephone conversation.    I personally reviewed a MRI of the lumbar spine from 3/22.  There is a compression fracture at L1 with approximately 75% height loss.  There is increased signal in the disc space at T12-L1 concerning for possible discitis or osteomyelitis.    I discussed with Veronica that her MRI is concerning for a possible infection within the spine.  At this time I recommend we get inflammatory markers, ESR and CRP to further evaluate for possible infection.  I will call her with the results of the labs and to discuss next steps, possibly a biopsy.    **This note was dictated using speech recognition software and was not corrected for spelling or grammatical errors**

## 2024-04-09 ENCOUNTER — TELEPHONE (OUTPATIENT)
Dept: OTOLARYNGOLOGY | Facility: HOSPITAL | Age: 79
End: 2024-04-09
Payer: MEDICARE

## 2024-04-09 LAB
KAPPA LC SERPL-MCNC: 4.66 MG/DL (ref 0.33–1.94)
KAPPA LC/LAMBDA SER: 1.49 {RATIO} (ref 0.26–1.65)
LAMBDA LC SERPL-MCNC: 3.13 MG/DL (ref 0.57–2.63)

## 2024-04-10 LAB
ALBUMIN: 3.9 G/DL (ref 3.4–5)
ALPHA 1 GLOBULIN: 0.3 G/DL (ref 0.2–0.6)
ALPHA 2 GLOBULIN: 0.7 G/DL (ref 0.4–1.1)
BETA GLOBULIN: 1 G/DL (ref 0.5–1.2)
EPO SERPL-ACNC: 22 MU/ML (ref 4–27)
GAMMA GLOBULIN: 0.9 G/DL (ref 0.5–1.4)
IMMUNOFIXATION COMMENT: NORMAL
PATH REVIEW - SERUM IMMUNOFIXATION: NORMAL
PATH REVIEW-SERUM PROTEIN ELECTROPHORESIS: NORMAL
PROTEIN ELECTROPHORESIS COMMENT: NORMAL

## 2024-04-10 RX ORDER — HEPARIN 100 UNIT/ML
500 SYRINGE INTRAVENOUS AS NEEDED
Status: CANCELLED | OUTPATIENT
Start: 2024-04-10

## 2024-04-10 RX ORDER — HEPARIN SODIUM,PORCINE/PF 10 UNIT/ML
50 SYRINGE (ML) INTRAVENOUS AS NEEDED
Status: CANCELLED | OUTPATIENT
Start: 2024-04-10

## 2024-04-12 ENCOUNTER — TELEMEDICINE (OUTPATIENT)
Dept: OTOLARYNGOLOGY | Facility: CLINIC | Age: 79
End: 2024-04-12
Payer: MEDICARE

## 2024-04-12 DIAGNOSIS — E04.1 THYROID NODULE: Primary | ICD-10-CM

## 2024-04-12 PROCEDURE — 1159F MED LIST DOCD IN RCRD: CPT | Performed by: OTOLARYNGOLOGY

## 2024-04-12 PROCEDURE — 1160F RVW MEDS BY RX/DR IN RCRD: CPT | Performed by: OTOLARYNGOLOGY

## 2024-04-12 PROCEDURE — 1157F ADVNC CARE PLAN IN RCRD: CPT | Performed by: OTOLARYNGOLOGY

## 2024-04-12 PROCEDURE — 99441 PR PHYS/QHP TELEPHONE EVALUATION 5-10 MIN: CPT | Performed by: OTOLARYNGOLOGY

## 2024-04-12 ASSESSMENT — PATIENT HEALTH QUESTIONNAIRE - PHQ9
2. FEELING DOWN, DEPRESSED OR HOPELESS: NOT AT ALL
SUM OF ALL RESPONSES TO PHQ9 QUESTIONS 1 AND 2: 0
1. LITTLE INTEREST OR PLEASURE IN DOING THINGS: NOT AT ALL
1. LITTLE INTEREST OR PLEASURE IN DOING THINGS: NOT AT ALL
2. FEELING DOWN, DEPRESSED OR HOPELESS: NOT AT ALL
SUM OF ALL RESPONSES TO PHQ9 QUESTIONS 1 AND 2: 0

## 2024-04-12 NOTE — PROGRESS NOTES
This note was conducted via telephone with the patient's permission secondary to Covid pandemic.       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

## 2024-04-18 ENCOUNTER — INFUSION (OUTPATIENT)
Dept: HEMATOLOGY/ONCOLOGY | Facility: CLINIC | Age: 79
End: 2024-04-18
Payer: MEDICARE

## 2024-04-18 VITALS
SYSTOLIC BLOOD PRESSURE: 126 MMHG | OXYGEN SATURATION: 100 % | TEMPERATURE: 98.4 F | RESPIRATION RATE: 16 BRPM | HEART RATE: 67 BPM | WEIGHT: 201.94 LBS | DIASTOLIC BLOOD PRESSURE: 74 MMHG | BODY MASS INDEX: 37.16 KG/M2 | HEIGHT: 62 IN

## 2024-04-18 DIAGNOSIS — K90.9 MALABSORPTION OF IRON (HHS-HCC): ICD-10-CM

## 2024-04-18 DIAGNOSIS — D50.8 OTHER IRON DEFICIENCY ANEMIA: ICD-10-CM

## 2024-04-18 PROCEDURE — 96365 THER/PROPH/DIAG IV INF INIT: CPT | Mod: INF

## 2024-04-18 PROCEDURE — 2500000004 HC RX 250 GENERAL PHARMACY W/ HCPCS (ALT 636 FOR OP/ED): Mod: SE | Performed by: NURSE PRACTITIONER

## 2024-04-18 RX ORDER — HEPARIN 100 UNIT/ML
500 SYRINGE INTRAVENOUS AS NEEDED
Status: CANCELLED | OUTPATIENT
Start: 2024-04-18

## 2024-04-18 RX ORDER — FAMOTIDINE 10 MG/ML
20 INJECTION INTRAVENOUS ONCE AS NEEDED
Status: CANCELLED | OUTPATIENT
Start: 2024-04-25

## 2024-04-18 RX ORDER — EPINEPHRINE 0.3 MG/.3ML
0.3 INJECTION SUBCUTANEOUS EVERY 5 MIN PRN
Status: CANCELLED | OUTPATIENT
Start: 2024-04-25

## 2024-04-18 RX ORDER — ALBUTEROL SULFATE 0.83 MG/ML
3 SOLUTION RESPIRATORY (INHALATION) AS NEEDED
Status: CANCELLED | OUTPATIENT
Start: 2024-04-25

## 2024-04-18 RX ORDER — DIPHENHYDRAMINE HYDROCHLORIDE 50 MG/ML
50 INJECTION INTRAMUSCULAR; INTRAVENOUS AS NEEDED
Status: CANCELLED | OUTPATIENT
Start: 2024-04-25

## 2024-04-18 RX ORDER — HEPARIN SODIUM,PORCINE/PF 10 UNIT/ML
50 SYRINGE (ML) INTRAVENOUS AS NEEDED
Status: CANCELLED | OUTPATIENT
Start: 2024-04-18

## 2024-04-18 RX ADMIN — FERUMOXYTOL 510 MG: 510 INJECTION INTRAVENOUS at 11:29

## 2024-04-18 ASSESSMENT — PAIN SCALES - GENERAL: PAINLEVEL: 8

## 2024-04-18 NOTE — PROGRESS NOTES
Pt arrived awake, alert, oriented, no apparent distress with unlabored breaths. Pt reports feeling well today without s/sx of illness. Pt here for 1st of 2 ordered weekly feraheme infusions, in which she received and tolerated well as of thus far. Pt given AVS, verbalized understanding of future appointment next week on 4/25/24, no further questions or concerns, discharged in stable condition.

## 2024-04-22 ENCOUNTER — OFFICE VISIT (OUTPATIENT)
Dept: WOUND CARE | Facility: CLINIC | Age: 79
End: 2024-04-22
Payer: MEDICARE

## 2024-04-22 VITALS
HEIGHT: 62 IN | DIASTOLIC BLOOD PRESSURE: 79 MMHG | BODY MASS INDEX: 39.56 KG/M2 | HEART RATE: 82 BPM | SYSTOLIC BLOOD PRESSURE: 136 MMHG | RESPIRATION RATE: 18 BRPM | WEIGHT: 215 LBS

## 2024-04-22 DIAGNOSIS — I82.5Y2 CHRONIC DEEP VEIN THROMBOSIS (DVT) OF PROXIMAL VEIN OF LEFT LOWER EXTREMITY (MULTI): ICD-10-CM

## 2024-04-22 DIAGNOSIS — I87.2 CHRONIC VENOUS INSUFFICIENCY: Primary | ICD-10-CM

## 2024-04-22 PROCEDURE — 1157F ADVNC CARE PLAN IN RCRD: CPT | Performed by: INTERNAL MEDICINE

## 2024-04-22 PROCEDURE — 1125F AMNT PAIN NOTED PAIN PRSNT: CPT | Performed by: INTERNAL MEDICINE

## 2024-04-22 PROCEDURE — 3075F SYST BP GE 130 - 139MM HG: CPT | Performed by: INTERNAL MEDICINE

## 2024-04-22 PROCEDURE — 1160F RVW MEDS BY RX/DR IN RCRD: CPT | Performed by: INTERNAL MEDICINE

## 2024-04-22 PROCEDURE — 99214 OFFICE O/P EST MOD 30 MIN: CPT | Performed by: INTERNAL MEDICINE

## 2024-04-22 PROCEDURE — 1159F MED LIST DOCD IN RCRD: CPT | Performed by: INTERNAL MEDICINE

## 2024-04-22 PROCEDURE — 3078F DIAST BP <80 MM HG: CPT | Performed by: INTERNAL MEDICINE

## 2024-04-22 RX ORDER — HYDROCORTISONE 25 MG/G
CREAM TOPICAL 2 TIMES DAILY
Qty: 30 G | Refills: 2 | Status: SHIPPED | OUTPATIENT
Start: 2024-04-22 | End: 2024-06-21

## 2024-04-22 ASSESSMENT — PATIENT HEALTH QUESTIONNAIRE - PHQ9
SUM OF ALL RESPONSES TO PHQ9 QUESTIONS 1 AND 2: 0
2. FEELING DOWN, DEPRESSED OR HOPELESS: NOT AT ALL
1. LITTLE INTEREST OR PLEASURE IN DOING THINGS: NOT AT ALL

## 2024-04-22 ASSESSMENT — PAIN SCALES - GENERAL: PAINLEVEL: 6

## 2024-04-22 NOTE — PATIENT INSTRUCTIONS
Assessment/Plan   CVHTN -- doing well. Ulcers healed. Change dressing to eucerin and add hydrocortisone 2.5%. Can also use eucerin calming. Change to latex free tubigrip. Elevate at night and at rest. Follow up 6 weeks. Has been on eliquis 5 mg q 12 hours since DVT Dx 1/2023. Will decrease now to secondary prophylaxis (2.5 mg q 12 hours). Needs ongoing eval for the anemia. Duration of AC TBD.   VISIT ORDERS:  Vascular Study Required: n  Labs Required: n  Radiology Imaging Required: n  Consultation Required: n  Rx Provided:   Changes to Plan of Care: y      NEW ORDERS:  Wash: soap and water  Irrigate/Soak:  Skin Care: eucerin with 2.5% hydrocortisone 2 x a day  Dressing:    Compression: latex free tubigrip and circaid   Offloading:     DISCHARGE PLANNING:    Follow Up: 6 weeks  Nurse Visit: prn

## 2024-04-22 NOTE — PROGRESS NOTES
"REFERRAL REQUESTED BY:  Dr. Albert Garner    LAST SEEN:  3/18/24    Patient ID: Genesis Garcia is a 79 y.o. female     HPI:   Here for lorenzo LE CVHTN and h/o ulcer. Reports only having issues with itching. Using eucerin for skin care. Mild dermatitis notes. Has been on eliquis 5 mg q 12 hours since DVT Dx 1/2023. Will decrease now to secondary prophylaxis (2.5 mg q 12 hours). Needs ongoing eval for the anemia. Duration of AC TBD.     CURRENT DRESSING:  Who is performing the dressing change: diane Gomez  Dressing applied: duoderm  Dressing Frequency: every three days    EDEMA MANAGEMENT:  Compression:  Right: circaid, tubigrip CALF 35.0   Left: circaid, tubigrip   CALF 37.5     Other Modality  Sleeping in Bed: yes  Elevating FOB:  yes    Offloading/Pressure Relief  Activity Level:  ad travon  Protection Devices:  none    Offloading/Pressure Relief Effective?     ROS:      Objective     VITALS:  /79 (BP Location: Left arm)   Pulse 82   Resp 18   Ht 1.575 m (5' 2\")   Wt 97.5 kg (215 lb)   BMI 39.32 kg/m²       Physical Exam    ARRIVAL:  Ambulating, wheelchair  TRANSFER:  None    PSYCHIATRIC:  Oriented to person, place and time: A & O x 3    CONSTITUTIONAL:    Appearance:  Well Groomed    SKIN:   LDS, mild dermatitis     PULSES:     EXTREMITY TEMPERATURE:    RIGHT: normal  LEFT: normal    EDEMA: mild    WOUND ASSESSMENT:    Wound 04/28/23 Venous Ulcer Leg Left;Lateral (Active)   Date First Assessed: 04/28/23   Present on Original Admission: Yes  Primary Wound Type: Venous Ulcer  Location: (c) Leg  Wound Location Orientation: (c) Left;Lateral      Assessments 9/13/2023  3:13 PM 4/22/2024  9:44 AM   Wound Image      Site Assessment Fibrinous --   Jackie-Wound Assessment Erythematous --   Non-staged Wound Description Full thickness --   Shape -- healed   Wound Length (cm) 2 cm --   Wound Width (cm) 1.7 cm --   Wound Surface Area (cm^2) 3.4 cm^2 --   Wound Depth (cm) 0.2 cm --   Wound Volume (cm^3) 0.68 cm^3 -- "   Wound Bed Granulation (%) 20 % --   Drainage Description Serosanguineous --   Drainage Amount Scant --       Inactive Orders   Date Order Priority Status Authorizing Provider   02/26/24 1003 Debridement Routine Completed Rosy Atkinson MD   12/28/23 1447 Wound Care Routine Discontinued Chon Lind MD     - Wound Complexity:    Simple   12/28/23 0110 Inpatient Consult to Wound and Ostomy Nurse Routine Completed Chon Lind MD     - Reason for Consult?:    Wound   12/21/23 1617 Wound Care Routine Discontinued Chon Lind MD     - Wound Complexity:    Simple   10/16/23 1107 Debridement Routine Completed Rosy Atkinson MD       Wound 02/26/24 Venous Ulcer Leg Left;Proximal;Lateral (Active)   Date First Assessed: 02/26/24   Primary Wound Type: Venous Ulcer  Location: Leg  Wound Location Orientation: Left;Proximal;Lateral      Assessments 2/26/2024 10:04 AM 4/22/2024  9:44 AM   Wound Image      Site Assessment Fibrinous;Granulation --   Jackie-Wound Assessment Intact;Dry --   Shape -- healed   Wound Length (cm) 0.5 cm --   Wound Width (cm) 0.4 cm --   Wound Surface Area (cm^2) 0.2 cm^2 --   Wound Depth (cm) 0.1 cm --   Wound Volume (cm^3) 0.02 cm^3 --   Drainage Description Serosanguineous --   Drainage Amount Scant --       No associated orders.          Procedures    Assessment/Plan   CVHTN -- doing well. Ulcers healed. Change dressing to eucerin and add hydrocortisone 2.5%. Can also use eucerin calming. Change to latex free tubigrip. Elevate at night and at rest. Follow up 6 weeks. Has been on eliquis 5 mg q 12 hours since DVT Dx 1/2023. Will decrease now to secondary prophylaxis (2.5 mg q 12 hours). Needs ongoing eval for the anemia. Duration of AC TBD.   VISIT ORDERS:  Vascular Study Required: n  Labs Required: n  Radiology Imaging Required: n  Consultation Required: n  Rx Provided:   Changes to Plan of Care: y      NEW ORDERS:  Wash: soap and water  Irrigate/Soak:  Skin Care: eucerin with 2.5%  hydrocortisone 2 x a day  Dressing:    Compression: latex free tubigrip and circaid   Offloading:     DISCHARGE PLANNING:    Follow Up: 6 weeks  Nurse Visit: prn    General Instructions:  Center RN to apply EMLA/Lidocaine 1% jelly/LMX  topically to wound(s) prior to debridement by physician.  Center RN my see patient as a nurse consult in my absence.      RN Post Procedure Note:    Single latex free tubigrip and circaid to BLE for compression. Pt tolerated well. CUCO Faria RN.     [facility]  Agency/Facility:   days/week:  Contacted Regarding Changes:

## 2024-04-25 ENCOUNTER — INFUSION (OUTPATIENT)
Dept: HEMATOLOGY/ONCOLOGY | Facility: CLINIC | Age: 79
End: 2024-04-25
Payer: MEDICARE

## 2024-04-25 VITALS
WEIGHT: 214.95 LBS | HEART RATE: 85 BPM | DIASTOLIC BLOOD PRESSURE: 55 MMHG | SYSTOLIC BLOOD PRESSURE: 126 MMHG | BODY MASS INDEX: 39.56 KG/M2 | RESPIRATION RATE: 16 BRPM | HEIGHT: 62 IN | TEMPERATURE: 98.6 F | OXYGEN SATURATION: 98 %

## 2024-04-25 DIAGNOSIS — K90.9 MALABSORPTION OF IRON (HHS-HCC): ICD-10-CM

## 2024-04-25 DIAGNOSIS — D50.8 OTHER IRON DEFICIENCY ANEMIA: ICD-10-CM

## 2024-04-25 LAB
ERYTHROCYTE [DISTWIDTH] IN BLOOD BY AUTOMATED COUNT: 20.3 % (ref 11.5–14.5)
HCT VFR BLD AUTO: 29.2 % (ref 36–46)
HGB BLD-MCNC: 8.6 G/DL (ref 12–16)
MCH RBC QN AUTO: 22.1 PG (ref 26–34)
MCHC RBC AUTO-ENTMCNC: 29.5 G/DL (ref 32–36)
MCV RBC AUTO: 75 FL (ref 80–100)
PLATELET # BLD AUTO: 380 X10*3/UL (ref 150–450)
RBC # BLD AUTO: 3.89 X10*6/UL (ref 4–5.2)
WBC # BLD AUTO: 6.9 X10*3/UL (ref 4.4–11.3)

## 2024-04-25 PROCEDURE — 96365 THER/PROPH/DIAG IV INF INIT: CPT | Mod: INF

## 2024-04-25 PROCEDURE — 85027 COMPLETE CBC AUTOMATED: CPT

## 2024-04-25 PROCEDURE — 2500000004 HC RX 250 GENERAL PHARMACY W/ HCPCS (ALT 636 FOR OP/ED): Mod: SE | Performed by: NURSE PRACTITIONER

## 2024-04-25 RX ORDER — EPINEPHRINE 0.3 MG/.3ML
0.3 INJECTION SUBCUTANEOUS EVERY 5 MIN PRN
OUTPATIENT
Start: 2024-05-02

## 2024-04-25 RX ORDER — HEPARIN 100 UNIT/ML
500 SYRINGE INTRAVENOUS AS NEEDED
OUTPATIENT
Start: 2024-04-25

## 2024-04-25 RX ORDER — HEPARIN SODIUM,PORCINE/PF 10 UNIT/ML
50 SYRINGE (ML) INTRAVENOUS AS NEEDED
OUTPATIENT
Start: 2024-04-25

## 2024-04-25 RX ORDER — DIPHENHYDRAMINE HYDROCHLORIDE 50 MG/ML
50 INJECTION INTRAMUSCULAR; INTRAVENOUS AS NEEDED
OUTPATIENT
Start: 2024-05-02

## 2024-04-25 RX ORDER — FAMOTIDINE 10 MG/ML
20 INJECTION INTRAVENOUS ONCE AS NEEDED
OUTPATIENT
Start: 2024-05-02

## 2024-04-25 RX ORDER — ALBUTEROL SULFATE 0.83 MG/ML
3 SOLUTION RESPIRATORY (INHALATION) AS NEEDED
OUTPATIENT
Start: 2024-05-02

## 2024-04-25 RX ADMIN — FERUMOXYTOL 510 MG: 510 INJECTION INTRAVENOUS at 09:29

## 2024-04-25 ASSESSMENT — PAIN SCALES - GENERAL: PAINLEVEL: 6

## 2024-04-25 NOTE — PROGRESS NOTES
Patient identified by name & .  Here for dose #2 feraheme; infusion completed without deficits; VSS prew and post infusion.   Patient discharged home, via wheelchair, in stable condition.

## 2024-04-29 ENCOUNTER — HOSPITAL ENCOUNTER (OUTPATIENT)
Dept: RADIOLOGY | Facility: HOSPITAL | Age: 79
Discharge: HOME | End: 2024-04-29
Payer: MEDICARE

## 2024-04-29 VITALS
RESPIRATION RATE: 20 BRPM | TEMPERATURE: 97.5 F | OXYGEN SATURATION: 99 % | DIASTOLIC BLOOD PRESSURE: 81 MMHG | HEART RATE: 82 BPM | SYSTOLIC BLOOD PRESSURE: 165 MMHG

## 2024-04-29 DIAGNOSIS — E04.1 THYROID NODULE: ICD-10-CM

## 2024-04-29 PROCEDURE — 10005 FNA BX W/US GDN 1ST LES: CPT

## 2024-04-29 PROCEDURE — 2500000005 HC RX 250 GENERAL PHARMACY W/O HCPCS: Performed by: NURSE PRACTITIONER

## 2024-04-29 PROCEDURE — 88112 CYTOPATH CELL ENHANCE TECH: CPT | Mod: TC,59 | Performed by: OTOLARYNGOLOGY

## 2024-04-29 PROCEDURE — 10006 FNA BX W/US GDN EA ADDL: CPT

## 2024-04-29 PROCEDURE — 88173 CYTOPATH EVAL FNA REPORT: CPT | Mod: TC,59

## 2024-04-29 PROCEDURE — 88173 CYTOPATH EVAL FNA REPORT: CPT | Performed by: PATHOLOGY

## 2024-04-29 RX ORDER — LIDOCAINE HYDROCHLORIDE 10 MG/ML
INJECTION, SOLUTION EPIDURAL; INFILTRATION; INTRACAUDAL; PERINEURAL
Status: COMPLETED | OUTPATIENT
Start: 2024-04-29 | End: 2024-04-29

## 2024-04-29 RX ADMIN — LIDOCAINE HYDROCHLORIDE 10 ML: 10 INJECTION, SOLUTION EPIDURAL; INFILTRATION; INTRACAUDAL; PERINEURAL at 10:20

## 2024-04-29 ASSESSMENT — PAIN SCALES - GENERAL: PAINLEVEL_OUTOF10: 0 - NO PAIN

## 2024-04-29 NOTE — POST-PROCEDURE NOTE
Interventional Radiology Brief Postprocedure Note    Attending: SREEDHAR Smith    Assistant: none    Diagnosis: TR IV thyroid nodules    Description of procedure: A Fine Needle Aspiration was preformed of the 1.4cm nodule of the left upper lobe of the thyroid. Attention was then turned to the left mid 2.4cm nodule, where FNA was also preformed.     Anesthesia:  Local    Complications: None    Estimated Blood Loss: minimal    Medications (Filter: Administrations occurring from 0950 to 1047 on 04/29/24) As of 04/29/24 1047      lidocaine PF (Xylocaine) 10 mg/mL (1 %) injection (mL) Total volume:  10 mL      Date/Time Rate/Dose/Volume Action       04/29/24  1020 10 mL Given                   ID Type Source Tests Collected by Time   A : LEFT SUPERIOR FNA Non-Gynecologic Cytology THYROID FINE NEEDLE ASPIRATION LEFT SUPERIOR LOBE CYTOLOGY CONSULTATION (NON-GYNECOLOGIC) SREEDHAR Smith 4/29/2024 1020   B : THYROID MID LOBE FNA Non-Gynecologic Cytology THYROID FINE NEEDLE ASPIRATION LEFT MID LOBE CYTOLOGY CONSULTATION (NON-GYNECOLOGIC) SREEDHAR Smith 4/29/2024 1025         See detailed result report with images in PACS.    The patient tolerated the procedure well without incident or complication and is in stable condition.

## 2024-04-29 NOTE — PRE-PROCEDURE NOTE
Interventional Radiology Preprocedure Note    Indication for procedure: The encounter diagnosis was Thyroid nodule.    Relevant review of systems: NA    Relevant Labs:   Lab Results   Component Value Date    CREATININE 1.15 (H) 04/08/2024    EGFR 49 (L) 04/08/2024    INR 1.0 01/18/2023    PROTIME 11.0 01/18/2023       Planned Sedation/Anesthesia: local    Directed physical examination:    A&Ox3  Breathing Unlabored  Neck supple    US thyroid with small left superior 1.4cm nodule, large 2.4cm left mid to inferior nodule, both graded TR IV. Plan to proceed with US guided FNA of these nodules.     Benefits, risks and alternatives of procedure and planned sedation have been discussed with the patient and/or their representative. All questions answered and they agree to proceed.

## 2024-04-30 ENCOUNTER — TELEPHONE (OUTPATIENT)
Dept: OTOLARYNGOLOGY | Facility: CLINIC | Age: 79
End: 2024-04-30
Payer: MEDICARE

## 2024-04-30 DIAGNOSIS — E04.1 THYROID NODULE: ICD-10-CM

## 2024-04-30 LAB
LABORATORY COMMENT REPORT: NORMAL
LABORATORY COMMENT REPORT: NORMAL
PATH REPORT.FINAL DX SPEC: NORMAL
PATH REPORT.GROSS SPEC: NORMAL
PATH REPORT.TOTAL CANCER: NORMAL

## 2024-04-30 NOTE — TELEPHONE ENCOUNTER
----- Message from Pedro Dawkins MD sent at 4/30/2024  4:33 PM EDT -----  Pls let her know these were benign, utrasound one year

## 2024-05-08 ENCOUNTER — OFFICE VISIT (OUTPATIENT)
Dept: PAIN MEDICINE | Facility: CLINIC | Age: 79
End: 2024-05-08
Payer: MEDICARE

## 2024-05-08 ENCOUNTER — HOSPITAL ENCOUNTER (OUTPATIENT)
Dept: RADIOLOGY | Facility: CLINIC | Age: 79
Discharge: HOME | End: 2024-05-08
Payer: MEDICARE

## 2024-05-08 VITALS
RESPIRATION RATE: 20 BRPM | DIASTOLIC BLOOD PRESSURE: 72 MMHG | HEIGHT: 62 IN | WEIGHT: 214 LBS | HEART RATE: 60 BPM | OXYGEN SATURATION: 94 % | SYSTOLIC BLOOD PRESSURE: 130 MMHG | BODY MASS INDEX: 39.38 KG/M2

## 2024-05-08 DIAGNOSIS — M46.45 DISCITIS OF THORACOLUMBAR REGION: ICD-10-CM

## 2024-05-08 DIAGNOSIS — G60.9 PERIPHERAL NEUROPATHY, IDIOPATHIC: ICD-10-CM

## 2024-05-08 DIAGNOSIS — M80.08XA AGE-RELATED OSTEOPOROSIS WITH CURRENT PATHOLOGICAL FRACTURE OF VERTEBRA, INITIAL ENCOUNTER (MULTI): ICD-10-CM

## 2024-05-08 DIAGNOSIS — R94.8 ABNORMAL BONE SCAN OF LUMBAR SPINE: Primary | ICD-10-CM

## 2024-05-08 PROCEDURE — 72158 MRI LUMBAR SPINE W/O & W/DYE: CPT

## 2024-05-08 PROCEDURE — A9577 INJ MULTIHANCE: HCPCS | Mod: SE | Performed by: ANESTHESIOLOGY

## 2024-05-08 PROCEDURE — 3075F SYST BP GE 130 - 139MM HG: CPT | Performed by: ANESTHESIOLOGY

## 2024-05-08 PROCEDURE — 2550000001 HC RX 255 CONTRASTS: Mod: SE | Performed by: ANESTHESIOLOGY

## 2024-05-08 PROCEDURE — 99205 OFFICE O/P NEW HI 60 MIN: CPT | Performed by: ANESTHESIOLOGY

## 2024-05-08 PROCEDURE — 1157F ADVNC CARE PLAN IN RCRD: CPT | Performed by: ANESTHESIOLOGY

## 2024-05-08 PROCEDURE — 1036F TOBACCO NON-USER: CPT | Performed by: ANESTHESIOLOGY

## 2024-05-08 PROCEDURE — 72158 MRI LUMBAR SPINE W/O & W/DYE: CPT | Performed by: RADIOLOGY

## 2024-05-08 PROCEDURE — 99215 OFFICE O/P EST HI 40 MIN: CPT | Performed by: ANESTHESIOLOGY

## 2024-05-08 PROCEDURE — 1159F MED LIST DOCD IN RCRD: CPT | Performed by: ANESTHESIOLOGY

## 2024-05-08 PROCEDURE — 1160F RVW MEDS BY RX/DR IN RCRD: CPT | Performed by: ANESTHESIOLOGY

## 2024-05-08 PROCEDURE — 3078F DIAST BP <80 MM HG: CPT | Performed by: ANESTHESIOLOGY

## 2024-05-08 PROCEDURE — 1125F AMNT PAIN NOTED PAIN PRSNT: CPT | Performed by: ANESTHESIOLOGY

## 2024-05-08 RX ADMIN — GADOBENATE DIMEGLUMINE 20 ML: 529 INJECTION, SOLUTION INTRAVENOUS at 17:10

## 2024-05-08 ASSESSMENT — PATIENT HEALTH QUESTIONNAIRE - PHQ9
2. FEELING DOWN, DEPRESSED OR HOPELESS: NOT AT ALL
SUM OF ALL RESPONSES TO PHQ9 QUESTIONS 1 AND 2: 0
1. LITTLE INTEREST OR PLEASURE IN DOING THINGS: NOT AT ALL

## 2024-05-08 ASSESSMENT — ENCOUNTER SYMPTOMS
DEPRESSION: 0
OCCASIONAL FEELINGS OF UNSTEADINESS: 1
LOSS OF SENSATION IN FEET: 1

## 2024-05-08 ASSESSMENT — PAIN DESCRIPTION - DESCRIPTORS: DESCRIPTORS: ACHING

## 2024-05-08 ASSESSMENT — PAIN - FUNCTIONAL ASSESSMENT: PAIN_FUNCTIONAL_ASSESSMENT: 0-10

## 2024-05-08 ASSESSMENT — LIFESTYLE VARIABLES: TOTAL SCORE: 0

## 2024-05-08 ASSESSMENT — PAIN SCALES - GENERAL
PAINLEVEL: 8
PAINLEVEL_OUTOF10: 8

## 2024-05-08 NOTE — PROGRESS NOTES
"PAIN MANAGEMENT NEW PATIENT OFFICE NOTE    Date of Service: 5/8/2024    SUBJECTIVE    CHIEF COMPLAINT: LBP    HISTORY OF PRESENT ILLNESS    Genesis Garcia is a 79 y.o. female with PMH HTN, DVT on ELIQUIS, hypothyroidism, CKD, asthma, obesity, L4-S1 fusion who presents as new patient referred by ortho spine PA Emma Darling with LB pain.    Pt describes worsening LBP since November. Describes chronic LLE cellulitis with swelling and pain at that time that caused her leg to give out while using walker. Fell onto stomach and noticed severe, worsening LBP since then. Cellulitis worsened that month and resulted in her admission for tx and tx of pna. Completed abx in January. Pain is located in R low back. Pain is worse with standing, doing dishes, getting out of chair. Pain relieved with sitting and reclining. Pt has tried Tylenol, m relaxants, oxycodone, topical medication, R SIJ CSI without sustained relief.    Pt has had numbness in BL feet described as \"walking on rocks\" for last 3-5 y. Does not feel this as worsened since fall. Notes chronic BLE weakness as well.     Pt denies new-onset numbness, weakness, bowel/bladder incontinence.  Pt denies recent infection, allergy to Latex/iodine/contrast. Patient is currently taking the following blood thinner(s): ELIQUIS    REVIEW OF SYSTEMS  Review of Systems    PAST MEDICAL HISTORY  Past Medical History:   Diagnosis Date    Cellulitis, unspecified 08/24/2020    Cellulitis of skin    Displaced fracture of lateral malleolus of unspecified fibula, initial encounter for closed fracture 07/22/2021    Closed fracture of lateral malleolus    Encounter for follow-up examination after completed treatment for conditions other than malignant neoplasm 11/30/2022    Hospital discharge follow-up    Encounter for other preprocedural examination 10/18/2022    Pre-op examination    Essential (primary) hypertension 01/04/2023    Hypertension    Other fracture of upper and lower end " of unspecified fibula, initial encounter for closed fracture 05/21/2021    Fracture of distal fibula    Other specified soft tissue disorders 09/19/2022    Leg swelling    Pain in left ankle and joints of left foot 05/03/2021    Left ankle pain, unspecified chronicity    Pain in right ankle and joints of right foot 05/03/2021    Ankle pain, right    Pain in right hip 05/03/2021    Hip pain, right    Personal history of (healed) stress fracture 06/26/2021    History of stress fracture    Personal history of diseases of the skin and subcutaneous tissue 11/19/2022    History of cellulitis    Personal history of other diseases of the circulatory system     History of atrial fibrillation    Personal history of other diseases of the musculoskeletal system and connective tissue 10/20/2016    History of arthritis    Personal history of other diseases of urinary system     History of acute renal failure    Personal history of other specified conditions 11/19/2022    History of tachycardia    Unspecified asthma, uncomplicated ([facility]) 12/01/2022    Asthma    Vitamin D deficiency, unspecified 10/20/2016    Vitamin D deficiency     Past Surgical History:   Procedure Laterality Date    BACK SURGERY  06/20/2016    Back Surgery    CHOLECYSTECTOMY  04/15/2016    Cholecystectomy    OTHER SURGICAL HISTORY  05/03/2021    Cataract surgery    THYROIDECTOMY, PARTIAL      TOTAL KNEE ARTHROPLASTY  12/01/2022    Knee Replacement     Family History   Problem Relation Name Age of Onset    Diabetes Mother          type 2    Heart disease Father      Hypertension Father         CURRENT MEDICATIONS  Current Outpatient Medications   Medication Sig Dispense Refill    acetaminophen (Tylenol) 325 mg tablet Take 2 tablets (650 mg) by mouth every 4 hours if needed for mild pain (1 - 3) or moderate pain (4 - 6). 30 tablet 0    albuterol 2.5 mg /3 mL (0.083 %) nebulizer solution Take 3 mL (2.5 mg) by nebulization every 2 hours if needed for wheezing.  "75 mL 11    apixaban (Eliquis) 2.5 mg tablet Take 1 tablet (2.5 mg) by mouth 2 times a day. 180 tablet 3    calcium carbonate 600 mg calcium (1,500 mg) tablet Take 1 tablet (1,500 mg) by mouth once daily.      cholecalciferol (Vitamin D-3) 50 MCG (2000 UT) tablet Take by mouth.      fluticasone propion-salmeteroL (Advair Diskus) 250-50 mcg/dose diskus inhaler Inhale 1 puff 2 times a day. Rinse mouth with water after use to reduce aftertaste and incidence of candidiasis. Do not swallow.      gabapentin (Neurontin) 400 mg capsule Take 1 capsule (400 mg) by mouth 3 times a day. 90 capsule 2    hydrocortisone 2.5 % cream Apply topically 2 times a day. Mix with eucerin 30 g 2    hydroxychloroquine (Plaquenil) 200 mg tablet Take 1 tablet (200 mg) by mouth 2 times a day.      levothyroxine (Synthroid) 25 mcg tablet Take 1 tablet (25 mcg) by mouth once daily in the morning. Take before meals. 90 tablet 3    losartan (Cozaar) 100 mg tablet Take 1 tablet (100 mg) by mouth once daily.      multivitamin capsule Take by mouth.      spironolactone (Aldactone) 25 mg tablet Take 1 tablet (25 mg) by mouth once daily.      b complex-vitamin c tablet Take 1 tablet by mouth once daily.      torsemide 40 mg tablet Take 40 mg by mouth once daily. Do not start before December 27, 2023. 30 tablet 0     No current facility-administered medications for this visit.       ALLERGIES AND DRUG REACTIONS  Allergies   Allergen Reactions    Sulfur Hives    Cefprozil Hives    Sulfa (Sulfonamide Antibiotics) Hives          OBJECTIVE  Visit Vitals  /72   Pulse 60   Resp 20   Ht 1.575 m (5' 2\")   Wt 97.1 kg (214 lb)   SpO2 94%   BMI 39.14 kg/m²   OB Status Postmenopausal   Smoking Status Former   BSA 2.06 m²       Last Recorded Pain Score (if available):                Physical Exam  General: Sitting in chair, NAD  Head: NCAT  Eyes: Sclera/conjunctiva clear, EOMI, PERRL  Nose/mouth: MMM  CV: Good distal pulses  Lungs: Good/equal chest " excursion  Abdomen: Soft, ND  Ext: BLE edema, R ankle eversion  MSK: l-spine alignment: unremarkable, BL paraspinal m TTP, thoracolumbar spine tenderness with +percussion tenderness. L-spine ROM: ext limtd by pain    Neuro: AAOx3   Dermatome sensation to light touch  LEFT L1 (lower pelvis/upper thigh): WNL    RIGHT L1: WNL      LEFT L2 (upper thigh): WNL       RIGHT: L2:WNL      LEFT L3 (medial knee): WNL       RIGHT L3: WNL      Decreased BL feet in stocking fashion to ankle level     Motor strength  LEFT L2 (hip flexion): 5/5   RIGHT L2: 5/5  LEFT L3 (knee extension): 5/5     RIGHT L3: 5/5  LEFT L4 (dorsiflexion): 5/5     RIGHT L4: 5/5  LEFT L5 (EHL extension): 5/5     RIGHT L5: 5/5  LEFT S1 (plantarflexion): 5/5     RIGHT S1: 5/5  LEFT S2 (knee flexion): 5/5      RIGHT S2: 5/5    Special testing  DTR decreased patellar reflexes BL  Seated slump test neg BL  Clonus: neg BL  Babinski: neg BL    Psych: affect nl  Skin: no rash/lesions      REVIEW OF LABORATORY DATA  I have reviewed the following lab results:  WBC   Date Value Ref Range Status   04/25/2024 6.9 4.4 - 11.3 x10*3/uL Final     RBC   Date Value Ref Range Status   04/25/2024 3.89 (L) 4.00 - 5.20 x10*6/uL Final     Hemoglobin   Date Value Ref Range Status   04/25/2024 8.6 (L) 12.0 - 16.0 g/dL Final     Hematocrit   Date Value Ref Range Status   04/25/2024 29.2 (L) 36.0 - 46.0 % Final     MCV   Date Value Ref Range Status   04/25/2024 75 (L) 80 - 100 fL Final     MCH   Date Value Ref Range Status   04/25/2024 22.1 (L) 26.0 - 34.0 pg Final     MCHC   Date Value Ref Range Status   04/25/2024 29.5 (L) 32.0 - 36.0 g/dL Final     RDW   Date Value Ref Range Status   04/25/2024 20.3 (H) 11.5 - 14.5 % Final     Platelets   Date Value Ref Range Status   04/25/2024 380 150 - 450 x10*3/uL Final     Sodium   Date Value Ref Range Status   04/08/2024 138 136 - 145 mmol/L Final     Potassium   Date Value Ref Range Status   04/08/2024 3.9 3.5 - 5.3 mmol/L Final      Bicarbonate   Date Value Ref Range Status   04/08/2024 25 21 - 32 mmol/L Final     Urea Nitrogen   Date Value Ref Range Status   04/08/2024 23 6 - 23 mg/dL Final     Calcium   Date Value Ref Range Status   04/08/2024 8.8 8.6 - 10.3 mg/dL Final     Protime   Date Value Ref Range Status   01/18/2023 11.0 9.8 - 13.4 sec Final     INR   Date Value Ref Range Status   01/18/2023 1.0 0.9 - 1.1 Final         REVIEW OF RADIOLOGY   I have reviewed the following:  Radiology Studies           MRI L-spine 3/22/24:  There is increased signal within the disc space at T12-L1 with  increased anterior wedging at L1 and new anterior wedging at T12.  There is approximately 75% loss of vertebral body height anteriorly  at L1 and approximately 25% loss of vertebral body height anteriorly  at T12. There is no significant osseous retropulsion into the spinal  canal. There is no significant paraspinal edema or visualized  epidural collection. These findings may represent acute compression  deformities or sequela of trauma, however by imaging and early  discitis osteomyelitis can have a similar appearance. Please  correlate clinically for signs and symptoms of infection.      There is edema within the sacrum on the left which may represent a  sacral insufficiency fracture.      Postsurgical changes of posterolateral fusion and decompression at L4  through S1. No spinal canal and mild-to-moderate neural foraminal  narrowing at these levels.      Additional multilevel degenerative disc disease and facet arthrosis  with mild spinal canal stenosis. Neural foraminal narrowing most  pronounced at T12-L1 with severe left neural foraminal stenosis.           ASSESSMENT & PLAN  Genesis Garcia is a 79 y.o. old female with PMH HTN, DVT on ELIQUIS, hypothyroidism, CKD, asthma, obesity, L4-S1 fusion who presents as new patient referred by ortho spine MICHELLE Darling with LB pain.    1) LBP  -Since fall in November likely 2/2 T12, L1 VCF as  seen on MRI with +STIR and reproducible on exam with +percussion tenderness  -Refractive to Tylenol, m relaxants, oxycodone, topical medication, R SIJ CSI  -Reviewed/discussed MRI l-spine 3/22/24, which details pathology above, but does not r/o T12-L1 discitis. Normal WBC, ESR. Marginally elevated CRP   -MRI L-spine w contrast STAT  -Back brace to maximize conservative tx and assist ambulation  -Will also touch base with rheum to consider investigating osteoporosis status/tx  -F/U 1 w  -If infxn ruled-out, plan on T12, L1 kypho with possible bx    2) Peripheral neuropathy  -BL foot neuropathy since ~2019 without known etiology  -No known DM. F/U B12, folate  -Consider revisiting after tx of LBP        Discussed procedure risks/benefits in detail with patient. Pt meets medical necessity for procedure due to failure of conservative measures. Reviewed procedural risks including bleeding, infection, nerve damage, paralysis. Also reviewed mitigating factors such as screening for infection/blood thinner use, sterile precautions, and image-guidance when applicable. All questions answered. Pt/guardian expressed understanding and choose to proceed           Faith Reese MD  Anesthesiologist & Interventional Pain Physician   [facility] [place]  O: [phone]  F: [phone]  8:59 AM  05/08/24

## 2024-05-08 NOTE — LETTER
"May 8, 2024     Emma Darling PA-C  [address]  [address]  [facility] [address]    Patient: Genesis Garcia   YOB: 1945   Date of Visit: 5/8/2024       Dear Dr. Emma Darling PA-C:    Thank you for referring Genesis Garcia to me for evaluation. Below are my notes for this consultation.  If you have questions, please do not hesitate to call me. I look forward to following your patient along with you.       Sincerely,     Faith Reese MD      CC: No Recipients  ______________________________________________________________________________________    PAIN MANAGEMENT NEW PATIENT OFFICE NOTE    Date of Service: 5/8/2024    SUBJECTIVE    CHIEF COMPLAINT: LBP    HISTORY OF PRESENT ILLNESS    Genesis Garcia is a 79 y.o. female with PMH HTN, DVT on ELIQUIS, hypothyroidism, CKD, asthma, obesity, L4-S1 fusion who presents as new patient referred by ortho spine MICHELLE Darling with LB pain.    Pt describes worsening LBP since November. Describes chronic LLE cellulitis with swelling and pain at that time that caused her leg to give out while using walker. Fell onto stomach and noticed severe, worsening LBP since then. Cellulitis worsened that month and resulted in her admission for tx and tx of pna. Completed abx in January. Pain is located in R low back. Pain is worse with standing, doing dishes, getting out of chair. Pain relieved with sitting and reclining. Pt has tried Tylenol, m relaxants, oxycodone, topical medication, R SIJ CSI without sustained relief.    Pt has had numbness in BL feet described as \"walking on rocks\" for last 3-5 y. Does not feel this as worsened since fall. Notes chronic BLE weakness as well.     Pt denies new-onset numbness, weakness, bowel/bladder incontinence.  Pt denies recent infection, allergy to Latex/iodine/contrast. Patient is currently taking the following blood thinner(s): ELIQUIS    REVIEW OF SYSTEMS  Review of Systems    PAST MEDICAL " HISTORY  Past Medical History:   Diagnosis Date   • Cellulitis, unspecified 08/24/2020    Cellulitis of skin   • Displaced fracture of lateral malleolus of unspecified fibula, initial encounter for closed fracture 07/22/2021    Closed fracture of lateral malleolus   • Encounter for follow-up examination after completed treatment for conditions other than malignant neoplasm 11/30/2022    Hospital discharge follow-up   • Encounter for other preprocedural examination 10/18/2022    Pre-op examination   • Essential (primary) hypertension 01/04/2023    Hypertension   • Other fracture of upper and lower end of unspecified fibula, initial encounter for closed fracture 05/21/2021    Fracture of distal fibula   • Other specified soft tissue disorders 09/19/2022    Leg swelling   • Pain in left ankle and joints of left foot 05/03/2021    Left ankle pain, unspecified chronicity   • Pain in right ankle and joints of right foot 05/03/2021    Ankle pain, right   • Pain in right hip 05/03/2021    Hip pain, right   • Personal history of (healed) stress fracture 06/26/2021    History of stress fracture   • Personal history of diseases of the skin and subcutaneous tissue 11/19/2022    History of cellulitis   • Personal history of other diseases of the circulatory system     History of atrial fibrillation   • Personal history of other diseases of the musculoskeletal system and connective tissue 10/20/2016    History of arthritis   • Personal history of other diseases of urinary system     History of acute renal failure   • Personal history of other specified conditions 11/19/2022    History of tachycardia   • Unspecified asthma, uncomplicated ([facility]) 12/01/2022    Asthma   • Vitamin D deficiency, unspecified 10/20/2016    Vitamin D deficiency     Past Surgical History:   Procedure Laterality Date   • BACK SURGERY  06/20/2016    Back Surgery   • CHOLECYSTECTOMY  04/15/2016    Cholecystectomy   • OTHER SURGICAL HISTORY  05/03/2021     Cataract surgery   • THYROIDECTOMY, PARTIAL     • TOTAL KNEE ARTHROPLASTY  12/01/2022    Knee Replacement     Family History   Problem Relation Name Age of Onset   • Diabetes Mother          type 2   • Heart disease Father     • Hypertension Father         CURRENT MEDICATIONS  Current Outpatient Medications   Medication Sig Dispense Refill   • acetaminophen (Tylenol) 325 mg tablet Take 2 tablets (650 mg) by mouth every 4 hours if needed for mild pain (1 - 3) or moderate pain (4 - 6). 30 tablet 0   • albuterol 2.5 mg /3 mL (0.083 %) nebulizer solution Take 3 mL (2.5 mg) by nebulization every 2 hours if needed for wheezing. 75 mL 11   • apixaban (Eliquis) 2.5 mg tablet Take 1 tablet (2.5 mg) by mouth 2 times a day. 180 tablet 3   • calcium carbonate 600 mg calcium (1,500 mg) tablet Take 1 tablet (1,500 mg) by mouth once daily.     • cholecalciferol (Vitamin D-3) 50 MCG (2000 UT) tablet Take by mouth.     • fluticasone propion-salmeteroL (Advair Diskus) 250-50 mcg/dose diskus inhaler Inhale 1 puff 2 times a day. Rinse mouth with water after use to reduce aftertaste and incidence of candidiasis. Do not swallow.     • gabapentin (Neurontin) 400 mg capsule Take 1 capsule (400 mg) by mouth 3 times a day. 90 capsule 2   • hydrocortisone 2.5 % cream Apply topically 2 times a day. Mix with eucerin 30 g 2   • hydroxychloroquine (Plaquenil) 200 mg tablet Take 1 tablet (200 mg) by mouth 2 times a day.     • levothyroxine (Synthroid) 25 mcg tablet Take 1 tablet (25 mcg) by mouth once daily in the morning. Take before meals. 90 tablet 3   • losartan (Cozaar) 100 mg tablet Take 1 tablet (100 mg) by mouth once daily.     • multivitamin capsule Take by mouth.     • spironolactone (Aldactone) 25 mg tablet Take 1 tablet (25 mg) by mouth once daily.     • b complex-vitamin c tablet Take 1 tablet by mouth once daily.     • torsemide 40 mg tablet Take 40 mg by mouth once daily. Do not start before December 27, 2023. 30 tablet 0     No  "current facility-administered medications for this visit.       ALLERGIES AND DRUG REACTIONS  Allergies   Allergen Reactions   • Sulfur Hives   • Cefprozil Hives   • Sulfa (Sulfonamide Antibiotics) Hives          OBJECTIVE  Visit Vitals  /72   Pulse 60   Resp 20   Ht 1.575 m (5' 2\")   Wt 97.1 kg (214 lb)   SpO2 94%   BMI 39.14 kg/m²   OB Status Postmenopausal   Smoking Status Former   BSA 2.06 m²       Last Recorded Pain Score (if available):                Physical Exam  General: Sitting in chair, NAD  Head: NCAT  Eyes: Sclera/conjunctiva clear, EOMI, PERRL  Nose/mouth: MMM  CV: Good distal pulses  Lungs: Good/equal chest excursion  Abdomen: Soft, ND  Ext: BLE edema, R ankle eversion  MSK: l-spine alignment: unremarkable, BL paraspinal m TTP, thoracolumbar spine tenderness with +percussion tenderness. L-spine ROM: ext limtd by pain    Neuro: AAOx3   Dermatome sensation to light touch  LEFT L1 (lower pelvis/upper thigh): WNL    RIGHT L1: WNL      LEFT L2 (upper thigh): WNL       RIGHT: L2:WNL      LEFT L3 (medial knee): WNL       RIGHT L3: WNL      Decreased BL feet in stocking fashion to ankle level     Motor strength  LEFT L2 (hip flexion): 5/5   RIGHT L2: 5/5  LEFT L3 (knee extension): 5/5     RIGHT L3: 5/5  LEFT L4 (dorsiflexion): 5/5     RIGHT L4: 5/5  LEFT L5 (EHL extension): 5/5     RIGHT L5: 5/5  LEFT S1 (plantarflexion): 5/5     RIGHT S1: 5/5  LEFT S2 (knee flexion): 5/5      RIGHT S2: 5/5    Special testing  DTR decreased patellar reflexes BL  Seated slump test neg BL  Clonus: neg BL  Babinski: neg BL    Psych: affect nl  Skin: no rash/lesions      REVIEW OF LABORATORY DATA  I have reviewed the following lab results:  WBC   Date Value Ref Range Status   04/25/2024 6.9 4.4 - 11.3 x10*3/uL Final     RBC   Date Value Ref Range Status   04/25/2024 3.89 (L) 4.00 - 5.20 x10*6/uL Final     Hemoglobin   Date Value Ref Range Status   04/25/2024 8.6 (L) 12.0 - 16.0 g/dL Final     Hematocrit   Date Value Ref " Range Status   04/25/2024 29.2 (L) 36.0 - 46.0 % Final     MCV   Date Value Ref Range Status   04/25/2024 75 (L) 80 - 100 fL Final     MCH   Date Value Ref Range Status   04/25/2024 22.1 (L) 26.0 - 34.0 pg Final     MCHC   Date Value Ref Range Status   04/25/2024 29.5 (L) 32.0 - 36.0 g/dL Final     RDW   Date Value Ref Range Status   04/25/2024 20.3 (H) 11.5 - 14.5 % Final     Platelets   Date Value Ref Range Status   04/25/2024 380 150 - 450 x10*3/uL Final     Sodium   Date Value Ref Range Status   04/08/2024 138 136 - 145 mmol/L Final     Potassium   Date Value Ref Range Status   04/08/2024 3.9 3.5 - 5.3 mmol/L Final     Bicarbonate   Date Value Ref Range Status   04/08/2024 25 21 - 32 mmol/L Final     Urea Nitrogen   Date Value Ref Range Status   04/08/2024 23 6 - 23 mg/dL Final     Calcium   Date Value Ref Range Status   04/08/2024 8.8 8.6 - 10.3 mg/dL Final     Protime   Date Value Ref Range Status   01/18/2023 11.0 9.8 - 13.4 sec Final     INR   Date Value Ref Range Status   01/18/2023 1.0 0.9 - 1.1 Final         REVIEW OF RADIOLOGY   I have reviewed the following:  Radiology Studies           MRI L-spine 3/22/24:  There is increased signal within the disc space at T12-L1 with  increased anterior wedging at L1 and new anterior wedging at T12.  There is approximately 75% loss of vertebral body height anteriorly  at L1 and approximately 25% loss of vertebral body height anteriorly  at T12. There is no significant osseous retropulsion into the spinal  canal. There is no significant paraspinal edema or visualized  epidural collection. These findings may represent acute compression  deformities or sequela of trauma, however by imaging and early  discitis osteomyelitis can have a similar appearance. Please  correlate clinically for signs and symptoms of infection.      There is edema within the sacrum on the left which may represent a  sacral insufficiency fracture.      Postsurgical changes of posterolateral fusion  and decompression at L4  through S1. No spinal canal and mild-to-moderate neural foraminal  narrowing at these levels.      Additional multilevel degenerative disc disease and facet arthrosis  with mild spinal canal stenosis. Neural foraminal narrowing most  pronounced at T12-L1 with severe left neural foraminal stenosis.           ASSESSMENT & PLAN  Genesis Garcia is a 79 y.o. old female with PMH HTN, DVT on ELIQUIS, hypothyroidism, CKD, asthma, obesity, L4-S1 fusion who presents as new patient referred by ortho spine PA Emma Darling with LB pain.    1) LBP  -Since fall in November likely 2/2 T12, L1 VCF as seen on MRI with +STIR and reproducible on exam with +percussion tenderness  -Refractive to Tylenol, m relaxants, oxycodone, topical medication, R SIJ CSI  -Reviewed/discussed MRI l-spine 3/22/24, which details pathology above, but does not r/o T12-L1 discitis. Normal WBC, ESR. Marginally elevated CRP   -MRI L-spine w contrast STAT  -Back brace to maximize conservative tx and assist ambulation  -Will also touch base with rheum to consider investigating osteoporosis status/tx  -F/U 1 w  -If infxn ruled-out, plan on T12, L1 kypho with possible bx    2) Peripheral neuropathy  -BL foot neuropathy since ~2019 without known etiology  -No known DM. F/U B12, folate  -Consider revisiting after tx of LBP        Discussed procedure risks/benefits in detail with patient. Pt meets medical necessity for procedure due to failure of conservative measures. Reviewed procedural risks including bleeding, infection, nerve damage, paralysis. Also reviewed mitigating factors such as screening for infection/blood thinner use, sterile precautions, and image-guidance when applicable. All questions answered. Pt/guardian expressed understanding and choose to proceed           Faith Reese MD  Anesthesiologist & Interventional Pain Physician   [facility] [place]  O: [phone]  F: [phone]  8:59 AM  05/08/24

## 2024-05-15 ENCOUNTER — OFFICE VISIT (OUTPATIENT)
Dept: PAIN MEDICINE | Facility: CLINIC | Age: 79
End: 2024-05-15
Payer: MEDICARE

## 2024-05-15 VITALS
SYSTOLIC BLOOD PRESSURE: 137 MMHG | WEIGHT: 214 LBS | RESPIRATION RATE: 18 BRPM | DIASTOLIC BLOOD PRESSURE: 69 MMHG | HEIGHT: 62 IN | HEART RATE: 70 BPM | BODY MASS INDEX: 39.38 KG/M2

## 2024-05-15 DIAGNOSIS — M54.50 CHRONIC MIDLINE LOW BACK PAIN, UNSPECIFIED WHETHER SCIATICA PRESENT: Primary | ICD-10-CM

## 2024-05-15 DIAGNOSIS — M80.08XD FRACTURE OF VERTEBRA DUE TO OSTEOPOROSIS WITH ROUTINE HEALING, SUBSEQUENT ENCOUNTER: ICD-10-CM

## 2024-05-15 DIAGNOSIS — G89.29 CHRONIC MIDLINE LOW BACK PAIN, UNSPECIFIED WHETHER SCIATICA PRESENT: Primary | ICD-10-CM

## 2024-05-15 DIAGNOSIS — G60.9 PERIPHERAL NEUROPATHY, IDIOPATHIC: ICD-10-CM

## 2024-05-15 PROCEDURE — 1159F MED LIST DOCD IN RCRD: CPT | Performed by: ANESTHESIOLOGY

## 2024-05-15 PROCEDURE — 3078F DIAST BP <80 MM HG: CPT | Performed by: ANESTHESIOLOGY

## 2024-05-15 PROCEDURE — 99215 OFFICE O/P EST HI 40 MIN: CPT | Performed by: ANESTHESIOLOGY

## 2024-05-15 PROCEDURE — 3075F SYST BP GE 130 - 139MM HG: CPT | Performed by: ANESTHESIOLOGY

## 2024-05-15 PROCEDURE — 1160F RVW MEDS BY RX/DR IN RCRD: CPT | Performed by: ANESTHESIOLOGY

## 2024-05-15 PROCEDURE — 1126F AMNT PAIN NOTED NONE PRSNT: CPT | Performed by: ANESTHESIOLOGY

## 2024-05-15 PROCEDURE — 1036F TOBACCO NON-USER: CPT | Performed by: ANESTHESIOLOGY

## 2024-05-15 PROCEDURE — 1157F ADVNC CARE PLAN IN RCRD: CPT | Performed by: ANESTHESIOLOGY

## 2024-05-15 ASSESSMENT — PAIN SCALES - GENERAL
PAINLEVEL_OUTOF10: 0 - NO PAIN
PAINLEVEL: 0-NO PAIN

## 2024-05-15 ASSESSMENT — ENCOUNTER SYMPTOMS
CONSTITUTIONAL NEGATIVE: 1
GASTROINTESTINAL NEGATIVE: 1
HEMATOLOGIC/LYMPHATIC NEGATIVE: 1
RESPIRATORY NEGATIVE: 1
WEAKNESS: 1
CARDIOVASCULAR NEGATIVE: 1
EYES NEGATIVE: 1
BACK PAIN: 1
PSYCHIATRIC NEGATIVE: 1
ENDOCRINE NEGATIVE: 1

## 2024-05-15 ASSESSMENT — PAIN - FUNCTIONAL ASSESSMENT: PAIN_FUNCTIONAL_ASSESSMENT: 0-10

## 2024-05-15 ASSESSMENT — PAIN DESCRIPTION - DESCRIPTORS: DESCRIPTORS: ACHING

## 2024-05-15 ASSESSMENT — PATIENT HEALTH QUESTIONNAIRE - PHQ9
2. FEELING DOWN, DEPRESSED OR HOPELESS: NOT AT ALL
1. LITTLE INTEREST OR PLEASURE IN DOING THINGS: NOT AT ALL
SUM OF ALL RESPONSES TO PHQ9 QUESTIONS 1 AND 2: 0

## 2024-05-15 NOTE — PROGRESS NOTES
PAIN MANAGEMENT FOLLOW-UP OFFICE NOTE    Date of Service: 5/15/2024    SUBJECTIVE    CHIEF COMPLAINT: LBP    HISTORY OF PRESENT ILLNESS    Genesis Garcia is a 79 y.o. female with PMH HTN, DVT on ELIQUIS, hypothyroidism, CKD, asthma, obesity, L4-S1 fusion who presents for F/U LBP.    Since her last visit, pt endorses 10/10 severe LBP with standing. Completed MRI L-spine w contrast and would like to discuss options for definitive LBP tx.    Pt denies new-onset numbness, weakness, bowel/bladder incontinence.  Pt denies recent infection, allergy to Latex/iodine/contrast. Patient is currently taking the following blood thinner(s): ELIQUIS    REVIEW OF SYSTEMS  Review of Systems   Constitutional: Negative.    HENT: Negative.     Eyes: Negative.    Respiratory: Negative.     Cardiovascular: Negative.    Gastrointestinal: Negative.    Endocrine: Negative.    Musculoskeletal:  Positive for back pain.   Skin: Negative.    Neurological:  Positive for weakness.   Hematological: Negative.    Psychiatric/Behavioral: Negative.         PAST MEDICAL HISTORY  Past Medical History:   Diagnosis Date    Cellulitis, unspecified 08/24/2020    Cellulitis of skin    Displaced fracture of lateral malleolus of unspecified fibula, initial encounter for closed fracture 07/22/2021    Closed fracture of lateral malleolus    Encounter for follow-up examination after completed treatment for conditions other than malignant neoplasm 11/30/2022    Hospital discharge follow-up    Encounter for other preprocedural examination 10/18/2022    Pre-op examination    Essential (primary) hypertension 01/04/2023    Hypertension    Other fracture of upper and lower end of unspecified fibula, initial encounter for closed fracture 05/21/2021    Fracture of distal fibula    Other specified soft tissue disorders 09/19/2022    Leg swelling    Pain in left ankle and joints of left foot 05/03/2021    Left ankle pain, unspecified chronicity    Pain in right ankle  and joints of right foot 05/03/2021    Ankle pain, right    Pain in right hip 05/03/2021    Hip pain, right    Personal history of (healed) stress fracture 06/26/2021    History of stress fracture    Personal history of diseases of the skin and subcutaneous tissue 11/19/2022    History of cellulitis    Personal history of other diseases of the circulatory system     History of atrial fibrillation    Personal history of other diseases of the musculoskeletal system and connective tissue 10/20/2016    History of arthritis    Personal history of other diseases of urinary system     History of acute renal failure    Personal history of other specified conditions 11/19/2022    History of tachycardia    Unspecified asthma, uncomplicated ([facility]) 12/01/2022    Asthma    Vitamin D deficiency, unspecified 10/20/2016    Vitamin D deficiency     Past Surgical History:   Procedure Laterality Date    BACK SURGERY  06/20/2016    Back Surgery    CHOLECYSTECTOMY  04/15/2016    Cholecystectomy    OTHER SURGICAL HISTORY  05/03/2021    Cataract surgery    THYROIDECTOMY, PARTIAL      TOTAL KNEE ARTHROPLASTY  12/01/2022    Knee Replacement     Family History   Problem Relation Name Age of Onset    Diabetes Mother          type 2    Heart disease Father      Hypertension Father         CURRENT MEDICATIONS  Current Outpatient Medications   Medication Sig Dispense Refill    acetaminophen (Tylenol) 325 mg tablet Take 2 tablets (650 mg) by mouth every 4 hours if needed for mild pain (1 - 3) or moderate pain (4 - 6). 30 tablet 0    albuterol 2.5 mg /3 mL (0.083 %) nebulizer solution Take 3 mL (2.5 mg) by nebulization every 2 hours if needed for wheezing. 75 mL 11    apixaban (Eliquis) 2.5 mg tablet Take 1 tablet (2.5 mg) by mouth 2 times a day. 180 tablet 3    b complex-vitamin c tablet Take 1 tablet by mouth once daily.      calcium carbonate 600 mg calcium (1,500 mg) tablet Take 1 tablet (1,500 mg) by mouth once daily.      cholecalciferol  "(Vitamin D-3) 50 MCG (2000 UT) tablet Take by mouth.      fluticasone propion-salmeteroL (Advair Diskus) 250-50 mcg/dose diskus inhaler Inhale 1 puff 2 times a day. Rinse mouth with water after use to reduce aftertaste and incidence of candidiasis. Do not swallow.      gabapentin (Neurontin) 400 mg capsule Take 1 capsule (400 mg) by mouth 3 times a day. 90 capsule 2    hydrocortisone 2.5 % cream Apply topically 2 times a day. Mix with eucerin 30 g 2    hydroxychloroquine (Plaquenil) 200 mg tablet Take 1 tablet (200 mg) by mouth 2 times a day.      levothyroxine (Synthroid) 25 mcg tablet Take 1 tablet (25 mcg) by mouth once daily in the morning. Take before meals. 90 tablet 3    losartan (Cozaar) 100 mg tablet Take 1 tablet (100 mg) by mouth once daily.      multivitamin capsule Take by mouth.      spironolactone (Aldactone) 25 mg tablet Take 1 tablet (25 mg) by mouth once daily.      torsemide 40 mg tablet Take 40 mg by mouth once daily. Do not start before December 27, 2023. 30 tablet 0     No current facility-administered medications for this visit.       ALLERGIES AND DRUG REACTIONS  Allergies   Allergen Reactions    Sulfur Hives    Cefprozil Hives    Sulfa (Sulfonamide Antibiotics) Hives          OBJECTIVE  Visit Vitals  /69   Pulse 70   Resp 18   Ht 1.575 m (5' 2\")   Wt 97.1 kg (214 lb)   BMI 39.14 kg/m²   OB Status Postmenopausal   Smoking Status Former   BSA 2.06 m²       Last Recorded Pain Score (if available):                Physical Exam  General: Sitting in chair, NAD  Head: NCAT  Eyes: Sclera/conjunctiva clear, EOMI, PERRL  Nose/mouth: MMM  CV: Good distal pulses  Lungs: Good/equal chest excursion  Abdomen: Soft, ND  Ext: BLE edema, R ankle eversion  MSK: l-spine alignment: unremarkable, BL paraspinal m TTP, thoracolumbar spine tenderness with +percussion tenderness. L-spine ROM: ext limtd by pain    Neuro: AAOx3   Dermatome sensation to light touch  LEFT L1 (lower pelvis/upper thigh): WNL    RIGHT " L1: WNL      LEFT L2 (upper thigh): WNL       RIGHT: L2:WNL      LEFT L3 (medial knee): WNL       RIGHT L3: WNL      Decreased BL feet in stocking fashion to ankle level     Motor strength  LEFT L2 (hip flexion): 5/5   RIGHT L2: 5/5  LEFT L3 (knee extension): 5/5     RIGHT L3: 5/5  LEFT L4 (dorsiflexion): 5/5     RIGHT L4: 5/5  LEFT L5 (EHL extension): 5/5     RIGHT L5: 5/5  LEFT S1 (plantarflexion): 5/5     RIGHT S1: 5/5  LEFT S2 (knee flexion): 5/5      RIGHT S2: 5/5      Psych: affect nl  Skin: no rash/lesions      REVIEW OF LABORATORY DATA  I have reviewed the following lab results:  WBC   Date Value Ref Range Status   04/25/2024 6.9 4.4 - 11.3 x10*3/uL Final     RBC   Date Value Ref Range Status   04/25/2024 3.89 (L) 4.00 - 5.20 x10*6/uL Final     Hemoglobin   Date Value Ref Range Status   04/25/2024 8.6 (L) 12.0 - 16.0 g/dL Final     Hematocrit   Date Value Ref Range Status   04/25/2024 29.2 (L) 36.0 - 46.0 % Final     MCV   Date Value Ref Range Status   04/25/2024 75 (L) 80 - 100 fL Final     MCH   Date Value Ref Range Status   04/25/2024 22.1 (L) 26.0 - 34.0 pg Final     MCHC   Date Value Ref Range Status   04/25/2024 29.5 (L) 32.0 - 36.0 g/dL Final     RDW   Date Value Ref Range Status   04/25/2024 20.3 (H) 11.5 - 14.5 % Final     Platelets   Date Value Ref Range Status   04/25/2024 380 150 - 450 x10*3/uL Final     Sodium   Date Value Ref Range Status   04/08/2024 138 136 - 145 mmol/L Final     Potassium   Date Value Ref Range Status   04/08/2024 3.9 3.5 - 5.3 mmol/L Final     Bicarbonate   Date Value Ref Range Status   04/08/2024 25 21 - 32 mmol/L Final     Urea Nitrogen   Date Value Ref Range Status   04/08/2024 23 6 - 23 mg/dL Final     Calcium   Date Value Ref Range Status   04/08/2024 8.8 8.6 - 10.3 mg/dL Final     Protime   Date Value Ref Range Status   01/18/2023 11.0 9.8 - 13.4 sec Final     INR   Date Value Ref Range Status   01/18/2023 1.0 0.9 - 1.1 Final         REVIEW OF RADIOLOGY   I have  reviewed the following:  Radiology Studies           MRI L-spine w 5/8/24:  * Discitis/osteomyelitis at T12/L1 as described.  *No evidence of fluid collection to suggest epidural or paraspinal  abscess.        MRI L-spine wo 3/22/24:  There is increased signal within the disc space at T12-L1 with  increased anterior wedging at L1 and new anterior wedging at T12.  There is approximately 75% loss of vertebral body height anteriorly  at L1 and approximately 25% loss of vertebral body height anteriorly  at T12. There is no significant osseous retropulsion into the spinal  canal. There is no significant paraspinal edema or visualized  epidural collection. These findings may represent acute compression  deformities or sequela of trauma, however by imaging and early  discitis osteomyelitis can have a similar appearance. Please  correlate clinically for signs and symptoms of infection.      There is edema within the sacrum on the left which may represent a  sacral insufficiency fracture.      Postsurgical changes of posterolateral fusion and decompression at L4  through S1. No spinal canal and mild-to-moderate neural foraminal  narrowing at these levels.      Additional multilevel degenerative disc disease and facet arthrosis  with mild spinal canal stenosis. Neural foraminal narrowing most  pronounced at T12-L1 with severe left neural foraminal stenosis.           ASSESSMENT & PLAN  Genesis Garcia is a 79 y.o. old female with PMH HTN, DVT on ELIQUIS, hypothyroidism, CKD, asthma, obesity, L4-S1 fusion who presents for F/U LB pain.    1) LBP  -Since fall in November likely 2/2 T12, L1 VCF as seen on MRI with +STIR and reproducible on exam with +percussion tenderness  -Refractive to Tylenol, m relaxants, oxycodone, topical medication, R SIJ CSI  -MRI l-spine 3/22/24, which details pathology above, but does not r/o T12-L1 discitis. Normal WBC, ESR. Marginally elevated CRP   --Reviewed/discussed MRI L-spine w contrast  5/8/24 unchanged  -Cont back brace to maximize conservative tx and assist ambulation  -After discussion with ortho spine and extensive discussion with patient regarding possible discitis, collaborative decision to proceed with T12, L1 kyphoplasty with bx and cx appropriately off Eliquis pending clearance to hold to target pain generator as seen on imaging and minimize risk/likelihood of chronic opioid use and/or surgery.   --Benefit of vertebral stabilization, improved ambulation outweighs potential risks of bony instrumentation, especially without the use of steroid medication. Concomitantly, vertebral bx and cx may help provide definitive answer to infection question    2) Peripheral neuropathy  -BL foot neuropathy since ~2019 without known etiology  -No known DM. F/U B12, folate  -Consider revisiting after tx of LBP        Discussed procedure risks/benefits in detail with patient. Pt meets medical necessity for procedure due to failure of conservative measures. Reviewed procedural risks including bleeding, infection, nerve damage, paralysis. Also reviewed mitigating factors such as screening for infection/blood thinner use, sterile precautions, and image-guidance when applicable. All questions answered. Pt/guardian expressed understanding and choose to proceed           Faith Reese MD  Anesthesiologist & Interventional Pain Physician   [facility] [place]  O: [phone]  F: [phone]  11:16 AM  05/15/24

## 2024-05-16 PROBLEM — I50.30 DIASTOLIC HEART FAILURE (MULTI): Status: ACTIVE | Noted: 2024-05-16

## 2024-05-16 PROBLEM — R60.0 EDEMA OF LOWER EXTREMITY: Status: ACTIVE | Noted: 2023-01-18

## 2024-05-16 PROBLEM — M25.511 RIGHT SHOULDER PAIN: Status: ACTIVE | Noted: 2024-05-16

## 2024-05-16 PROBLEM — L03.119 CELLULITIS OF LOWER EXTREMITY: Status: ACTIVE | Noted: 2024-05-16

## 2024-05-16 PROBLEM — M19.90 UNSPECIFIED OSTEOARTHRITIS, UNSPECIFIED SITE: Status: ACTIVE | Noted: 2022-06-16

## 2024-05-16 PROBLEM — B37.0 CANDIDIASIS OF MOUTH: Status: ACTIVE | Noted: 2024-05-16

## 2024-05-16 PROBLEM — K57.90 DIVERTICULOSIS OF INTESTINE, PART UNSPECIFIED, WITHOUT PERFORATION OR ABSCESS WITHOUT BLEEDING: Status: ACTIVE | Noted: 2022-06-16

## 2024-05-16 PROBLEM — S46.009A INJURY OF TENDON OF ROTATOR CUFF: Status: ACTIVE | Noted: 2024-05-16

## 2024-05-16 PROBLEM — K22.9 ABNORMALITY OF ESOPHAGUS: Status: ACTIVE | Noted: 2024-05-16

## 2024-05-16 PROBLEM — R11.0 NAUSEA: Status: ACTIVE | Noted: 2024-05-16

## 2024-05-16 PROBLEM — R49.9 ABNORMAL VOICE: Status: ACTIVE | Noted: 2023-08-23

## 2024-05-16 PROBLEM — M16.9 OSTEOARTHRITIS OF HIP: Status: ACTIVE | Noted: 2024-05-16

## 2024-05-16 PROBLEM — R13.10 DYSPHAGIA: Status: ACTIVE | Noted: 2024-05-16

## 2024-05-16 PROBLEM — Z96.649 STATUS POST HIP HEMIARTHROPLASTY: Status: ACTIVE | Noted: 2022-11-03

## 2024-05-16 PROBLEM — I83.899 VENOUS STASIS ULCER WITH EDEMA OF LOWER LEG (MULTI): Status: ACTIVE | Noted: 2024-02-26

## 2024-05-16 PROBLEM — R19.00 PELVIC MASS: Status: ACTIVE | Noted: 2024-05-16

## 2024-05-16 PROBLEM — N63.0 MASS OF BREAST: Status: ACTIVE | Noted: 2023-08-23

## 2024-05-16 PROBLEM — K57.90 DIVERTICULAR DISEASE: Status: ACTIVE | Noted: 2023-07-27

## 2024-05-16 PROBLEM — I87.339 STASIS DERMATITIS WITH VENOUS ULCER OF LOWER EXTREMITY DUE TO CHRONIC PERIPHERAL VENOUS HYPERTENSION (MULTI): Status: ACTIVE | Noted: 2024-05-16

## 2024-05-16 PROBLEM — M79.89 SWELLING OF LOWER EXTREMITY: Status: ACTIVE | Noted: 2023-01-18

## 2024-05-16 PROBLEM — R00.0 TACHYCARDIA: Status: ACTIVE | Noted: 2024-05-16

## 2024-05-16 PROBLEM — L03.116 CELLULITIS OF LEFT LOWER LEG: Status: ACTIVE | Noted: 2024-05-16

## 2024-05-16 PROBLEM — K22.9 DISORDER OF ESOPHAGUS: Status: ACTIVE | Noted: 2024-05-16

## 2024-05-16 PROBLEM — R60.0 LOCALIZED EDEMA: Status: ACTIVE | Noted: 2023-01-21

## 2024-05-16 PROBLEM — M13.0 POLYARTHROPATHY: Status: ACTIVE | Noted: 2020-10-07

## 2024-05-16 PROBLEM — K42.0 UMBILICAL HERNIA WITH OBSTRUCTION, WITHOUT GANGRENE: Status: ACTIVE | Noted: 2022-06-16

## 2024-05-16 PROBLEM — M21.40 PES PLANUS: Status: ACTIVE | Noted: 2020-10-07

## 2024-05-16 PROBLEM — J18.9 HCAP (HEALTHCARE-ASSOCIATED PNEUMONIA): Status: ACTIVE | Noted: 2024-05-16

## 2024-05-16 PROBLEM — M80.08XA PATHOLOGICAL FRACTURE OF VERTEBRA DUE TO OSTEOPOROSIS (MULTI): Status: ACTIVE | Noted: 2024-04-02

## 2024-05-16 PROBLEM — M16.11 UNILATERAL PRIMARY OSTEOARTHRITIS, RIGHT HIP: Status: ACTIVE | Noted: 2022-06-16

## 2024-05-16 PROBLEM — M96.1 POSTLAMINECTOMY SYNDROME OF LUMBAR REGION: Status: ACTIVE | Noted: 2023-11-28

## 2024-05-16 PROBLEM — R60.0 EDEMA OF FOOT: Status: ACTIVE | Noted: 2024-05-16

## 2024-05-16 PROBLEM — G60.9 IDIOPATHIC PERIPHERAL NEUROPATHY: Status: ACTIVE | Noted: 2024-05-08

## 2024-05-16 PROBLEM — K52.9 COLITIS: Status: ACTIVE | Noted: 2024-05-16

## 2024-05-16 PROBLEM — R29.898 OTHER SYMPTOMS AND SIGNS INVOLVING THE MUSCULOSKELETAL SYSTEM: Status: ACTIVE | Noted: 2023-12-30

## 2024-05-16 PROBLEM — K21.00 GASTROESOPHAGEAL REFLUX DISEASE WITH ESOPHAGITIS: Status: ACTIVE | Noted: 2023-08-23

## 2024-05-16 PROBLEM — R60.0 PEDAL EDEMA: Status: ACTIVE | Noted: 2024-05-16

## 2024-05-16 PROBLEM — Z96.649 HISTORY OF TOTAL HIP REPLACEMENT: Status: ACTIVE | Noted: 2022-11-03

## 2024-05-16 PROBLEM — M06.9 RHEUMATOID ARTHRITIS (MULTI): Status: ACTIVE | Noted: 2023-12-20

## 2024-05-16 PROBLEM — A04.72 COLITIS DUE TO CLOSTRIDIOIDES DIFFICILE: Status: ACTIVE | Noted: 2024-05-16

## 2024-05-16 PROBLEM — I87.2 VENOUS INSUFFICIENCY (CHRONIC) (PERIPHERAL): Status: ACTIVE | Noted: 2022-06-16

## 2024-05-16 PROBLEM — M79.671 PAIN IN BOTH FEET: Status: ACTIVE | Noted: 2024-05-16

## 2024-05-16 PROBLEM — E66.9 OBESITY, UNSPECIFIED: Status: ACTIVE | Noted: 2022-06-16

## 2024-05-16 PROBLEM — M13.0 POLYARTHRITIS, UNSPECIFIED: Status: ACTIVE | Noted: 2024-05-16

## 2024-05-16 PROBLEM — L97.909 VENOUS STASIS ULCER WITH EDEMA OF LOWER LEG (MULTI): Status: ACTIVE | Noted: 2024-02-26

## 2024-05-16 PROBLEM — L97.909 ULCER OF LOWER EXTREMITY (MULTI): Status: ACTIVE | Noted: 2023-08-23

## 2024-05-16 PROBLEM — K43.0 INCISIONAL HERNIA WITH OBSTRUCTION BUT NO GANGRENE: Status: ACTIVE | Noted: 2024-05-16

## 2024-05-16 PROBLEM — A04.72 C. DIFFICILE COLITIS: Status: ACTIVE | Noted: 2024-05-16

## 2024-05-16 PROBLEM — I82.5Y9 CHRONIC DEEP VEIN THROMBOSIS (DVT) OF PROXIMAL VEIN OF LOWER EXTREMITY (MULTI): Status: ACTIVE | Noted: 2024-02-26

## 2024-05-16 PROBLEM — S32.000A COMPRESSION FRACTURE OF LUMBAR VERTEBRA (MULTI): Status: ACTIVE | Noted: 2024-03-22

## 2024-05-16 PROBLEM — M17.9 OSTEOARTHRITIS OF KNEE: Status: ACTIVE | Noted: 2024-05-16

## 2024-05-16 PROBLEM — D64.9 ANEMIA, UNSPECIFIED: Status: ACTIVE | Noted: 2023-12-29

## 2024-05-16 PROBLEM — M25.572 LEFT ANKLE PAIN: Status: ACTIVE | Noted: 2024-05-16

## 2024-05-16 PROBLEM — E87.70 FLUID OVERLOAD: Status: ACTIVE | Noted: 2024-05-16

## 2024-05-16 PROBLEM — J44.9 CHRONIC OBSTRUCTIVE PULMONARY DISEASE, UNSPECIFIED (MULTI): Status: ACTIVE | Noted: 2022-06-16

## 2024-05-16 PROBLEM — A41.9 SEPSIS, UNSPECIFIED ORGANISM (MULTI): Status: ACTIVE | Noted: 2023-12-29

## 2024-05-16 PROBLEM — D50.9 IRON DEFICIENCY ANEMIA: Status: ACTIVE | Noted: 2023-07-27

## 2024-05-16 PROBLEM — M35.9 SYSTEMIC INVOLVEMENT OF CONNECTIVE TISSUE, UNSPECIFIED (MULTI): Status: ACTIVE | Noted: 2022-06-16

## 2024-05-16 PROBLEM — J96.01 ACUTE RESPIRATORY FAILURE WITH HYPOXIA (MULTI): Status: ACTIVE | Noted: 2023-12-29

## 2024-05-16 PROBLEM — K90.9 IRON MALABSORPTION (HHS-HCC): Status: ACTIVE | Noted: 2024-04-08

## 2024-05-16 PROBLEM — R10.9 ABDOMINAL PAIN: Status: ACTIVE | Noted: 2024-05-16

## 2024-05-16 PROBLEM — I83.009 VENOUS STASIS ULCER WITH EDEMA OF LOWER LEG (MULTI): Status: ACTIVE | Noted: 2024-02-26

## 2024-05-16 PROBLEM — I48.91 UNSPECIFIED ATRIAL FIBRILLATION (MULTI): Status: ACTIVE | Noted: 2022-06-16

## 2024-05-16 PROBLEM — L03.90 CELLULITIS: Status: ACTIVE | Noted: 2024-05-16

## 2024-05-16 PROBLEM — A41.9 SEPSIS (MULTI): Status: ACTIVE | Noted: 2023-12-19

## 2024-05-16 PROBLEM — R05.9 COUGH: Status: ACTIVE | Noted: 2023-12-18

## 2024-05-16 PROBLEM — M79.89 SYMPTOM OF LEG SWELLING: Status: ACTIVE | Noted: 2024-05-16

## 2024-05-16 PROBLEM — I87.2 PERIPHERAL VENOUS INSUFFICIENCY: Status: ACTIVE | Noted: 2023-08-23

## 2024-05-16 PROBLEM — M35.9 UNDIFFERENTIATED CONNECTIVE TISSUE DISEASE (MULTI): Status: ACTIVE | Noted: 2023-08-23

## 2024-05-16 PROBLEM — Z86.39 HISTORY OF METABOLIC DISORDER: Status: ACTIVE | Noted: 2024-05-16

## 2024-05-16 PROBLEM — R48.8 OTHER SYMBOLIC DYSFUNCTIONS: Status: ACTIVE | Noted: 2024-01-05

## 2024-05-16 PROBLEM — K56.600 PARTIAL SMALL BOWEL OBSTRUCTION (MULTI): Status: ACTIVE | Noted: 2024-05-16

## 2024-05-16 PROBLEM — K43.2 INCISIONAL HERNIA: Status: ACTIVE | Noted: 2023-08-23

## 2024-05-16 PROBLEM — I50.33 ACUTE ON CHRONIC DIASTOLIC (CONGESTIVE) HEART FAILURE (MULTI): Status: ACTIVE | Noted: 2023-01-17

## 2024-05-16 PROBLEM — J45.909 UNSPECIFIED ASTHMA, UNCOMPLICATED (HHS-HCC): Status: ACTIVE | Noted: 2022-06-16

## 2024-05-16 PROBLEM — I87.311 CHRONIC VENOUS HYPERTENSION (IDIOPATHIC) WITH ULCER OF RIGHT LOWER EXTREMITY (CODE) (MULTI): Status: ACTIVE | Noted: 2022-06-16

## 2024-05-16 PROBLEM — D72.829 LEUKOCYTOSIS: Status: ACTIVE | Noted: 2024-05-16

## 2024-05-16 PROBLEM — R05.8 PRODUCTIVE COUGH: Status: ACTIVE | Noted: 2024-05-16

## 2024-05-16 PROBLEM — L97.909 LEG ULCER (MULTI): Status: ACTIVE | Noted: 2024-05-16

## 2024-05-16 PROBLEM — M85.80 SENILE OSTEOPENIA: Status: ACTIVE | Noted: 2020-12-29

## 2024-05-16 PROBLEM — M79.89 OTHER SPECIFIED SOFT TISSUE DISORDERS: Status: ACTIVE | Noted: 2023-01-21

## 2024-05-16 PROBLEM — I10 ESSENTIAL (PRIMARY) HYPERTENSION: Status: ACTIVE | Noted: 2022-06-16

## 2024-05-16 PROBLEM — M54.50 LOW BACK PAIN, UNSPECIFIED: Status: ACTIVE | Noted: 2023-12-29

## 2024-05-16 PROBLEM — N18.30 STAGE 3 CHRONIC KIDNEY DISEASE (MULTI): Status: ACTIVE | Noted: 2024-02-26

## 2024-05-16 PROBLEM — M79.672 PAIN IN BOTH FEET: Status: ACTIVE | Noted: 2024-05-16

## 2024-05-16 PROBLEM — M25.569 KNEE PAIN: Status: ACTIVE | Noted: 2024-05-16

## 2024-05-16 PROBLEM — R26.2 DIFFICULTY IN WALKING, NOT ELSEWHERE CLASSIFIED: Status: ACTIVE | Noted: 2023-12-31

## 2024-05-16 PROBLEM — R53.1 WEAKNESS: Status: ACTIVE | Noted: 2023-12-27

## 2024-05-16 PROBLEM — R60.9 VENOUS STASIS ULCER WITH EDEMA OF LOWER LEG (MULTI): Status: ACTIVE | Noted: 2024-02-26

## 2024-05-16 PROBLEM — J18.9 PNEUMONIA, UNSPECIFIED ORGANISM: Status: ACTIVE | Noted: 2023-12-20

## 2024-05-16 PROBLEM — I82.403 DEEP VEIN THROMBOSIS (DVT) OF BOTH LOWER EXTREMITIES (MULTI): Status: ACTIVE | Noted: 2023-03-10

## 2024-05-16 PROBLEM — R22.43 LOCALIZED SWELLING, MASS AND LUMP, LOWER LIMB, BILATERAL: Status: ACTIVE | Noted: 2022-06-16

## 2024-05-16 PROBLEM — N17.9 AKI (ACUTE KIDNEY INJURY) (CMS-HCC): Status: ACTIVE | Noted: 2023-12-15

## 2024-05-20 ENCOUNTER — LAB (OUTPATIENT)
Dept: LAB | Facility: HOSPITAL | Age: 79
End: 2024-05-20
Payer: MEDICARE

## 2024-05-20 DIAGNOSIS — D50.8 OTHER IRON DEFICIENCY ANEMIA: ICD-10-CM

## 2024-05-20 LAB
ALBUMIN SERPL BCP-MCNC: 3.8 G/DL (ref 3.4–5)
ALP SERPL-CCNC: 83 U/L (ref 33–136)
ALT SERPL W P-5'-P-CCNC: 8 U/L (ref 7–45)
ANION GAP SERPL CALC-SCNC: 9 MMOL/L (ref 10–20)
AST SERPL W P-5'-P-CCNC: 16 U/L (ref 9–39)
BASOPHILS # BLD AUTO: 0.04 X10*3/UL (ref 0–0.1)
BASOPHILS NFR BLD AUTO: 0.8 %
BILIRUB SERPL-MCNC: 0.5 MG/DL (ref 0–1.2)
BUN SERPL-MCNC: 25 MG/DL (ref 6–23)
CALCIUM SERPL-MCNC: 9 MG/DL (ref 8.6–10.3)
CHLORIDE SERPL-SCNC: 104 MMOL/L (ref 98–107)
CO2 SERPL-SCNC: 30 MMOL/L (ref 21–32)
CREAT SERPL-MCNC: 1.31 MG/DL (ref 0.5–1.05)
EGFRCR SERPLBLD CKD-EPI 2021: 42 ML/MIN/1.73M*2
EOSINOPHIL # BLD AUTO: 0.32 X10*3/UL (ref 0–0.4)
EOSINOPHIL NFR BLD AUTO: 6.1 %
ERYTHROCYTE [DISTWIDTH] IN BLOOD BY AUTOMATED COUNT: ABNORMAL %
FERRITIN SERPL-MCNC: 112 NG/ML (ref 8–150)
FOLATE SERPL-MCNC: 18.8 NG/ML
GLUCOSE SERPL-MCNC: 87 MG/DL (ref 74–99)
HCT VFR BLD AUTO: 36.1 % (ref 36–46)
HGB BLD-MCNC: 11.1 G/DL (ref 12–16)
HYPOCHROMIA BLD QL SMEAR: NORMAL
IMM GRANULOCYTES # BLD AUTO: 0.03 X10*3/UL (ref 0–0.5)
IMM GRANULOCYTES NFR BLD AUTO: 0.6 % (ref 0–0.9)
IRON SATN MFR SERPL: 17 % (ref 25–45)
IRON SERPL-MCNC: 48 UG/DL (ref 35–150)
LYMPHOCYTES # BLD AUTO: 0.88 X10*3/UL (ref 0.8–3)
LYMPHOCYTES NFR BLD AUTO: 16.8 %
MCH RBC QN AUTO: 24.3 PG (ref 26–34)
MCHC RBC AUTO-ENTMCNC: 30.7 G/DL (ref 32–36)
MCV RBC AUTO: 79 FL (ref 80–100)
MONOCYTES # BLD AUTO: 0.54 X10*3/UL (ref 0.05–0.8)
MONOCYTES NFR BLD AUTO: 10.3 %
NEUTROPHILS # BLD AUTO: 3.44 X10*3/UL (ref 1.6–5.5)
NEUTROPHILS NFR BLD AUTO: 65.4 %
NRBC BLD-RTO: ABNORMAL /100{WBCS}
OVALOCYTES BLD QL SMEAR: NORMAL
PLATELET # BLD AUTO: 207 X10*3/UL (ref 150–450)
POTASSIUM SERPL-SCNC: 4.2 MMOL/L (ref 3.5–5.3)
PROT SERPL-MCNC: 6.2 G/DL (ref 6.4–8.2)
RBC # BLD AUTO: 4.56 X10*6/UL (ref 4–5.2)
RBC MORPH BLD: NORMAL
SODIUM SERPL-SCNC: 139 MMOL/L (ref 136–145)
TIBC SERPL-MCNC: 277 UG/DL (ref 240–445)
UIBC SERPL-MCNC: 229 UG/DL (ref 110–370)
VIT B12 SERPL-MCNC: 647 PG/ML (ref 211–911)
WBC # BLD AUTO: 5.3 X10*3/UL (ref 4.4–11.3)

## 2024-05-20 PROCEDURE — 82746 ASSAY OF FOLIC ACID SERUM: CPT

## 2024-05-20 PROCEDURE — 82728 ASSAY OF FERRITIN: CPT

## 2024-05-20 PROCEDURE — 36415 COLL VENOUS BLD VENIPUNCTURE: CPT

## 2024-05-20 PROCEDURE — 82607 VITAMIN B-12: CPT

## 2024-05-20 PROCEDURE — 80053 COMPREHEN METABOLIC PANEL: CPT

## 2024-05-20 PROCEDURE — 83540 ASSAY OF IRON: CPT

## 2024-05-20 PROCEDURE — 85025 COMPLETE CBC W/AUTO DIFF WBC: CPT

## 2024-05-21 ENCOUNTER — TELEPHONE (OUTPATIENT)
Dept: PAIN MEDICINE | Facility: CLINIC | Age: 79
End: 2024-05-21
Payer: MEDICARE

## 2024-05-21 NOTE — TELEPHONE ENCOUNTER
Called the office in follow up regarding our previous request to hold eliquis x 3 days for interventional pain procedure.  Request refaxed attention Rafaela

## 2024-05-29 ENCOUNTER — TELEPHONE (OUTPATIENT)
Dept: PAIN MEDICINE | Facility: CLINIC | Age: 79
End: 2024-05-29
Payer: MEDICARE

## 2024-06-03 ENCOUNTER — OFFICE VISIT (OUTPATIENT)
Dept: WOUND CARE | Facility: CLINIC | Age: 79
End: 2024-06-03
Payer: MEDICARE

## 2024-06-03 VITALS
RESPIRATION RATE: 18 BRPM | SYSTOLIC BLOOD PRESSURE: 161 MMHG | DIASTOLIC BLOOD PRESSURE: 64 MMHG | HEIGHT: 62 IN | HEART RATE: 82 BPM | BODY MASS INDEX: 39.56 KG/M2 | WEIGHT: 215 LBS

## 2024-06-03 DIAGNOSIS — I82.5Y2 CHRONIC DEEP VEIN THROMBOSIS (DVT) OF PROXIMAL VEIN OF LEFT LOWER EXTREMITY (MULTI): ICD-10-CM

## 2024-06-03 DIAGNOSIS — I87.2 CHRONIC VENOUS INSUFFICIENCY: Primary | ICD-10-CM

## 2024-06-03 PROCEDURE — 3078F DIAST BP <80 MM HG: CPT | Performed by: INTERNAL MEDICINE

## 2024-06-03 PROCEDURE — 3077F SYST BP >= 140 MM HG: CPT | Performed by: INTERNAL MEDICINE

## 2024-06-03 PROCEDURE — 1157F ADVNC CARE PLAN IN RCRD: CPT | Performed by: INTERNAL MEDICINE

## 2024-06-03 PROCEDURE — 1160F RVW MEDS BY RX/DR IN RCRD: CPT | Performed by: INTERNAL MEDICINE

## 2024-06-03 PROCEDURE — 1159F MED LIST DOCD IN RCRD: CPT | Performed by: INTERNAL MEDICINE

## 2024-06-03 PROCEDURE — 99214 OFFICE O/P EST MOD 30 MIN: CPT | Performed by: INTERNAL MEDICINE

## 2024-06-03 NOTE — PROGRESS NOTES
"REFERRAL REQUESTED BY:  Albert Garner MD PCP - General    LAST SEEN:  04/22/2024    Patient ID: Genesis Garcia is a 79 y.o. female     HPI:   Here for follow up CVHTN and h/o ulcers. Ulcers and dermatitis are healed. Using the circaids. Reports pending kyphoplasty    CURRENT DRESSING:  Who is performing the dressing change:  No wounds  Dressing applied:   Dressing Frequency:     EDEMA MANAGEMENT:  Compression:  Right: Circaid CALF 35.0 CM   Left: Circaid   CALF 37.0 CM     Other Modality  Sleeping in Bed: yes  Elevating FOB:  yes    Offloading/Pressure Relief  Activity Level:  ad travon  Protection Devices:  NA    Offloading/Pressure Relief Effective?     ROS:      Objective     VITALS:  /64 (BP Location: Left arm)   Pulse 82   Resp 18   Ht 1.575 m (5' 2\")   Wt 97.5 kg (215 lb)   BMI 39.32 kg/m²       Physical Exam    ARRIVAL:  Ambulating  TRANSFER:  None    PSYCHIATRIC:  Oriented to person, place and time: A & O x 3    CONSTITUTIONAL:    Appearance:  Well Groomed and Well Nourished    SKIN:   LDS and chronic stasis changes     PULSES:     EXTREMITY TEMPERATURE:    RIGHT: normal  LEFT: normal    EDEMA: 1-2+ brawny    WOUND ASSESSMENT:            Procedures    Assessment/Plan   CVHTN and h/o ulcers and dermatitis. Overall continues to do well. Discussed continued skin care, continue the compression. Elevate at night. Continue to increase activity. After the back is treated may need to consider physical therapy. Follow up 4 months.      VISIT ORDERS:  Vascular Study Required: n  Labs Required: n  Radiology Imaging Required: n  Consultation Required: n  Rx Provided:   Changes to Plan of Care: n      NEW ORDERS:  Wash: soap and water  Irrigate/Soak:  Skin Care: eucerin  Dressing:    Compression: liner and circaid   Offloading:     DISCHARGE PLANNING:    Follow Up: 4 months  Nurse Visit: prn    General Instructions:  Center RN to apply EMLA/Lidocaine 1% jelly/LMX  topically to wound(s) prior to " debridement by physician.  Center RN my see patient as a nurse consult in my absence.      RN Post Procedure Note:        HCC/ECF/Assisted Living  Agency/Facility:   days/week:  Contacted Regarding Changes:

## 2024-06-03 NOTE — PATIENT INSTRUCTIONS
Assessment/Plan   CVHTN and h/o ulcers and dermatitis. Overall continues to do well. Discussed continued skin care, continue the compression. Elevate at night. Continue to increase activity. After the back is treated may need to consider physical therapy. Follow up 4 months.      VISIT ORDERS:  Vascular Study Required: n  Labs Required: n  Radiology Imaging Required: n  Consultation Required: n  Rx Provided:   Changes to Plan of Care: n      NEW ORDERS:  Wash: soap and water  Irrigate/Soak:  Skin Care: eucerin  Dressing:    Compression: liner and circaid   Offloading:     DISCHARGE PLANNING:    Follow Up: 4 months  Nurse Visit: prn

## 2024-06-13 ENCOUNTER — PREP FOR PROCEDURE (OUTPATIENT)
Dept: PAIN MEDICINE | Facility: CLINIC | Age: 79
End: 2024-06-13
Payer: MEDICARE

## 2024-06-13 DIAGNOSIS — M80.08XD AGE-RELATED OSTEOPOROSIS WITH CURRENT PATHOLOGICAL FRACTURE OF VERTEBRA WITH ROUTINE HEALING, SUBSEQUENT ENCOUNTER: Primary | ICD-10-CM

## 2024-06-13 DIAGNOSIS — M80.08XA AGE-RELATED OSTEOPOROSIS WITH CURRENT PATHOLOGICAL FRACTURE, VERTEBRA(E), INITIAL ENCOUNTER FOR FRACTURE (MULTI): ICD-10-CM

## 2024-06-13 RX ORDER — SODIUM CHLORIDE, SODIUM LACTATE, POTASSIUM CHLORIDE, CALCIUM CHLORIDE 600; 310; 30; 20 MG/100ML; MG/100ML; MG/100ML; MG/100ML
100 INJECTION, SOLUTION INTRAVENOUS CONTINUOUS
OUTPATIENT
Start: 2024-06-13

## 2024-06-20 ENCOUNTER — PRE-ADMISSION TESTING (OUTPATIENT)
Dept: PREADMISSION TESTING | Facility: HOSPITAL | Age: 79
End: 2024-06-20
Payer: MEDICARE

## 2024-06-20 VITALS
HEART RATE: 85 BPM | BODY MASS INDEX: 39.56 KG/M2 | SYSTOLIC BLOOD PRESSURE: 138 MMHG | HEIGHT: 62 IN | TEMPERATURE: 99 F | OXYGEN SATURATION: 96 % | WEIGHT: 215 LBS | DIASTOLIC BLOOD PRESSURE: 72 MMHG | RESPIRATION RATE: 16 BRPM

## 2024-06-20 DIAGNOSIS — Z01.818 PREOP TESTING: Primary | ICD-10-CM

## 2024-06-20 PROCEDURE — 99213 OFFICE O/P EST LOW 20 MIN: CPT | Performed by: NURSE PRACTITIONER

## 2024-06-20 PROCEDURE — 87081 CULTURE SCREEN ONLY: CPT | Mod: TRILAB

## 2024-06-20 RX ORDER — CHLORHEXIDINE GLUCONATE ORAL RINSE 1.2 MG/ML
SOLUTION DENTAL
Qty: 473 ML | Refills: 0 | Status: SHIPPED | OUTPATIENT
Start: 2024-06-20

## 2024-06-20 ASSESSMENT — DUKE ACTIVITY SCORE INDEX (DASI)
CAN YOU RUN A SHORT DISTANCE: NO
CAN YOU DO YARD WORK LIKE RAKING LEAVES, WEEDING OR PUSHING A MOWER: NO
CAN YOU DO LIGHT WORK AROUND THE HOUSE LIKE DUSTING OR WASHING DISHES: YES
CAN YOU WALK INDOORS, SUCH AS AROUND YOUR HOUSE: YES
CAN YOU CLIMB A FLIGHT OF STAIRS OR WALK UP A HILL: NO
TOTAL_SCORE: 15.95
CAN YOU PARTICIPATE IN MODERATE RECREATIONAL ACTIVITIES LIKE GOLF, BOWLING, DANCING, DOUBLES TENNIS OR THROWING A BASEBALL OR FOOTBALL: NO
CAN YOU DO HEAVY WORK AROUND THE HOUSE LIKE SCRUBBING FLOORS OR LIFTING AND MOVING HEAVY FURNITURE: NO
DASI METS SCORE: 4.7
CAN YOU WALK A BLOCK OR TWO ON LEVEL GROUND: NO
CAN YOU PARTICIPATE IN STRENOUS SPORTS LIKE SWIMMING, SINGLES TENNIS, FOOTBALL, BASKETBALL, OR SKIING: NO
CAN YOU TAKE CARE OF YOURSELF (EAT, DRESS, BATHE, OR USE TOILET): YES
CAN YOU HAVE SEXUAL RELATIONS: YES
CAN YOU DO MODERATE WORK AROUND THE HOUSE LIKE VACUUMING, SWEEPING FLOORS OR CARRYING GROCERIES: YES

## 2024-06-20 ASSESSMENT — ENCOUNTER SYMPTOMS
GASTROINTESTINAL NEGATIVE: 1
BACK PAIN: 1
NEUROLOGICAL NEGATIVE: 1
EYES NEGATIVE: 1
ACTIVITY CHANGE: 1
PSYCHIATRIC NEGATIVE: 1
RESPIRATORY NEGATIVE: 1

## 2024-06-20 ASSESSMENT — PAIN - FUNCTIONAL ASSESSMENT: PAIN_FUNCTIONAL_ASSESSMENT: 0-10

## 2024-06-20 ASSESSMENT — PAIN SCALES - GENERAL: PAINLEVEL_OUTOF10: 0 - NO PAIN

## 2024-06-20 NOTE — CPM/PAT H&P
CPM/PAT Evaluation       Name: Genesis Garcia (Genesis Garcia)  /Age: 1945/79 y.o.     In-Person       Chief Complaint: back pain    HPI    Pt is a 79 year old female with chronic lower back pain. Pt has history of Lumbar spine fusion in the past. Pt reports having lower back pain for several years that has progressively worsened. Pt describes the pain as a  sharp pain that occurs in her lower back and radiates to her right buttock and right side. The pain becomes severe with standing.  Pt has to use a wheelchair with long distance. Pt has history of falls. Pt has history of urinary incontinence for many years. Pt was examined by her physician  and has been scheduled for spine vertebroplasty. Pt denies CP, SOB, or dizziness.     Past Medical History:   Diagnosis Date    Ankle fracture     left ankle fracture    Cellulitis, unspecified 2020    Cellulitis of skin    Chronic venous insufficiency     Chronic venous insufficiency     CKD (chronic kidney disease), stage III (Multi)     COPD (chronic obstructive pulmonary disease) (Multi)     Essential (primary) hypertension 2023    Hypertension    H/O deep venous thrombosis     Hypothyroidism     Lymphedema     Osteoporosis     Other fracture of upper and lower end of unspecified fibula, initial encounter for closed fracture 2021    Fracture of distal fibula    Other specified soft tissue disorders 2022    Leg swelling    Peripheral neuropathy     Personal history of (healed) stress fracture 2021    History of stress fracture    Personal history of other diseases of the circulatory system     History of atrial fibrillation    Personal history of other diseases of the musculoskeletal system and connective tissue 10/20/2016    History of arthritis    Undifferentiated connective tissue disease (Multi)     Unspecified asthma, uncomplicated ([facility]) 2022    Asthma    Venous ulcer of leg (Multi)     Vitamin D deficiency,  unspecified 10/20/2016    Vitamin D deficiency       Past Surgical History:   Procedure Laterality Date    BACK SURGERY  06/20/2016    Back Surgery L4-L5 fusion    CARDIAC ELECTROPHYSIOLOGY STUDY AND ABLATION      CHOLECYSTECTOMY  04/15/2016    Cholecystectomy    HERNIA REPAIR      OTHER SURGICAL HISTORY  05/03/2021    Cataract surgery    THYROIDECTOMY, PARTIAL      d/t goiter    TOTAL HIP ARTHROPLASTY Right     TOTAL KNEE ARTHROPLASTY Bilateral 12/01/2022    Knee Replacement       Family History   Problem Relation Name Age of Onset    Diabetes Mother          type 2    Heart disease Father      Hypertension Father         Allergies   Allergen Reactions    Sulfur Hives    Cefprozil Hives    Sulfa (Sulfonamide Antibiotics) Hives     Current Outpatient Medications   Medication Sig Dispense Refill    acetaminophen (Tylenol) 325 mg tablet Take 2 tablets (650 mg) by mouth every 4 hours if needed for mild pain (1 - 3) or moderate pain (4 - 6). 30 tablet 0    apixaban (Eliquis) 2.5 mg tablet Take 1 tablet (2.5 mg) by mouth 2 times a day. 180 tablet 3    b complex-vitamin c tablet Take 1 tablet by mouth once daily.      Bacillus coagulans (PROBIOTIC, B. COAGULANS, ORAL) Take 1 tablet by mouth once daily.      calcium carbonate 600 mg calcium (1,500 mg) tablet Take 1 tablet (1,500 mg) by mouth once daily.      cholecalciferol (Vitamin D-3) 50 MCG (2000 UT) tablet Take by mouth.      fluticasone propion-salmeteroL (Advair Diskus) 250-50 mcg/dose diskus inhaler Inhale 1 puff 2 times a day. Rinse mouth with water after use to reduce aftertaste and incidence of candidiasis. Do not swallow.      gabapentin (Neurontin) 400 mg capsule Take 1 capsule (400 mg) by mouth 3 times a day. 90 capsule 2    hydrocortisone 2.5 % cream Apply topically 2 times a day. Mix with eucerin 30 g 2    hydroxychloroquine (Plaquenil) 200 mg tablet Take 1 tablet (200 mg) by mouth 2 times a day.      levothyroxine (Synthroid) 25 mcg tablet Take 1 tablet  "(25 mcg) by mouth once daily in the morning. Take before meals. 90 tablet 3    losartan (Cozaar) 100 mg tablet Take 1 tablet (100 mg) by mouth once daily.      multivitamin capsule Take by mouth.      spironolactone (Aldactone) 25 mg tablet Take 1 tablet (25 mg) by mouth once daily.      torsemide 40 mg tablet Take 40 mg by mouth once daily. Do not start before December 27, 2023. 30 tablet 0    albuterol 2.5 mg /3 mL (0.083 %) nebulizer solution Take 3 mL (2.5 mg) by nebulization every 2 hours if needed for wheezing. (Patient not taking: Reported on 6/20/2024) 75 mL 11    albuterol sulfate (Proair Digihaler) 90 mcg/actuation aero powdr breath act w/sensor inhaler Inhale 1 puff every 6 hours if needed for wheezing.      chlorhexidine (Peridex) 0.12 % solution Use as directed. 473 mL 0     No current facility-administered medications for this visit.     Review of Systems   Constitutional:  Positive for activity change.   HENT: Negative.     Eyes: Negative.    Respiratory: Negative.     Cardiovascular:  Positive for leg swelling (H/O: peripheral edema).   Gastrointestinal: Negative.    Genitourinary:         H/O urinary incontinence   Musculoskeletal:  Positive for back pain.   Skin: Negative.         H/O venous stasis ulcers. Per pt she does not currently have any open sores on her bilateral legs. She also does not currently need to see wound care. Pt wears soft protective legs wraps to avoid bumping her legs and causing wounds.    Neurological: Negative.    Psychiatric/Behavioral: Negative.       /72   Pulse 85   Temp 37.2 °C (99 °F) (Temporal)   Resp 16   Ht 1.575 m (5' 2\")   Wt 97.5 kg (215 lb)   SpO2 96%   BMI 39.32 kg/m²     Physical Exam  Vitals reviewed.   Constitutional:       Appearance: She is obese.   HENT:      Head: Normocephalic and atraumatic.      Nose: Nose normal.      Mouth/Throat:      Mouth: Mucous membranes are moist.      Pharynx: Oropharynx is clear.   Eyes:      Extraocular " Movements: Extraocular movements intact.      Conjunctiva/sclera: Conjunctivae normal.      Pupils: Pupils are equal, round, and reactive to light.   Cardiovascular:      Rate and Rhythm: Normal rate and regular rhythm.      Pulses: Normal pulses.      Heart sounds: Murmur heard.   Pulmonary:      Effort: Pulmonary effort is normal.      Breath sounds: Normal breath sounds.   Abdominal:      General: Bowel sounds are normal.      Palpations: Abdomen is soft.      Hernia: A hernia (abdominal hernia) is present.   Musculoskeletal:      Cervical back: Normal range of motion and neck supple.      Comments: Deferred examination of spine to surgeon.   Sitting in a wheelchair   Skin:     General: Skin is warm and dry.      Comments: Bilateral leg wraps in place to protect skin on her legs   Neurological:      General: No focal deficit present.      Mental Status: She is alert and oriented to person, place, and time. Mental status is at baseline.   Psychiatric:         Mood and Affect: Mood normal.         Behavior: Behavior normal.         Thought Content: Thought content normal.         Judgment: Judgment normal.        PAT AIRWAY:   Airway:     Mallampati::  III    TM distance::  >3 FB    Neck ROM::  Full   upper dentures and lower dentures    ASA: 3  DASI: 15.95  METS: 4.7  CHADS: 4%  RCRI: 0.4%  STOP BAN    Assessment and Plan:     Age-related osteoporosis with current pathological fracture, vertebra(e), initial encounter for fracture: Spine Vertebroplasty   HTN: pt is taking losartan, spironolactone, and torsemide.   H/O DVT x2: in the past; pt is taking Eliquis.   Venous insufficiency with history of venous ulcers. Pt is taking spironolactone and torsemide. H/O LE cellulitis; Pt  wears BLE soft leg wraps to protect the skin on her legs.  Peripheral neuropathy in BLE   COPD/Asthma: Pt is well controlled on Advair Diskus; pt denies needing to use albuterol inhaler.   H/O atrial fibrillaton: s/p successful cardiac  ablation   Hypothyroidism: 4/8/2024 TSH completed WNL.   Undifferentiated connective tissue disease: followed by Rheumatology Dr. Kennedy; pt is taking hydroxychloroquine.   BMI: 39.32  Heart Murmur: 12/21/2023 ECHO  CKD stage 3    CBC and CMP completed on 5/20/2024  EKG was completed on 12/23/2024 12/21/2023 ECHO:   CONCLUSIONS:   1. Left ventricular systolic function is normal with a 55-60% estimated ejection fraction.   2. Slightly elevated RVSP.    Ceci Wang, DENISA-CNP

## 2024-06-20 NOTE — PREPROCEDURE INSTRUCTIONS
Medication List            Accurate as of June 20, 2024 12:25 PM. Always use your most recent med list.                acetaminophen 325 mg tablet  Commonly known as: Tylenol  Take 2 tablets (650 mg) by mouth every 4 hours if needed for mild pain (1 - 3) or moderate pain (4 - 6).  Medication Adjustments for Surgery: Other (Comment)  Notes to patient: CAN TAKE THE MORNING OF SURGERY IF NEEDED     * albuterol sulfate 90 mcg/actuation aero powdr breath act w/sensor inhaler  Commonly known as: Proair Digihaler  Medication Adjustments for Surgery: Other (Comment)  Notes to patient: CAN USE THE MORNING OF SURGERY IF NEEDED     * albuterol 2.5 mg /3 mL (0.083 %) nebulizer solution  Take 3 mL (2.5 mg) by nebulization every 2 hours if needed for wheezing.  Medication Adjustments for Surgery: Other (Comment)  Notes to patient: CAN USE THE MORNING OF SURGERY IF NEEDED     apixaban 2.5 mg tablet  Commonly known as: Eliquis  Take 1 tablet (2.5 mg) by mouth 2 times a day.  Medication Adjustments for Surgery: Stop 3 days before surgery  Notes to patient: LAST DOSE WILL BE SUNDAY EVENING 6/23/24 PER VASCULAR DOCTOR AND SURGEON     B complex-vitamin C tablet  Medication Adjustments for Surgery: Stop 7 days before surgery     calcium carbonate 600 mg calcium (1,500 mg) tablet  Medication Adjustments for Surgery: Stop 7 days before surgery     cholecalciferol 50 MCG (2000 UT) tablet  Commonly known as: Vitamin D-3  Medication Adjustments for Surgery: Stop 7 days before surgery     fluticasone propion-salmeteroL 250-50 mcg/dose diskus inhaler  Commonly known as: Advair Diskus  Medication Adjustments for Surgery: Take morning of surgery with sip of water, no other fluids     gabapentin 400 mg capsule  Commonly known as: Neurontin  Take 1 capsule (400 mg) by mouth 3 times a day.  Medication Adjustments for Surgery: Take morning of surgery with sip of water, no other fluids     hydrocortisone 2.5 % cream  Apply topically 2 times a day.  Mix with eucerin  Medication Adjustments for Surgery: Other (Comment)  Notes to patient: CAN USE IF NEEDED     hydroxychloroquine 200 mg tablet  Commonly known as: Plaquenil  Medication Adjustments for Surgery: Take morning of surgery with sip of water, no other fluids     levothyroxine 25 mcg tablet  Commonly known as: Synthroid  Take 1 tablet (25 mcg) by mouth once daily in the morning. Take before meals.  Medication Adjustments for Surgery: Take morning of surgery with sip of water, no other fluids     losartan 100 mg tablet  Commonly known as: Cozaar  Medication Adjustments for Surgery: Other (Comment)  Notes to patient: HOLD MORNING OF SURGERY     multivitamin capsule  Medication Adjustments for Surgery: Stop 7 days before surgery     PROBIOTIC (B. COAGULANS) ORAL  Medication Adjustments for Surgery: Stop 7 days before surgery     spironolactone 25 mg tablet  Commonly known as: Aldactone  Medication Adjustments for Surgery: Take morning of surgery with sip of water, no other fluids     torsemide 40 mg tablet  Take 40 mg by mouth once daily. Do not start before December 27, 2023.  Medication Adjustments for Surgery: Take morning of surgery with sip of water, no other fluids           * This list has 2 medication(s) that are the same as other medications prescribed for you. Read the directions carefully, and ask your doctor or other care provider to review them with you.                     Preoperative Fasting Guidelines    Why must I stop eating and drinking near surgery time?  With sedation, food or liquid in your stomach can enter your lungs causing serious complications  Increases nausea and vomiting    When do I need to stop eating and drinking before my surgery?  Do not eat any food or drink any liquids after midnight the night before your surgery/procedure.  You may have sips of water to take medications.    PAT DISCHARGE INSTRUCTIONS    Please call the Same Day Surgery (SDS) Department of the [facility]  where your procedure will be performed after 2:00 PM the day before your surgery. If you are scheduled on a Monday, or a Tuesday following a Monday holiday, you will need to call on the last business day prior to your surgery.    [facility]  [address] [place], [address]  [phone]  [facility]  [address] [place] [address] [phone]  [facility]  [address]  [place] [id number]  [phone]    Please let your surgeon know if:      You develop any open sores, shingles, burning or painful urination as these may increase your risk of an infection.   You no longer wish to have the surgery.   Any other personal circumstances change that may lead to the need to cancel or defer this surgery-such as being sick or getting admitted to any hospital within one week of your planned procedure.    Your contact details change, such as a change of address or phone number.    Starting now:     Please DO NOT drink alcohol or smoke for 24 hours before surgery. It is well known that quitting smoking can make a huge difference to your health and recovery from surgery. The longer you abstain from smoking, the better your chances of a healthy recovery. If you need help with quitting, call 1-800-QUIT-NOW to be connected to a trained counselor who will discuss the best methods to help you quit.     Before your surgery:    Please stop all supplements 7 days prior to surgery. Or as directed by your surgeon.   Please stop taking NSAID pain medicine such as Advil and Motrin 7 days before surgery.    If you develop any fever, cough, cold, rashes, cuts, scratches, scrapes, urinary symptoms or infection anywhere on your body (including teeth and gums) prior to surgery, please call your surgeon’s office as soon as possible. This may require treatment to reduce the chance of  cancellation on the day of surgery.    The day before your surgery:   DIET- Please follow the diet instructions at the top of your packet.   Get a good night’s rest.  Use the special soap for bathing if you have been instructed to use one.    Scheduled surgery times may change and you will be notified if this occurs - please check your personal voicemail for any updates.     On the morning of surgery:   Wear comfortable, loose fitting clothes which open in the front. Please do not wear moisturizers, creams, lotions, makeup or perfume.    Please bring with you to surgery:   Photo ID and insurance card   Current list of medicines and allergies   Pacemaker/ Defibrillator/Heart stent cards   CPAP machine and mask    Slings/ splints/ crutches   A copy of your complete advanced directive/DHPOA.    Please do NOT bring with you to surgery:   All jewelry and valuables should be left at home.   Prosthetic devices such as contact lenses, hearing aids, dentures, eyelash extensions, hairpins and body piercings must be removed prior to going in to the surgical suite.    After outpatient surgery:   A responsible adult MUST accompany you at the time of discharge and stay with you for 24 hours after your surgery. You may NOT drive yourself home after surgery.    Do not drive, operate machinery, make critical decisions or do activities that require co-ordination or balance until after a night’s sleep.   Do not drink alcoholic beverages for 24 hours.   Instructions for resuming your medications will be provided by your surgeon.    CALL YOUR DOCTOR AFTER SURGERY IF YOU HAVE:     Chills and/or a fever of 101° F or higher.    Redness, swelling, pus or drainage from your surgical wound or a bad smell from the wound.    Lightheadedness, fainting or confusion.    Persistent vomiting (throwing up) and are not able to eat or drink for 12 hours.    Three or more loose, watery bowel movements in 24 hours (diarrhea).   Difficulty or pain while  urinating( after non-urological surgery)    Pain and swelling in your legs, especially if it is only on one side.    Difficulty breathing or are breathing faster than normal.    Any new concerning symptoms.      Patient Information: Pre-Operative Infection Prevention Measures     Why did I have my nose, under my arms, and groin swabbed?  The purpose of the swab is to identify Staphylococcus aureus inside your nose or on your skin.  The swab was sent to the laboratory for culture.  A positive swab/culture for Staphylococcus aureus is called colonization or carriage.      What is Staphylococcus aureus?  Staphylococcus aureus, also known as “staph”, is a germ found on the skin or in the nose of healthy people.  Sometimes Staphylococcus aureus can get into the body and cause an infection.  This can be minor (such as pimples, boils, or other skin problems).  It might also be serious (such as a blood infection, pneumonia, or a surgical site infection).    What is Staphylococcus aureus colonization or carriage?  Colonization or carriage means that a person has the germ but is not sick from it.  These bacteria can be spread on the hands or when breathing or sneezing.    How is Staphylococcus aureus spread?  It is most often spread by close contact with a person or item that carries it.    What happens if my culture is positive for Staphylococcus aureus?  Your doctor/medical team will use this information to guide any antibiotic treatment which may be necessary.  Regardless of the culture results, we will clean the inside of your nose with a betadine swab just before you have your surgery.      Will I get an infection if I have Staphylococcus aureus in my nose or on my skin?  Anyone can get an infection with Staphylococcus aureus.  However, the best way to reduce your risk of infection is to follow the instructions provided to you for the use of your CHG soap and dental rinse.        Patient Information: Oral/Dental  Rinse    What is oral/dental rinse?   It is a mouthwash. It is a way of cleaning the mouth with a germ-killing solution before your surgery.  The solution contains chlorhexidine, commonly known as CHG.   It is used inside the mouth to kill a bacteria known as Staphylococcus aureus.  Let your doctor know if you are allergic to Chlorhexidine.    Why do I need to use CHG oral/dental rinse?  The CHG oral/dental rinse helps to kill a bacteria in your mouth known as Staphylococcus aureus.     This reduces the risk of infection at the surgical site.      Using your CHG oral/dental rinse  STEPS:  Use your CHG oral/dental rinse after you brush your teeth the night before (at bedtime) and the morning of your surgery.  Follow all directions on your prescription label.    Use the cap on the container to measure 15ml   Swish (gargle if you can) the mouthwash in your mouth for at least 30 seconds, (do not swallow) and spit out  After you use your CHG rinse, do not rinse your mouth with water, drink or eat.  Please refer to the prescription label for the appropriate time to resume oral intake      What side effects might I have using the CHG oral/dental rinse?  CHG rinse will stick to plaque on the teeth.  Brush and floss just before use.  Teeth brushing will help avoid staining of plaque during use.      Patient Information: Home Preoperative Antibacterial Shower      What is a home preoperative antibacterial shower?  This shower is a way of cleaning the skin with a germ-killing solution before surgery.  The solution contains chlorhexidine, commonly known as CHG.  CHG is a skin cleanser with germ-killing ability.  Let your doctor know if you are allergic to chlorhexidine.    Why do I need to take a preoperative antibacterial shower?  Skin is not sterile.  It is best to try to make your skin as free of germs as possible before surgery.  Proper cleansing with a germ-killing soap before surgery can lower the number of germs on your  skin.  This helps to reduce the risk of infection at the surgical site.  Following the instructions listed below will help you prepare your skin for surgery.      How do I use the solution?  Steps:  Begin using your CHG soap 5 days before your scheduled surgery on ___START WASH 6/23/24_________.    First, wash and rinse your hair using the CHG soap. Keep CHG soap away from ear canals and eyes.  Rinse completely, do not condition.  Hair extensions should be removed.  Wash your face with your normal soap and rinse.    Apply the CHG solution to a clean wet washcloth.  Turn the water off or move away from the water spray to avoid premature rinsing of the CHG soap as you are applying.   Firmly lather your entire body from the neck down.  Do not use on your face.  Pay special attention to the area(s) where your incision(s) will be located unless they are on your face.  Avoid scrubbing your skin too hard.  The important point is to have the CHG soap sit on your skin for 3 minutes.    When the 3 minutes are up, turn on the water and rinse the CHG solution off your body completely.   DO NOT wash with regular soap after you have used the CHG soap solution  Pat yourself dry with a clean, freshly-laundered towel.  DO NOT apply powders, deodorants, or lotions.  Dress in clean, freshly laundered nightclothes.    Be sure to sleep with clean, freshly laundered sheets.  Be aware that CHG will cause stains on fabrics; if you wash them with bleach after use.  Rinse your washcloth and other linens that have contact with CHG completely.  Use only non-chlorine detergents to launder the items used.   The morning of surgery is the fifth day.  Repeat the above steps and dress in clean comfortable clothing     Whom should I contact if I have any questions regarding the use of CHG soap?  Call the [facility] at [phone] if you have any questions.

## 2024-06-21 ENCOUNTER — APPOINTMENT (OUTPATIENT)
Dept: NEPHROLOGY | Facility: CLINIC | Age: 79
End: 2024-06-21
Payer: MEDICARE

## 2024-06-21 VITALS
BODY MASS INDEX: 39.56 KG/M2 | HEART RATE: 79 BPM | WEIGHT: 215 LBS | OXYGEN SATURATION: 85 % | SYSTOLIC BLOOD PRESSURE: 154 MMHG | HEIGHT: 62 IN | DIASTOLIC BLOOD PRESSURE: 77 MMHG

## 2024-06-21 DIAGNOSIS — D64.9 ANEMIA, UNSPECIFIED TYPE: ICD-10-CM

## 2024-06-21 DIAGNOSIS — N18.30 STAGE 3 CHRONIC KIDNEY DISEASE, UNSPECIFIED WHETHER STAGE 3A OR 3B CKD (MULTI): Primary | ICD-10-CM

## 2024-06-21 DIAGNOSIS — I10 HYPERTENSION, UNSPECIFIED TYPE: ICD-10-CM

## 2024-06-21 PROCEDURE — 99213 OFFICE O/P EST LOW 20 MIN: CPT | Performed by: NURSE PRACTITIONER

## 2024-06-21 PROCEDURE — 1036F TOBACCO NON-USER: CPT | Performed by: NURSE PRACTITIONER

## 2024-06-21 PROCEDURE — 3078F DIAST BP <80 MM HG: CPT | Performed by: NURSE PRACTITIONER

## 2024-06-21 PROCEDURE — 3077F SYST BP >= 140 MM HG: CPT | Performed by: NURSE PRACTITIONER

## 2024-06-21 PROCEDURE — 1159F MED LIST DOCD IN RCRD: CPT | Performed by: NURSE PRACTITIONER

## 2024-06-21 PROCEDURE — 1157F ADVNC CARE PLAN IN RCRD: CPT | Performed by: NURSE PRACTITIONER

## 2024-06-21 ASSESSMENT — ENCOUNTER SYMPTOMS
CONSTITUTIONAL NEGATIVE: 1
EYES NEGATIVE: 1
HEMATOLOGIC/LYMPHATIC NEGATIVE: 1
MUSCULOSKELETAL NEGATIVE: 1
GASTROINTESTINAL NEGATIVE: 1
PSYCHIATRIC NEGATIVE: 1
WOUND: 1
ENDOCRINE NEGATIVE: 1
RESPIRATORY NEGATIVE: 1
NEUROLOGICAL NEGATIVE: 1

## 2024-06-21 NOTE — PROGRESS NOTES
History Of Present Illness  Genesis Garcia is a 79 y.o. female with medical history significant for CKD3, HTN, HLD, a-fib s/p ablation, COPD/asthma, bilateral total knee arthroplasty, and right total hip arthroplasty (11/2022), chronic venous insufficiency, and chronic ulcer of the calf who presents for a 6-month fuv for CKD.     Past Medical History  As above.    Surgical History  She has a past surgical history that includes Cholecystectomy (04/15/2016); Back surgery (06/20/2016); Total knee arthroplasty (Bilateral, 12/01/2022); Other surgical history (05/03/2021); Thyroidectomy, partial; Total hip arthroplasty (Right); Cardiac electrophysiology study and ablation; and Hernia repair.     Social History  She reports that she quit smoking about 32 years ago. Her smoking use included cigarettes. She started smoking about 56 years ago. She has a 3.6 pack-year smoking history. She has never used smokeless tobacco. She reports that she does not currently use alcohol. She reports that she does not use drugs.    Family History  Family History   Problem Relation Name Age of Onset    Diabetes Mother          type 2    Heart disease Father      Hypertension Father          Allergies  Sulfur, Cefprozil, and Sulfa (sulfonamide antibiotics)    Review of Systems   Constitutional: Negative.    HENT: Negative.     Eyes: Negative.    Respiratory: Negative.     Cardiovascular:  Positive for leg swelling.   Gastrointestinal: Negative.    Endocrine: Negative.    Genitourinary: Negative.    Musculoskeletal: Negative.    Skin:  Positive for wound (chronic lower leg wound).   Neurological: Negative.    Hematological: Negative.    Psychiatric/Behavioral: Negative.          Physical Exam  Constitutional:       Appearance: Normal appearance.   HENT:      Head: Normocephalic and atraumatic.      Mouth/Throat:      Mouth: Mucous membranes are moist.   Cardiovascular:      Rate and Rhythm: Normal rate and regular rhythm.      Pulses:  "Normal pulses.      Heart sounds: Normal heart sounds.   Pulmonary:      Effort: Pulmonary effort is normal.      Breath sounds: Normal breath sounds.   Musculoskeletal:         General: Normal range of motion.      Right lower leg: Edema present.      Left lower leg: Edema present.   Skin:     General: Skin is warm and dry.      Comments: Chronic lower leg wound, followed by wound clinic   Neurological:      General: No focal deficit present.      Mental Status: She is alert and oriented to person, place, and time.   Psychiatric:         Mood and Affect: Mood normal.         Behavior: Behavior normal.         Thought Content: Thought content normal.         Judgment: Judgment normal.          Last Recorded Vitals  Blood pressure 154/77, pulse 79, height 1.575 m (5' 2\"), weight 97.5 kg (215 lb), SpO2 (!) 85%.    Relevant Results  Recent Results (from the past 840 hour(s))   CBC and Auto Differential    Collection Time: 05/20/24  9:21 AM   Result Value Ref Range    WBC 5.3 4.4 - 11.3 x10*3/uL    nRBC      RBC 4.56 4.00 - 5.20 x10*6/uL    Hemoglobin 11.1 (L) 12.0 - 16.0 g/dL    Hematocrit 36.1 36.0 - 46.0 %    MCV 79 (L) 80 - 100 fL    MCH 24.3 (L) 26.0 - 34.0 pg    MCHC 30.7 (L) 32.0 - 36.0 g/dL    RDW      Platelets 207 150 - 450 x10*3/uL    Neutrophils % 65.4 40.0 - 80.0 %    Immature Granulocytes %, Automated 0.6 0.0 - 0.9 %    Lymphocytes % 16.8 13.0 - 44.0 %    Monocytes % 10.3 2.0 - 10.0 %    Eosinophils % 6.1 0.0 - 6.0 %    Basophils % 0.8 0.0 - 2.0 %    Neutrophils Absolute 3.44 1.60 - 5.50 x10*3/uL    Immature Granulocytes Absolute, Automated 0.03 0.00 - 0.50 x10*3/uL    Lymphocytes Absolute 0.88 0.80 - 3.00 x10*3/uL    Monocytes Absolute 0.54 0.05 - 0.80 x10*3/uL    Eosinophils Absolute 0.32 0.00 - 0.40 x10*3/uL    Basophils Absolute 0.04 0.00 - 0.10 x10*3/uL   Comprehensive Metabolic Panel    Collection Time: 05/20/24  9:21 AM   Result Value Ref Range    Glucose 87 74 - 99 mg/dL    Sodium 139 136 - 145 " mmol/L    Potassium 4.2 3.5 - 5.3 mmol/L    Chloride 104 98 - 107 mmol/L    Bicarbonate 30 21 - 32 mmol/L    Anion Gap 9 (L) 10 - 20 mmol/L    Urea Nitrogen 25 (H) 6 - 23 mg/dL    Creatinine 1.31 (H) 0.50 - 1.05 mg/dL    eGFR 42 (L) >60 mL/min/1.73m*2    Calcium 9.0 8.6 - 10.3 mg/dL    Albumin 3.8 3.4 - 5.0 g/dL    Alkaline Phosphatase 83 33 - 136 U/L    Total Protein 6.2 (L) 6.4 - 8.2 g/dL    AST 16 9 - 39 U/L    Bilirubin, Total 0.5 0.0 - 1.2 mg/dL    ALT 8 7 - 45 U/L   Ferritin    Collection Time: 05/20/24  9:21 AM   Result Value Ref Range    Ferritin 112 8 - 150 ng/mL   Folate    Collection Time: 05/20/24  9:21 AM   Result Value Ref Range    Folate, Serum 18.8 >5.0 ng/mL   Iron and TIBC    Collection Time: 05/20/24  9:21 AM   Result Value Ref Range    Iron 48 35 - 150 ug/dL    UIBC 229 110 - 370 ug/dL    TIBC 277 240 - 445 ug/dL    % Saturation 17 (L) 25 - 45 %   Vitamin B12    Collection Time: 05/20/24  9:21 AM   Result Value Ref Range    Vitamin B12 647 211 - 911 pg/mL   Morphology    Collection Time: 05/20/24  9:21 AM   Result Value Ref Range    RBC Morphology See Below     Hypochromia Mild     Ovalocytes Few       Assessment/Plan     79 y.o. female with medical history significant for CKD3, HTN, HLD, a-fib s/p ablation, COPD/asthma, bilateral total knee arthroplasty, and right total hip arthroplasty (11/2022), chronic venous insufficiency, and chronic ulcer of the calf who presents for a 6-month fuv for CKD.    # CKD3: baseline sCr 1.2 - 1.6 mg/dL with eGFR 34 - 49 ml/min/1.73m2. Likely due to hypertensive kidney disease. Most recent sCr 1.31 mg/dL with eGFR 42 ml/min/1.73m2 - at baseline. Currently stable.    # HTN: BP was slightly elevated in office today. Reportedly home BPs are at target 130/80 mmHg,    # Anemia: chronic. Receives iron infusion. Improved. Follows with heme/onc.     Plan:  - Advised regular home BP checks and keep a log to bring it to next visit. Target < 130/80 mmHg. To call providers if  BPs are persistently > 130/80 mmHg.  - Continue to follow with PCP for optimal HTN management.  - Continue to follow with heme/onc for anemia management.  - Reviewed strategies for protecting kidney function includin) Avoidance of NSAIDs including Aleve (Naprosyn), Motrin (Ibuprofen), Mobic (Meloxicam), Celebrex (Celecoxib) and aspirin as well as PPI acid blocking medications such as Prilosec (omeprazole), Protonix (pantoprazole), and Nexium (esomeprazole), as well as other nephrotoxic agents.   2) Avoidance of tobacco or alcohol use.   3) Adequate hydration daily.   4) Blood pressure target 130/80 mmHg.   5) Daily dietary sodium intake less than 2 grams per day.    6) Maintain healthy lifestyle. Healthy diet and regular exercises.   7) Maintain ideal weight.  - FUV: in 6 months or sooner if concerns arise.     Patient verbalized understanding of above. She will not hesitate to contact Division of Nephrology at [phone] with concerns/questions.    I spent 25 minutes in the professional and overall care of this patient.    Torrey Myers, DENISA-CNP

## 2024-06-22 LAB — STAPHYLOCOCCUS SPEC CULT: ABNORMAL

## 2024-06-27 ENCOUNTER — ANESTHESIA EVENT (OUTPATIENT)
Dept: OPERATING ROOM | Facility: HOSPITAL | Age: 79
End: 2024-06-27
Payer: MEDICARE

## 2024-06-27 ENCOUNTER — ANESTHESIA (OUTPATIENT)
Dept: OPERATING ROOM | Facility: HOSPITAL | Age: 79
End: 2024-06-27
Payer: MEDICARE

## 2024-06-27 ENCOUNTER — APPOINTMENT (OUTPATIENT)
Dept: RADIOLOGY | Facility: HOSPITAL | Age: 79
End: 2024-06-27
Payer: MEDICARE

## 2024-06-27 ENCOUNTER — HOSPITAL ENCOUNTER (OUTPATIENT)
Facility: HOSPITAL | Age: 79
Setting detail: OUTPATIENT SURGERY
Discharge: HOME | End: 2024-06-27
Attending: ANESTHESIOLOGY | Admitting: ANESTHESIOLOGY
Payer: MEDICARE

## 2024-06-27 VITALS
OXYGEN SATURATION: 96 % | TEMPERATURE: 98.2 F | DIASTOLIC BLOOD PRESSURE: 75 MMHG | RESPIRATION RATE: 18 BRPM | SYSTOLIC BLOOD PRESSURE: 147 MMHG | HEART RATE: 89 BPM

## 2024-06-27 DIAGNOSIS — M80.08XA AGE-RELATED OSTEOPOROSIS WITH CURRENT PATHOLOGICAL FRACTURE, VERTEBRA(E), INITIAL ENCOUNTER FOR FRACTURE (MULTI): ICD-10-CM

## 2024-06-27 DIAGNOSIS — M80.08XD AGE-RELATED OSTEOPOROSIS WITH CURRENT PATHOLOGICAL FRACTURE OF VERTEBRA WITH ROUTINE HEALING, SUBSEQUENT ENCOUNTER: ICD-10-CM

## 2024-06-27 PROCEDURE — 7100000002 HC RECOVERY ROOM TIME - EACH INCREMENTAL 1 MINUTE: Performed by: ANESTHESIOLOGY

## 2024-06-27 PROCEDURE — 3700000001 HC GENERAL ANESTHESIA TIME - INITIAL BASE CHARGE: Performed by: ANESTHESIOLOGY

## 2024-06-27 PROCEDURE — 2500000004 HC RX 250 GENERAL PHARMACY W/ HCPCS (ALT 636 FOR OP/ED): Performed by: ANESTHESIOLOGY

## 2024-06-27 PROCEDURE — 3600000003 HC OR TIME - INITIAL BASE CHARGE - PROCEDURE LEVEL THREE: Performed by: ANESTHESIOLOGY

## 2024-06-27 PROCEDURE — 2500000001 HC RX 250 WO HCPCS SELF ADMINISTERED DRUGS (ALT 637 FOR MEDICARE OP): Performed by: ANESTHESIOLOGY

## 2024-06-27 PROCEDURE — 87205 SMEAR GRAM STAIN: CPT | Mod: TRILAB,WESLAB | Performed by: ANESTHESIOLOGY

## 2024-06-27 PROCEDURE — 88305 TISSUE EXAM BY PATHOLOGIST: CPT | Mod: TC | Performed by: ANESTHESIOLOGY

## 2024-06-27 PROCEDURE — 7100000009 HC PHASE TWO TIME - INITIAL BASE CHARGE: Performed by: ANESTHESIOLOGY

## 2024-06-27 PROCEDURE — A22513 PR PERQ VERT AGMNTJ CAVITY CRTJ UNI/BI CANNULATION: Performed by: NURSE ANESTHETIST, CERTIFIED REGISTERED

## 2024-06-27 PROCEDURE — 87075 CULTR BACTERIA EXCEPT BLOOD: CPT | Mod: 91,TRILAB,WESLAB | Performed by: ANESTHESIOLOGY

## 2024-06-27 PROCEDURE — 2500000002 HC RX 250 W HCPCS SELF ADMINISTERED DRUGS (ALT 637 FOR MEDICARE OP, ALT 636 FOR OP/ED): Performed by: ANESTHESIOLOGY

## 2024-06-27 PROCEDURE — 22515 PERQ VERTEBRAL AUGMENTATION: CPT | Performed by: ANESTHESIOLOGY

## 2024-06-27 PROCEDURE — A22513 PR PERQ VERT AGMNTJ CAVITY CRTJ UNI/BI CANNULATION: Performed by: ANESTHESIOLOGY

## 2024-06-27 PROCEDURE — 7100000010 HC PHASE TWO TIME - EACH INCREMENTAL 1 MINUTE: Performed by: ANESTHESIOLOGY

## 2024-06-27 PROCEDURE — 22513 PERQ VERTEBRAL AUGMENTATION: CPT | Performed by: ANESTHESIOLOGY

## 2024-06-27 PROCEDURE — 7100000001 HC RECOVERY ROOM TIME - INITIAL BASE CHARGE: Performed by: ANESTHESIOLOGY

## 2024-06-27 PROCEDURE — 3600000008 HC OR TIME - EACH INCREMENTAL 1 MINUTE - PROCEDURE LEVEL THREE: Performed by: ANESTHESIOLOGY

## 2024-06-27 PROCEDURE — 2500000004 HC RX 250 GENERAL PHARMACY W/ HCPCS (ALT 636 FOR OP/ED): Performed by: NURSE ANESTHETIST, CERTIFIED REGISTERED

## 2024-06-27 PROCEDURE — 99100 ANES PT EXTEME AGE<1 YR&>70: CPT | Performed by: ANESTHESIOLOGY

## 2024-06-27 PROCEDURE — 2780000003 HC OR 278 NO HCPCS: Performed by: ANESTHESIOLOGY

## 2024-06-27 PROCEDURE — 2500000005 HC RX 250 GENERAL PHARMACY W/O HCPCS: Performed by: ANESTHESIOLOGIST ASSISTANT

## 2024-06-27 PROCEDURE — C1889 IMPLANT/INSERT DEVICE, NOC: HCPCS | Performed by: ANESTHESIOLOGY

## 2024-06-27 PROCEDURE — 2500000004 HC RX 250 GENERAL PHARMACY W/ HCPCS (ALT 636 FOR OP/ED): Performed by: ANESTHESIOLOGIST ASSISTANT

## 2024-06-27 PROCEDURE — 3700000002 HC GENERAL ANESTHESIA TIME - EACH INCREMENTAL 1 MINUTE: Performed by: ANESTHESIOLOGY

## 2024-06-27 PROCEDURE — 2500000005 HC RX 250 GENERAL PHARMACY W/O HCPCS: Performed by: ANESTHESIOLOGY

## 2024-06-27 PROCEDURE — 88305 TISSUE EXAM BY PATHOLOGIST: CPT | Performed by: PATHOLOGY

## 2024-06-27 PROCEDURE — 2720000007 HC OR 272 NO HCPCS: Performed by: ANESTHESIOLOGY

## 2024-06-27 DEVICE — IVAS SYSTEM, BONE CEMENT W/HALF DOSE VERTAPLEX MIER DELIVERY SYSTM W/O NEEDLE: Type: IMPLANTABLE DEVICE | Site: BACK | Status: FUNCTIONAL

## 2024-06-27 RX ORDER — MIDAZOLAM HYDROCHLORIDE 1 MG/ML
INJECTION, SOLUTION INTRAMUSCULAR; INTRAVENOUS AS NEEDED
Status: DISCONTINUED | OUTPATIENT
Start: 2024-06-27 | End: 2024-06-27

## 2024-06-27 RX ORDER — DIPHENHYDRAMINE HYDROCHLORIDE 50 MG/ML
12.5 INJECTION INTRAMUSCULAR; INTRAVENOUS ONCE AS NEEDED
Status: DISCONTINUED | OUTPATIENT
Start: 2024-06-27 | End: 2024-06-27 | Stop reason: HOSPADM

## 2024-06-27 RX ORDER — KETOROLAC TROMETHAMINE 30 MG/ML
15 INJECTION, SOLUTION INTRAMUSCULAR; INTRAVENOUS ONCE AS NEEDED
Status: DISCONTINUED | OUTPATIENT
Start: 2024-06-27 | End: 2024-06-27 | Stop reason: HOSPADM

## 2024-06-27 RX ORDER — ALBUTEROL SULFATE 0.83 MG/ML
2.5 SOLUTION RESPIRATORY (INHALATION) ONCE AS NEEDED
Status: COMPLETED | OUTPATIENT
Start: 2024-06-27 | End: 2024-06-27

## 2024-06-27 RX ORDER — NORETHINDRONE AND ETHINYL ESTRADIOL 0.5-0.035
50 KIT ORAL ONCE
Status: DISCONTINUED | OUTPATIENT
Start: 2024-06-27 | End: 2024-06-27 | Stop reason: HOSPADM

## 2024-06-27 RX ORDER — LABETALOL HYDROCHLORIDE 5 MG/ML
10 INJECTION, SOLUTION INTRAVENOUS ONCE AS NEEDED
Status: DISCONTINUED | OUTPATIENT
Start: 2024-06-27 | End: 2024-06-27 | Stop reason: HOSPADM

## 2024-06-27 RX ORDER — PROPOFOL 10 MG/ML
INJECTION, EMULSION INTRAVENOUS AS NEEDED
Status: DISCONTINUED | OUTPATIENT
Start: 2024-06-27 | End: 2024-06-27

## 2024-06-27 RX ORDER — LABETALOL HYDROCHLORIDE 5 MG/ML
INJECTION, SOLUTION INTRAVENOUS AS NEEDED
Status: DISCONTINUED | OUTPATIENT
Start: 2024-06-27 | End: 2024-06-27

## 2024-06-27 RX ORDER — BUPIVACAINE HYDROCHLORIDE 2.5 MG/ML
INJECTION, SOLUTION INFILTRATION; PERINEURAL AS NEEDED
Status: DISCONTINUED | OUTPATIENT
Start: 2024-06-27 | End: 2024-06-27 | Stop reason: HOSPADM

## 2024-06-27 RX ORDER — HYDRALAZINE HYDROCHLORIDE 20 MG/ML
10 INJECTION INTRAMUSCULAR; INTRAVENOUS EVERY 30 MIN PRN
Status: DISCONTINUED | OUTPATIENT
Start: 2024-06-27 | End: 2024-06-27 | Stop reason: HOSPADM

## 2024-06-27 RX ORDER — METOCLOPRAMIDE 10 MG/1
10 TABLET ORAL ONCE
Status: COMPLETED | OUTPATIENT
Start: 2024-06-27 | End: 2024-06-27

## 2024-06-27 RX ORDER — PROPOFOL 10 MG/ML
INJECTION, EMULSION INTRAVENOUS CONTINUOUS PRN
Status: DISCONTINUED | OUTPATIENT
Start: 2024-06-27 | End: 2024-06-27

## 2024-06-27 RX ORDER — FAMOTIDINE 20 MG/1
20 TABLET, FILM COATED ORAL ONCE
Status: COMPLETED | OUTPATIENT
Start: 2024-06-27 | End: 2024-06-27

## 2024-06-27 RX ORDER — KETAMINE HYDROCHLORIDE 50 MG/ML
INJECTION, SOLUTION INTRAMUSCULAR; INTRAVENOUS AS NEEDED
Status: DISCONTINUED | OUTPATIENT
Start: 2024-06-27 | End: 2024-06-27

## 2024-06-27 RX ORDER — ALBUTEROL SULFATE 0.83 MG/ML
2.5 SOLUTION RESPIRATORY (INHALATION) ONCE
Status: DISCONTINUED | OUTPATIENT
Start: 2024-06-27 | End: 2024-06-27 | Stop reason: HOSPADM

## 2024-06-27 RX ORDER — ONDANSETRON HYDROCHLORIDE 2 MG/ML
4 INJECTION, SOLUTION INTRAVENOUS ONCE AS NEEDED
Status: DISCONTINUED | OUTPATIENT
Start: 2024-06-27 | End: 2024-06-27 | Stop reason: HOSPADM

## 2024-06-27 RX ORDER — LIDOCAINE HYDROCHLORIDE 10 MG/ML
INJECTION INFILTRATION; PERINEURAL AS NEEDED
Status: DISCONTINUED | OUTPATIENT
Start: 2024-06-27 | End: 2024-06-27

## 2024-06-27 RX ORDER — LIDOCAINE HYDROCHLORIDE AND EPINEPHRINE 10; 10 MG/ML; UG/ML
INJECTION, SOLUTION INFILTRATION; PERINEURAL AS NEEDED
Status: DISCONTINUED | OUTPATIENT
Start: 2024-06-27 | End: 2024-06-27 | Stop reason: HOSPADM

## 2024-06-27 RX ORDER — GLYCOPYRROLATE 0.2 MG/ML
INJECTION INTRAMUSCULAR; INTRAVENOUS AS NEEDED
Status: DISCONTINUED | OUTPATIENT
Start: 2024-06-27 | End: 2024-06-27

## 2024-06-27 RX ORDER — CLINDAMYCIN PHOSPHATE 600 MG/50ML
600 INJECTION, SOLUTION INTRAVENOUS ONCE
Status: COMPLETED | OUTPATIENT
Start: 2024-06-27 | End: 2024-06-27

## 2024-06-27 RX ORDER — SODIUM CHLORIDE, SODIUM LACTATE, POTASSIUM CHLORIDE, CALCIUM CHLORIDE 600; 310; 30; 20 MG/100ML; MG/100ML; MG/100ML; MG/100ML
100 INJECTION, SOLUTION INTRAVENOUS CONTINUOUS
Status: DISCONTINUED | OUTPATIENT
Start: 2024-06-27 | End: 2024-06-27 | Stop reason: HOSPADM

## 2024-06-27 RX ORDER — FENTANYL CITRATE 50 UG/ML
INJECTION, SOLUTION INTRAMUSCULAR; INTRAVENOUS AS NEEDED
Status: DISCONTINUED | OUTPATIENT
Start: 2024-06-27 | End: 2024-06-27

## 2024-06-27 RX ADMIN — FAMOTIDINE 20 MG: 20 TABLET, FILM COATED ORAL at 13:30

## 2024-06-27 RX ADMIN — HYDROMORPHONE HYDROCHLORIDE 0.5 MG: 2 INJECTION, SOLUTION INTRAMUSCULAR; INTRAVENOUS; SUBCUTANEOUS at 16:55

## 2024-06-27 RX ADMIN — METOCLOPRAMIDE 10 MG: 10 TABLET ORAL at 13:30

## 2024-06-27 RX ADMIN — ALBUTEROL SULFATE 2.5 MG: 2.5 SOLUTION RESPIRATORY (INHALATION) at 16:41

## 2024-06-27 ASSESSMENT — PAIN DESCRIPTION - LOCATION: LOCATION: ARM

## 2024-06-27 ASSESSMENT — PAIN - FUNCTIONAL ASSESSMENT
PAIN_FUNCTIONAL_ASSESSMENT: 0-10

## 2024-06-27 ASSESSMENT — PAIN SCALES - GENERAL
PAINLEVEL_OUTOF10: 0 - NO PAIN
PAINLEVEL_OUTOF10: 0 - NO PAIN
PAINLEVEL_OUTOF10: 10 - WORST POSSIBLE PAIN
PAINLEVEL_OUTOF10: 0 - NO PAIN
PAINLEVEL_OUTOF10: 0 - NO PAIN

## 2024-06-27 ASSESSMENT — PAIN DESCRIPTION - ORIENTATION: ORIENTATION: OTHER (COMMENT)

## 2024-06-27 ASSESSMENT — ENCOUNTER SYMPTOMS
CONSTITUTIONAL NEGATIVE: 1
EYES NEGATIVE: 1
RESPIRATORY NEGATIVE: 1
ENDOCRINE NEGATIVE: 1
PSYCHIATRIC NEGATIVE: 1
HEMATOLOGIC/LYMPHATIC NEGATIVE: 1
BACK PAIN: 1
CARDIOVASCULAR NEGATIVE: 1
NEUROLOGICAL NEGATIVE: 1
GASTROINTESTINAL NEGATIVE: 1

## 2024-06-27 NOTE — DISCHARGE INSTRUCTIONS
DISCHARGE INSTRUCTIONS FOR INJECTIONS     You underwent a kyphoplasty today. You may resume your Eliquis tomorrow    Aftermost injections, it is recommended that you relax and limit your activity for the remainder of the day unless you have been told otherwise by your pain physician.  You should not drive a car, operate machinery, or make important legal decisions unless otherwise directed by your pain physician.  You may resume your normal activity, including exercise, tomorrow.      Keep a written pain diary of how much pain relief you experienced following the injection procedure and the length of time of pain relief you experienced pain relief. Following diagnostic injections like medial branch nerve blocks, sacroiliac joint blocks, stellate ganglion injections and other blocks, it is very important you record the specific amount of pain relief you experienced immediately after the injectionand how long it lasted. Your doctor will ask you for this information at your follow up visit.     For all injections, please keep the injection site dry and inspect the site for a couple of days. You may remove the Band-Aid the day of the injection at any time.     Some discomfort, bruising or slight swelling may occur at the injection site. This is not abnormal if it occurs.  If needed you may:    -Take over the counter medication such as Tylenol or Motrin.   -Apply an ice pack for 30 minutes, 2 to 3 times a day for the first 24 hours.     You may shower in 2 days; no soaking baths, hot tubs, whirlpools or swimming pools for two weeks.      If you are given steroids in your injection, it may take 3-5 days for the steroid medication to take effect. You may notice a worsening of your symptoms for 1-2 days after the injection. This is not abnormal.  You may use acetaminophen, ibuprofen, or prescription medication that your doctor may have prescribed for you if you need to do so.     A few common side effects of steroids  include facial flushing, sweating, restlessness, irritability,difficulty sleeping, increase in blood sugar, and increased blood pressure. If you have diabetes, please monitor your blood sugar at least once a day for at least 5 days. If you have poorly controlled high blood pressure, monitoryour blood pressure for at least 2 days and contact your primary care physician if these numbers are unusually high for you.      If you take aspirin or non-steroidal anti-inflammatory drugs (examples are Motrin, Advil, ibuprofen, Naprosyn, Voltaren, Relafen, etc.) you may restart these this evening, but stop taking it 3 days before your next appointment, unless instructed otherwiseby your physician.      You do not need to discontinue non-aspirin-containing pain medications prior to an injection (examples: Celebrex, tramadol, hydrocodone and acetaminophen).      If you take a blood thinning medication (Coumadin, Lovenox, Fragmin,Ticlid, Plavix, Pradaxa, etc.), please discuss this with your primary care physician/cardiologist and your pain physician. These medications MUST be discontinued before you can have an injection safely, without the risk of uncontrolled bleeding. If these medications are not discontinued for an appropriate period of time, you will not be able to receivean injection.      If you are taking Coumadin, please have your INR checked the morning of your procedure and bringthe result to your appointment unless otherwise instructed. If your INR is over 1.2, your injection may need to be rescheduled to avoid uncontrolled bleeding from the needle placement.     Call [facility] at [phone] between 8am-4pm Monday - Friday if you are experiencing the following:    If you received an epidural or spinal injection:    -Headache that doesnot go away with medicine, is worse when sitting or standing up, and is greatly relieved upon lying down.   -Severe pain worse than or different than your baseline  pain.   -Chills or fever (101º F or greater).   -Drainage or signs of infection at the injection site     Go directly to the Emergency Department if you are experiencing the following and received an epidural or spinal injection:   -Abrupt weakness or progressive weakness in your legs that starts after you leave the clinic.   -Abrupt severe or worsening numbness in your legs.   -Inability to urinate after the injection or loss of bowel or bladder control without the urge to defecate or urinate.     If you have a clinical question that cannot wait until your next appointment, please call [phone] between 8am-4pm Monday - Friday or send a [employer] message. We do our best to return all non-emergency messages within 24 hours, Monday - Friday. A nurse or physician will return your message.      If you need to cancel an appointment, please call the scheduling staff at [phone] during normal business hours or leave a message at least 24 hours in advance.     If you are going to be sedated for your next procedure, you MUST have responsible adult who can legally drive accompany you home. You cannot eat or drink for eight hours prior to the planned procedure if you are going to receive sedation. You may take your non-blood thinning medications with a small sip of water.

## 2024-06-27 NOTE — ANESTHESIA PREPROCEDURE EVALUATION
Patient: Genesis Garcia    Procedure Information       Date/Time: 06/27/24 1415    Procedure: Kyphoplasty Thoracic 12, Lumbar 1 - C-ARM, AMA - RANDY    [facility] / [facility]    Surgeons: Faith Reese MD            Relevant Problems   Cardiac   (+) Atrial fibrillation (Multi)   (+) Essential (primary) hypertension   (+) Essential hypertension   (+) Heart murmur   (+) Hypertension   (+) Unspecified atrial fibrillation (Multi)      Pulmonary   (+) Asthma (HHS-HCC)   (+) COPD (chronic obstructive pulmonary disease) (Multi)   (+) Chronic bronchitis (Multi)   (+) Chronic obstructive pulmonary disease (Multi)   (+) Chronic obstructive pulmonary disease, unspecified (Multi)   (+) HCAP (healthcare-associated pneumonia)   (+) Pneumonia, unspecified organism   (+) Unspecified asthma, uncomplicated (HHS-HCC)      Neuro   (+) Carpal tunnel syndrome   (+) Idiopathic peripheral neuropathy   (+) Lumbar radiculitis   (+) Peripheral neuropathy, idiopathic      GI   (+) Dysphagia   (+) Gastroesophageal reflux disease with esophagitis   (+) Gastroesophageal reflux disease with esophagitis without hemorrhage      Endocrine   (+) Hypothyroidism (acquired)   (+) Morbid obesity (Multi)   (+) Morbid obesity due to excess calories (Multi)   (+) Obesity, unspecified      Hematology   (+) Anemia   (+) Anemia, unspecified   (+) Chronic anticoagulation   (+) Chronic deep vein thrombosis (DVT) of proximal vein of lower extremity (Multi)   (+) DVT, bilateral lower limbs (Multi)   (+) Deep vein thrombosis (DVT) of both lower extremities (Multi)   (+) Iron deficiency anemia      Musculoskeletal   (+) Carpal tunnel syndrome   (+) Chronic low back pain   (+) Osteoarthritis of hip   (+) Osteoarthritis of knee   (+) Primary osteoarthritis of right knee   (+) Rheumatoid arthritis (Multi)   (+) Unilateral primary osteoarthritis, right hip   (+) Unspecified osteoarthritis, unspecified site      ID   (+) C. difficile colitis    (+) Candidiasis of mouth   (+) Colitis due to Clostridioides difficile   (+) HCAP (healthcare-associated pneumonia)   (+) Pneumonia, unspecified organism       Clinical information reviewed:   Tobacco  Allergies  Meds   Med Hx  Surg Hx  OB Status  Fam Hx  Soc   Hx        NPO Detail:  NPO/Void Status  Date of Last Liquid: 06/27/24  Time of Last Liquid: 0530  Date of Last Solid: 06/26/24  Time of Last Solid: 1700         Physical Exam    Airway  Mallampati: II  TM distance: >3 FB     Cardiovascular   Rhythm: regular  Rate: normal     Dental - normal exam     Pulmonary   Breath sounds clear to auscultation     Abdominal   Abdomen: soft             Anesthesia Plan    History of general anesthesia?: yes  History of complications of general anesthesia?: no    ASA 3     MAC     intravenous induction   Postoperative administration of opioids is intended.  Anesthetic plan and risks discussed with patient.    Plan discussed with CRNA, attending and CAA.

## 2024-06-27 NOTE — H&P
PAIN MANAGEMENT H&P    Date of Service: 6/27/2024  SUBJECTIVE    CHIEF COMPLAINT: LBP    HISTORY OF PRESENT ILLNESS    Genesis Garcia is a 79 y.o. old female with PMH  HTN, DVT on ELIQUIS (held >3 d), hypothyroidism, CKD, asthma, obesity, L4-S1 fusion  who presents for T12, L1 kyphoplasty with bx/cx    Pain and overall medical condition unchanged from previous visit. Pt is appropriately NPO. Pt denies new-onset numbness, weakness, bowel/bladder incontinence.  Pt denies recent infection/abx use, allergy to Latex/iodine/contrast. Patient is currently taking the following blood thinner(s): N/A    REVIEW OF SYSTEMS  Review of Systems   Constitutional: Negative.    HENT: Negative.     Eyes: Negative.    Respiratory: Negative.     Cardiovascular: Negative.    Gastrointestinal: Negative.    Endocrine: Negative.    Musculoskeletal:  Positive for back pain.   Skin: Negative.    Neurological: Negative.    Hematological: Negative.    Psychiatric/Behavioral: Negative.         PAST MEDICAL HISTORY  Past Medical History:   Diagnosis Date    Ankle fracture     left ankle fracture    Cellulitis, unspecified 08/24/2020    Cellulitis of skin    Chronic venous insufficiency     Chronic venous insufficiency     CKD (chronic kidney disease), stage III (Multi)     COPD (chronic obstructive pulmonary disease) (Multi)     Essential (primary) hypertension 01/04/2023    Hypertension    H/O deep venous thrombosis     Hypothyroidism     Lymphedema     Osteoporosis     Other fracture of upper and lower end of unspecified fibula, initial encounter for closed fracture 05/21/2021    Fracture of distal fibula    Other specified soft tissue disorders 09/19/2022    Leg swelling    Peripheral neuropathy     Personal history of (healed) stress fracture 06/26/2021    History of stress fracture    Personal history of other diseases of the circulatory system     History of atrial fibrillation    Personal history of other diseases of the  musculoskeletal system and connective tissue 10/20/2016    History of arthritis    Undifferentiated connective tissue disease (Multi)     Unspecified asthma, uncomplicated (HHS-HCC) 12/01/2022    Asthma    Venous ulcer of leg (Multi)     Vitamin D deficiency, unspecified 10/20/2016    Vitamin D deficiency     Past Surgical History:   Procedure Laterality Date    BACK SURGERY  06/20/2016    Back Surgery L4-L5 fusion    CARDIAC ELECTROPHYSIOLOGY STUDY AND ABLATION      CHOLECYSTECTOMY  04/15/2016    Cholecystectomy    HERNIA REPAIR      OTHER SURGICAL HISTORY  05/03/2021    Cataract surgery    THYROIDECTOMY, PARTIAL      d/t goiter    TOTAL HIP ARTHROPLASTY Right     TOTAL KNEE ARTHROPLASTY Bilateral 12/01/2022    Knee Replacement     Family History   Problem Relation Name Age of Onset    Diabetes Mother          type 2    Heart disease Father      Hypertension Father         CURRENT MEDICATIONS  Current Facility-Administered Medications   Medication Dose Route Frequency Provider Last Rate Last Admin    albuterol 2.5 mg /3 mL (0.083 %) nebulizer solution 2.5 mg  2.5 mg nebulization Once Navid Fihs DO        clindamycin (Cleocin) 600 mg in dextrose 5% water 50 mL  600 mg intravenous Once Faith Reese MD        lactated Ringer's infusion  100 mL/hr intravenous Continuous Faith Reese MD           ALLERGIES AND DRUG REACTIONS  Allergies   Allergen Reactions    Sulfur Hives    Cefprozil Hives    Sulfa (Sulfonamide Antibiotics) Hives          OBJECTIVE  Visit Vitals  /70   Pulse 85   Temp 36.7 °C (98.1 °F) (Temporal)   Resp 18   SpO2 100%   OB Status Postmenopausal   Smoking Status Former     General: Lying comfortably in bed, NAD  Head: NCAT  Eyes: Sclera/conjunctiva clear, EOMI, PERRL  Nose/mouth: MMM  CV: Good distal pulses  Lungs: Good/equal chest excursion  Abdomen: Soft, ND  Ext: No cyanosis/edema  MSK: Able to move extremities  Neuro: AAOx3, grossly normal  Psych: affect nl      REVIEW OF  "LABORATORY DATA  I have reviewed the following lab results:  No results found for: \"WBC\", \"RBC\", \"HGB\", \"HCT\", \"MCV\", \"MCH\", \"MCHC\", \"RDW\", \"PLT\", \"MPV\"  No results found for: \"NA\", \"K\", \"CO2\", \"BUN\", \"CALCIUM\"  No results found for: \"PROTIME\", \"PTT\", \"INR\", \"FIBRINOGEN\"      REVIEW OF RADIOLOGY DATA  I have reviewed the following:  Radiology Studies           MRI L-spine wo 3/22/24:  There is increased signal within the disc space at T12-L1 with  increased anterior wedging at L1 and new anterior wedging at T12.  There is approximately 75% loss of vertebral body height anteriorly  at L1 and approximately 25% loss of vertebral body height anteriorly  at T12. There is no significant osseous retropulsion into the spinal  canal. There is no significant paraspinal edema or visualized  epidural collection. These findings may represent acute compression  deformities or sequela of trauma, however by imaging and early  discitis osteomyelitis can have a similar appearance. Please  correlate clinically for signs and symptoms of infection.      There is edema within the sacrum on the left which may represent a  sacral insufficiency fracture.      Postsurgical changes of posterolateral fusion and decompression at L4  through S1. No spinal canal and mild-to-moderate neural foraminal  narrowing at these levels.      Additional multilevel degenerative disc disease and facet arthrosis  with mild spinal canal stenosis. Neural foraminal narrowing most  pronounced at T12-L1 with severe left neural foraminal stenosis.                ASSESSMENT & PLAN  Genesis Garcai is a 79 y.o. old female with PMH HTN, DVT on ELIQUIS (held >3 d), hypothyroidism, CKD, asthma, obesity, L4-S1 fusion  who presents for T12, L1 kyphoplasty with bx/cx         1) LBP  -Since fall in November likely 2/2 T12, L1 VCF as seen on MRI with +STIR and reproducible on exam with +percussion tenderness  -Refractive to Tylenol, m relaxants, oxycodone, topical " medication, R SIJ CSI  -MRI l-spine 3/22/24, which details pathology above, but does not r/o T12-L1 discitis. Normal WBC, ESR. Marginally elevated CRP   --MRI L-spine w contrast 5/8/24 unchanged  -Cont back brace to maximize conservative tx and assist ambulation  -After discussion with ortho spine and extensive discussion with patient regarding possible discitis, collaborative decision to proceed with T12, L1 kyphoplasty with bx and cx today appropriately off Eliquis to target pain generator as seen on imaging and minimize risk/likelihood of chronic opioid use and/or surgery.   --Benefit of vertebral stabilization, improved ambulation outweighs potential risks of bony instrumentation, especially without the use of steroid medication. Concomitantly, vertebral bx and cx may help provide definitive answer to infection question  -F/U 2 w      Discussed procedure risks/benefits in detail with patient. Pt meets medical necessity for procedure due to failure of conservative measures. Reviewed procedural risks including bleeding, infection, nerve damage, paralysis. Also reviewed mitigating factors such as screening for infection/blood thinner use, sterile precautions, and image-guidance when applicable. All questions answered. Pt/guardian expressed understanding and choose to proceed            Faith Reese MD  Anesthesiologist & Interventional Pain Physician   [facility] [place]  O: [phone]  F: [phone]  2:25 PM  06/27/24

## 2024-06-27 NOTE — OP NOTE
Kyphoplasty Thoracic 12, Lumbar 1 Operative Note     Date: 2024  OR Location: [facility]    Name: Genesis Garcia, : 1945, Age: 79 y.o., MRN: [id number], Sex: female    Diagnosis  Pre-op Diagnosis     * Age-related osteoporosis with current pathological fracture of vertebra with routine healing, subsequent encounter [M80.08XD]     * Age-related osteoporosis with current pathological fracture, vertebra(e), initial encounter for fracture (Multi) [M80.08XA] Post-op Diagnosis     * Age-related osteoporosis with current pathological fracture of vertebra with routine healing, subsequent encounter [M80.08XD]     * Age-related osteoporosis with current pathological fracture, vertebra(e), initial encounter for fracture (Multi) [M80.08XA]     Procedures  Kyphoplasty Thoracic 12, Lumbar 1  98737 - RI PERQ VERT AGMNTJ CAVITY CRTJ UNI/BI CANNULATION    Kyphoplasty Thoracic 12, Lumbar 1  85859 - RI PERQ VERT AGMNTJ CAVITY CRTJ UNI/BI CANNULJ LMBR      Surgeons      * Faith Reese - Primary    Resident/Fellow/Other Assistant:  Surgeons and Role:  * No surgeons found with a matching role *    Procedure Summary  Anesthesia: Anesthesia type not filed in the log.  ASA: III  Anesthesia Staff: Anesthesiologist: Navid Fish DO  CRNA: DENISA Santiago-CRNA  C-AA: KAVYA Guillory  Estimated Blood Loss: 5 mL  Intra-op Medications: Administrations occurring from 24 1750 to 24 1750:  * No intraprocedure medications in log *           Anesthesia Record               Intraprocedure I/O Totals          Intake    Ketamine 0.00 mL    The total shown is the total volume documented since Anesthesia Start was filed.    Propofol Drip 0.00 mL    The total shown is the total volume documented since Anesthesia Start was filed.    lactated Ringer's infusion 1000.00 mL    Total Intake 1000 mL          Specimen:   ID Type Source Tests Collected by Time   1 : T12 vertebral biopsy Tissue VERTEBRAL BIOPSY SURGICAL  PATHOLOGY EXAM Faith Reese MD 6/27/2024 1532   2 : L1 vertebral body biopsy Tissue VERTEBRAL BIOPSY SURGICAL PATHOLOGY EXAM Faith Reese MD 6/27/2024 1555   A : T12 vertebral culture Tissue VERTEBRAL BIOPSY TISSUE/WOUND CULTURE/SMEAR Faith Reese MD 6/27/2024 1532   B : L1 vertebral body culture Tissue VERTEBRAL BIOPSY TISSUE/WOUND CULTURE/SMEAR Faith Reese MD 6/27/2024 1555        Staff:   Amyulator: Ole Chiang Person: Maximus  Circulator: Edmundo Reynolds Scrub: Diamante Reynolds Circulator: Connor               Implants:  Implants       Type Name Action Serial No.      Implant IVAS SYSTEM, BONE CEMENT W/HALF DOSE VERTAPLEX ZOHRA DELIVERY SYSTM W/O NEEDLE - SSEE LOT - [id number] Implanted SEE LOT              Indications: Genesis Garcia is an 79 y.o. female who is having surgery for Age-related osteoporosis with current pathological fracture of vertebra with routine healing, subsequent encounter [M80.08XD]  Age-related osteoporosis with current pathological fracture, vertebra(e), initial encounter for fracture (Multi) [M80.08XA].       VERTEBRAL AUGMENTATION PROCEDURE NOTE: T12, L1 Kyphoplasty under fluoroscopic guidance    PREPROCEDURAL DIAGNOSES: T12, L1 Osteoporotic compression fracture  POSTPROCEDURAL DIAGNOSES: T12, L1 Osteoporotic compression fracture      PROCEDURALIST: Faith Reese MD  ANESTHESIA: MAC  LOCATION:  [facility]      INDICATION FOR PROCEDURE:       Persistent back pain at site of vertebral body insufficiency fracture, intractable, failed conservative medical management including: time, rest, activity modification, oral analgesics. Kyphoplasty indicated to target pain generator as seen on imaging and minimize risk/likelihood of chronic opioid use and/or surgery         INFORMED CONSENT:    After reviewing the procedure with the patient, expected outcomes, potential benefits and procedural risks, informed consent to proceed with the injection was obtained.  The  "potential benefit of significantly decreased back pain was reviewed; however, the possibility of little to no pain relief resulting from the procedure was also discussed.  The patient was specifically made aware of risks involving spinal cord injury, nerve root injury, pedicle fracture, hematoma, pain at injection site, cement leakage, and vascular injury.     DESCRIPTION OF PROCEDURE:     Pt brought to the OR, placed in prone position, standard ASA monitors applied by anesthesia car provider. Pt was given preoperative antibiotics: 600 mg clindamycin IV.      Patient's lumbar spine prepped with chloroprep and duraprep and draped in sterile fashion. The T12 vertebra was identified under fluoroscopy and the end plates squared off. Right pedicle identified. Skin anesthetized with 1% lidocaine. A 3.5\" 22 G spinal needle was advanced under fluoro guidance to contact pedicle. After negative aspiration, additional 1% lidocaine was administered. Needle re-styletted and withdrawn. Then, skin puncture was made with a #11 blade.     An 11 G Jamshidi trocar with kathryn tip stylet was placed percutaenously here and seated into the superolateral aspect of the T12 pedicle.  Using a small mallet, the trocar was advanced slowly with AP and lateral fluoroscopic guidance to ensure right intra-pedicular access without violation of foramen or canal. Thus, the  trochar was advanced to a terminal position in the anterior middle  portion of the T12 vertebral body.      At this point, Jamshidi stylet removed and biopsy needle inserted. T12 vertebral body core biopsy obtained and sent to lab for pathology and culture.    Curved stylet advanced through trocar until across midline in AP and 2/3 to anterior border of vertebral body. Stylet removed and balloon inserted. Balloon inflated not to exceed 300 mm Hg to create cavity. Balloon then manually deflated and removed intact.   After cement was prepared using Striker kit, approximately 3 cc " of cement was injected under constant live fluoroscopy showing excellent cephalo-caudad spread and interdigitation within the vertebral body. Bilateral spread was confirmed on AP views. No canal or foraminal spread of cement seen. The stylet was then replaced and the trocar was carefully removed.     The procedure was repeated in similar fashion at the L1 vertebral body via left intra-pedicular approach without issue. A biopsy and culture sample was obtained at this level as well and sent to lab. Approximately 1 ml cement was injected under live fluoroscopy with adequate cephalo-caudad spread and interdigitation within the vertebral body. Bilateral spread was confirmed on AP views. No canal or foraminal spread of cement seen. The stylet was then replaced and the trocar was carefully removed. Hemostasis achieved.    Wounds dressed with dermabond, benzoin, Steri-strips, 2x2 gauze, and Tegaderm.     The pt was transferred to the recovery room in stable condition and left supine for 30 min until the cement was cured. At no time during the procedure did the patient experience lower extremity pain or paresthesia.  The patient was neurologically intact post-op and the BLE were assessed in the recovery room.      The patient reported that back pain was improved after recovery and ambulation.  The patient was discharged home  in excellent condition with a family member.       DISPOSITION:     HOME  COMPLICATIONS: NONE        Faith Reese MD  Anesthesiologist & Interventional Pain Physician   [facility] [place]  O: [phone]  F: [phone]  4:45 PM  06/27/24

## 2024-06-27 NOTE — POST-PROCEDURE NOTE
Patient pulled from PACU, report from Emma WELDON. Patient awake, denies pain at this time. Head to toe assessment unchanged from previous. Dressings x2 to back dry and intact. Patient given ice pack. Patient given cookies and ginger ale. Friend at bedside.    1812- Discharge instructions reviewed with patient and her friend, both verbalize understanding. Patient taking her IS home to assist with her breathing. She was able to cough and deep breathe bringing up phlegm and increasing her pulse ox.     1818- Patient sat up at the edge of the bed, denies dizziness, friend helping her get dressed.

## 2024-06-30 LAB
BACTERIA SPEC CULT: NORMAL
BACTERIA SPEC CULT: NORMAL
GRAM STN SPEC: NORMAL

## 2024-07-01 LAB
BACTERIA SPEC CULT: NORMAL
GRAM STN SPEC: NORMAL
GRAM STN SPEC: NORMAL

## 2024-07-02 ENCOUNTER — TELEPHONE (OUTPATIENT)
Dept: PAIN MEDICINE | Facility: CLINIC | Age: 79
End: 2024-07-02
Payer: MEDICARE

## 2024-07-02 LAB
LABORATORY COMMENT REPORT: NORMAL
PATH REPORT.FINAL DX SPEC: NORMAL
PATH REPORT.GROSS SPEC: NORMAL
PATH REPORT.RELEVANT HX SPEC: NORMAL
PATH REPORT.TOTAL CANCER: NORMAL

## 2024-07-02 NOTE — TELEPHONE ENCOUNTER
I called Dolores Arcos sp kyphoplasty and found her to be feeling well from her procedure.  She feels she ias walking taller and her back does not hurt.  Her right leg does hurt and did hurt prior to her surgery.  She said it feels like it is going to give out on her.  She is aware of her follow up appointment and plans to be here.   She did mention that on this past Sunday , 3 days ago, she was diagnosed with covid.  She was started on medication which is almost gone.  Her headache is gone also and she doesnot have a fever.  I did remind her she would have to wear a mask for her appointment.

## 2024-07-03 ENCOUNTER — APPOINTMENT (OUTPATIENT)
Dept: SURGERY | Facility: CLINIC | Age: 79
End: 2024-07-03
Payer: MEDICARE

## 2024-07-05 ENCOUNTER — OFFICE VISIT (OUTPATIENT)
Dept: PAIN MEDICINE | Facility: CLINIC | Age: 79
End: 2024-07-05
Payer: MEDICARE

## 2024-07-05 VITALS
BODY MASS INDEX: 39.56 KG/M2 | DIASTOLIC BLOOD PRESSURE: 77 MMHG | SYSTOLIC BLOOD PRESSURE: 148 MMHG | HEART RATE: 77 BPM | HEIGHT: 62 IN | OXYGEN SATURATION: 93 % | WEIGHT: 215 LBS

## 2024-07-05 DIAGNOSIS — M54.50 CHRONIC LOW BACK PAIN, UNSPECIFIED BACK PAIN LATERALITY, UNSPECIFIED WHETHER SCIATICA PRESENT: ICD-10-CM

## 2024-07-05 DIAGNOSIS — G89.29 CHRONIC LOW BACK PAIN, UNSPECIFIED BACK PAIN LATERALITY, UNSPECIFIED WHETHER SCIATICA PRESENT: ICD-10-CM

## 2024-07-05 DIAGNOSIS — Z01.818 PREOP EXAMINATION: Primary | ICD-10-CM

## 2024-07-05 DIAGNOSIS — G60.9 PERIPHERAL NEUROPATHY, IDIOPATHIC: ICD-10-CM

## 2024-07-05 DIAGNOSIS — Z98.890 S/P KYPHOPLASTY: ICD-10-CM

## 2024-07-05 PROCEDURE — 99214 OFFICE O/P EST MOD 30 MIN: CPT | Performed by: ANESTHESIOLOGY

## 2024-07-05 ASSESSMENT — ENCOUNTER SYMPTOMS
HEMATOLOGIC/LYMPHATIC NEGATIVE: 1
ENDOCRINE NEGATIVE: 1
WEAKNESS: 1
PSYCHIATRIC NEGATIVE: 1
GASTROINTESTINAL NEGATIVE: 1
EYES NEGATIVE: 1
CARDIOVASCULAR NEGATIVE: 1
RESPIRATORY NEGATIVE: 1
CONSTITUTIONAL NEGATIVE: 1
BACK PAIN: 1

## 2024-07-05 ASSESSMENT — PAIN DESCRIPTION - DESCRIPTORS: DESCRIPTORS: ACHING

## 2024-07-05 ASSESSMENT — PAIN SCALES - GENERAL
PAINLEVEL: 3
PAINLEVEL_OUTOF10: 3

## 2024-07-05 ASSESSMENT — PAIN - FUNCTIONAL ASSESSMENT: PAIN_FUNCTIONAL_ASSESSMENT: 0-10

## 2024-07-05 NOTE — LETTER
July 5, 2024     Rosangela Linares MA    Patient: Genesis Garcia   YOB: 1945   Date of Visit: 7/5/2024       Dear Rosangela Linares MA:    Can we plz submit PA for  BL foot Qutenza?       Sincerely,     Faith Reese MD      CC: No Recipients  ______________________________________________________________________________________    PAIN MANAGEMENT FOLLOW-UP OFFICE NOTE    Date of Service: 7/5/2024    SUBJECTIVE    CHIEF COMPLAINT: LBP    HISTORY OF PRESENT ILLNESS    Genesis Garcia is a 79 y.o. female with PMH HTN, DVT on ELIQUIS, hypothyroidism, CKD, asthma, obesity, L4-S1 fusion, CREST syndrome who presents for F/U LBP.    On 6/27, T12, L1 kyphoplasty with 75% ongoing relief. Cx/bx negative. Complains of BLE neuropathy.     Pt denies new-onset numbness, weakness, bowel/bladder incontinence.  Pt denies recent infection, allergy to Latex/iodine/contrast. Patient is currently taking the following blood thinner(s): ELIQUIS    REVIEW OF SYSTEMS  Review of Systems   Constitutional: Negative.    HENT: Negative.     Eyes: Negative.    Respiratory: Negative.     Cardiovascular: Negative.    Gastrointestinal: Negative.    Endocrine: Negative.    Musculoskeletal:  Positive for back pain.   Skin: Negative.    Neurological:  Positive for weakness.   Hematological: Negative.    Psychiatric/Behavioral: Negative.         PAST MEDICAL HISTORY  Past Medical History:   Diagnosis Date   • Ankle fracture     left ankle fracture   • Cellulitis, unspecified 08/24/2020    Cellulitis of skin   • Chronic venous insufficiency    • Chronic venous insufficiency    • CKD (chronic kidney disease), stage III (Multi)    • COPD (chronic obstructive pulmonary disease) (Multi)    • Essential (primary) hypertension 01/04/2023    Hypertension   • H/O deep venous thrombosis    • Hypothyroidism    • Lymphedema    • Osteoporosis    • Other fracture of upper and lower end of unspecified fibula, initial encounter for closed  fracture 05/21/2021    Fracture of distal fibula   • Other specified soft tissue disorders 09/19/2022    Leg swelling   • Peripheral neuropathy    • Personal history of (healed) stress fracture 06/26/2021    History of stress fracture   • Personal history of other diseases of the circulatory system     History of atrial fibrillation   • Personal history of other diseases of the musculoskeletal system and connective tissue 10/20/2016    History of arthritis   • Undifferentiated connective tissue disease (Multi)    • Unspecified asthma, uncomplicated ([facility]) 12/01/2022    Asthma   • Venous ulcer of leg (Multi)    • Vitamin D deficiency, unspecified 10/20/2016    Vitamin D deficiency     Past Surgical History:   Procedure Laterality Date   • BACK SURGERY  06/20/2016    Back Surgery L4-L5 fusion   • CARDIAC ELECTROPHYSIOLOGY STUDY AND ABLATION     • CHOLECYSTECTOMY  04/15/2016    Cholecystectomy   • HERNIA REPAIR     • OTHER SURGICAL HISTORY  05/03/2021    Cataract surgery   • THYROIDECTOMY, PARTIAL      d/t goiter   • TOTAL HIP ARTHROPLASTY Right    • TOTAL KNEE ARTHROPLASTY Bilateral 12/01/2022    Knee Replacement     Family History   Problem Relation Name Age of Onset   • Diabetes Mother          type 2   • Heart disease Father     • Hypertension Father         CURRENT MEDICATIONS  Current Outpatient Medications   Medication Sig Dispense Refill   • acetaminophen (Tylenol) 325 mg tablet Take 2 tablets (650 mg) by mouth every 4 hours if needed for mild pain (1 - 3) or moderate pain (4 - 6). 30 tablet 0   • albuterol 2.5 mg /3 mL (0.083 %) nebulizer solution Take 3 mL (2.5 mg) by nebulization every 2 hours if needed for wheezing. 75 mL 11   • albuterol sulfate (Proair Digihaler) 90 mcg/actuation aero powdr breath act w/sensor inhaler Inhale 1 puff every 6 hours if needed for wheezing.     • apixaban (Eliquis) 2.5 mg tablet Take 1 tablet (2.5 mg) by mouth 2 times a day. 180 tablet 3   • b complex-vitamin c tablet Take  "1 tablet by mouth once daily.     • Bacillus coagulans (PROBIOTIC, B. COAGULANS, ORAL) Take 1 tablet by mouth once daily.     • calcium carbonate 600 mg calcium (1,500 mg) tablet Take 1 tablet (1,500 mg) by mouth once daily.     • chlorhexidine (Peridex) 0.12 % solution Use as directed. 473 mL 0   • cholecalciferol (Vitamin D-3) 50 MCG (2000 UT) tablet Take by mouth.     • fluticasone propion-salmeteroL (Advair Diskus) 250-50 mcg/dose diskus inhaler Inhale 1 puff 2 times a day. Rinse mouth with water after use to reduce aftertaste and incidence of candidiasis. Do not swallow.     • gabapentin (Neurontin) 400 mg capsule Take 1 capsule (400 mg) by mouth 3 times a day. 90 capsule 2   • hydroxychloroquine (Plaquenil) 200 mg tablet Take 1 tablet (200 mg) by mouth 2 times a day.     • levothyroxine (Synthroid) 25 mcg tablet Take 1 tablet (25 mcg) by mouth once daily in the morning. Take before meals. 90 tablet 3   • losartan (Cozaar) 100 mg tablet Take 1 tablet (100 mg) by mouth once daily.     • multivitamin capsule Take by mouth.     • spironolactone (Aldactone) 25 mg tablet Take 1 tablet (25 mg) by mouth once daily.     • hydrocortisone 2.5 % cream Apply topically 2 times a day. Mix with eucerin 30 g 2   • torsemide 40 mg tablet Take 40 mg by mouth once daily. Do not start before December 27, 2023. (Patient taking differently: Take 20 mg by mouth once daily.) 30 tablet 0     No current facility-administered medications for this visit.       ALLERGIES AND DRUG REACTIONS  Allergies   Allergen Reactions   • Sulfur Hives   • Cefprozil Hives   • Sulfa (Sulfonamide Antibiotics) Hives          OBJECTIVE  Visit Vitals  /77   Pulse 77   Ht 1.575 m (5' 2\")   Wt 97.5 kg (215 lb)   SpO2 93%   BMI 39.32 kg/m²   OB Status Postmenopausal   Smoking Status Former   BSA 2.07 m²       Last Recorded Pain Score (if available):                Physical Exam  General: Sitting in chair, NAD  Head: NCAT  Eyes: Sclera/conjunctiva clear, " EOMI, PERRL  Nose/mouth: MMM  CV: Good distal pulses  Lungs: Good/equal chest excursion  Abdomen: Soft, ND  Ext: BLE edema, R ankle eversion  MSK: l-spine alignment: unremarkable    Neuro: AAOx3   Dermatome sensation to light touch  LEFT L1 (lower pelvis/upper thigh): WNL    RIGHT L1: WNL      LEFT L2 (upper thigh): WNL       RIGHT: L2:WNL      LEFT L3 (medial knee): WNL       RIGHT L3: WNL      Decreased BL feet in stocking fashion to ankle level     Motor strength  LEFT L2 (hip flexion): 5/5   RIGHT L2: 5/5  LEFT L3 (knee extension): 5/5     RIGHT L3: 5/5  LEFT L4 (dorsiflexion): 5/5     RIGHT L4: 5/5  LEFT L5 (EHL extension): 5/5     RIGHT L5: 5/5  LEFT S1 (plantarflexion): 5/5     RIGHT S1: 5/5  LEFT S2 (knee flexion): 5/5      RIGHT S2: 5/5      Psych: affect nl  Skin: no rash/lesions. Kypho wounds well-healed        REVIEW OF LABORATORY DATA  I have reviewed the following lab results:  WBC   Date Value Ref Range Status   05/20/2024 5.3 4.4 - 11.3 x10*3/uL Final     RBC   Date Value Ref Range Status   05/20/2024 4.56 4.00 - 5.20 x10*6/uL Final     Hemoglobin   Date Value Ref Range Status   05/20/2024 11.1 (L) 12.0 - 16.0 g/dL Final     Hematocrit   Date Value Ref Range Status   05/20/2024 36.1 36.0 - 46.0 % Final     MCV   Date Value Ref Range Status   05/20/2024 79 (L) 80 - 100 fL Final     MCH   Date Value Ref Range Status   05/20/2024 24.3 (L) 26.0 - 34.0 pg Final     MCHC   Date Value Ref Range Status   05/20/2024 30.7 (L) 32.0 - 36.0 g/dL Final     RDW   Date Value Ref Range Status   05/20/2024   Final     Comment:     Not Measured     Platelets   Date Value Ref Range Status   05/20/2024 207 150 - 450 x10*3/uL Final     Sodium   Date Value Ref Range Status   05/20/2024 139 136 - 145 mmol/L Final     Potassium   Date Value Ref Range Status   05/20/2024 4.2 3.5 - 5.3 mmol/L Final     Bicarbonate   Date Value Ref Range Status   05/20/2024 30 21 - 32 mmol/L Final     Urea Nitrogen   Date Value Ref Range Status    05/20/2024 25 (H) 6 - 23 mg/dL Final     Calcium   Date Value Ref Range Status   05/20/2024 9.0 8.6 - 10.3 mg/dL Final     Protime   Date Value Ref Range Status   01/18/2023 11.0 9.8 - 13.4 sec Final     INR   Date Value Ref Range Status   01/18/2023 1.0 0.9 - 1.1 Final         REVIEW OF RADIOLOGY   I have reviewed the following:  Radiology Studies           MRI L-spine w 5/8/24:  * Discitis/osteomyelitis at T12/L1 as described.  *No evidence of fluid collection to suggest epidural or paraspinal  abscess.        MRI L-spine wo 3/22/24:  There is increased signal within the disc space at T12-L1 with  increased anterior wedging at L1 and new anterior wedging at T12.  There is approximately 75% loss of vertebral body height anteriorly  at L1 and approximately 25% loss of vertebral body height anteriorly  at T12. There is no significant osseous retropulsion into the spinal  canal. There is no significant paraspinal edema or visualized  epidural collection. These findings may represent acute compression  deformities or sequela of trauma, however by imaging and early  discitis osteomyelitis can have a similar appearance. Please  correlate clinically for signs and symptoms of infection.      There is edema within the sacrum on the left which may represent a  sacral insufficiency fracture.      Postsurgical changes of posterolateral fusion and decompression at L4  through S1. No spinal canal and mild-to-moderate neural foraminal  narrowing at these levels.      Additional multilevel degenerative disc disease and facet arthrosis  with mild spinal canal stenosis. Neural foraminal narrowing most  pronounced at T12-L1 with severe left neural foraminal stenosis.               ASSESSMENT & PLAN  Genesis Garcia is a 79 y.o. old female with PMH HTN, DVT on ELIQUIS, hypothyroidism, CKD, asthma, obesity, L4-S1 fusion who presents for F/U LB pain.    1) LBP  -Since fall in November likely 2/2 T12, L1 VCF as seen on MRI with  +STIR and reproducible on exam with +percussion tenderness  -Refractive to Tylenol, m relaxants, oxycodone, topical medication, R SIJ CSI  -MRI l-spine 3/22/24, which details pathology above, but does not r/o T12-L1 discitis. Normal WBC, ESR. Marginally elevated CRP   --Reviewed/discussed MRI L-spine w contrast 5/8/24 unchanged  -T12, L1 kypho 6/27/24: 75% ongoing relief. Cx neg for growth, bx neg for malignancy    2) Peripheral neuropathy  -BL foot neuropathy since ~2019, idiopathic  -Refractive Tylenol, NSAIDs, gabapentin  -No known DM. Nl B12, folate  -Submit PA for BL foot Qutenza (4 patches)  -[facility] SCS work-up:  --CT T-spine to r/o stenosis   --Psych eval  -F/U 4-6 w                Faith Reese MD  Anesthesiologist & Interventional Pain Physician   [facility]  O: [phone]  F: [phone]  9:26 AM  07/05/24

## 2024-07-05 NOTE — PROGRESS NOTES
PAIN MANAGEMENT FOLLOW-UP OFFICE NOTE    Date of Service: 7/5/2024    SUBJECTIVE    CHIEF COMPLAINT: LBP    HISTORY OF PRESENT ILLNESS    Genesis Garcia is a 79 y.o. female with PMH HTN, DVT on ELIQUIS, hypothyroidism, CKD, asthma, obesity, L4-S1 fusion, CREST syndrome who presents for F/U LBP.    On 6/27, T12, L1 kyphoplasty with 75% ongoing relief. Cx/bx negative. Complains of BLE neuropathy.     Pt denies new-onset numbness, weakness, bowel/bladder incontinence.  Pt denies recent infection, allergy to Latex/iodine/contrast. Patient is currently taking the following blood thinner(s): ELIQUIS    REVIEW OF SYSTEMS  Review of Systems   Constitutional: Negative.    HENT: Negative.     Eyes: Negative.    Respiratory: Negative.     Cardiovascular: Negative.    Gastrointestinal: Negative.    Endocrine: Negative.    Musculoskeletal:  Positive for back pain.   Skin: Negative.    Neurological:  Positive for weakness.   Hematological: Negative.    Psychiatric/Behavioral: Negative.         PAST MEDICAL HISTORY  Past Medical History:   Diagnosis Date    Ankle fracture     left ankle fracture    Cellulitis, unspecified 08/24/2020    Cellulitis of skin    Chronic venous insufficiency     Chronic venous insufficiency     CKD (chronic kidney disease), stage III (Multi)     COPD (chronic obstructive pulmonary disease) (Multi)     Essential (primary) hypertension 01/04/2023    Hypertension    H/O deep venous thrombosis     Hypothyroidism     Lymphedema     Osteoporosis     Other fracture of upper and lower end of unspecified fibula, initial encounter for closed fracture 05/21/2021    Fracture of distal fibula    Other specified soft tissue disorders 09/19/2022    Leg swelling    Peripheral neuropathy     Personal history of (healed) stress fracture 06/26/2021    History of stress fracture    Personal history of other diseases of the circulatory system     History of atrial fibrillation    Personal history of other diseases of  the musculoskeletal system and connective tissue 10/20/2016    History of arthritis    Undifferentiated connective tissue disease (Multi)     Unspecified asthma, uncomplicated ([facility]) 12/01/2022    Asthma    Venous ulcer of leg (Multi)     Vitamin D deficiency, unspecified 10/20/2016    Vitamin D deficiency     Past Surgical History:   Procedure Laterality Date    BACK SURGERY  06/20/2016    Back Surgery L4-L5 fusion    CARDIAC ELECTROPHYSIOLOGY STUDY AND ABLATION      CHOLECYSTECTOMY  04/15/2016    Cholecystectomy    HERNIA REPAIR      OTHER SURGICAL HISTORY  05/03/2021    Cataract surgery    THYROIDECTOMY, PARTIAL      d/t goiter    TOTAL HIP ARTHROPLASTY Right     TOTAL KNEE ARTHROPLASTY Bilateral 12/01/2022    Knee Replacement     Family History   Problem Relation Name Age of Onset    Diabetes Mother          type 2    Heart disease Father      Hypertension Father         CURRENT MEDICATIONS  Current Outpatient Medications   Medication Sig Dispense Refill    acetaminophen (Tylenol) 325 mg tablet Take 2 tablets (650 mg) by mouth every 4 hours if needed for mild pain (1 - 3) or moderate pain (4 - 6). 30 tablet 0    albuterol 2.5 mg /3 mL (0.083 %) nebulizer solution Take 3 mL (2.5 mg) by nebulization every 2 hours if needed for wheezing. 75 mL 11    albuterol sulfate (Proair Digihaler) 90 mcg/actuation aero powdr breath act w/sensor inhaler Inhale 1 puff every 6 hours if needed for wheezing.      apixaban (Eliquis) 2.5 mg tablet Take 1 tablet (2.5 mg) by mouth 2 times a day. 180 tablet 3    b complex-vitamin c tablet Take 1 tablet by mouth once daily.      Bacillus coagulans (PROBIOTIC, B. COAGULANS, ORAL) Take 1 tablet by mouth once daily.      calcium carbonate 600 mg calcium (1,500 mg) tablet Take 1 tablet (1,500 mg) by mouth once daily.      chlorhexidine (Peridex) 0.12 % solution Use as directed. 473 mL 0    cholecalciferol (Vitamin D-3) 50 MCG (2000 UT) tablet Take by mouth.      fluticasone  "propion-salmeteroL (Advair Diskus) 250-50 mcg/dose diskus inhaler Inhale 1 puff 2 times a day. Rinse mouth with water after use to reduce aftertaste and incidence of candidiasis. Do not swallow.      gabapentin (Neurontin) 400 mg capsule Take 1 capsule (400 mg) by mouth 3 times a day. 90 capsule 2    hydroxychloroquine (Plaquenil) 200 mg tablet Take 1 tablet (200 mg) by mouth 2 times a day.      levothyroxine (Synthroid) 25 mcg tablet Take 1 tablet (25 mcg) by mouth once daily in the morning. Take before meals. 90 tablet 3    losartan (Cozaar) 100 mg tablet Take 1 tablet (100 mg) by mouth once daily.      multivitamin capsule Take by mouth.      spironolactone (Aldactone) 25 mg tablet Take 1 tablet (25 mg) by mouth once daily.      hydrocortisone 2.5 % cream Apply topically 2 times a day. Mix with eucerin 30 g 2    torsemide 40 mg tablet Take 40 mg by mouth once daily. Do not start before December 27, 2023. (Patient taking differently: Take 20 mg by mouth once daily.) 30 tablet 0     No current facility-administered medications for this visit.       ALLERGIES AND DRUG REACTIONS  Allergies   Allergen Reactions    Sulfur Hives    Cefprozil Hives    Sulfa (Sulfonamide Antibiotics) Hives          OBJECTIVE  Visit Vitals  /77   Pulse 77   Ht 1.575 m (5' 2\")   Wt 97.5 kg (215 lb)   SpO2 93%   BMI 39.32 kg/m²   OB Status Postmenopausal   Smoking Status Former   BSA 2.07 m²       Last Recorded Pain Score (if available):                Physical Exam  General: Sitting in chair, NAD  Head: NCAT  Eyes: Sclera/conjunctiva clear, EOMI, PERRL  Nose/mouth: MMM  CV: Good distal pulses  Lungs: Good/equal chest excursion  Abdomen: Soft, ND  Ext: BLE edema, R ankle eversion  MSK: l-spine alignment: unremarkable    Neuro: AAOx3   Dermatome sensation to light touch  LEFT L1 (lower pelvis/upper thigh): WNL    RIGHT L1: WNL      LEFT L2 (upper thigh): WNL       RIGHT: L2:WNL      LEFT L3 (medial knee): WNL       RIGHT L3: WNL    "   Decreased BL feet in stocking fashion to ankle level     Motor strength  LEFT L2 (hip flexion): 5/5   RIGHT L2: 5/5  LEFT L3 (knee extension): 5/5     RIGHT L3: 5/5  LEFT L4 (dorsiflexion): 5/5     RIGHT L4: 5/5  LEFT L5 (EHL extension): 5/5     RIGHT L5: 5/5  LEFT S1 (plantarflexion): 5/5     RIGHT S1: 5/5  LEFT S2 (knee flexion): 5/5      RIGHT S2: 5/5      Psych: affect nl  Skin: no rash/lesions. Kypho wounds well-healed        REVIEW OF LABORATORY DATA  I have reviewed the following lab results:  WBC   Date Value Ref Range Status   05/20/2024 5.3 4.4 - 11.3 x10*3/uL Final     RBC   Date Value Ref Range Status   05/20/2024 4.56 4.00 - 5.20 x10*6/uL Final     Hemoglobin   Date Value Ref Range Status   05/20/2024 11.1 (L) 12.0 - 16.0 g/dL Final     Hematocrit   Date Value Ref Range Status   05/20/2024 36.1 36.0 - 46.0 % Final     MCV   Date Value Ref Range Status   05/20/2024 79 (L) 80 - 100 fL Final     MCH   Date Value Ref Range Status   05/20/2024 24.3 (L) 26.0 - 34.0 pg Final     MCHC   Date Value Ref Range Status   05/20/2024 30.7 (L) 32.0 - 36.0 g/dL Final     RDW   Date Value Ref Range Status   05/20/2024   Final     Comment:     Not Measured     Platelets   Date Value Ref Range Status   05/20/2024 207 150 - 450 x10*3/uL Final     Sodium   Date Value Ref Range Status   05/20/2024 139 136 - 145 mmol/L Final     Potassium   Date Value Ref Range Status   05/20/2024 4.2 3.5 - 5.3 mmol/L Final     Bicarbonate   Date Value Ref Range Status   05/20/2024 30 21 - 32 mmol/L Final     Urea Nitrogen   Date Value Ref Range Status   05/20/2024 25 (H) 6 - 23 mg/dL Final     Calcium   Date Value Ref Range Status   05/20/2024 9.0 8.6 - 10.3 mg/dL Final     Protime   Date Value Ref Range Status   01/18/2023 11.0 9.8 - 13.4 sec Final     INR   Date Value Ref Range Status   01/18/2023 1.0 0.9 - 1.1 Final         REVIEW OF RADIOLOGY   I have reviewed the following:  Radiology Studies           MRI L-spine w 5/8/24:  *  Discitis/osteomyelitis at T12/L1 as described.  *No evidence of fluid collection to suggest epidural or paraspinal  abscess.        MRI L-spine wo 3/22/24:  There is increased signal within the disc space at T12-L1 with  increased anterior wedging at L1 and new anterior wedging at T12.  There is approximately 75% loss of vertebral body height anteriorly  at L1 and approximately 25% loss of vertebral body height anteriorly  at T12. There is no significant osseous retropulsion into the spinal  canal. There is no significant paraspinal edema or visualized  epidural collection. These findings may represent acute compression  deformities or sequela of trauma, however by imaging and early  discitis osteomyelitis can have a similar appearance. Please  correlate clinically for signs and symptoms of infection.      There is edema within the sacrum on the left which may represent a  sacral insufficiency fracture.      Postsurgical changes of posterolateral fusion and decompression at L4  through S1. No spinal canal and mild-to-moderate neural foraminal  narrowing at these levels.      Additional multilevel degenerative disc disease and facet arthrosis  with mild spinal canal stenosis. Neural foraminal narrowing most  pronounced at T12-L1 with severe left neural foraminal stenosis.               ASSESSMENT & PLAN  Genesis Garcia is a 79 y.o. old female with PMH HTN, DVT on ELIQUIS, hypothyroidism, CKD, asthma, obesity, L4-S1 fusion who presents for F/U LB pain.    1) LBP  -Since fall in November likely 2/2 T12, L1 VCF as seen on MRI with +STIR and reproducible on exam with +percussion tenderness  -Refractive to Tylenol, m relaxants, oxycodone, topical medication, R SIJ CSI  -MRI l-spine 3/22/24, which details pathology above, but does not r/o T12-L1 discitis. Normal WBC, ESR. Marginally elevated CRP   --Reviewed/discussed MRI L-spine w contrast 5/8/24 unchanged  -T12, L1 kypho 6/27/24: 75% ongoing relief. Cx neg for  growth, bx neg for malignancy    2) Peripheral neuropathy  -BL foot neuropathy since ~2019, idiopathic  -Refractive Tylenol, NSAIDs, gabapentin  -No known DM. Nl B12, folate  -Submit PA for BL foot Qutenza (4 patches)  -[facility] SCS work-up:  --CT T-spine to r/o stenosis   --Psych eval  -F/U 4-6 w                Faith Reese MD  Anesthesiologist & Interventional Pain Physician   [facility]  O: [phone]  F: [phone]  9:26 AM  07/05/24

## 2024-07-08 ENCOUNTER — OFFICE VISIT (OUTPATIENT)
Dept: HEMATOLOGY/ONCOLOGY | Facility: CLINIC | Age: 79
End: 2024-07-08
Payer: MEDICARE

## 2024-07-08 VITALS
SYSTOLIC BLOOD PRESSURE: 112 MMHG | RESPIRATION RATE: 16 BRPM | HEART RATE: 77 BPM | DIASTOLIC BLOOD PRESSURE: 63 MMHG | TEMPERATURE: 98.6 F | OXYGEN SATURATION: 93 %

## 2024-07-08 DIAGNOSIS — K90.9 MALABSORPTION OF IRON (HHS-HCC): ICD-10-CM

## 2024-07-08 DIAGNOSIS — D50.8 OTHER IRON DEFICIENCY ANEMIA: ICD-10-CM

## 2024-07-08 LAB
ALBUMIN SERPL BCP-MCNC: 3.5 G/DL (ref 3.4–5)
ALP SERPL-CCNC: 82 U/L (ref 33–136)
ALT SERPL W P-5'-P-CCNC: 23 U/L (ref 7–45)
ANION GAP SERPL CALC-SCNC: 11 MMOL/L (ref 10–20)
AST SERPL W P-5'-P-CCNC: 28 U/L (ref 9–39)
BASOPHILS # BLD AUTO: 0.05 X10*3/UL (ref 0–0.1)
BASOPHILS NFR BLD AUTO: 0.6 %
BILIRUB SERPL-MCNC: 0.4 MG/DL (ref 0–1.2)
BUN SERPL-MCNC: 43 MG/DL (ref 6–23)
CALCIUM SERPL-MCNC: 8.5 MG/DL (ref 8.6–10.3)
CHLORIDE SERPL-SCNC: 107 MMOL/L (ref 98–107)
CO2 SERPL-SCNC: 25 MMOL/L (ref 21–32)
CREAT SERPL-MCNC: 1.56 MG/DL (ref 0.5–1.05)
EGFRCR SERPLBLD CKD-EPI 2021: 34 ML/MIN/1.73M*2
EOSINOPHIL # BLD AUTO: 0.19 X10*3/UL (ref 0–0.4)
EOSINOPHIL NFR BLD AUTO: 2.2 %
ERYTHROCYTE [DISTWIDTH] IN BLOOD BY AUTOMATED COUNT: 21.5 % (ref 11.5–14.5)
FERRITIN SERPL-MCNC: 91 NG/ML (ref 8–150)
FOLATE SERPL-MCNC: 22.3 NG/ML
GLUCOSE SERPL-MCNC: 108 MG/DL (ref 74–99)
HCT VFR BLD AUTO: 36.3 % (ref 36–46)
HGB BLD-MCNC: 11.3 G/DL (ref 12–16)
IMM GRANULOCYTES # BLD AUTO: 0.09 X10*3/UL (ref 0–0.5)
IMM GRANULOCYTES NFR BLD AUTO: 1.1 % (ref 0–0.9)
IRON SATN MFR SERPL: 10 % (ref 25–45)
IRON SERPL-MCNC: 30 UG/DL (ref 35–150)
LYMPHOCYTES # BLD AUTO: 1.28 X10*3/UL (ref 0.8–3)
LYMPHOCYTES NFR BLD AUTO: 15.1 %
MCH RBC QN AUTO: 26.6 PG (ref 26–34)
MCHC RBC AUTO-ENTMCNC: 31.1 G/DL (ref 32–36)
MCV RBC AUTO: 85 FL (ref 80–100)
MONOCYTES # BLD AUTO: 0.95 X10*3/UL (ref 0.05–0.8)
MONOCYTES NFR BLD AUTO: 11.2 %
NEUTROPHILS # BLD AUTO: 5.91 X10*3/UL (ref 1.6–5.5)
NEUTROPHILS NFR BLD AUTO: 69.8 %
NRBC BLD-RTO: ABNORMAL /100{WBCS}
OVALOCYTES BLD QL SMEAR: NORMAL
PLATELET # BLD AUTO: 266 X10*3/UL (ref 150–450)
POTASSIUM SERPL-SCNC: 4.2 MMOL/L (ref 3.5–5.3)
PROT SERPL-MCNC: 5.9 G/DL (ref 6.4–8.2)
RBC # BLD AUTO: 4.25 X10*6/UL (ref 4–5.2)
RBC MORPH BLD: NORMAL
SODIUM SERPL-SCNC: 139 MMOL/L (ref 136–145)
TIBC SERPL-MCNC: 288 UG/DL (ref 240–445)
UIBC SERPL-MCNC: 258 UG/DL (ref 110–370)
VIT B12 SERPL-MCNC: 762 PG/ML (ref 211–911)
WBC # BLD AUTO: 8.5 X10*3/UL (ref 4.4–11.3)

## 2024-07-08 PROCEDURE — 80053 COMPREHEN METABOLIC PANEL: CPT | Performed by: NURSE PRACTITIONER

## 2024-07-08 PROCEDURE — 99214 OFFICE O/P EST MOD 30 MIN: CPT | Performed by: NURSE PRACTITIONER

## 2024-07-08 PROCEDURE — 82728 ASSAY OF FERRITIN: CPT | Performed by: NURSE PRACTITIONER

## 2024-07-08 PROCEDURE — 83540 ASSAY OF IRON: CPT | Performed by: NURSE PRACTITIONER

## 2024-07-08 PROCEDURE — 36415 COLL VENOUS BLD VENIPUNCTURE: CPT | Performed by: NURSE PRACTITIONER

## 2024-07-08 PROCEDURE — 85025 COMPLETE CBC W/AUTO DIFF WBC: CPT | Performed by: NURSE PRACTITIONER

## 2024-07-08 PROCEDURE — 3078F DIAST BP <80 MM HG: CPT | Performed by: NURSE PRACTITIONER

## 2024-07-08 PROCEDURE — 3074F SYST BP LT 130 MM HG: CPT | Performed by: NURSE PRACTITIONER

## 2024-07-08 PROCEDURE — 82746 ASSAY OF FOLIC ACID SERUM: CPT | Performed by: NURSE PRACTITIONER

## 2024-07-08 PROCEDURE — 82607 VITAMIN B-12: CPT | Performed by: NURSE PRACTITIONER

## 2024-07-08 PROCEDURE — 1126F AMNT PAIN NOTED NONE PRSNT: CPT | Performed by: NURSE PRACTITIONER

## 2024-07-08 PROCEDURE — 1157F ADVNC CARE PLAN IN RCRD: CPT | Performed by: NURSE PRACTITIONER

## 2024-07-08 PROCEDURE — 1160F RVW MEDS BY RX/DR IN RCRD: CPT | Performed by: NURSE PRACTITIONER

## 2024-07-08 PROCEDURE — 1159F MED LIST DOCD IN RCRD: CPT | Performed by: NURSE PRACTITIONER

## 2024-07-08 ASSESSMENT — PAIN SCALES - GENERAL
PAINLEVEL: 0-NO PAIN
PAIN_LEVEL: 2

## 2024-07-08 NOTE — ANESTHESIA POSTPROCEDURE EVALUATION
Patient: Genesis Garcia    Procedure Summary       Date: 06/27/24 Room / Location: [facility] 04 / [facility]    Anesthesia Start: 1440 Anesthesia Stop: 1647    Procedure: Kyphoplasty Thoracic 12, Lumbar 1 Diagnosis:       Age-related osteoporosis with current pathological fracture of vertebra with routine healing, subsequent encounter      Age-related osteoporosis with current pathological fracture, vertebra(e), initial encounter for fracture (Multi)      (Age-related osteoporosis with current pathological fracture of vertebra with routine healing, subsequent encounter [M80.08XD])      (Age-related osteoporosis with current pathological fracture, vertebra(e), initial encounter for fracture (Multi) [M80.08XA])    Surgeons: Faith Reese MD Responsible Provider: Navid Fish DO    Anesthesia Type: MAC ASA Status: 3            Anesthesia Type: MAC    Vitals Value Taken Time   /81 06/27/24 1745   Temp 36 °C (96.8 °F) 06/27/24 1745   Pulse 89 06/27/24 1745   Resp 17 06/27/24 1745   SpO2 94 % 06/27/24 1745       Anesthesia Post Evaluation    Patient location during evaluation: bedside  Patient participation: complete - patient participated  Level of consciousness: awake  Pain score: 2  Pain management: adequate  Airway patency: patent  Two or more strategies used to mitigate risk of obstructive sleep apnea  Cardiovascular status: acceptable  Respiratory status: acceptable  Hydration status: acceptable  Postoperative Nausea and Vomiting: none        There were no known notable events for this encounter.

## 2024-07-08 NOTE — ADDENDUM NOTE
Addendum  created 07/08/24 1038 by Nvaid Fish DO    Attestation recorded in Intraprocedure, Intraprocedure Attestations filed

## 2024-07-08 NOTE — PROGRESS NOTES
Patient ID: Genesis Garcia is a 79 y.o. female.  Referring Physician: Rosanne M Casal, APRN-CNP, DNP  [address] [place] [address]  Primary Care Provider: Albert Garner MD  Visit Type: Follow Up      Subjective    Location:  [facility]  Initial consult: 4/8/2024  Reason: Microcytic Anemia  Referred by Dr Garner     Patient is a 78 yo woman with history of UCTD (positive MAURICE, anticentromere antibody and arthralgia), OA s/p bilateral TKR, HTN, DVT on Eliquis, stage 3 CKD, chronic venous insufficiency, recent cellulitis and leg ulcers, seeing the wound clinic under the care of Dr Rosy Atkinson.  She was hospitalized in December for sepsis secondary to pneumonia and cellulitis. She suffers from chronic lower extremity cellulitis, she has been finding this for approximately 2 years.  When her cellulitis is flared up her left leg gives out.  She suffered from a fall in early December.  Since the fall she has suffered from constant daily low back pain.  She does not have pain radiating down her legs.  Her cellulitis has significantly improved, she does have a small dime sized ulcer on the outside of her left leg.  She has PT and OT coming to the house.  She is able to ambulate with a walker.  She is established with Dr. Wakefield from pain management.  She is maintained on gabapentin, tizanidine and oxycodone.     Today, patient presents for follow up consultation in a wheelchair. Had a recent kyphoplasty and states she can stand up straight again, but still needs a walker. Denies abnormal weight loss, night sweats, fever. Patient does endorse fatigue, but denies chills, sob, cp, n/v/d, n/t. No PICA. Denies any abnormal bleeding. Minor bruising 2/2 to Eliquis, dose was reduced to 2.5 po bid by Dr clark. Up to date on GI screening. No recurrent infections or lymphadenopathy. No known blood disorders in family. Has had surgery in past w/o issue. Never had blood/blood products. Denies NSAID use.      10  point review of systems negative except as stated in HPI       Objective   BSA: There is no height or weight on file to calculate BSA.  /63 (BP Location: Left arm, Patient Position: Sitting, BP Cuff Size: Adult)   Pulse 77   Temp 37 °C (98.6 °F) (Temporal)   Resp 16   SpO2 93%      has a past medical history of Ankle fracture, Cellulitis, unspecified (08/24/2020), Chronic venous insufficiency, Chronic venous insufficiency, CKD (chronic kidney disease), stage III (Multi), COPD (chronic obstructive pulmonary disease) (Multi), Essential (primary) hypertension (01/04/2023), H/O deep venous thrombosis, Hypothyroidism, Lymphedema, Osteoporosis, Other fracture of upper and lower end of unspecified fibula, initial encounter for closed fracture (05/21/2021), Other specified soft tissue disorders (09/19/2022), Peripheral neuropathy, Personal history of (healed) stress fracture (06/26/2021), Personal history of other diseases of the circulatory system, Personal history of other diseases of the musculoskeletal system and connective tissue (10/20/2016), Undifferentiated connective tissue disease (Multi), Unspecified asthma, uncomplicated ([facility]) (12/01/2022), Venous ulcer of leg (Multi), and Vitamin D deficiency, unspecified (10/20/2016).   has a past surgical history that includes Cholecystectomy (04/15/2016); Back surgery (06/20/2016); Total knee arthroplasty (Bilateral, 12/01/2022); Other surgical history (05/03/2021); Thyroidectomy, partial; Total hip arthroplasty (Right); Cardiac electrophysiology study and ablation; and Hernia repair.      Genesis Garcia  reports that she quit smoking about 32 years ago. Her smoking use included cigarettes. She started smoking about 56 years ago. She has a 3.6 pack-year smoking history. She has never used smokeless tobacco.  She  reports that she does not currently use alcohol.  She  reports no history of drug use.    Physical Exam  Constitutional:       Comments: Morbid  obesity, in a wheelchair   HENT:      Head: Normocephalic.      Nose: Nose normal.      Mouth/Throat:      Mouth: Mucous membranes are moist.   Eyes:      Pupils: Pupils are equal, round, and reactive to light.   Cardiovascular:      Rate and Rhythm: Normal rate and regular rhythm.      Pulses: Normal pulses.      Heart sounds: Normal heart sounds.   Pulmonary:      Effort: Pulmonary effort is normal.      Breath sounds: Normal breath sounds.   Abdominal:      General: Bowel sounds are normal.      Palpations: Abdomen is soft.   Musculoskeletal:         General: Swelling (mild bilateral pedal edema) present. Normal range of motion.   Skin:     General: Skin is warm and dry.      Findings: Bruising (on eliquis) present.   Neurological:      General: No focal deficit present.      Mental Status: She is alert and oriented to person, place, and time.      Gait: Gait abnormal.   Psychiatric:         Mood and Affect: Mood normal.         Behavior: Behavior normal.         WBC   Date/Time Value Ref Range Status   07/08/2024 10:11 AM 8.5 4.4 - 11.3 x10*3/uL Preliminary   05/20/2024 09:21 AM 5.3 4.4 - 11.3 x10*3/uL Final   04/25/2024 09:08 AM 6.9 4.4 - 11.3 x10*3/uL Final     nRBC   Date Value Ref Range Status   07/08/2024   Preliminary     Comment:     Not Measured   05/20/2024   Final     Comment:     Not Measured   04/08/2024   Final     Comment:     Not Measured     RBC   Date Value Ref Range Status   07/08/2024 4.25 4.00 - 5.20 x10*6/uL Preliminary   05/20/2024 4.56 4.00 - 5.20 x10*6/uL Final   04/25/2024 3.89 (L) 4.00 - 5.20 x10*6/uL Final     Hemoglobin   Date Value Ref Range Status   07/08/2024 11.3 (L) 12.0 - 16.0 g/dL Preliminary   05/20/2024 11.1 (L) 12.0 - 16.0 g/dL Final   04/25/2024 8.6 (L) 12.0 - 16.0 g/dL Final     Hematocrit   Date Value Ref Range Status   07/08/2024 36.3 36.0 - 46.0 % Preliminary   05/20/2024 36.1 36.0 - 46.0 % Final   04/25/2024 29.2 (L) 36.0 - 46.0 % Final     MCV   Date/Time Value Ref  "Range Status   07/08/2024 10:11 AM 85 80 - 100 fL Preliminary   05/20/2024 09:21 AM 79 (L) 80 - 100 fL Final   04/25/2024 09:08 AM 75 (L) 80 - 100 fL Final     MCH   Date/Time Value Ref Range Status   07/08/2024 10:11 AM 26.6 26.0 - 34.0 pg Preliminary   05/20/2024 09:21 AM 24.3 (L) 26.0 - 34.0 pg Final   04/25/2024 09:08 AM 22.1 (L) 26.0 - 34.0 pg Final     MCHC   Date/Time Value Ref Range Status   07/08/2024 10:11 AM 31.1 (L) 32.0 - 36.0 g/dL Preliminary   05/20/2024 09:21 AM 30.7 (L) 32.0 - 36.0 g/dL Final   04/25/2024 09:08 AM 29.5 (L) 32.0 - 36.0 g/dL Final     RDW   Date/Time Value Ref Range Status   07/08/2024 10:11 AM 21.5 (H) 11.5 - 14.5 % Preliminary   05/20/2024 09:21 AM   Final     Comment:     Not Measured   04/25/2024 09:08 AM 20.3 (H) 11.5 - 14.5 % Final     Platelets   Date/Time Value Ref Range Status   07/08/2024 10:11  150 - 450 x10*3/uL Preliminary   05/20/2024 09:21  150 - 450 x10*3/uL Final   04/25/2024 09:08  150 - 450 x10*3/uL Final     No results found for: \"MPV\"  Neutrophils %   Date/Time Value Ref Range Status   05/20/2024 09:21 AM 65.4 40.0 - 80.0 % Final   04/08/2024 10:42 AM 66.0 40.0 - 80.0 % Final   12/27/2023 12:15 PM 75.5 40.0 - 80.0 % Final     Immature Granulocytes %, Automated   Date/Time Value Ref Range Status   05/20/2024 09:21 AM 0.6 0.0 - 0.9 % Final     Comment:     Immature Granulocyte Count (IG) includes promyelocytes, myelocytes and metamyelocytes but does not include bands. Percent differential counts (%) should be interpreted in the context of the absolute cell counts (cells/UL).   04/08/2024 10:42 AM 0.2 0.0 - 0.9 % Final     Comment:     Immature Granulocyte Count (IG) includes promyelocytes, myelocytes and metamyelocytes but does not include bands. Percent differential counts (%) should be interpreted in the context of the absolute cell counts (cells/UL).   12/27/2023 12:15 PM 3.9 (H) 0.0 - 0.9 % Final     Comment:     Immature Granulocyte Count (IG) " includes promyelocytes, myelocytes and metamyelocytes but does not include bands. Percent differential counts (%) should be interpreted in the context of the absolute cell counts (cells/UL).     Lymphocytes %   Date/Time Value Ref Range Status   05/20/2024 09:21 AM 16.8 13.0 - 44.0 % Final   04/08/2024 10:42 AM 16.5 13.0 - 44.0 % Final   12/27/2023 12:15 PM 9.7 13.0 - 44.0 % Final     Monocytes %   Date/Time Value Ref Range Status   05/20/2024 09:21 AM 10.3 2.0 - 10.0 % Final   04/08/2024 10:42 AM 12.3 2.0 - 10.0 % Final   12/27/2023 12:15 PM 8.8 2.0 - 10.0 % Final     Eosinophils %   Date/Time Value Ref Range Status   05/20/2024 09:21 AM 6.1 0.0 - 6.0 % Final   04/08/2024 10:42 AM 4.4 0.0 - 6.0 % Final   12/27/2023 12:15 PM 1.7 0.0 - 6.0 % Final     Basophils %   Date/Time Value Ref Range Status   05/20/2024 09:21 AM 0.8 0.0 - 2.0 % Final   04/08/2024 10:42 AM 0.6 0.0 - 2.0 % Final   12/27/2023 12:15 PM 0.4 0.0 - 2.0 % Final     Neutrophils Absolute   Date/Time Value Ref Range Status   05/20/2024 09:21 AM 3.44 1.60 - 5.50 x10*3/uL Final     Comment:     Percent differential counts (%) should be interpreted in the context of the absolute cell counts (cells/uL).   04/08/2024 10:42 AM 3.12 1.60 - 5.50 x10*3/uL Final     Comment:     Percent differential counts (%) should be interpreted in the context of the absolute cell counts (cells/uL).   12/27/2023 12:15 PM 9.64 (H) 1.60 - 5.50 x10*3/uL Final     Comment:     Percent differential counts (%) should be interpreted in the context of the absolute cell counts (cells/uL).     Immature Granulocytes Absolute, Automated   Date/Time Value Ref Range Status   05/20/2024 09:21 AM 0.03 0.00 - 0.50 x10*3/uL Final   04/08/2024 10:42 AM 0.01 0.00 - 0.50 x10*3/uL Final   12/27/2023 12:15 PM 0.50 0.00 - 0.50 x10*3/uL Final     Lymphocytes Absolute   Date/Time Value Ref Range Status   05/20/2024 09:21 AM 0.88 0.80 - 3.00 x10*3/uL Final   04/08/2024 10:42 AM 0.78 (L) 0.80 - 3.00  x10*3/uL Final   12/27/2023 12:15 PM 1.24 0.80 - 3.00 x10*3/uL Final     Monocytes Absolute   Date/Time Value Ref Range Status   05/20/2024 09:21 AM 0.54 0.05 - 0.80 x10*3/uL Final   04/08/2024 10:42 AM 0.58 0.05 - 0.80 x10*3/uL Final   12/27/2023 12:15 PM 1.13 (H) 0.05 - 0.80 x10*3/uL Final     Eosinophils Absolute   Date/Time Value Ref Range Status   05/20/2024 09:21 AM 0.32 0.00 - 0.40 x10*3/uL Final   04/08/2024 10:42 AM 0.21 0.00 - 0.40 x10*3/uL Final   12/27/2023 12:15 PM 0.22 0.00 - 0.40 x10*3/uL Final     Basophils Absolute   Date/Time Value Ref Range Status   05/20/2024 09:21 AM 0.04 0.00 - 0.10 x10*3/uL Final     Comment:     Automated WBC differential has been confirmed by manual smear.   04/08/2024 10:42 AM 0.03 0.00 - 0.10 x10*3/uL Final   12/27/2023 12:15 PM 0.05 0.00 - 0.10 x10*3/uL Final       Assessment/Plan    80 yo woman with history of UCTD (positive MAURICE, anticentromere antibody and arthralgia), OA s/p bilateral TKR, HTN, DVT on Eliquis, stage 3 CKD, chronic venous insufficiency, recent cellulitis and leg ulcers, seeing the wound clinic under the care of Dr Rosy Atkinson.  She was hospitalized in December for sepsis secondary to pneumonia and cellulitis. Referred for consult for microcytic anemia.      I reviewed the patients medical record for labs and noted that this patient has had significant microcytic anemia for  at least 2 years. She denies blood loss that she is aware of. Discussed possible etiologies including multifactorial, nutritional deficiencies, anemia of chronic disease, malabsorption, hemopathy, medications, bleeding, malignancy, etc. We noted that we know she is iron deficient and has stage 3 CKD, and inflammation that could be contributing to her anemia. Also could be B12, folic acid or other deficiencies and being on a blood thinner. Patient has had oral iron in the past and it caused severe constipation. We discussed IV iron to improve her anemia and quality of life and she  agreed. Hemoglobin has improved nicely since the IV iron which she tolerated without incident. We also discussed that once we replace iron and other vitamin deficiencies, we will discuss Procrit if her hemoglobin is still below 10g/dL.      Hemoglobin today is  11.3, increased from 8.6 prior to iron infusion. Microcytosis has resolved. I suspect bleeding from GI tract 2/2 to Eliquis. Recent kyphoplasty and feeling better with standing post op.       Plan:  1. Labs today: CBC/diff, CMP, retics, iron studies, B12, folate and every 3 months  3. Repeat CBCdiff, iron studies and B12 and folate every 3 weeks. Will plan to support with IV iron if needed.   4. RTC for inperson visit and repeat labs in 9 months.  5. Follow up with Dr Atkinson regarding wound care and Eliquis management.      I had an extensive discussion with the patient regarding the diagnosis and discussed the plan of therapy, including general considerations regarding side effects and outcomes. Pt understood and gave appropriate teach back about the plan of care. All questions were answered to the patient's satisfaction. The patient is instructed to contact us at any time if questions or problems arise. Thank you for the opportunity to participate in the care of this very pleasant patient.        Problem List Items Addressed This Visit             ICD-10-CM    Anemia, unspecified D64.9    Relevant Orders    CBC and Auto Differential    Ferritin    Iron and TIBC    Malabsorption of iron ([facility]) K90.9    Relevant Orders    CBC and Auto Differential    Ferritin    Iron and TIBC   I had an extensive discussion with the patient regarding the diagnosis and discussed the plan of therapy, including general considerations regarding side effects and outcomes. Pt understood and gave appropriate teach back about the plan of care. All questions were answered to the patient's satisfaction. The patient is instructed to contact us at any time if questions or problems  arise. Thank you for the opportunity to participate in the care of this very pleasant patient.    Rosanne M Casal, APRN-CNP, DNP

## 2024-07-08 NOTE — PATIENT INSTRUCTIONS
Office visit with Nakia for Microcytic Anemia    Genesis will have her labs completed every 3mons in [place]  Scheduled for phone visit in 6mons    Follow up with Nakia here at [place] in 9mons. Please make sure you have lab work done prior to this visit

## 2024-07-10 ENCOUNTER — TELEPHONE (OUTPATIENT)
Dept: PAIN MEDICINE | Facility: CLINIC | Age: 79
End: 2024-07-10
Payer: MEDICARE

## 2024-07-15 ENCOUNTER — OFFICE VISIT (OUTPATIENT)
Dept: SURGERY | Facility: CLINIC | Age: 79
End: 2024-07-15
Payer: MEDICARE

## 2024-07-15 VITALS
BODY MASS INDEX: 39.56 KG/M2 | DIASTOLIC BLOOD PRESSURE: 81 MMHG | HEIGHT: 62 IN | SYSTOLIC BLOOD PRESSURE: 156 MMHG | TEMPERATURE: 98 F | HEART RATE: 69 BPM | WEIGHT: 215 LBS

## 2024-07-15 DIAGNOSIS — K43.2 INCISIONAL HERNIA, WITHOUT OBSTRUCTION OR GANGRENE: Primary | ICD-10-CM

## 2024-07-15 PROCEDURE — 1036F TOBACCO NON-USER: CPT | Performed by: SURGERY

## 2024-07-15 PROCEDURE — 3077F SYST BP >= 140 MM HG: CPT | Performed by: SURGERY

## 2024-07-15 PROCEDURE — 3079F DIAST BP 80-89 MM HG: CPT | Performed by: SURGERY

## 2024-07-15 PROCEDURE — 99213 OFFICE O/P EST LOW 20 MIN: CPT | Performed by: SURGERY

## 2024-07-15 PROCEDURE — 1157F ADVNC CARE PLAN IN RCRD: CPT | Performed by: SURGERY

## 2024-07-15 ASSESSMENT — ENCOUNTER SYMPTOMS: DEPRESSION: 0

## 2024-07-15 NOTE — PATIENT INSTRUCTIONS
My office will schedule repair of your incisional hernia.    In the meanwhile I recommend that you get a CT scan of your abdomen.  Please call the number listed below to schedule the study

## 2024-07-15 NOTE — PROGRESS NOTES
"Subjective   Patient ID: Genesis Garcia is a 79 y.o. female who presents for Hernia.  HPI  78-year-old female with multiple comorbid medical conditions to include obesity. She is known to me having taken to surgery for incisional hernia repair with mesh. This procedure was performed on 8/1/20 for an indication of incarcerated incisional hernia associated with small bowel obstruction     She is being followed on a watchful waiting basis for a fairly asymptomatic incisional hernia just above the umbilicus.  I last saw her 2 years ago.        She comes in today with a friend. She continues to do well but she has had a few medical setbacks recently related to right hip replacement surgery       Regarding her hernia, she denies any abdominal pain, nausea or vomiting.  She feels it is gotten somewhat larger.    She states that she has \"too much going on \"she is raising 2 grandchildren     Review of Systems  On review of systems patient denies any weight loss, fever, change in bowel habits, melena, hematochezia, coronary artery disease, TIA/CVA, bleeding, jaundice, pancreatitis, hepatitis.     Former smoker. Patient does not drink alcohol.   Review of Systems    Past Medical History:   Diagnosis Date    Ankle fracture     left ankle fracture    Cellulitis, unspecified 08/24/2020    Cellulitis of skin    Chronic venous insufficiency     Chronic venous insufficiency     CKD (chronic kidney disease), stage III (Multi)     COPD (chronic obstructive pulmonary disease) (Multi)     Essential (primary) hypertension 01/04/2023    Hypertension    H/O deep venous thrombosis     Hypothyroidism     Lymphedema     Osteoporosis     Other fracture of upper and lower end of unspecified fibula, initial encounter for closed fracture 05/21/2021    Fracture of distal fibula    Other specified soft tissue disorders 09/19/2022    Leg swelling    Peripheral neuropathy     Personal history of (healed) stress fracture 06/26/2021    History of " stress fracture    Personal history of other diseases of the circulatory system     History of atrial fibrillation    Personal history of other diseases of the musculoskeletal system and connective tissue 10/20/2016    History of arthritis    Undifferentiated connective tissue disease (Multi)     Unspecified asthma, uncomplicated ([facility]) 12/01/2022    Asthma    Venous ulcer of leg (Multi)     Vitamin D deficiency, unspecified 10/20/2016    Vitamin D deficiency        Past Surgical History:   Procedure Laterality Date    BACK SURGERY  06/20/2016    Back Surgery L4-L5 fusion    CARDIAC ELECTROPHYSIOLOGY STUDY AND ABLATION      CHOLECYSTECTOMY  04/15/2016    Cholecystectomy    HERNIA REPAIR      OTHER SURGICAL HISTORY  05/03/2021    Cataract surgery    THYROIDECTOMY, PARTIAL      d/t goiter    TOTAL HIP ARTHROPLASTY Right     TOTAL KNEE ARTHROPLASTY Bilateral 12/01/2022    Knee Replacement            Objective     Physical Exam    morbidly obese, requires a wheelchair in no distress  Lungs are clear to auscultation bilaterally.  Cardiac exam is regular rhythm and rate.  Abdomen is soft nontender nondistended. moderate sized hernia bulge just above and to the right of the umbilicus. Reduces easily   Neurologic exam is without focal deficit.     Assessment/Plan        Impression: Fairly asymptomatic periumbilical incisional hernia.  This reduces easily.  Denies pain but feels that it has gotten somewhat larger.    She has a lot going on in her home life.    Would hold recommendation to repair this hernia at this time given the fact that the hernia is asymptomatic over the past several years and the fact that she has some stressors in her life.    Will obtain a CT scan to further evaluate her abdominal wall.  I have asked her to see me back in the office in 6 months, or sooner should she have worsening symptoms related to the 100

## 2024-07-15 NOTE — LETTER
"July 15, 2024     Albert Garner MD  [address]  [facility]  [place] [address]    Patient: Genesis Garcia   YOB: 1945   Date of Visit: 7/15/2024       Dear Dr. Albert Garner MD:    Thank you for referring Genesis Garcia to me for evaluation. Below are my notes for this consultation.  If you have questions, please do not hesitate to call me. I look forward to following your patient along with you.       Sincerely,     Kalen Russo MD      CC: No Recipients  ______________________________________________________________________________________    Subjective  Patient ID: Genesis Garcia is a 79 y.o. female who presents for Hernia.  HPI  78-year-old female with multiple comorbid medical conditions to include obesity. She is known to me having taken to surgery for incisional hernia repair with mesh. This procedure was performed on 8/1/20 for an indication of incarcerated incisional hernia associated with small bowel obstruction     She is being followed on a watchful waiting basis for a fairly asymptomatic incisional hernia just above the umbilicus.  I last saw her 2 years ago.        She comes in today with a friend. She continues to do well but she has had a few medical setbacks recently related to right hip replacement surgery       Regarding her hernia, she denies any abdominal pain, nausea or vomiting.  She feels it is gotten somewhat larger.    She states that she has \"too much going on \"she is raising 2 grandchildren     Review of Systems  On review of systems patient denies any weight loss, fever, change in bowel habits, melena, hematochezia, coronary artery disease, TIA/CVA, bleeding, jaundice, pancreatitis, hepatitis.     Former smoker. Patient does not drink alcohol.   Review of Systems    Past Medical History:   Diagnosis Date   • Ankle fracture     left ankle fracture   • Cellulitis, unspecified 08/24/2020    Cellulitis of skin   • Chronic " venous insufficiency    • Chronic venous insufficiency    • CKD (chronic kidney disease), stage III (Multi)    • COPD (chronic obstructive pulmonary disease) (Multi)    • Essential (primary) hypertension 01/04/2023    Hypertension   • H/O deep venous thrombosis    • Hypothyroidism    • Lymphedema    • Osteoporosis    • Other fracture of upper and lower end of unspecified fibula, initial encounter for closed fracture 05/21/2021    Fracture of distal fibula   • Other specified soft tissue disorders 09/19/2022    Leg swelling   • Peripheral neuropathy    • Personal history of (healed) stress fracture 06/26/2021    History of stress fracture   • Personal history of other diseases of the circulatory system     History of atrial fibrillation   • Personal history of other diseases of the musculoskeletal system and connective tissue 10/20/2016    History of arthritis   • Undifferentiated connective tissue disease (Multi)    • Unspecified asthma, uncomplicated ([facility]) 12/01/2022    Asthma   • Venous ulcer of leg (Multi)    • Vitamin D deficiency, unspecified 10/20/2016    Vitamin D deficiency        Past Surgical History:   Procedure Laterality Date   • BACK SURGERY  06/20/2016    Back Surgery L4-L5 fusion   • CARDIAC ELECTROPHYSIOLOGY STUDY AND ABLATION     • CHOLECYSTECTOMY  04/15/2016    Cholecystectomy   • HERNIA REPAIR     • OTHER SURGICAL HISTORY  05/03/2021    Cataract surgery   • THYROIDECTOMY, PARTIAL      d/t goiter   • TOTAL HIP ARTHROPLASTY Right    • TOTAL KNEE ARTHROPLASTY Bilateral 12/01/2022    Knee Replacement            Objective    Physical Exam    morbidly obese, requires a wheelchair in no distress  Lungs are clear to auscultation bilaterally.  Cardiac exam is regular rhythm and rate.  Abdomen is soft nontender nondistended. moderate sized hernia bulge just above and to the right of the umbilicus. Reduces easily   Neurologic exam is without focal deficit.     Assessment/Plan       Impression: Fairly  asymptomatic periumbilical incisional hernia.  This reduces easily.  Denies pain but feels that it has gotten somewhat larger.    She has a lot going on in her home life.    Would hold recommendation to repair this hernia at this time given the fact that the hernia is asymptomatic over the past several years and the fact that she has some stressors in her life.    Will obtain a CT scan to further evaluate her abdominal wall.  I have asked her to see me back in the office in 6 months, or sooner should she have worsening symptoms related to the 100

## 2024-07-18 NOTE — TELEPHONE ENCOUNTER
Tyler Velazquez Notes:  Patient has a fully-funded Medicare Advantage HMO plan with an effective date of 02/01/2024. Plan runs on a Calendar year. Plan covers at 80% of allowable amount for Q[employer]  and 100% of allowable amount for administration 98149. Deductibles do not apply to these services. Specialist office visits if billed are covered at 100% of the allowable with no copay. If out of pocket is met, coverage goes to 100%. A pre-cert is required for administration 11891. We received your clinical notes and will proceed in a medical Prior Authorization. Medical notes and referrals are not required to be submitted with the claim. Plan follows Medicare guidelines. Reference# [id number].  Rx Benefit Notes:  This patient has Medicare Part D plan. Qutenza is a non-formulary product under this patient's prescription coverage, however, an authorization can be submitted for approval by [facility]. If you would like to fill through [facility] ([phone]), please initiate transfer through [facility]. If you have any questions, please contact us at [phone].

## 2024-07-18 NOTE — TELEPHONE ENCOUNTER
Insurance Name:  [employer]  Insurance Type:  managed_medicare   Coverage:  80.0 %   Copay:  $  Deductible:  $  Out of Packet Max:  $ 3000.0  Admin Coverage:  100.0 %  Admin Copay:  $  Deductible Met:  $  Out of Pocket Met:  $ 973.67  Office Coverage:  100.0 %  Office Copay:  $  Deductible Remaining:  $  Out of Pocket Remaining:  $ 2026.33

## 2024-07-18 NOTE — TELEPHONE ENCOUNTER
[employer] approved and delivery arranged for week of July 29th for Aug 5th appointment. Latest delivery date 8/2/24.

## 2024-07-18 NOTE — TELEPHONE ENCOUNTER
[facility]yle[facility] SOLANOZA Connect  07/17/2024 01:23 PM  Spoke to Cullen LEE @ [facility]: He stated, prescription verification process was completed. Prior Authorization has been initiated on 07/15/2024 through [employer]. Medical Doctor  is required. (CoverMeds Key Code: [id number]).  My [facility] Connect  07/17/2024 12:03 PM  For the CPT code 82944: We apologize. This patient's plan does not allow third party callers to complete a pre-cert on your behalf. To obtain pre-cert, please initiate via their portal @ [email or web address]  [facility] Connect  07/15/2024 02:18 PM  Spoke to Mari SILVER @ [facility]: She stated, prescription is currently under insurance verification process.  Kyleigh SOLANOZA Connect  07/12/2024 01:57 PM  Spoke to Florentino BUTLER @ [facility]: She stated, prescription was received on 07/11/2024. Currently under insurance verification process.

## 2024-07-22 ENCOUNTER — APPOINTMENT (OUTPATIENT)
Dept: RHEUMATOLOGY | Facility: CLINIC | Age: 79
End: 2024-07-22
Payer: MEDICARE

## 2024-07-22 ENCOUNTER — HOSPITAL ENCOUNTER (OUTPATIENT)
Dept: RADIOLOGY | Facility: CLINIC | Age: 79
Discharge: HOME | End: 2024-07-22
Payer: MEDICARE

## 2024-07-22 VITALS — SYSTOLIC BLOOD PRESSURE: 171 MMHG | WEIGHT: 215 LBS | DIASTOLIC BLOOD PRESSURE: 74 MMHG | BODY MASS INDEX: 39.32 KG/M2

## 2024-07-22 DIAGNOSIS — M80.08XA AGE-RELATED OSTEOPOROSIS WITH CURRENT PATHOLOGICAL FRACTURE OF VERTEBRA, INITIAL ENCOUNTER (MULTI): ICD-10-CM

## 2024-07-22 DIAGNOSIS — M35.9 CONNECTIVE TISSUE DISEASE, UNDIFFERENTIATED (MULTI): Primary | ICD-10-CM

## 2024-07-22 PROCEDURE — 3077F SYST BP >= 140 MM HG: CPT | Performed by: INTERNAL MEDICINE

## 2024-07-22 PROCEDURE — 1157F ADVNC CARE PLAN IN RCRD: CPT | Performed by: INTERNAL MEDICINE

## 2024-07-22 PROCEDURE — 1159F MED LIST DOCD IN RCRD: CPT | Performed by: INTERNAL MEDICINE

## 2024-07-22 PROCEDURE — 3078F DIAST BP <80 MM HG: CPT | Performed by: INTERNAL MEDICINE

## 2024-07-22 PROCEDURE — 99213 OFFICE O/P EST LOW 20 MIN: CPT | Performed by: INTERNAL MEDICINE

## 2024-07-22 PROCEDURE — 1036F TOBACCO NON-USER: CPT | Performed by: INTERNAL MEDICINE

## 2024-07-22 RX ORDER — HYDROXYCHLOROQUINE SULFATE 200 MG/1
200 TABLET, FILM COATED ORAL 2 TIMES DAILY
Qty: 180 TABLET | Refills: 0 | Status: SHIPPED | OUTPATIENT
Start: 2024-07-22 | End: 2024-10-20

## 2024-07-22 ASSESSMENT — LIFESTYLE VARIABLES
3_OR_MORE_DRINKS_PER_DAY: N
CURRENT_SMOKER: N

## 2024-07-22 NOTE — PROGRESS NOTES
Subjective  .Genesis Garcia is a 79 y.o. female who presents for Follow-up.    HPI. 79-year-old female with history of UCTD (positive MAURICE, anticentromere antibody and arthralgia), OA s/p bilateral TKR, HTN, CKD, DVT, postlaminectomy syndrome, chronic venous insufficiency, and asthma presented for follow-up.     She is following with pain management.  She was noted to have T12 and L1 wedge deformity.  She was referred to spine surgeon and underwent kyphoplasty in June.  Vertebral bone biopsy was negative for osteomyelitis.  She was suggested to see rheumatology for osteoporosis evaluation and management.  She has no recent DEXA scan done.    She stated that she is otherwise feeling fine.    Immunosuppression: Hydroxychloroquine     Review of Systems   All other systems reviewed and are negative.    Objective     Blood pressure 171/74, weight 97.5 kg (215 lb).    Physical Exam.    Gen. AAO x3, NAD.  HEENT: No pallor or icterus, PERRLA, EOMI. No cervical lymphadenopathy .  Skin: No rashes.  Heart: S1, S2/ RRR.   Lungs: CTA B.  Abdomen: Soft, NT/ND, BS regular.  MSK: Chronic mild synovial proliferation of the right hand MCP joints.  No active synovitis.  Bilateral pes planus with bilateral ankle valgus deformity.  Bilateral wrist, elbows without swelling and tenderness.    Neuro: Sensation to touch intact.Strength 5/5 throughout.   Psych:Appropriate mood and behavior  EXT: No edema    Assessment/Plan . 79-year-old female with history of UCTD (positive MAURICE, anticentromere antibody and arthralgia), OA s/p bilateral TKR, HTN, CKD, DVT, postlaminectomy syndrome, chronic venous insufficiency, and asthma presented for follow-up.     #1: UCTD.  She is doing well.  -Continue hydroxychloroquine twice a day.  Eye exam discussed again.    #2: L1 and T12 vertebral compression fracture.   -Obtain DEXA scan.  -Continue calcium and vitamin D.  -We will determine antiresorptive therapy once DEXA scan is done.      #3: Anemia:  Improved with IV iron therapy.  She is following with hematology.     This note was partially generated using the Dragon Voice recognition system. There may be some incorrect wording, spelling and/or spelling errors or punctuation errors that were not corrected prior to committing the note to the medical record.    Problem List Items Addressed This Visit       Connective tissue disease, undifferentiated (Multi) - Primary    Relevant Medications    hydroxychloroquine (Plaquenil) 200 mg tablet    Age-related osteoporosis with current pathological fracture of vertebra (Multi)    Relevant Orders    XR DEXA bone density    Parathyroid Hormone, Intact    Phosphorus    Magnesium    N-Telopeptide, Serum    Vitamin D 25-Hydroxy,Total (for eval of Vitamin D levels)            Active Ambulatory Problems     Diagnosis Date Noted    DVT, bilateral lower limbs (Multi) 03/10/2023    Vitamin D deficiency 07/27/2023    Heart murmur 07/27/2023    Diverticulosis 07/27/2023    COPD (chronic obstructive pulmonary disease) (Multi) 07/27/2023    Arthritis 07/27/2023    Asthma ([facility]) 07/27/2023    Atrial fibrillation (Multi) 07/27/2023    Anemia 07/27/2023    Abnormal mammogram 07/27/2023    MAURICE positive 10/07/2020    Ankle arthritis 08/23/2023    Arthritis of right hip 08/23/2023    Carpal tunnel syndrome 06/07/2012    Cataract 08/23/2023    Chronic bronchitis (Multi) 01/18/2012    Chronic low back pain 05/18/2015    Chronic ulcer of left calf limited to breakdown of skin (Multi) 08/23/2023    Chronic venous insufficiency 08/23/2023    Connective tissue disease, undifferentiated (Multi) 08/23/2023    Essential hypertension 06/15/2012    Flat foot 10/07/2020    Gastroesophageal reflux disease with esophagitis without hemorrhage 08/23/2023    Hypokalemia 08/23/2023    Left breast mass 08/23/2023    Localized swelling of both lower legs 08/23/2023    Morbid obesity due to excess calories (Multi) 11/16/2015    Osteopenia, senile 12/29/2020     Postoperative incisional hernia 08/23/2023    Primary osteoarthritis of right knee 09/25/2013    Rosacea 02/13/2020    Umbilical hernia 08/23/2023    Ventral hernia 08/23/2023    Polyarthritis 10/07/2020    Lumbar post-laminectomy syndrome 11/28/2023    Lumbar radiculitis 11/28/2023    Impaired mobility and ADLs 12/23/2023    Generalized weakness 12/27/2023    Sacroiliac inflammation (CMS-HCC) 02/20/2024    Chronic anticoagulation 02/26/2024    Stage 3b chronic kidney disease (Multi) 02/26/2024    Hypothyroidism (acquired) 02/26/2024    Unspecified severe protein-calorie malnutrition (Multi) 02/26/2024    Varicose veins of right lower extremity with ulcer other part of lower leg (CODE) (Multi) 02/26/2024    Fracture of vertebra due to osteoporosis (Multi) 04/02/2024    Malabsorption of iron ([facility]) 04/08/2024    Thyroid nodule 04/12/2024    Peripheral neuropathy, idiopathic 05/08/2024    Abdominal pain 05/16/2024    Abnormality of esophagus 05/16/2024    Disorder of esophagus 05/16/2024    Acute on chronic diastolic (congestive) heart failure (Multi) 01/17/2023    Acute respiratory failure with hypoxia (Multi) 12/29/2023    PAULY (acute kidney injury) (CMS-HCC) 12/15/2023    C. difficile colitis 05/16/2024    Colitis due to Clostridioides difficile 05/16/2024    Candidiasis of mouth 05/16/2024    Chronic deep vein thrombosis (DVT) of proximal vein of lower extremity (Multi) 02/26/2024    Chronic venous hypertension (idiopathic) with ulcer of right lower extremity (CODE) (Multi) 06/16/2022    Colitis 05/16/2024    Compression fracture of lumbar vertebra (Multi) 03/22/2024    Diastolic heart failure (Multi) 05/16/2024    Difficulty in walking, not elsewhere classified 12/31/2023    Dysphagia 05/16/2024    Edema of foot 05/16/2024    Edema of lower extremity 01/18/2023    Symptom of leg swelling 05/16/2024    Localized swelling, mass and lump, lower limb, bilateral 06/16/2022    Pedal edema 05/16/2024    Swelling of  lower extremity 01/18/2023    Fluid overload 05/16/2024    Other symptoms and signs involving the musculoskeletal system 12/30/2023    History of metabolic disorder 05/16/2024    Injury of tendon of rotator cuff 05/16/2024    Iron deficiency anemia 07/27/2023    Left ankle pain 05/16/2024    Knee pain 05/16/2024    Leukocytosis 05/16/2024    Localized edema 01/21/2023    Nausea 05/16/2024    Other symbolic dysfunctions 01/05/2024    Pain in both feet 05/16/2024    Partial small bowel obstruction (Multi) 05/16/2024    Pelvic mass 05/16/2024    Cough 12/18/2023    Productive cough 05/16/2024    Right shoulder pain 05/16/2024    History of total hip replacement 11/03/2022    Tachycardia 05/16/2024    Status post hip hemiarthroplasty 11/03/2022    Venous stasis ulcer with edema of lower leg (Multi) 02/26/2024    Deep vein thrombosis (DVT) of both lower extremities (Multi) 03/10/2023    HCAP (healthcare-associated pneumonia) 05/16/2024    Pneumonia, unspecified organism 12/20/2023    Anemia, unspecified 12/29/2023    Osteoarthritis of knee 05/16/2024    Osteoarthritis of hip 05/16/2024    Polyarthritis, unspecified 05/16/2024    Polyarthropathy 10/07/2020    Rheumatoid arthritis (Multi) 12/20/2023    Sepsis, unspecified organism (Multi) 12/29/2023    Sepsis (Multi) 12/19/2023    Unilateral primary osteoarthritis, right hip 06/16/2022    Unspecified osteoarthritis, unspecified site 06/16/2022    Unspecified asthma, uncomplicated ([facility]) 06/16/2022    Unspecified atrial fibrillation (Multi) 06/16/2022    Cellulitis of left lower leg 05/16/2024    Cellulitis 05/16/2024    Cellulitis of lower extremity 05/16/2024    Leg ulcer (Multi) 05/16/2024    Stasis dermatitis with venous ulcer of lower extremity due to chronic peripheral venous hypertension (Multi) 05/16/2024    Ulcer of lower extremity (Multi) 08/23/2023    Low back pain, unspecified 12/29/2023    Peripheral venous insufficiency 08/23/2023    Venous insufficiency  (chronic) (peripheral) 06/16/2022    Undifferentiated connective tissue disease (Multi) 08/23/2023    Chronic obstructive pulmonary disease (Multi) 01/18/2012    Chronic obstructive pulmonary disease, unspecified (Multi) 06/16/2022    Diverticular disease 07/27/2023    Diverticulosis of intestine, part unspecified, without perforation or abscess without bleeding 06/16/2022    Essential (primary) hypertension 06/16/2022    Hypertension 06/15/2012    Pes planus 10/07/2020    Pathological fracture of vertebra due to osteoporosis (Multi) 04/02/2024    Gastroesophageal reflux disease with esophagitis 08/23/2023    Weakness 12/27/2023    Mass of breast 08/23/2023    Postlaminectomy syndrome of lumbar region 11/28/2023    Iron malabsorption ([facility]) 04/08/2024    Morbid obesity (Multi) 11/16/2015    Obesity, unspecified 06/16/2022    Other specified soft tissue disorders 01/21/2023    Systemic involvement of connective tissue, unspecified (Multi) 06/16/2022    Senile osteopenia 12/29/2020    Idiopathic peripheral neuropathy 05/08/2024    Incisional hernia with obstruction but no gangrene 05/16/2024    Incisional hernia 08/23/2023    Stage 3 chronic kidney disease (Multi) 02/26/2024    Umbilical hernia with obstruction, without gangrene 06/16/2022    Abnormal voice 08/23/2023    Age-related osteoporosis with current pathological fracture of vertebra (Multi) 06/13/2024    Age-related osteoporosis with current pathological fracture, vertebra(e), initial encounter for fracture (Multi) 06/13/2024     Resolved Ambulatory Problems     Diagnosis Date Noted    Abscess of hip, left 08/23/2023    Cellulitis, leg 08/23/2023    Ulcer of right lower extremity (Multi) 08/23/2023    Closed fracture of lateral malleolus 08/23/2023    Decreased GFR 08/23/2023    DVT (deep venous thrombosis) (Multi) 08/23/2023    Dyspnea on exertion 08/23/2023    Fracture of distal fibula 08/23/2023    Hoarseness 08/23/2023    Multinodular goiter 08/23/2023     Stress fracture 2023    Voice disturbance 2023    Acute deep vein thrombosis (DVT) of both lower extremities (Multi) 2023    Acute pneumonia 2023    Thyroid nodule 2024     Past Medical History:   Diagnosis Date    Ankle fracture     Cellulitis, unspecified 2020    CKD (chronic kidney disease), stage III (Multi)     H/O deep venous thrombosis     Hypothyroidism     Lymphedema     Osteoporosis     Other fracture of upper and lower end of unspecified fibula, initial encounter for closed fracture 2021    Peripheral neuropathy     Personal history of (healed) stress fracture 2021    Personal history of other diseases of the circulatory system     Personal history of other diseases of the musculoskeletal system and connective tissue 10/20/2016    Venous ulcer of leg (Multi)     Vitamin D deficiency, unspecified 10/20/2016       Family History   Problem Relation Name Age of Onset    Diabetes Mother          type 2    Heart disease Father      Hypertension Father         Past Surgical History:   Procedure Laterality Date    BACK SURGERY  2016    Back Surgery L4-L5 fusion    CARDIAC ELECTROPHYSIOLOGY STUDY AND ABLATION      CHOLECYSTECTOMY  04/15/2016    Cholecystectomy    HERNIA REPAIR      OTHER SURGICAL HISTORY  2021    Cataract surgery    THYROIDECTOMY, PARTIAL      d/t goiter    TOTAL HIP ARTHROPLASTY Right     TOTAL KNEE ARTHROPLASTY Bilateral 2022    Knee Replacement       Social History     Tobacco Use   Smoking Status Former    Current packs/day: 0.00    Average packs/day: 0.2 packs/day for 24.0 years (3.6 ttl pk-yrs)    Types: Cigarettes    Start date:     Quit date:     Years since quittin.5   Smokeless Tobacco Never       Allergies  Sulfur, Cefprozil, and Sulfa (sulfonamide antibiotics)    Current Meds  Current Outpatient Medications   Medication Instructions    acetaminophen (TYLENOL) 650 mg, oral, Every 4 hours PRN    albuterol  sulfate (Proair Digihaler) 90 mcg/actuation aero powdr breath act w/sensor inhaler 1 puff, inhalation, Every 6 hours PRN    albuterol 2.5 mg, nebulization, Every 2 hour PRN    apixaban (ELIQUIS) 2.5 mg, oral, 2 times daily    b complex-vitamin c tablet 1 tablet, oral, Daily    Bacillus coagulans (PROBIOTIC, B. COAGULANS, ORAL) 1 tablet, oral, Daily    calcium carbonate 600 mg calcium (1,500 mg) tablet 1 tablet, oral, Daily    chlorhexidine (Peridex) 0.12 % solution Use as directed.    cholecalciferol (Vitamin D-3) 50 MCG (2000 UT) tablet oral    fluticasone propion-salmeteroL (Advair Diskus) 250-50 mcg/dose diskus inhaler 1 puff, inhalation, 2 times daily RT, Rinse mouth with water after use to reduce aftertaste and incidence of candidiasis. Do not swallow.    gabapentin (NEURONTIN) 400 mg, oral, 3 times daily    hydrocortisone 2.5 % cream Topical, 2 times daily, Mix with eucerin    hydroxychloroquine (PLAQUENIL) 200 mg, oral, 2 times daily    levothyroxine (SYNTHROID) 25 mcg, oral, Daily before breakfast    losartan (Cozaar) 100 mg tablet 1 tablet, oral, Daily    multivitamin capsule oral    spironolactone (Aldactone) 25 mg tablet 1 tablet, oral, Daily    torsemide 40 mg, oral, Daily        Lab Results   Component Value Date    SEDRATE 21 04/05/2024    CRP 1.41 (H) 04/05/2024    DNADS 2.0 07/18/2023    CKTOTAL 351 (H) 04/15/2022             Brent Sheridan MD

## 2024-07-25 ENCOUNTER — HOSPITAL ENCOUNTER (OUTPATIENT)
Dept: RADIOLOGY | Facility: CLINIC | Age: 79
Discharge: HOME | End: 2024-07-25
Payer: MEDICARE

## 2024-07-25 ENCOUNTER — LAB (OUTPATIENT)
Dept: LAB | Facility: LAB | Age: 79
End: 2024-07-25
Payer: MEDICARE

## 2024-07-25 DIAGNOSIS — K43.2 INCISIONAL HERNIA, WITHOUT OBSTRUCTION OR GANGRENE: ICD-10-CM

## 2024-07-25 DIAGNOSIS — M80.08XA AGE-RELATED OSTEOPOROSIS WITH CURRENT PATHOLOGICAL FRACTURE OF VERTEBRA, INITIAL ENCOUNTER (MULTI): ICD-10-CM

## 2024-07-25 LAB
25(OH)D3 SERPL-MCNC: 41 NG/ML (ref 30–100)
MAGNESIUM SERPL-MCNC: 2.37 MG/DL (ref 1.6–2.4)
PHOSPHATE SERPL-MCNC: 4.4 MG/DL (ref 2.5–4.9)
PTH-INTACT SERPL-MCNC: 42.4 PG/ML (ref 18.5–88)

## 2024-07-25 PROCEDURE — 74176 CT ABD & PELVIS W/O CONTRAST: CPT

## 2024-07-25 PROCEDURE — 72128 CT CHEST SPINE W/O DYE: CPT | Performed by: RADIOLOGY

## 2024-07-25 PROCEDURE — 83735 ASSAY OF MAGNESIUM: CPT

## 2024-07-25 PROCEDURE — 36415 COLL VENOUS BLD VENIPUNCTURE: CPT

## 2024-07-25 PROCEDURE — 72128 CT CHEST SPINE W/O DYE: CPT

## 2024-07-25 PROCEDURE — 83970 ASSAY OF PARATHORMONE: CPT

## 2024-07-25 PROCEDURE — 82306 VITAMIN D 25 HYDROXY: CPT

## 2024-07-25 PROCEDURE — 84100 ASSAY OF PHOSPHORUS: CPT

## 2024-07-25 PROCEDURE — 82523 COLLAGEN CROSSLINKS: CPT

## 2024-07-25 PROCEDURE — 74176 CT ABD & PELVIS W/O CONTRAST: CPT | Performed by: RADIOLOGY

## 2024-07-26 ENCOUNTER — HOSPITAL ENCOUNTER (OUTPATIENT)
Dept: RADIOLOGY | Facility: CLINIC | Age: 79
Discharge: HOME | End: 2024-07-26
Payer: MEDICARE

## 2024-07-26 DIAGNOSIS — M80.08XA AGE-RELATED OSTEOPOROSIS WITH CURRENT PATHOLOGICAL FRACTURE OF VERTEBRA, INITIAL ENCOUNTER (MULTI): ICD-10-CM

## 2024-07-26 PROCEDURE — 77080 DXA BONE DENSITY AXIAL: CPT

## 2024-07-29 LAB — COLLAGEN NTX SER-SCNC: 36.7 NM BCE

## 2024-08-05 ENCOUNTER — TELEPHONE (OUTPATIENT)
Dept: PAIN MEDICINE | Facility: CLINIC | Age: 79
End: 2024-08-05

## 2024-08-05 ENCOUNTER — OFFICE VISIT (OUTPATIENT)
Dept: PAIN MEDICINE | Facility: CLINIC | Age: 79
End: 2024-08-05
Payer: MEDICARE

## 2024-08-05 VITALS
SYSTOLIC BLOOD PRESSURE: 138 MMHG | HEART RATE: 68 BPM | DIASTOLIC BLOOD PRESSURE: 69 MMHG | WEIGHT: 215 LBS | HEIGHT: 62 IN | BODY MASS INDEX: 39.56 KG/M2 | RESPIRATION RATE: 18 BRPM

## 2024-08-05 DIAGNOSIS — G60.9 PERIPHERAL NEUROPATHY, IDIOPATHIC: ICD-10-CM

## 2024-08-05 DIAGNOSIS — Z79.01 CURRENT USE OF LONG TERM ANTICOAGULATION: ICD-10-CM

## 2024-08-05 DIAGNOSIS — M79.605 BILATERAL LEG PAIN: Primary | ICD-10-CM

## 2024-08-05 DIAGNOSIS — M79.604 BILATERAL LEG PAIN: Primary | ICD-10-CM

## 2024-08-05 PROCEDURE — 1125F AMNT PAIN NOTED PAIN PRSNT: CPT | Performed by: ANESTHESIOLOGY

## 2024-08-05 PROCEDURE — 1036F TOBACCO NON-USER: CPT | Performed by: ANESTHESIOLOGY

## 2024-08-05 PROCEDURE — 99214 OFFICE O/P EST MOD 30 MIN: CPT | Performed by: ANESTHESIOLOGY

## 2024-08-05 PROCEDURE — 1157F ADVNC CARE PLAN IN RCRD: CPT | Performed by: ANESTHESIOLOGY

## 2024-08-05 PROCEDURE — 3075F SYST BP GE 130 - 139MM HG: CPT | Performed by: ANESTHESIOLOGY

## 2024-08-05 PROCEDURE — 1159F MED LIST DOCD IN RCRD: CPT | Performed by: ANESTHESIOLOGY

## 2024-08-05 PROCEDURE — 3078F DIAST BP <80 MM HG: CPT | Performed by: ANESTHESIOLOGY

## 2024-08-05 PROCEDURE — 1160F RVW MEDS BY RX/DR IN RCRD: CPT | Performed by: ANESTHESIOLOGY

## 2024-08-05 ASSESSMENT — PAIN SCALES - GENERAL
PAINLEVEL: 6
PAINLEVEL_OUTOF10: 6

## 2024-08-05 ASSESSMENT — ENCOUNTER SYMPTOMS
CONSTITUTIONAL NEGATIVE: 1
PSYCHIATRIC NEGATIVE: 1
CARDIOVASCULAR NEGATIVE: 1
HEMATOLOGIC/LYMPHATIC NEGATIVE: 1
ENDOCRINE NEGATIVE: 1
WEAKNESS: 1
RESPIRATORY NEGATIVE: 1
EYES NEGATIVE: 1
BACK PAIN: 1
GASTROINTESTINAL NEGATIVE: 1

## 2024-08-05 ASSESSMENT — LIFESTYLE VARIABLES: TOTAL SCORE: 0

## 2024-08-05 ASSESSMENT — PAIN - FUNCTIONAL ASSESSMENT: PAIN_FUNCTIONAL_ASSESSMENT: 0-10

## 2024-08-05 ASSESSMENT — PAIN DESCRIPTION - DESCRIPTORS: DESCRIPTORS: ACHING

## 2024-08-05 NOTE — TELEPHONE ENCOUNTER
Phone from the patient stating she misplace the [employer] information for her psych eval. She asked that it be mailed. Advised it would be mailed today.

## 2024-08-05 NOTE — PROGRESS NOTES
PAIN MANAGEMENT FOLLOW-UP OFFICE NOTE    Date of Service: 8/5/2024    SUBJECTIVE    CHIEF COMPLAINT: BLE pain    HISTORY OF PRESENT ILLNESS    Genesis Garcia is a 79 y.o. female with PMH HTN, DVT on ELIQUIS, hypothyroidism, CKD, asthma, obesity, L4-S1 fusion, CREST syndrome, VCF s/p T12/L1 kypho who presents for F/U.    Since her last visit, pt saw surgeon Dr Russo. Ventral hernia reportedly not incarcerated and so no surgery planned although has F/U planned 3/2025. In meantime, she did not complete psych eval yet in this context. She also received notice of Qutenza approval.     Pt denies new-onset numbness, weakness, bowel/bladder incontinence.  Pt denies recent infection, allergy to Latex/iodine/contrast. Patient is currently taking the following blood thinner(s): ELIQUIS    REVIEW OF SYSTEMS  Review of Systems   Constitutional: Negative.    HENT: Negative.     Eyes: Negative.    Respiratory: Negative.     Cardiovascular: Negative.    Gastrointestinal: Negative.    Endocrine: Negative.    Musculoskeletal:  Positive for back pain.   Skin: Negative.    Neurological:  Positive for weakness.   Hematological: Negative.    Psychiatric/Behavioral: Negative.         PAST MEDICAL HISTORY  Past Medical History:   Diagnosis Date    Ankle fracture     left ankle fracture    Cellulitis, unspecified 08/24/2020    Cellulitis of skin    Chronic venous insufficiency     Chronic venous insufficiency     CKD (chronic kidney disease), stage III (Multi)     COPD (chronic obstructive pulmonary disease) (Multi)     Essential (primary) hypertension 01/04/2023    Hypertension    H/O deep venous thrombosis     Hypothyroidism     Lymphedema     Osteoporosis     Other fracture of upper and lower end of unspecified fibula, initial encounter for closed fracture 05/21/2021    Fracture of distal fibula    Other specified soft tissue disorders 09/19/2022    Leg swelling    Peripheral neuropathy     Personal history of (healed) stress  fracture 06/26/2021    History of stress fracture    Personal history of other diseases of the circulatory system     History of atrial fibrillation    Personal history of other diseases of the musculoskeletal system and connective tissue 10/20/2016    History of arthritis    Undifferentiated connective tissue disease (Multi)     Unspecified asthma, uncomplicated ([facility]) 12/01/2022    Asthma    Venous ulcer of leg (Multi)     Vitamin D deficiency, unspecified 10/20/2016    Vitamin D deficiency     Past Surgical History:   Procedure Laterality Date    BACK SURGERY  06/20/2016    Back Surgery L4-L5 fusion    CARDIAC ELECTROPHYSIOLOGY STUDY AND ABLATION      CHOLECYSTECTOMY  04/15/2016    Cholecystectomy    HERNIA REPAIR      OTHER SURGICAL HISTORY  05/03/2021    Cataract surgery    THYROIDECTOMY, PARTIAL      d/t goiter    TOTAL HIP ARTHROPLASTY Right     TOTAL KNEE ARTHROPLASTY Bilateral 12/01/2022    Knee Replacement     Family History   Problem Relation Name Age of Onset    Diabetes Mother          type 2    Heart disease Father      Hypertension Father         CURRENT MEDICATIONS  Current Outpatient Medications   Medication Sig Dispense Refill    acetaminophen (Tylenol) 325 mg tablet Take 2 tablets (650 mg) by mouth every 4 hours if needed for mild pain (1 - 3) or moderate pain (4 - 6). 30 tablet 0    albuterol 2.5 mg /3 mL (0.083 %) nebulizer solution Take 3 mL (2.5 mg) by nebulization every 2 hours if needed for wheezing. 75 mL 11    albuterol sulfate (Proair Digihaler) 90 mcg/actuation aero powdr breath act w/sensor inhaler Inhale 1 puff every 6 hours if needed for wheezing.      apixaban (Eliquis) 2.5 mg tablet Take 1 tablet (2.5 mg) by mouth 2 times a day. 180 tablet 3    Bacillus coagulans (PROBIOTIC, B. COAGULANS, ORAL) Take 1 tablet by mouth once daily.      calcium carbonate 600 mg calcium (1,500 mg) tablet Take 1 tablet (1,500 mg) by mouth once daily.      cholecalciferol (Vitamin D-3) 50 MCG (2000 UT)  "tablet Take by mouth.      fluticasone propion-salmeteroL (Advair Diskus) 250-50 mcg/dose diskus inhaler Inhale 1 puff 2 times a day. Rinse mouth with water after use to reduce aftertaste and incidence of candidiasis. Do not swallow.      gabapentin (Neurontin) 400 mg capsule Take 1 capsule (400 mg) by mouth 3 times a day. 90 capsule 2    hydroxychloroquine (Plaquenil) 200 mg tablet Take 1 tablet (200 mg) by mouth 2 times a day. 180 tablet 0    levothyroxine (Synthroid) 25 mcg tablet Take 1 tablet (25 mcg) by mouth once daily in the morning. Take before meals. 90 tablet 3    losartan (Cozaar) 100 mg tablet Take 1 tablet (100 mg) by mouth once daily.      multivitamin capsule Take by mouth.      spironolactone (Aldactone) 25 mg tablet Take 1 tablet (25 mg) by mouth once daily.      b complex-vitamin c tablet Take 1 tablet by mouth once daily.      chlorhexidine (Peridex) 0.12 % solution Use as directed. (Patient not taking: Reported on 8/5/2024) 473 mL 0    hydrocortisone 2.5 % cream Apply topically 2 times a day. Mix with eucerin 30 g 2    torsemide 40 mg tablet Take 40 mg by mouth once daily. Do not start before December 27, 2023. (Patient taking differently: Take 20 mg by mouth once daily.) 30 tablet 0     No current facility-administered medications for this visit.       ALLERGIES AND DRUG REACTIONS  Allergies   Allergen Reactions    Sulfur Hives    Cefprozil Hives    Sulfa (Sulfonamide Antibiotics) Hives          OBJECTIVE  Visit Vitals  /69   Pulse 68   Resp 18   Ht 1.575 m (5' 2\")   Wt 97.5 kg (215 lb)   BMI 39.32 kg/m²   OB Status Postmenopausal   Smoking Status Former   BSA 2.07 m²       Last Recorded Pain Score (if available):                Physical Exam  General: Sitting in chair, NAD  Head: NCAT  Eyes: Sclera/conjunctiva clear, EOMI, PERRL  Nose/mouth: MMM  CV: Good distal pulses  Lungs: Good/equal chest excursion  Abdomen: Soft, ND  Ext: BLE edema, R ankle eversion  MSK: l-spine alignment: " unremarkable    Neuro: AAOx3   Dermatome sensation to light touch  LEFT L1 (lower pelvis/upper thigh): WNL    RIGHT L1: WNL      LEFT L2 (upper thigh): WNL       RIGHT: L2:WNL      LEFT L3 (medial knee): WNL       RIGHT L3: WNL      Decreased BL feet in stocking fashion to ankle level       Psych: affect nl  Skin: no rash/lesions. Kypho wounds well-healed        REVIEW OF LABORATORY DATA  I have reviewed the following lab results:  WBC   Date Value Ref Range Status   07/08/2024 8.5 4.4 - 11.3 x10*3/uL Final     RBC   Date Value Ref Range Status   07/08/2024 4.25 4.00 - 5.20 x10*6/uL Final     Hemoglobin   Date Value Ref Range Status   07/08/2024 11.3 (L) 12.0 - 16.0 g/dL Final     Hematocrit   Date Value Ref Range Status   07/08/2024 36.3 36.0 - 46.0 % Final     MCV   Date Value Ref Range Status   07/08/2024 85 80 - 100 fL Final     MCH   Date Value Ref Range Status   07/08/2024 26.6 26.0 - 34.0 pg Final     MCHC   Date Value Ref Range Status   07/08/2024 31.1 (L) 32.0 - 36.0 g/dL Final     RDW   Date Value Ref Range Status   07/08/2024 21.5 (H) 11.5 - 14.5 % Final     Platelets   Date Value Ref Range Status   07/08/2024 266 150 - 450 x10*3/uL Final     Sodium   Date Value Ref Range Status   07/08/2024 139 136 - 145 mmol/L Final     Potassium   Date Value Ref Range Status   07/08/2024 4.2 3.5 - 5.3 mmol/L Final     Bicarbonate   Date Value Ref Range Status   07/08/2024 25 21 - 32 mmol/L Final     Urea Nitrogen   Date Value Ref Range Status   07/08/2024 43 (H) 6 - 23 mg/dL Final     Calcium   Date Value Ref Range Status   07/08/2024 8.5 (L) 8.6 - 10.3 mg/dL Final     Protime   Date Value Ref Range Status   01/18/2023 11.0 9.8 - 13.4 sec Final     INR   Date Value Ref Range Status   01/18/2023 1.0 0.9 - 1.1 Final         REVIEW OF RADIOLOGY   I have reviewed the following:  Radiology Studies           MRI L-spine w 5/8/24:  * Discitis/osteomyelitis at T12/L1 as described.  *No evidence of fluid collection to suggest  epidural or paraspinal  abscess.        MRI L-spine wo 3/22/24:  There is increased signal within the disc space at T12-L1 with  increased anterior wedging at L1 and new anterior wedging at T12.  There is approximately 75% loss of vertebral body height anteriorly  at L1 and approximately 25% loss of vertebral body height anteriorly  at T12. There is no significant osseous retropulsion into the spinal  canal. There is no significant paraspinal edema or visualized  epidural collection. These findings may represent acute compression  deformities or sequela of trauma, however by imaging and early  discitis osteomyelitis can have a similar appearance. Please  correlate clinically for signs and symptoms of infection.      There is edema within the sacrum on the left which may represent a  sacral insufficiency fracture.      Postsurgical changes of posterolateral fusion and decompression at L4  through S1. No spinal canal and mild-to-moderate neural foraminal  narrowing at these levels.      Additional multilevel degenerative disc disease and facet arthrosis  with mild spinal canal stenosis. Neural foraminal narrowing most  pronounced at T12-L1 with severe left neural foraminal stenosis.               ASSESSMENT & PLAN  Genesis Garcia is a 79 y.o. old female with PMH HTN, DVT on ELIQUIS, hypothyroidism, CKD, asthma, obesity, L4-S1 fusion, VCF s/p T12/L1 kypho who presents for F/U .    1) Peripheral neuropathy  -BL foot neuropathy since ~2019, idiopathic  -Refractive Tylenol, NSAIDs, gabapentin  -No known DM. Nl B12, folate  -Schedule BL foot Qutenza (4 patches)  -[facility] work-up:  --Reviewed/discussed CT T-spine 7/25/24: mild T12-L1 stenosis  --Reminded to complete psych eval  -F/U 4-6 w                Faith Reese MD  Anesthesiologist & Interventional Pain Physician   [facility]  O: [phone]  F: [phone]  8:45 AM  08/05/24

## 2024-08-19 ENCOUNTER — APPOINTMENT (OUTPATIENT)
Dept: RHEUMATOLOGY | Facility: CLINIC | Age: 79
End: 2024-08-19
Payer: MEDICARE

## 2024-08-19 ENCOUNTER — TELEPHONE (OUTPATIENT)
Dept: PAIN MEDICINE | Facility: CLINIC | Age: 79
End: 2024-08-19

## 2024-08-19 NOTE — TELEPHONE ENCOUNTER
Phone call from the patient stating she has been getting a call from [facility] and she is not sure why. Advised patient to call the number back that they are trying to arrange for Qutenza delivery.       She also advised that she completed her psych eval and they said they would send it to the office.     Arranged delivery with [facility] following the phone call with the patient. Delivery scheduled for 8/21 and administration scheduled for 8/23/24.

## 2024-08-20 ENCOUNTER — APPOINTMENT (OUTPATIENT)
Dept: RHEUMATOLOGY | Facility: CLINIC | Age: 79
End: 2024-08-20
Payer: MEDICARE

## 2024-08-20 VITALS — BODY MASS INDEX: 37.86 KG/M2 | DIASTOLIC BLOOD PRESSURE: 91 MMHG | WEIGHT: 207 LBS | SYSTOLIC BLOOD PRESSURE: 146 MMHG

## 2024-08-20 DIAGNOSIS — M80.08XA AGE-RELATED OSTEOPOROSIS WITH CURRENT PATHOLOGICAL FRACTURE OF VERTEBRA, INITIAL ENCOUNTER (MULTI): Primary | ICD-10-CM

## 2024-08-20 PROCEDURE — 1159F MED LIST DOCD IN RCRD: CPT | Performed by: INTERNAL MEDICINE

## 2024-08-20 PROCEDURE — 3077F SYST BP >= 140 MM HG: CPT | Performed by: INTERNAL MEDICINE

## 2024-08-20 PROCEDURE — 1157F ADVNC CARE PLAN IN RCRD: CPT | Performed by: INTERNAL MEDICINE

## 2024-08-20 PROCEDURE — 1036F TOBACCO NON-USER: CPT | Performed by: INTERNAL MEDICINE

## 2024-08-20 PROCEDURE — 99212 OFFICE O/P EST SF 10 MIN: CPT | Performed by: INTERNAL MEDICINE

## 2024-08-20 PROCEDURE — 3080F DIAST BP >= 90 MM HG: CPT | Performed by: INTERNAL MEDICINE

## 2024-08-20 ASSESSMENT — PATIENT HEALTH QUESTIONNAIRE - PHQ9
1. LITTLE INTEREST OR PLEASURE IN DOING THINGS: NOT AT ALL
SUM OF ALL RESPONSES TO PHQ9 QUESTIONS 1 AND 2: 0
2. FEELING DOWN, DEPRESSED OR HOPELESS: NOT AT ALL

## 2024-08-20 NOTE — PATIENT INSTRUCTIONS
Continue calcium and vitamin D.  Continue hydroxychloroquine.  We will contact you once Prolia injection approved.

## 2024-08-20 NOTE — PROGRESS NOTES
Subjective  . Genesis Garcia is a 79 y.o. female who presents for Follow-up.    HPI. 79-year-old female with history of UCTD (positive MAURICE, anticentromere antibody and arthralgia), OA s/p bilateral TKR, HTN, CKD, DVT, postlaminectomy syndrome, chronic venous insufficiency, and asthma presented to discussed bone density and osteoporosis management.    She has no recent fall or fractures.  She takes calcium and vitamin D.    Continue to have chronic low back pain.  She has spinal stenosis.  She is following with pain management.  Spinal stimulator placement has been planned.    DEXA scan obtained on July 26, 2024 showed left total femur BMD of 0.703 g/cm² reflecting a T-score of -2.4, left femoral neck BMD of 0.778 g/cm² reflecting a T-score of -1.9 and left 33% forearm BMD of 0.772 g/cm² reflecting a T-score of -1.2.  FRAX score for major osteoporotic fracture is 18.3% and hip fracture is 4.1%.    Review of Systems   All other systems reviewed and are negative.    Objective     Blood pressure (!) 146/91, weight 93.9 kg (207 lb).      Assessment/Plan  . 79-year-old female with history of UCTD (positive MAURICE, anticentromere antibody and arthralgia), OA s/p bilateral TKR, HTN, CKD, DVT, postlaminectomy syndrome, chronic venous insufficiency, and asthma presented to discussed bone density and osteoporosis management.    #1: Bone densitometry results reviewed with the patient.  She has T12 and L1 osteoporotic fractures and underwent kyphoplasty in June.  She has elevated FRAX score for hip fracture.  Serum N-telopeptide elevated at 36.7.  PTH and Vitamin D within normal limits.  She is not a good candidate to take bisphosphonate due to chronic kidney disease.  We discussed daily injection of teriparatide however she is not ready for it.    -Begin Prolia injection every 6 months.  Patient verbalizes an agreement.  Side effect discussed in length.  -Continue calcium and vitamin D.    #2: UCTD.  -Continue  hydroxychloroquine twice a day.     This note was partially generated using the Dragon Voice recognition system. There may be some incorrect wording, spelling and/or spelling errors or punctuation errors that were not corrected prior to committing the note to the medical record.          Problem List Items Addressed This Visit       Age-related osteoporosis with current pathological fracture of vertebra (Multi) - Primary    Relevant Orders    Calcium       Active Ambulatory Problems     Diagnosis Date Noted    DVT, bilateral lower limbs (Multi) 03/10/2023    Vitamin D deficiency 07/27/2023    Heart murmur 07/27/2023    Diverticulosis 07/27/2023    COPD (chronic obstructive pulmonary disease) (Multi) 07/27/2023    Arthritis 07/27/2023    [facility] 07/27/2023    Atrial fibrillation (Multi) 07/27/2023    Anemia 07/27/2023    Abnormal mammogram 07/27/2023    MAURICE positive 10/07/2020    Ankle arthritis 08/23/2023    Arthritis of right hip 08/23/2023    Carpal tunnel syndrome 06/07/2012    Cataract 08/23/2023    Chronic bronchitis (Multi) 01/18/2012    Chronic low back pain 05/18/2015    Chronic ulcer of left calf limited to breakdown of skin (Multi) 08/23/2023    Chronic venous insufficiency 08/23/2023    Connective tissue disease, undifferentiated (Multi) 08/23/2023    Essential hypertension 06/15/2012    Flat foot 10/07/2020    Gastroesophageal reflux disease with esophagitis without hemorrhage 08/23/2023    Hypokalemia 08/23/2023    Left breast mass 08/23/2023    Localized swelling of both lower legs 08/23/2023    Morbid obesity due to excess calories (Multi) 11/16/2015    Osteopenia, senile 12/29/2020    Postoperative incisional hernia 08/23/2023    Primary osteoarthritis of right knee 09/25/2013    Rosacea 02/13/2020    Umbilical hernia 08/23/2023    Ventral hernia 08/23/2023    Polyarthritis 10/07/2020    Lumbar post-laminectomy syndrome 11/28/2023    Lumbar radiculitis 11/28/2023    Impaired mobility and  ADLs 12/23/2023    Generalized weakness 12/27/2023    Sacroiliac inflammation (CMS-HCC) 02/20/2024    Chronic anticoagulation 02/26/2024    Stage 3b chronic kidney disease (Multi) 02/26/2024    Hypothyroidism (acquired) 02/26/2024    Unspecified severe protein-calorie malnutrition (Multi) 02/26/2024    Varicose veins of right lower extremity with ulcer other part of lower leg (CODE) (Multi) 02/26/2024    Fracture of vertebra due to osteoporosis (Multi) 04/02/2024    Malabsorption of iron ([facility]) 04/08/2024    Thyroid nodule 04/12/2024    Peripheral neuropathy, idiopathic 05/08/2024    Abdominal pain 05/16/2024    Abnormality of esophagus 05/16/2024    Disorder of esophagus 05/16/2024    Acute on chronic diastolic (congestive) heart failure (Multi) 01/17/2023    Acute respiratory failure with hypoxia (Multi) 12/29/2023    PAULY (acute kidney injury) (CMS-HCC) 12/15/2023    C. difficile colitis 05/16/2024    Colitis due to Clostridioides difficile 05/16/2024    Candidiasis of mouth 05/16/2024    Chronic deep vein thrombosis (DVT) of proximal vein of lower extremity (Multi) 02/26/2024    Chronic venous hypertension (idiopathic) with ulcer of right lower extremity (CODE) (Multi) 06/16/2022    Colitis 05/16/2024    Compression fracture of lumbar vertebra (Multi) 03/22/2024    Diastolic heart failure (Multi) 05/16/2024    Difficulty in walking, not elsewhere classified 12/31/2023    Dysphagia 05/16/2024    Edema of foot 05/16/2024    Edema of lower extremity 01/18/2023    Symptom of leg swelling 05/16/2024    Localized swelling, mass and lump, lower limb, bilateral 06/16/2022    Pedal edema 05/16/2024    Swelling of lower extremity 01/18/2023    Fluid overload 05/16/2024    Other symptoms and signs involving the musculoskeletal system 12/30/2023    History of metabolic disorder 05/16/2024    Injury of tendon of rotator cuff 05/16/2024    Iron deficiency anemia 07/27/2023    Left ankle pain 05/16/2024    Knee pain  05/16/2024    Leukocytosis 05/16/2024    Localized edema 01/21/2023    Nausea 05/16/2024    Other symbolic dysfunctions 01/05/2024    Pain in both feet 05/16/2024    Partial small bowel obstruction (Multi) 05/16/2024    Pelvic mass 05/16/2024    Cough 12/18/2023    Productive cough 05/16/2024    Right shoulder pain 05/16/2024    History of total hip replacement 11/03/2022    Tachycardia 05/16/2024    Status post hip hemiarthroplasty 11/03/2022    Venous stasis ulcer with edema of lower leg (Multi) 02/26/2024    Deep vein thrombosis (DVT) of both lower extremities (Multi) 03/10/2023    HCAP (healthcare-associated pneumonia) 05/16/2024    Pneumonia, unspecified organism 12/20/2023    Anemia, unspecified 12/29/2023    Osteoarthritis of knee 05/16/2024    Osteoarthritis of hip 05/16/2024    Polyarthritis, unspecified 05/16/2024    Polyarthropathy 10/07/2020    Rheumatoid arthritis (Multi) 12/20/2023    Sepsis, unspecified organism (Multi) 12/29/2023    Sepsis (Multi) 12/19/2023    Unilateral primary osteoarthritis, right hip 06/16/2022    Unspecified osteoarthritis, unspecified site 06/16/2022    Unspecified asthma, uncomplicated ([facility]) 06/16/2022    Unspecified atrial fibrillation (Multi) 06/16/2022    Cellulitis of left lower leg 05/16/2024    Cellulitis 05/16/2024    Cellulitis of lower extremity 05/16/2024    Leg ulcer (Multi) 05/16/2024    Stasis dermatitis with venous ulcer of lower extremity due to chronic peripheral venous hypertension (Multi) 05/16/2024    Ulcer of lower extremity (Multi) 08/23/2023    Low back pain, unspecified 12/29/2023    Peripheral venous insufficiency 08/23/2023    Venous insufficiency (chronic) (peripheral) 06/16/2022    Undifferentiated connective tissue disease (Multi) 08/23/2023    Chronic obstructive pulmonary disease (Multi) 01/18/2012    Chronic obstructive pulmonary disease, unspecified (Multi) 06/16/2022    Diverticular disease 07/27/2023    Diverticulosis of intestine, part  unspecified, without perforation or abscess without bleeding 06/16/2022    Essential (primary) hypertension 06/16/2022    Hypertension 06/15/2012    Pes planus 10/07/2020    Pathological fracture of vertebra due to osteoporosis (Multi) 04/02/2024    Gastroesophageal reflux disease with esophagitis 08/23/2023    Weakness 12/27/2023    Mass of breast 08/23/2023    Postlaminectomy syndrome of lumbar region 11/28/2023    Iron malabsorption ([facility]) 04/08/2024    Morbid obesity (Multi) 11/16/2015    Obesity, unspecified 06/16/2022    Other specified soft tissue disorders 01/21/2023    Systemic involvement of connective tissue, unspecified (Multi) 06/16/2022    Senile osteopenia 12/29/2020    Idiopathic peripheral neuropathy 05/08/2024    Incisional hernia with obstruction but no gangrene 05/16/2024    Incisional hernia 08/23/2023    Stage 3 chronic kidney disease (Multi) 02/26/2024    Umbilical hernia with obstruction, without gangrene 06/16/2022    Abnormal voice 08/23/2023    Age-related osteoporosis with current pathological fracture of vertebra (Multi) 06/13/2024    Age-related osteoporosis with current pathological fracture, vertebra(e), initial encounter for fracture (Multi) 06/13/2024     Resolved Ambulatory Problems     Diagnosis Date Noted    Abscess of hip, left 08/23/2023    Cellulitis, leg 08/23/2023    Ulcer of right lower extremity (Multi) 08/23/2023    Closed fracture of lateral malleolus 08/23/2023    Decreased GFR 08/23/2023    DVT (deep venous thrombosis) (Multi) 08/23/2023    Dyspnea on exertion 08/23/2023    Fracture of distal fibula 08/23/2023    Hoarseness 08/23/2023    Multinodular goiter 08/23/2023    Stress fracture 08/23/2023    Voice disturbance 08/23/2023    Acute deep vein thrombosis (DVT) of both lower extremities (Multi) 08/23/2023    Acute pneumonia 12/20/2023    Thyroid nodule 01/19/2024     Past Medical History:   Diagnosis Date    Ankle fracture     Cellulitis, unspecified 08/24/2020     CKD (chronic kidney disease), stage III (Multi)     H/O deep venous thrombosis     Hypothyroidism     Lymphedema     Osteoporosis     Other fracture of upper and lower end of unspecified fibula, initial encounter for closed fracture 2021    Peripheral neuropathy     Personal history of (healed) stress fracture 2021    Personal history of other diseases of the circulatory system     Personal history of other diseases of the musculoskeletal system and connective tissue 10/20/2016    Venous ulcer of leg (Multi)     Vitamin D deficiency, unspecified 10/20/2016       Family History   Problem Relation Name Age of Onset    Diabetes Mother          type 2    Heart disease Father      Hypertension Father         Past Surgical History:   Procedure Laterality Date    BACK SURGERY  2016    Back Surgery L4-L5 fusion    CARDIAC ELECTROPHYSIOLOGY STUDY AND ABLATION      CHOLECYSTECTOMY  04/15/2016    Cholecystectomy    HERNIA REPAIR      OTHER SURGICAL HISTORY  2021    Cataract surgery    THYROIDECTOMY, PARTIAL      d/t goiter    TOTAL HIP ARTHROPLASTY Right     TOTAL KNEE ARTHROPLASTY Bilateral 2022    Knee Replacement    VERTEBROPLASTY         Social History     Tobacco Use   Smoking Status Former    Current packs/day: 0.00    Average packs/day: 0.2 packs/day for 24.0 years (3.6 ttl pk-yrs)    Types: Cigarettes    Start date:     Quit date:     Years since quittin.6   Smokeless Tobacco Never       Allergies  Sulfur, Cefprozil, and Sulfa (sulfonamide antibiotics)    Current Meds  Current Outpatient Medications   Medication Instructions    acetaminophen (TYLENOL) 650 mg, oral, Every 4 hours PRN    albuterol sulfate (Proair Digihaler) 90 mcg/actuation aero powdr breath act w/sensor inhaler 1 puff, inhalation, Every 6 hours PRN    albuterol 2.5 mg, nebulization, Every 2 hour PRN    apixaban (ELIQUIS) 2.5 mg, oral, 2 times daily    b complex-vitamin c tablet 1 tablet, oral, Daily     Bacillus coagulans (PROBIOTIC, B. COAGULANS, ORAL) 1 tablet, oral, Daily    calcium carbonate 600 mg calcium (1,500 mg) tablet 1 tablet, oral, Daily    chlorhexidine (Peridex) 0.12 % solution Use as directed.    cholecalciferol (Vitamin D-3) 50 MCG (2000 UT) tablet oral    fluticasone propion-salmeteroL (Advair Diskus) 250-50 mcg/dose diskus inhaler 1 puff, inhalation, 2 times daily RT, Rinse mouth with water after use to reduce aftertaste and incidence of candidiasis. Do not swallow.    gabapentin (NEURONTIN) 400 mg, oral, 3 times daily    hydrocortisone 2.5 % cream Topical, 2 times daily, Mix with eucerin    hydroxychloroquine (PLAQUENIL) 200 mg, oral, 2 times daily    levothyroxine (SYNTHROID) 25 mcg, oral, Daily before breakfast    losartan (Cozaar) 100 mg tablet 1 tablet, oral, Daily    multivitamin capsule oral    spironolactone (Aldactone) 25 mg tablet 1 tablet, oral, Daily    torsemide 40 mg, oral, Daily        Lab Results   Component Value Date    SEDRATE 21 04/05/2024    CRP 1.41 (H) 04/05/2024    DNADS 2.0 07/18/2023    CKTOTAL 351 (H) 04/15/2022             Brent Sheridan MD

## 2024-08-21 ENCOUNTER — SPECIALTY PHARMACY (OUTPATIENT)
Dept: PHARMACY | Facility: CLINIC | Age: 79
End: 2024-08-21

## 2024-08-21 PROCEDURE — RXMED WILLOW AMBULATORY MEDICATION CHARGE

## 2024-08-23 ENCOUNTER — OFFICE VISIT (OUTPATIENT)
Dept: PAIN MEDICINE | Facility: CLINIC | Age: 79
End: 2024-08-23
Payer: MEDICARE

## 2024-08-23 ENCOUNTER — APPOINTMENT (OUTPATIENT)
Dept: RHEUMATOLOGY | Facility: CLINIC | Age: 79
End: 2024-08-23
Payer: MEDICARE

## 2024-08-23 VITALS
RESPIRATION RATE: 18 BRPM | DIASTOLIC BLOOD PRESSURE: 79 MMHG | SYSTOLIC BLOOD PRESSURE: 157 MMHG | WEIGHT: 207 LBS | BODY MASS INDEX: 38.09 KG/M2 | HEART RATE: 69 BPM | HEIGHT: 62 IN

## 2024-08-23 DIAGNOSIS — G60.9 IDIOPATHIC PERIPHERAL NEUROPATHY: Primary | ICD-10-CM

## 2024-08-23 PROCEDURE — 99214 OFFICE O/P EST MOD 30 MIN: CPT | Performed by: ANESTHESIOLOGY

## 2024-08-23 ASSESSMENT — PAIN DESCRIPTION - DESCRIPTORS: DESCRIPTORS: OTHER (COMMENT)

## 2024-08-23 ASSESSMENT — PAIN SCALES - GENERAL
PAINLEVEL: 9
PAINLEVEL_OUTOF10: 9

## 2024-08-23 ASSESSMENT — PAIN - FUNCTIONAL ASSESSMENT: PAIN_FUNCTIONAL_ASSESSMENT: 0-10

## 2024-08-23 NOTE — PROGRESS NOTES
"Qutenza (capsaicin 8%) Application  /79   Pulse 69   Resp 18   Ht 1.575 m (5' 2\")   Wt 93.9 kg (207 lb)   BMI 37.86 kg/m²   8/25/2024    Indication: Peripheral neuropathy    Patient presents for Qutenza application today to treat the neuropathic syndrome mentioned above. The patient exhibits ongoing neuropathic pain refractive to extensive conservative care and conventional medications. The patient's exhibits no hypertensive urgency and does not have open wounds/cuts at the target site. Target site cleaned with wet towel and dried accordingly.    4 Qutenza topical systems each containing 280 units were applied to BL feet for total of 1,120 units in total.    These were wrapped with polyethylene to maintain administration of the medication for 30 minutes.    The Qutenza systems were carefully removed without issue. Soothing topical medication was applied to the patients feet.     The patient tolerated the procedure well. They were discharged home in stable condition.    Total face-to-face time: 34        Faith Reese MD  Anesthesiologist & Interventional Pain Physician   [facility]  O: [phone]  F: [phone]  8/23/24    "

## 2024-08-26 ENCOUNTER — PHARMACY VISIT (OUTPATIENT)
Dept: PHARMACY | Facility: CLINIC | Age: 79
End: 2024-08-26
Payer: COMMERCIAL

## 2024-08-26 ENCOUNTER — SPECIALTY PHARMACY (OUTPATIENT)
Dept: PHARMACY | Facility: CLINIC | Age: 79
End: 2024-08-26

## 2024-08-30 ENCOUNTER — OFFICE VISIT (OUTPATIENT)
Dept: RHEUMATOLOGY | Facility: CLINIC | Age: 79
End: 2024-08-30
Payer: MEDICARE

## 2024-08-30 VITALS
DIASTOLIC BLOOD PRESSURE: 76 MMHG | HEIGHT: 62 IN | SYSTOLIC BLOOD PRESSURE: 145 MMHG | HEART RATE: 67 BPM | WEIGHT: 215 LBS | OXYGEN SATURATION: 94 % | BODY MASS INDEX: 39.56 KG/M2

## 2024-08-30 DIAGNOSIS — M80.08XA AGE-RELATED OSTEOPOROSIS WITH CURRENT PATHOLOGICAL FRACTURE OF VERTEBRA, INITIAL ENCOUNTER (MULTI): Primary | ICD-10-CM

## 2024-08-30 ASSESSMENT — ENCOUNTER SYMPTOMS
LOSS OF SENSATION IN FEET: 1
DEPRESSION: 0
OCCASIONAL FEELINGS OF UNSTEADINESS: 1

## 2024-08-30 ASSESSMENT — PATIENT HEALTH QUESTIONNAIRE - PHQ9
1. LITTLE INTEREST OR PLEASURE IN DOING THINGS: NOT AT ALL
2. FEELING DOWN, DEPRESSED OR HOPELESS: NOT AT ALL
SUM OF ALL RESPONSES TO PHQ9 QUESTIONS 1 AND 2: 0

## 2024-08-30 ASSESSMENT — PAIN SCALES - GENERAL: PAINLEVEL: 6

## 2024-08-30 NOTE — PROGRESS NOTES
"Subjective  . Genesis Garcia is a 79 y.o. female who presents for Injections ( Prolia) and Follow-up.    HPI. 79-year-old female with history of UCTD (positive MAURICE, anticentromere antibody and arthralgia), osteopenia, T12/L1 compression fracture status post kyphoplasty (6/2024), OA s/p bilateral TKR, HTN, CKD, DVT, postlaminectomy syndrome, chronic venous insufficiency, and asthma presented for Prolia injection.    She has no recent fall or new fractures.  She takes calcium and vitamin D.      DEXA scan obtained on July 26, 2024 showed left total femur BMD of 0.703 g/cm² reflecting a T-score of -2.4, left femoral neck BMD of 0.778 g/cm² reflecting a T-score of -1.9 and left 33% forearm BMD of 0.772 g/cm² reflecting a T-score of -1.2. FRAX score for major osteoporotic fracture is 18.3% and hip fracture is 4.1%.     Immunosuppression: Hydroxychloroquine     Review of Systems   All other systems reviewed and are negative.    Objective     Blood pressure 145/76, pulse 67, height 1.575 m (5' 2\"), weight 97.5 kg (215 lb), SpO2 94%.      Assessment/Plan . 79-year-old female with history of UCTD (positive MAURICE, anticentromere antibody and arthralgia), osteopenia, T12/L1 compression fracture status post kyphoplasty (6/2024), OA s/p bilateral TKR, HTN, CKD, DVT, postlaminectomy syndrome, chronic venous insufficiency, and asthma presented for Prolia injection.    #1: Osteopenia with elevated FRAX score and history of T12 and L1 compression fracture(6/20240.  -Prolia injection given at left upper arm.  -Continue calcium and vitamin D.    #2: UCTD.  Doing well on hydroxychloroquine.      Follow-up in 3 months.     This note was partially generated using the Dragon Voice recognition system. There may be some incorrect wording, spelling and/or spelling errors or punctuation errors that were not corrected prior to committing the note to the medical record.    Problem List Items Addressed This Visit    None    Active Ambulatory " Problems     Diagnosis Date Noted    DVT, bilateral lower limbs (Multi) 03/10/2023    Vitamin D deficiency 07/27/2023    Heart murmur 07/27/2023    Diverticulosis 07/27/2023    COPD (chronic obstructive pulmonary disease) (Multi) 07/27/2023    Arthritis 07/27/2023    Asthma ([facility]) 07/27/2023    Atrial fibrillation (Multi) 07/27/2023    Anemia 07/27/2023    Abnormal mammogram 07/27/2023    MAURICE positive 10/07/2020    Ankle arthritis 08/23/2023    Arthritis of right hip 08/23/2023    Carpal tunnel syndrome 06/07/2012    Cataract 08/23/2023    Chronic bronchitis (Multi) 01/18/2012    Chronic low back pain 05/18/2015    Chronic ulcer of left calf limited to breakdown of skin (Multi) 08/23/2023    Chronic venous insufficiency 08/23/2023    Connective tissue disease, undifferentiated (Multi) 08/23/2023    Essential hypertension 06/15/2012    Flat foot 10/07/2020    Gastroesophageal reflux disease with esophagitis without hemorrhage 08/23/2023    Hypokalemia 08/23/2023    Left breast mass 08/23/2023    Localized swelling of both lower legs 08/23/2023    Morbid obesity due to excess calories (Multi) 11/16/2015    Osteopenia, senile 12/29/2020    Postoperative incisional hernia 08/23/2023    Primary osteoarthritis of right knee 09/25/2013    Rosacea 02/13/2020    Umbilical hernia 08/23/2023    Ventral hernia 08/23/2023    Polyarthritis 10/07/2020    Lumbar post-laminectomy syndrome 11/28/2023    Lumbar radiculitis 11/28/2023    Impaired mobility and ADLs 12/23/2023    Generalized weakness 12/27/2023    Sacroiliac inflammation (CMS-HCC) 02/20/2024    Chronic anticoagulation 02/26/2024    Stage 3b chronic kidney disease (Multi) 02/26/2024    Hypothyroidism (acquired) 02/26/2024    Unspecified severe protein-calorie malnutrition (Multi) 02/26/2024    Varicose veins of right lower extremity with ulcer other part of lower leg (CODE) (Multi) 02/26/2024    Fracture of vertebra due to osteoporosis (Multi) 04/02/2024     Malabsorption of iron ([facility]) 04/08/2024    Thyroid nodule 04/12/2024    Peripheral neuropathy, idiopathic 05/08/2024    Abdominal pain 05/16/2024    Abnormality of esophagus 05/16/2024    Disorder of esophagus 05/16/2024    Acute on chronic diastolic (congestive) heart failure (Multi) 01/17/2023    Acute respiratory failure with hypoxia (Multi) 12/29/2023    PAULY (acute kidney injury) (CMS-HCC) 12/15/2023    C. difficile colitis 05/16/2024    Colitis due to Clostridioides difficile 05/16/2024    Candidiasis of mouth 05/16/2024    Chronic deep vein thrombosis (DVT) of proximal vein of lower extremity (Multi) 02/26/2024    Chronic venous hypertension (idiopathic) with ulcer of right lower extremity (CODE) (Multi) 06/16/2022    Colitis 05/16/2024    Compression fracture of lumbar vertebra (Multi) 03/22/2024    Diastolic heart failure (Multi) 05/16/2024    Difficulty in walking, not elsewhere classified 12/31/2023    Dysphagia 05/16/2024    Edema of foot 05/16/2024    Edema of lower extremity 01/18/2023    Symptom of leg swelling 05/16/2024    Localized swelling, mass and lump, lower limb, bilateral 06/16/2022    Pedal edema 05/16/2024    Swelling of lower extremity 01/18/2023    Fluid overload 05/16/2024    Other symptoms and signs involving the musculoskeletal system 12/30/2023    History of metabolic disorder 05/16/2024    Injury of tendon of rotator cuff 05/16/2024    Iron deficiency anemia 07/27/2023    Left ankle pain 05/16/2024    Knee pain 05/16/2024    Leukocytosis 05/16/2024    Localized edema 01/21/2023    Nausea 05/16/2024    Other symbolic dysfunctions 01/05/2024    Pain in both feet 05/16/2024    Partial small bowel obstruction (Multi) 05/16/2024    Pelvic mass 05/16/2024    Cough 12/18/2023    Productive cough 05/16/2024    Right shoulder pain 05/16/2024    History of total hip replacement 11/03/2022    Tachycardia 05/16/2024    Status post hip hemiarthroplasty 11/03/2022    Venous stasis ulcer with  edema of lower leg (Multi) 02/26/2024    Deep vein thrombosis (DVT) of both lower extremities (Multi) 03/10/2023    HCAP (healthcare-associated pneumonia) 05/16/2024    Pneumonia, unspecified organism 12/20/2023    Anemia, unspecified 12/29/2023    Osteoarthritis of knee 05/16/2024    Osteoarthritis of hip 05/16/2024    Polyarthritis, unspecified 05/16/2024    Polyarthropathy 10/07/2020    Rheumatoid arthritis (Multi) 12/20/2023    Sepsis, unspecified organism (Multi) 12/29/2023    Sepsis (Multi) 12/19/2023    Unilateral primary osteoarthritis, right hip 06/16/2022    Unspecified osteoarthritis, unspecified site 06/16/2022    Unspecified asthma, uncomplicated ([facility]) 06/16/2022    Unspecified atrial fibrillation (Multi) 06/16/2022    Cellulitis of left lower leg 05/16/2024    Cellulitis 05/16/2024    Cellulitis of lower extremity 05/16/2024    Leg ulcer (Multi) 05/16/2024    Stasis dermatitis with venous ulcer of lower extremity due to chronic peripheral venous hypertension (Multi) 05/16/2024    Ulcer of lower extremity (Multi) 08/23/2023    Low back pain, unspecified 12/29/2023    Peripheral venous insufficiency 08/23/2023    Venous insufficiency (chronic) (peripheral) 06/16/2022    Undifferentiated connective tissue disease (Multi) 08/23/2023    Chronic obstructive pulmonary disease (Multi) 01/18/2012    Chronic obstructive pulmonary disease, unspecified (Multi) 06/16/2022    Diverticular disease 07/27/2023    Diverticulosis of intestine, part unspecified, without perforation or abscess without bleeding 06/16/2022    Essential (primary) hypertension 06/16/2022    Hypertension 06/15/2012    Pes planus 10/07/2020    Pathological fracture of vertebra due to osteoporosis (Multi) 04/02/2024    Gastroesophageal reflux disease with esophagitis 08/23/2023    Weakness 12/27/2023    Mass of breast 08/23/2023    Postlaminectomy syndrome of lumbar region 11/28/2023    Iron malabsorption ([facility]) 04/08/2024    Morbid  obesity (Multi) 11/16/2015    Obesity, unspecified 06/16/2022    Other specified soft tissue disorders 01/21/2023    Systemic involvement of connective tissue, unspecified (Multi) 06/16/2022    Senile osteopenia 12/29/2020    Idiopathic peripheral neuropathy 05/08/2024    Incisional hernia with obstruction but no gangrene 05/16/2024    Incisional hernia 08/23/2023    Stage 3 chronic kidney disease (Multi) 02/26/2024    Umbilical hernia with obstruction, without gangrene 06/16/2022    Abnormal voice 08/23/2023    Age-related osteoporosis with current pathological fracture of vertebra (Multi) 06/13/2024    Age-related osteoporosis with current pathological fracture, vertebra(e), initial encounter for fracture (Multi) 06/13/2024     Resolved Ambulatory Problems     Diagnosis Date Noted    Abscess of hip, left 08/23/2023    Cellulitis, leg 08/23/2023    Ulcer of right lower extremity (Multi) 08/23/2023    Closed fracture of lateral malleolus 08/23/2023    Decreased GFR 08/23/2023    DVT (deep venous thrombosis) (Multi) 08/23/2023    Dyspnea on exertion 08/23/2023    Fracture of distal fibula 08/23/2023    Hoarseness 08/23/2023    Multinodular goiter 08/23/2023    Stress fracture 08/23/2023    Voice disturbance 08/23/2023    Acute deep vein thrombosis (DVT) of both lower extremities (Multi) 08/23/2023    Acute pneumonia 12/20/2023    Thyroid nodule 01/19/2024     Past Medical History:   Diagnosis Date    Ankle fracture     Cellulitis, unspecified 08/24/2020    CKD (chronic kidney disease), stage III (Multi)     H/O deep venous thrombosis     Hypothyroidism     Lymphedema     Osteoporosis     Other fracture of upper and lower end of unspecified fibula, initial encounter for closed fracture 05/21/2021    Peripheral neuropathy     Personal history of (healed) stress fracture 06/26/2021    Personal history of other diseases of the circulatory system     Personal history of other diseases of the musculoskeletal system and  connective tissue 10/20/2016    Venous ulcer of leg (Multi)     Vitamin D deficiency, unspecified 10/20/2016       Family History   Problem Relation Name Age of Onset    Diabetes Mother          type 2    Heart disease Father      Hypertension Father         Past Surgical History:   Procedure Laterality Date    BACK SURGERY  2016    Back Surgery L4-L5 fusion    CARDIAC ELECTROPHYSIOLOGY STUDY AND ABLATION      CHOLECYSTECTOMY  04/15/2016    Cholecystectomy    HERNIA REPAIR      OTHER SURGICAL HISTORY  2021    Cataract surgery    THYROIDECTOMY, PARTIAL      d/t goiter    TOTAL HIP ARTHROPLASTY Right     TOTAL KNEE ARTHROPLASTY Bilateral 2022    Knee Replacement    VERTEBROPLASTY         Social History     Tobacco Use   Smoking Status Former    Current packs/day: 0.00    Average packs/day: 0.2 packs/day for 24.0 years (3.6 ttl pk-yrs)    Types: Cigarettes    Start date:     Quit date:     Years since quittin.6   Smokeless Tobacco Never       Allergies  Sulfur, Cefprozil, and Sulfa (sulfonamide antibiotics)    Current Meds  Current Outpatient Medications   Medication Instructions    acetaminophen (TYLENOL) 650 mg, oral, Every 4 hours PRN    albuterol sulfate (Proair Digihaler) 90 mcg/actuation aero powdr breath act w/sensor inhaler 1 puff, inhalation, Every 6 hours PRN    albuterol 2.5 mg, nebulization, Every 2 hour PRN    apixaban (ELIQUIS) 2.5 mg, oral, 2 times daily    b complex-vitamin c tablet 1 tablet, oral, Daily    Bacillus coagulans (PROBIOTIC, B. COAGULANS, ORAL) 1 tablet, oral, Daily    calcium carbonate 600 mg calcium (1,500 mg) tablet 1 tablet, oral, Daily    chlorhexidine (Peridex) 0.12 % solution Use as directed.    cholecalciferol (Vitamin D-3) 50 MCG (2000 UT) tablet oral    denosumab (Prolia) 60 mg/mL syringe Provider to inject 1 mL (60 mg total) under the skin every 6 months.  For  ONLY.    fluticasone propion-salmeteroL (Advair Diskus) 250-50  mcg/dose diskus inhaler 1 puff, inhalation, 2 times daily RT, Rinse mouth with water after use to reduce aftertaste and incidence of candidiasis. Do not swallow.    gabapentin (NEURONTIN) 400 mg, oral, 3 times daily    hydrocortisone 2.5 % cream Topical, 2 times daily, Mix with eucerin    hydroxychloroquine (PLAQUENIL) 200 mg, oral, 2 times daily    levothyroxine (SYNTHROID) 25 mcg, oral, Daily before breakfast    losartan (Cozaar) 100 mg tablet 1 tablet, oral, Daily    multivitamin capsule oral    spironolactone (Aldactone) 25 mg tablet 1 tablet, oral, Daily    torsemide 40 mg, oral, Daily        Lab Results   Component Value Date    SEDRATE 21 04/05/2024    CRP 1.41 (H) 04/05/2024    DNADS 2.0 07/18/2023    CKTOTAL 351 (H) 04/15/2022      Brent Sheridan MD

## 2024-09-06 ENCOUNTER — OFFICE VISIT (OUTPATIENT)
Dept: PAIN MEDICINE | Facility: CLINIC | Age: 79
End: 2024-09-06
Payer: MEDICARE

## 2024-09-06 VITALS
HEIGHT: 62 IN | SYSTOLIC BLOOD PRESSURE: 158 MMHG | HEART RATE: 63 BPM | RESPIRATION RATE: 18 BRPM | BODY MASS INDEX: 39.56 KG/M2 | DIASTOLIC BLOOD PRESSURE: 78 MMHG | WEIGHT: 215 LBS

## 2024-09-06 DIAGNOSIS — Z79.01 CHRONIC ANTICOAGULATION: ICD-10-CM

## 2024-09-06 DIAGNOSIS — G60.9 PERIPHERAL NEUROPATHY, IDIOPATHIC: Primary | ICD-10-CM

## 2024-09-06 PROCEDURE — 3077F SYST BP >= 140 MM HG: CPT | Performed by: ANESTHESIOLOGY

## 2024-09-06 PROCEDURE — 99214 OFFICE O/P EST MOD 30 MIN: CPT | Performed by: ANESTHESIOLOGY

## 2024-09-06 PROCEDURE — 1157F ADVNC CARE PLAN IN RCRD: CPT | Performed by: ANESTHESIOLOGY

## 2024-09-06 PROCEDURE — 1125F AMNT PAIN NOTED PAIN PRSNT: CPT | Performed by: ANESTHESIOLOGY

## 2024-09-06 PROCEDURE — 1159F MED LIST DOCD IN RCRD: CPT | Performed by: ANESTHESIOLOGY

## 2024-09-06 PROCEDURE — 1160F RVW MEDS BY RX/DR IN RCRD: CPT | Performed by: ANESTHESIOLOGY

## 2024-09-06 PROCEDURE — 3078F DIAST BP <80 MM HG: CPT | Performed by: ANESTHESIOLOGY

## 2024-09-06 ASSESSMENT — ENCOUNTER SYMPTOMS
HEMATOLOGIC/LYMPHATIC NEGATIVE: 1
RESPIRATORY NEGATIVE: 1
ENDOCRINE NEGATIVE: 1
EYES NEGATIVE: 1
GASTROINTESTINAL NEGATIVE: 1
CARDIOVASCULAR NEGATIVE: 1
BACK PAIN: 1
PSYCHIATRIC NEGATIVE: 1
WEAKNESS: 1
CONSTITUTIONAL NEGATIVE: 1

## 2024-09-06 ASSESSMENT — PAIN - FUNCTIONAL ASSESSMENT
PAIN_FUNCTIONAL_ASSESSMENT: 0-10
PAIN_FUNCTIONAL_ASSESSMENT: 0-10

## 2024-09-06 ASSESSMENT — PAIN DESCRIPTION - DESCRIPTORS: DESCRIPTORS: OTHER (COMMENT);BURNING

## 2024-09-06 ASSESSMENT — PAIN SCALES - GENERAL
PAINLEVEL: 8
PAINLEVEL_OUTOF10: 8

## 2024-09-06 NOTE — PROGRESS NOTES
PAIN MANAGEMENT FOLLOW-UP OFFICE NOTE    Date of Service: 9/6/2024    SUBJECTIVE    CHIEF COMPLAINT: BLE pain    HISTORY OF PRESENT ILLNESS    Genesis Garcia is a 79 y.o. female with PMH HTN, DVT on ELIQUIS, hypothyroidism, CKD, asthma, obesity, L4-S1 fusion, CREST syndrome, VCF s/p T12/L1 kypho who presents for F/U.    Since her last visit, pt reports she completed psych eval and anticipates her SCS trial.     Pt denies new-onset numbness, weakness, bowel/bladder incontinence.  Pt denies recent infection, allergy to Latex/iodine/contrast. Patient is currently taking the following blood thinner(s): ELIQUIS    Procedure log:  -BL foot Qutenza #1 on 8/23/24  -T12, L1 kypho 6/27/24: 75% relief      REVIEW OF SYSTEMS  Review of Systems   Constitutional: Negative.    HENT: Negative.     Eyes: Negative.    Respiratory: Negative.     Cardiovascular: Negative.    Gastrointestinal: Negative.    Endocrine: Negative.    Musculoskeletal:  Positive for back pain.   Skin: Negative.    Neurological:  Positive for weakness.   Hematological: Negative.    Psychiatric/Behavioral: Negative.         PAST MEDICAL HISTORY  Past Medical History:   Diagnosis Date    Ankle fracture     left ankle fracture    Cellulitis, unspecified 08/24/2020    Cellulitis of skin    Chronic venous insufficiency     Chronic venous insufficiency     CKD (chronic kidney disease), stage III (Multi)     COPD (chronic obstructive pulmonary disease) (Multi)     Essential (primary) hypertension 01/04/2023    Hypertension    H/O deep venous thrombosis     Hypothyroidism     Lymphedema     Osteoporosis     Other fracture of upper and lower end of unspecified fibula, initial encounter for closed fracture 05/21/2021    Fracture of distal fibula    Other specified soft tissue disorders 09/19/2022    Leg swelling    Peripheral neuropathy     Personal history of (healed) stress fracture 06/26/2021    History of stress fracture    Personal history of other diseases of  the circulatory system     History of atrial fibrillation    Personal history of other diseases of the musculoskeletal system and connective tissue 10/20/2016    History of arthritis    Undifferentiated connective tissue disease (Multi)     Unspecified asthma, uncomplicated ([facility]) 12/01/2022    Asthma    Venous ulcer of leg (Multi)     Vitamin D deficiency, unspecified 10/20/2016    Vitamin D deficiency     Past Surgical History:   Procedure Laterality Date    BACK SURGERY  06/20/2016    Back Surgery L4-L5 fusion    CARDIAC ELECTROPHYSIOLOGY STUDY AND ABLATION      CHOLECYSTECTOMY  04/15/2016    Cholecystectomy    HERNIA REPAIR      OTHER SURGICAL HISTORY  05/03/2021    Cataract surgery    THYROIDECTOMY, PARTIAL      d/t goiter    TOTAL HIP ARTHROPLASTY Right     TOTAL KNEE ARTHROPLASTY Bilateral 12/01/2022    Knee Replacement    VERTEBROPLASTY       Family History   Problem Relation Name Age of Onset    Diabetes Mother          type 2    Heart disease Father      Hypertension Father         CURRENT MEDICATIONS  Current Outpatient Medications   Medication Sig Dispense Refill    acetaminophen (Tylenol) 325 mg tablet Take 2 tablets (650 mg) by mouth every 4 hours if needed for mild pain (1 - 3) or moderate pain (4 - 6). 30 tablet 0    albuterol 2.5 mg /3 mL (0.083 %) nebulizer solution Take 3 mL (2.5 mg) by nebulization every 2 hours if needed for wheezing. 75 mL 11    albuterol sulfate (Proair Digihaler) 90 mcg/actuation aero powdr breath act w/sensor inhaler Inhale 1 puff every 6 hours if needed for wheezing.      apixaban (Eliquis) 2.5 mg tablet Take 1 tablet (2.5 mg) by mouth 2 times a day. 180 tablet 3    Bacillus coagulans (PROBIOTIC, B. COAGULANS, ORAL) Take 1 tablet by mouth once daily.      calcium carbonate 600 mg calcium (1,500 mg) tablet Take 1 tablet (1,500 mg) by mouth once daily.      cholecalciferol (Vitamin D-3) 50 MCG (2000 UT) tablet Take by mouth.      denosumab (Prolia) 60 mg/mL syringe  "Provider to inject 1 mL (60 mg total) under the skin every 6 months.  For  ONLY. 1 mL 2    fluticasone propion-salmeteroL (Advair Diskus) 250-50 mcg/dose diskus inhaler Inhale 1 puff 2 times a day. Rinse mouth with water after use to reduce aftertaste and incidence of candidiasis. Do not swallow.      gabapentin (Neurontin) 400 mg capsule Take 1 capsule (400 mg) by mouth 3 times a day. 90 capsule 2    hydroxychloroquine (Plaquenil) 200 mg tablet Take 1 tablet (200 mg) by mouth 2 times a day. 180 tablet 0    levothyroxine (Synthroid) 25 mcg tablet Take 1 tablet (25 mcg) by mouth once daily in the morning. Take before meals. 90 tablet 3    losartan (Cozaar) 100 mg tablet Take 1 tablet (100 mg) by mouth once daily.      multivitamin capsule Take by mouth.      spironolactone (Aldactone) 25 mg tablet Take 1 tablet (25 mg) by mouth once daily.      b complex-vitamin c tablet Take 1 tablet by mouth once daily.      chlorhexidine (Peridex) 0.12 % solution Use as directed. (Patient not taking: Reported on 9/6/2024) 473 mL 0    hydrocortisone 2.5 % cream Apply topically 2 times a day. Mix with eucerin 30 g 2    torsemide 40 mg tablet Take 40 mg by mouth once daily. Do not start before December 27, 2023. (Patient taking differently: Take 20 mg by mouth once daily.) 30 tablet 0     No current facility-administered medications for this visit.       ALLERGIES AND DRUG REACTIONS  Allergies   Allergen Reactions    Sulfur Hives    Cefprozil Hives    Sulfa (Sulfonamide Antibiotics) Hives          OBJECTIVE  Visit Vitals  /78   Pulse 63   Resp 18   Ht 1.575 m (5' 2\")   Wt 97.5 kg (215 lb)   BMI 39.32 kg/m²   OB Status Postmenopausal   Smoking Status Former   BSA 2.07 m²       Last Recorded Pain Score (if available):                Physical Exam  General: Sitting in chair, NAD  Head: NCAT  Eyes: Sclera/conjunctiva clear, EOMI, PERRL  Nose/mouth: MMM  CV: Good distal pulses  Lungs: Good/equal chest " excursion  Abdomen: Soft, ND  Ext: BLE edema, R ankle eversion  MSK: l-spine alignment: unremarkable    Neuro: AAOx3   Dermatome sensation to light touch  LEFT L1 (lower pelvis/upper thigh): WNL    RIGHT L1: WNL      LEFT L2 (upper thigh): WNL       RIGHT: L2:WNL      LEFT L3 (medial knee): WNL       RIGHT L3: WNL      Decreased BL feet in stocking fashion to ankle level       Psych: affect nl  Skin: no rash/lesions. Kypho wounds well-healed        REVIEW OF LABORATORY DATA  I have reviewed the following lab results:  WBC   Date Value Ref Range Status   07/08/2024 8.5 4.4 - 11.3 x10*3/uL Final     RBC   Date Value Ref Range Status   07/08/2024 4.25 4.00 - 5.20 x10*6/uL Final     Hemoglobin   Date Value Ref Range Status   07/08/2024 11.3 (L) 12.0 - 16.0 g/dL Final     Hematocrit   Date Value Ref Range Status   07/08/2024 36.3 36.0 - 46.0 % Final     MCV   Date Value Ref Range Status   07/08/2024 85 80 - 100 fL Final     MCH   Date Value Ref Range Status   07/08/2024 26.6 26.0 - 34.0 pg Final     MCHC   Date Value Ref Range Status   07/08/2024 31.1 (L) 32.0 - 36.0 g/dL Final     RDW   Date Value Ref Range Status   07/08/2024 21.5 (H) 11.5 - 14.5 % Final     Platelets   Date Value Ref Range Status   07/08/2024 266 150 - 450 x10*3/uL Final     Sodium   Date Value Ref Range Status   07/08/2024 139 136 - 145 mmol/L Final     Potassium   Date Value Ref Range Status   07/08/2024 4.2 3.5 - 5.3 mmol/L Final     Bicarbonate   Date Value Ref Range Status   07/08/2024 25 21 - 32 mmol/L Final     Urea Nitrogen   Date Value Ref Range Status   07/08/2024 43 (H) 6 - 23 mg/dL Final     Calcium   Date Value Ref Range Status   07/08/2024 8.5 (L) 8.6 - 10.3 mg/dL Final     Protime   Date Value Ref Range Status   01/18/2023 11.0 9.8 - 13.4 sec Final     INR   Date Value Ref Range Status   01/18/2023 1.0 0.9 - 1.1 Final         REVIEW OF RADIOLOGY   I have reviewed the following:  Radiology Studies           MRI L-spine w 5/8/24:  *  Discitis/osteomyelitis at T12/L1 as described.  *No evidence of fluid collection to suggest epidural or paraspinal  abscess.        MRI L-spine wo 3/22/24:  There is increased signal within the disc space at T12-L1 with  increased anterior wedging at L1 and new anterior wedging at T12.  There is approximately 75% loss of vertebral body height anteriorly  at L1 and approximately 25% loss of vertebral body height anteriorly  at T12. There is no significant osseous retropulsion into the spinal  canal. There is no significant paraspinal edema or visualized  epidural collection. These findings may represent acute compression  deformities or sequela of trauma, however by imaging and early  discitis osteomyelitis can have a similar appearance. Please  correlate clinically for signs and symptoms of infection.      There is edema within the sacrum on the left which may represent a  sacral insufficiency fracture.      Postsurgical changes of posterolateral fusion and decompression at L4  through S1. No spinal canal and mild-to-moderate neural foraminal  narrowing at these levels.      Additional multilevel degenerative disc disease and facet arthrosis  with mild spinal canal stenosis. Neural foraminal narrowing most  pronounced at T12-L1 with severe left neural foraminal stenosis.               ASSESSMENT & PLAN  Genesis Garcia is a 79 y.o. old female with PMH HTN, DVT on ELIQUIS, hypothyroidism, CKD, asthma, obesity, L4-S1 fusion, VCF s/p T12/L1 kypho who presents for F/U .    1) Peripheral neuropathy  -BL foot neuropathy since ~2019, idiopathic  -Refractive Tylenol, NSAIDs, gabapentin  -No known DM. Nl B12, folate  -s/p BL foot Qutenza #1 on 8/23/24  --s/f repeat 12/13  -Schedule Mcknight SCS appropriately off Eliquis pending clearance to hold. See detailed discussion below:  --CT T-spine 7/25/24: mild T12-L1 stenosis  --Passed psych eval            I spent time in the office with the patient explaining the treatment  "plan, the risks and potential benefits therein and what the diagnostic or F/U plan is for their pain problem moving forward. Under present circumstances, it is very reasonable at this point to offer a trial of spinal cord stimulation (SCS) to see if we improve pain, decrease  medication use and improve function and activity tolerance in a meaningful, long term fashion without the downsides of continued med mgt or opioids for long term, lifetime use.       We discussed the SCS system (electrodes and IPG), how it works, why it works, the published literature on SCS, the variety of programming options, specifically conventional and high frequency and subthreshold options; we discussed some of the pros and cons of these and what might work best for this patient.         We discussed that there are non-recharge and rechargeable batteries and the relative difference in sizes and management over time, depending on the anticipated power use requirements estimated when we do the trial.        We discussed where the electrodes and the IPG (battery) are placed in the body during a \"trial\" of 5-10 days, and during \"permanent\" implantation and the differences between these two steps, also, how the electrodes are secured externally during a trial and internally during permanent implantation; the lifestyle limitations with a SCS trial and permanent implant post op, with 6 weeks of no reaching overhead nor bending from the waist and being vigilant for wound infections and hte fact that not all SCS systems are completely MRI compatible; we discussed the risks of infection, electrode migration, pain at the IPG or anchor sites, mechanical or equipment failure, and use of the SCS system for leg and/or back pain, as well as mitigating factors and management strategies if these issues were to occur. We discussed that this is a long term but possibly impermanent pain control system and that in some cases, efficacy diminishes over time. We " also briefly discussed the emerging technologies with regard to neuromodulation.       I spent 32 min in consultation with this patient; > 50% was spent in counseling and education on treatment of this pain disorder with SCS, infection risk 2.5% nationally, and rare instances of hardware migration, failure, breakage, loss of battery power, travel restrictions with implanted device, MRI contingencies depending on the device we select together. The patient had optimal time to ask questions during this exchange today.                        Faith Reese MD  Anesthesiologist & Interventional Pain Physician   [facility]  O: [phone]  F: [phone]  10:15 AM  09/06/24

## 2024-09-08 RX ORDER — SODIUM CHLORIDE, SODIUM LACTATE, POTASSIUM CHLORIDE, CALCIUM CHLORIDE 600; 310; 30; 20 MG/100ML; MG/100ML; MG/100ML; MG/100ML
20 INJECTION, SOLUTION INTRAVENOUS CONTINUOUS
OUTPATIENT
Start: 2024-09-08

## 2024-09-17 ENCOUNTER — TELEPHONE (OUTPATIENT)
Dept: PAIN MEDICINE | Facility: CLINIC | Age: 79
End: 2024-09-17
Payer: MEDICARE

## 2024-10-07 ENCOUNTER — OFFICE VISIT (OUTPATIENT)
Dept: WOUND CARE | Facility: CLINIC | Age: 79
End: 2024-10-07
Payer: MEDICARE

## 2024-10-07 ENCOUNTER — LAB (OUTPATIENT)
Dept: LAB | Facility: LAB | Age: 79
End: 2024-10-07
Payer: MEDICARE

## 2024-10-07 VITALS
SYSTOLIC BLOOD PRESSURE: 190 MMHG | WEIGHT: 220 LBS | BODY MASS INDEX: 40.24 KG/M2 | DIASTOLIC BLOOD PRESSURE: 94 MMHG | HEART RATE: 78 BPM

## 2024-10-07 DIAGNOSIS — M80.08XA AGE-RELATED OSTEOPOROSIS WITH CURRENT PATHOLOGICAL FRACTURE OF VERTEBRA, INITIAL ENCOUNTER (MULTI): ICD-10-CM

## 2024-10-07 DIAGNOSIS — I87.2 CHRONIC VENOUS INSUFFICIENCY: Primary | ICD-10-CM

## 2024-10-07 DIAGNOSIS — E04.1 THYROID NODULE: ICD-10-CM

## 2024-10-07 DIAGNOSIS — D50.8 OTHER IRON DEFICIENCY ANEMIA: ICD-10-CM

## 2024-10-07 DIAGNOSIS — K90.9 MALABSORPTION OF IRON (HHS-HCC): ICD-10-CM

## 2024-10-07 LAB
ALBUMIN SERPL BCP-MCNC: 3.9 G/DL (ref 3.4–5)
ALP SERPL-CCNC: 79 U/L (ref 33–136)
ALT SERPL W P-5'-P-CCNC: 12 U/L (ref 7–45)
ANION GAP SERPL CALC-SCNC: 14 MMOL/L (ref 10–20)
AST SERPL W P-5'-P-CCNC: 20 U/L (ref 9–39)
BASOPHILS # BLD AUTO: 0.05 X10*3/UL (ref 0–0.1)
BASOPHILS NFR BLD AUTO: 1 %
BILIRUB SERPL-MCNC: 0.7 MG/DL (ref 0–1.2)
BUN SERPL-MCNC: 26 MG/DL (ref 6–23)
CALCIUM SERPL-MCNC: 9.1 MG/DL (ref 8.6–10.6)
CHLORIDE SERPL-SCNC: 108 MMOL/L (ref 98–107)
CO2 SERPL-SCNC: 26 MMOL/L (ref 21–32)
CREAT SERPL-MCNC: 1.04 MG/DL (ref 0.5–1.05)
EGFRCR SERPLBLD CKD-EPI 2021: 55 ML/MIN/1.73M*2
EOSINOPHIL # BLD AUTO: 0.23 X10*3/UL (ref 0–0.4)
EOSINOPHIL NFR BLD AUTO: 4.5 %
ERYTHROCYTE [DISTWIDTH] IN BLOOD BY AUTOMATED COUNT: 15 % (ref 11.5–14.5)
FERRITIN SERPL-MCNC: 39 NG/ML (ref 8–150)
FOLATE SERPL-MCNC: >24 NG/ML
GLUCOSE SERPL-MCNC: 79 MG/DL (ref 74–99)
HCT VFR BLD AUTO: 40.4 % (ref 36–46)
HGB BLD-MCNC: 12.3 G/DL (ref 12–16)
IMM GRANULOCYTES # BLD AUTO: 0.02 X10*3/UL (ref 0–0.5)
IMM GRANULOCYTES NFR BLD AUTO: 0.4 % (ref 0–0.9)
IRON SATN MFR SERPL: 23 % (ref 25–45)
IRON SERPL-MCNC: 76 UG/DL (ref 35–150)
LYMPHOCYTES # BLD AUTO: 0.98 X10*3/UL (ref 0.8–3)
LYMPHOCYTES NFR BLD AUTO: 19 %
MCH RBC QN AUTO: 28.5 PG (ref 26–34)
MCHC RBC AUTO-ENTMCNC: 30.4 G/DL (ref 32–36)
MCV RBC AUTO: 94 FL (ref 80–100)
MONOCYTES # BLD AUTO: 0.65 X10*3/UL (ref 0.05–0.8)
MONOCYTES NFR BLD AUTO: 12.6 %
NEUTROPHILS # BLD AUTO: 3.23 X10*3/UL (ref 1.6–5.5)
NEUTROPHILS NFR BLD AUTO: 62.5 %
NRBC BLD-RTO: 0 /100 WBCS (ref 0–0)
PLATELET # BLD AUTO: 238 X10*3/UL (ref 150–450)
POTASSIUM SERPL-SCNC: 4.7 MMOL/L (ref 3.5–5.3)
PROT SERPL-MCNC: 6.3 G/DL (ref 6.4–8.2)
RBC # BLD AUTO: 4.32 X10*6/UL (ref 4–5.2)
SODIUM SERPL-SCNC: 143 MMOL/L (ref 136–145)
TIBC SERPL-MCNC: 334 UG/DL (ref 240–445)
TSH SERPL-ACNC: 2.53 MIU/L (ref 0.44–3.98)
UIBC SERPL-MCNC: 258 UG/DL (ref 110–370)
VIT B12 SERPL-MCNC: 856 PG/ML (ref 211–911)
WBC # BLD AUTO: 5.2 X10*3/UL (ref 4.4–11.3)

## 2024-10-07 PROCEDURE — 82746 ASSAY OF FOLIC ACID SERUM: CPT

## 2024-10-07 PROCEDURE — 99214 OFFICE O/P EST MOD 30 MIN: CPT | Performed by: INTERNAL MEDICINE

## 2024-10-07 PROCEDURE — 3077F SYST BP >= 140 MM HG: CPT | Performed by: INTERNAL MEDICINE

## 2024-10-07 PROCEDURE — 83540 ASSAY OF IRON: CPT

## 2024-10-07 PROCEDURE — 1159F MED LIST DOCD IN RCRD: CPT | Performed by: INTERNAL MEDICINE

## 2024-10-07 PROCEDURE — 85025 COMPLETE CBC W/AUTO DIFF WBC: CPT

## 2024-10-07 PROCEDURE — 84443 ASSAY THYROID STIM HORMONE: CPT

## 2024-10-07 PROCEDURE — 83550 IRON BINDING TEST: CPT

## 2024-10-07 PROCEDURE — 80053 COMPREHEN METABOLIC PANEL: CPT

## 2024-10-07 PROCEDURE — 3080F DIAST BP >= 90 MM HG: CPT | Performed by: INTERNAL MEDICINE

## 2024-10-07 PROCEDURE — 82607 VITAMIN B-12: CPT

## 2024-10-07 PROCEDURE — 1157F ADVNC CARE PLAN IN RCRD: CPT | Performed by: INTERNAL MEDICINE

## 2024-10-07 PROCEDURE — 82728 ASSAY OF FERRITIN: CPT

## 2024-10-07 PROCEDURE — 36415 COLL VENOUS BLD VENIPUNCTURE: CPT

## 2024-10-07 RX ORDER — TORSEMIDE 20 MG/1
1 TABLET ORAL
COMMUNITY
Start: 2024-06-27

## 2024-10-07 RX ORDER — HYDROCORTISONE 25 MG/G
CREAM TOPICAL AS NEEDED
Qty: 30 G | Refills: 2 | Status: SHIPPED | OUTPATIENT
Start: 2024-10-07 | End: 2025-10-07

## 2024-10-07 NOTE — PROGRESS NOTES
REFERRAL REQUESTED BY:  Albert Garner PCP    LAST SEEN:  6/3/24    Patient ID: Genesis Garcia is a 79 y.o. female     HPI:   Legs are doing OK. Mild erythema and dermatitis. +itching.  Pending a stimulator for her neuropathy. Discussed with her CrCl - holding 5 days before the stimulator and after as needed and directed by the .     CURRENT DRESSING:  Who is performing the dressing change:  n/a  Dressing applied: n/a  Dressing Frequency: n/a    EDEMA MANAGEMENT:  Compression:  Right: Other Circaid  CALF 37.0   Left: Other Circaid    CALF 38.5     Other Modality  Sleeping in Bed: yes  Elevating FOB:  yes    Offloading/Pressure Relief  Activity Level:  ad travon  Protection Devices:  none    Offloading/Pressure Relief Effective?     ROS:      Objective     VITALS:  BP (!) 190/94 (BP Location: Left arm)   Pulse 78   Wt 99.8 kg (220 lb)   BMI 40.24 kg/m²       Physical Exam    ARRIVAL:  Ambulating  TRANSFER:  None and One Assist, Minimal    PSYCHIATRIC:  Oriented to person, place and time: A & O x 3    CONSTITUTIONAL:    Appearance:  Well Groomed    SKIN:   LDS, hemosiderin staining and dermatitis     PULSES:     EXTREMITY TEMPERATURE:    RIGHT: normal  LEFT: normal    EDEMA: mild    WOUND ASSESSMENT:  No wounds noted          Procedures    Assessment/Plan   Pending a stimulator for her neuropathy. Discussed with her CrCl - holding 5 days before the stimulator and after as needed and directed by the . Advised to limit the time off the AC to what is medically necessary to have the procedure completed.     Legs are doing well. No significant swelling. New circaids to be ordered. Continue the skin care. Rx hydrocortisone to be used 2-3 x / week as needed for the rash and itching.     Continue to elevate at night. Increase walking as able.  Work towards weight loss.    Follow up 6 months.     VISIT ORDERS:  Vascular Study Required:   Labs Required:   Radiology Imaging Required:   Consultation Required:    Rx Provided:   Changes to Plan of Care:       NEW ORDERS:  Wash:  Irrigate/Soak:  Skin Care:  Dressing:   Compression:   Offloading:     DISCHARGE PLANNING:    Follow Up:   Nurse Visit:    General Instructions:  Center RN to apply EMLA/Lidocaine 1% jelly/LMX  topically to wound(s) prior to debridement by physician.  Center RN my see patient as a nurse consult in my absence.      RN Post Procedure Note:        [facility]isted [facility]  Agency/Facility:   days/week:  Contacted Regarding Changes:

## 2024-10-10 DIAGNOSIS — M35.9 CONNECTIVE TISSUE DISEASE, UNDIFFERENTIATED (MULTI): ICD-10-CM

## 2024-10-10 RX ORDER — HYDROXYCHLOROQUINE SULFATE 200 MG/1
200 TABLET, FILM COATED ORAL 2 TIMES DAILY
Qty: 180 TABLET | Refills: 0 | Status: SHIPPED | OUTPATIENT
Start: 2024-10-10 | End: 2025-01-08

## 2024-11-22 ENCOUNTER — APPOINTMENT (OUTPATIENT)
Dept: NEPHROLOGY | Facility: CLINIC | Age: 79
End: 2024-11-22
Payer: MEDICARE

## 2024-11-22 VITALS
WEIGHT: 220 LBS | DIASTOLIC BLOOD PRESSURE: 77 MMHG | HEART RATE: 76 BPM | BODY MASS INDEX: 40.48 KG/M2 | SYSTOLIC BLOOD PRESSURE: 147 MMHG | HEIGHT: 62 IN

## 2024-11-22 DIAGNOSIS — D64.9 ANEMIA, UNSPECIFIED TYPE: Primary | ICD-10-CM

## 2024-11-22 DIAGNOSIS — N18.30 STAGE 3 CHRONIC KIDNEY DISEASE, UNSPECIFIED WHETHER STAGE 3A OR 3B CKD (MULTI): ICD-10-CM

## 2024-11-22 DIAGNOSIS — I10 ESSENTIAL HYPERTENSION: Chronic | ICD-10-CM

## 2024-11-22 PROCEDURE — 3077F SYST BP >= 140 MM HG: CPT | Performed by: NURSE PRACTITIONER

## 2024-11-22 PROCEDURE — 1159F MED LIST DOCD IN RCRD: CPT | Performed by: NURSE PRACTITIONER

## 2024-11-22 PROCEDURE — 3078F DIAST BP <80 MM HG: CPT | Performed by: NURSE PRACTITIONER

## 2024-11-22 PROCEDURE — 99213 OFFICE O/P EST LOW 20 MIN: CPT | Performed by: NURSE PRACTITIONER

## 2024-11-22 PROCEDURE — 1157F ADVNC CARE PLAN IN RCRD: CPT | Performed by: NURSE PRACTITIONER

## 2024-11-22 ASSESSMENT — ENCOUNTER SYMPTOMS
NEUROLOGICAL NEGATIVE: 1
MUSCULOSKELETAL NEGATIVE: 1
CONSTITUTIONAL NEGATIVE: 1
PSYCHIATRIC NEGATIVE: 1
GASTROINTESTINAL NEGATIVE: 1
HEMATOLOGIC/LYMPHATIC NEGATIVE: 1
ENDOCRINE NEGATIVE: 1
RESPIRATORY NEGATIVE: 1
EYES NEGATIVE: 1
WOUND: 0

## 2024-11-22 NOTE — PROGRESS NOTES
History Of Present Illness  Genesis Garcia is a 79 y.o. female with medical history significant for CKD3, HTN, HLD, a-fib s/p ablation, COPD/asthma, bilateral total knee arthroplasty, right total hip arthroplasty (11/2022), and chronic venous insufficiency who presents with a significant other for a 6-month fuv for CKD.     Past Medical History  As above.    Surgical History  She has a past surgical history that includes Cholecystectomy (04/15/2016); Back surgery (06/20/2016); Total knee arthroplasty (Bilateral, 12/01/2022); Other surgical history (05/03/2021); Thyroidectomy, partial; Total hip arthroplasty (Right); Cardiac electrophysiology study and ablation; Hernia repair; and Vertebroplasty.     Social History  She reports that she quit smoking about 32 years ago. Her smoking use included cigarettes. She started smoking about 56 years ago. She has a 3.6 pack-year smoking history. She has never used smokeless tobacco. She reports that she does not currently use alcohol. She reports that she does not use drugs.    Family History  Family History   Problem Relation Name Age of Onset    Diabetes Mother          type 2    Heart disease Father      Hypertension Father          Allergies  Sulfur, Cefprozil, and Sulfa (sulfonamide antibiotics)    Review of Systems   Constitutional: Negative.    HENT: Negative.     Eyes: Negative.    Respiratory: Negative.     Cardiovascular:  Positive for leg swelling.   Gastrointestinal: Negative.    Endocrine: Negative.    Genitourinary: Negative.    Musculoskeletal: Negative.    Skin:  Negative for wound.   Neurological: Negative.    Hematological: Negative.    Psychiatric/Behavioral: Negative.          Physical Exam  Constitutional:       Appearance: Normal appearance.   HENT:      Head: Normocephalic and atraumatic.      Mouth/Throat:      Mouth: Mucous membranes are moist.   Cardiovascular:      Rate and Rhythm: Normal rate and regular rhythm.      Pulses: Normal pulses.       "Heart sounds: Normal heart sounds.   Pulmonary:      Effort: Pulmonary effort is normal.      Breath sounds: Normal breath sounds.   Musculoskeletal:         General: Normal range of motion.      Right lower leg: Edema present.      Left lower leg: Edema present.   Skin:     General: Skin is warm and dry.   Neurological:      General: No focal deficit present.      Mental Status: She is alert and oriented to person, place, and time.   Psychiatric:         Mood and Affect: Mood normal.         Behavior: Behavior normal.         Thought Content: Thought content normal.         Judgment: Judgment normal.          Last Recorded Vitals  Blood pressure 147/77, pulse 76, height 1.575 m (5' 2\"), weight 99.8 kg (220 lb).    Relevant Results  Recent Results (from the past 8 weeks)   Thyroid Stimulating Hormone    Collection Time: 10/07/24 10:48 AM   Result Value Ref Range    Thyroid Stimulating Hormone 2.53 0.44 - 3.98 mIU/L   CBC and Auto Differential    Collection Time: 10/07/24 10:48 AM   Result Value Ref Range    WBC 5.2 4.4 - 11.3 x10*3/uL    nRBC 0.0 0.0 - 0.0 /100 WBCs    RBC 4.32 4.00 - 5.20 x10*6/uL    Hemoglobin 12.3 12.0 - 16.0 g/dL    Hematocrit 40.4 36.0 - 46.0 %    MCV 94 80 - 100 fL    MCH 28.5 26.0 - 34.0 pg    MCHC 30.4 (L) 32.0 - 36.0 g/dL    RDW 15.0 (H) 11.5 - 14.5 %    Platelets 238 150 - 450 x10*3/uL    Neutrophils % 62.5 40.0 - 80.0 %    Immature Granulocytes %, Automated 0.4 0.0 - 0.9 %    Lymphocytes % 19.0 13.0 - 44.0 %    Monocytes % 12.6 2.0 - 10.0 %    Eosinophils % 4.5 0.0 - 6.0 %    Basophils % 1.0 0.0 - 2.0 %    Neutrophils Absolute 3.23 1.60 - 5.50 x10*3/uL    Immature Granulocytes Absolute, Automated 0.02 0.00 - 0.50 x10*3/uL    Lymphocytes Absolute 0.98 0.80 - 3.00 x10*3/uL    Monocytes Absolute 0.65 0.05 - 0.80 x10*3/uL    Eosinophils Absolute 0.23 0.00 - 0.40 x10*3/uL    Basophils Absolute 0.05 0.00 - 0.10 x10*3/uL   Comprehensive Metabolic Panel    Collection Time: 10/07/24 10:48 AM "   Result Value Ref Range    Glucose 79 74 - 99 mg/dL    Sodium 143 136 - 145 mmol/L    Potassium 4.7 3.5 - 5.3 mmol/L    Chloride 108 (H) 98 - 107 mmol/L    Bicarbonate 26 21 - 32 mmol/L    Anion Gap 14 10 - 20 mmol/L    Urea Nitrogen 26 (H) 6 - 23 mg/dL    Creatinine 1.04 0.50 - 1.05 mg/dL    eGFR 55 (L) >60 mL/min/1.73m*2    Calcium 9.1 8.6 - 10.6 mg/dL    Albumin 3.9 3.4 - 5.0 g/dL    Alkaline Phosphatase 79 33 - 136 U/L    Total Protein 6.3 (L) 6.4 - 8.2 g/dL    AST 20 9 - 39 U/L    Bilirubin, Total 0.7 0.0 - 1.2 mg/dL    ALT 12 7 - 45 U/L   Ferritin    Collection Time: 10/07/24 10:48 AM   Result Value Ref Range    Ferritin 39 8 - 150 ng/mL   Folate    Collection Time: 10/07/24 10:48 AM   Result Value Ref Range    Folate, Serum >24.0 >5.0 ng/mL   Iron and TIBC    Collection Time: 10/07/24 10:48 AM   Result Value Ref Range    Iron 76 35 - 150 ug/dL    UIBC 258 110 - 370 ug/dL    TIBC 334 240 - 445 ug/dL    % Saturation 23 (L) 25 - 45 %   Vitamin B12    Collection Time: 10/07/24 10:48 AM   Result Value Ref Range    Vitamin B12 856 211 - 911 pg/mL         Assessment/Plan     79 y.o. female with medical history significant for CKD3, HTN, HLD, a-fib s/p ablation, COPD/asthma, bilateral total knee arthroplasty, right total hip arthroplasty (11/2022), and chronic venous insufficiency who presents with a significant other for a 6-month fuv for CKD.    # CKD3: baseline sCr 1.0 - 1.6 mg/dL with eGFR 34 - 55 ml/min/1.73m2. Likely due to hypertensive kidney disease. Most recent sCr 1.04 mg/dL with eGFR 55 ml/min/1.73m2 (10/7/24) - at baseline. Currently stable.    # HTN: BP was slightly elevated in office today. Reportedly home BPs are at target 130/80 mmHg,    # Anemia: chronic. Received iron infusion. Significantly improved. Follows with heme/onc.     Plan:  - Advised regular home BP checks and keep a log to bring it to next visit. Target < 130/80 mmHg. To call providers if BPs are persistently > 130/80 mmHg.  - Continue  to follow with PCP for optimal HTN management.  - Continue to follow with heme/onc for anemia management.  - No changes with current medications.  - Reviewed strategies for protecting kidney function includin) Avoidance of NSAIDs including Aleve (Naprosyn), Motrin (Ibuprofen), Mobic (Meloxicam), Celebrex (Celecoxib) and aspirin as well as PPI acid blocking medications such as Prilosec (omeprazole), Protonix (pantoprazole), and Nexium (esomeprazole), as well as other nephrotoxic agents.   2) Avoidance of tobacco or alcohol use.   3) Adequate hydration daily.   4) Blood pressure target 130/80 mmHg.   5) Daily dietary sodium intake less than 2 grams per day.    6) Maintain healthy lifestyle. Healthy diet and regular exercises.   7) Maintain ideal weight.  - FUV: in 6 months or sooner if concerns arise.    Patient and her significant other verbalized understanding of above. They will not hesitate to contact Division of Nephrology at [phone] with concerns/questions.    I spent 25 minutes in the professional and overall care of this patient.    Torrey Myers, APRN-CNP

## 2024-11-25 ENCOUNTER — HOSPITAL ENCOUNTER (OUTPATIENT)
Dept: RADIOLOGY | Facility: CLINIC | Age: 79
Discharge: HOME | End: 2024-11-25
Payer: MEDICARE

## 2024-11-25 ENCOUNTER — LAB (OUTPATIENT)
Dept: LAB | Facility: LAB | Age: 79
End: 2024-11-25
Payer: MEDICARE

## 2024-11-25 ENCOUNTER — APPOINTMENT (OUTPATIENT)
Dept: RHEUMATOLOGY | Facility: CLINIC | Age: 79
End: 2024-11-25
Payer: MEDICARE

## 2024-11-25 VITALS
HEIGHT: 62 IN | BODY MASS INDEX: 40.48 KG/M2 | WEIGHT: 220 LBS | DIASTOLIC BLOOD PRESSURE: 75 MMHG | SYSTOLIC BLOOD PRESSURE: 176 MMHG | HEART RATE: 73 BPM

## 2024-11-25 DIAGNOSIS — M35.9 CONNECTIVE TISSUE DISEASE, UNDIFFERENTIATED (MULTI): ICD-10-CM

## 2024-11-25 DIAGNOSIS — M80.08XA AGE-RELATED OSTEOPOROSIS WITH CURRENT PATHOLOGICAL FRACTURE OF VERTEBRA, INITIAL ENCOUNTER (MULTI): ICD-10-CM

## 2024-11-25 DIAGNOSIS — M35.9 CONNECTIVE TISSUE DISEASE, UNDIFFERENTIATED (MULTI): Primary | ICD-10-CM

## 2024-11-25 LAB
C3 SERPL-MCNC: 113 MG/DL (ref 87–200)
C4 SERPL-MCNC: 21 MG/DL (ref 10–50)
CCP IGG SERPL-ACNC: <1 U/ML
CRP SERPL-MCNC: 0.35 MG/DL
DSDNA AB SER-ACNC: 2 IU/ML
ERYTHROCYTE [SEDIMENTATION RATE] IN BLOOD BY WESTERGREN METHOD: 14 MM/H (ref 0–30)
RHEUMATOID FACT SER NEPH-ACNC: 11 IU/ML (ref 0–15)

## 2024-11-25 PROCEDURE — 86225 DNA ANTIBODY NATIVE: CPT

## 2024-11-25 PROCEDURE — 99213 OFFICE O/P EST LOW 20 MIN: CPT | Performed by: INTERNAL MEDICINE

## 2024-11-25 PROCEDURE — 3077F SYST BP >= 140 MM HG: CPT | Performed by: INTERNAL MEDICINE

## 2024-11-25 PROCEDURE — 36415 COLL VENOUS BLD VENIPUNCTURE: CPT

## 2024-11-25 PROCEDURE — 73130 X-RAY EXAM OF HAND: CPT | Mod: 50

## 2024-11-25 PROCEDURE — 3078F DIAST BP <80 MM HG: CPT | Performed by: INTERNAL MEDICINE

## 2024-11-25 PROCEDURE — 73130 X-RAY EXAM OF HAND: CPT | Mod: BILATERAL PROCEDURE | Performed by: RADIOLOGY

## 2024-11-25 PROCEDURE — 85652 RBC SED RATE AUTOMATED: CPT

## 2024-11-25 PROCEDURE — 1157F ADVNC CARE PLAN IN RCRD: CPT | Performed by: INTERNAL MEDICINE

## 2024-11-25 PROCEDURE — 86431 RHEUMATOID FACTOR QUANT: CPT

## 2024-11-25 PROCEDURE — 86140 C-REACTIVE PROTEIN: CPT

## 2024-11-25 PROCEDURE — 86160 COMPLEMENT ANTIGEN: CPT

## 2024-11-25 PROCEDURE — 1036F TOBACCO NON-USER: CPT | Performed by: INTERNAL MEDICINE

## 2024-11-25 PROCEDURE — 86200 CCP ANTIBODY: CPT

## 2024-11-25 RX ORDER — LEFLUNOMIDE 10 MG/1
10 TABLET ORAL DAILY
Qty: 30 TABLET | Refills: 2 | Status: SHIPPED | OUTPATIENT
Start: 2024-11-25 | End: 2024-12-25

## 2024-11-25 NOTE — PROGRESS NOTES
"Subjective . Genesis Garcia is a 79 y.o. female who presents for Follow-up (3 month follow up).    HPI. 79-year-old female with history of UCTD (positive MAURICE, anticentromere antibody and arthralgia), osteoporosis, T12/L1 compression fracture status post kyphoplasty (6/2024),OA s/p bilateral TKR, HTN, CKD, DVT, postlaminectomy syndrome, chronic venous insufficiency, and asthma presented for follow-up.    She reports persistent pain in MCP joints of both hands that gets worse when weather changes or sometimes at night and wakes her up.  No recent fall or fractures.    Immunosuppressive therapy: Hydroxychloroquine.    Anterolateral therapy: Prolia. (8/2024).    DEXA scan obtained on July 26, 2024 showed left total femur BMD of 0.703 g/cm² reflecting a T-score of -2.4, left femoral neck BMD of 0.778 g/cm² reflecting a T-score of -1.9 and left 33% forearm BMD of 0.772 g/cm² reflecting a T-score of -1.2. FRAX score for major osteoporotic fracture is 18.3% and hip fracture is 4.1%.     Review of Systems   All other systems reviewed and are negative.    Objective     Blood pressure 176/75, pulse 73, height 1.575 m (5' 2\"), weight 99.8 kg (220 lb).    Physical Exam.  Gen. AAO x3, NAD.  HEENT: No pallor or icterus, PERRLA, EOMI.   Skin: No rashes.  Heart: S1, S2/ RRR.   Lungs: CTA B.  Abdomen: Soft, NT/ND.  MSK: Chronic synovial proliferation of the MCP joints bilaterally with mild to moderate tenderness.  Handgrip fair.  Bilateral wrist and elbow without swelling and tenderness.  Right shoulder with decreased forward flexion, abduction external rotation.  Bilateral pes planus with bilateral ankle valgus deformity.    Neuro: Sensation to touch intact.Strength 5/5 throughout.   Psych:Appropriate mood and behavior  EXT: No edema    Assessment/Plan . 79-year-old female with history of UCTD (positive MAURICE, anticentromere antibody and arthralgia), osteoporosis, T12/L1 compression fracture status post kyphoplasty (6/2024),OA " s/p bilateral TKR, HTN, CKD, DVT, postlaminectomy syndrome, chronic venous insufficiency, and asthma presented for follow-up.      #1: UCTD.  It appears she has inflammatory arthritis involving the hands.  She also has features of OA.  -Obtain labs and x-rays for further evaluation.  -Begin leflunomide 10 mg daily.  -Continue hydroxychloroquine twice a day.  Eye exam up to date.    #2: Osteopenia with T12/L1 osteoporotic fracture.  -Prolia injection #1 given on August 30, 2024.  She tolerated well.  -Continue calcium and vitamin D.    Follow-up in 4 months.     This note was partially generated using the Dragon Voice recognition system. There may be some incorrect wording, spelling and/or spelling errors or punctuation errors that were not corrected prior to committing the note to the medical record.      Problem List Items Addressed This Visit       Connective tissue disease, undifferentiated (Multi) - Primary    Relevant Medications    leflunomide (Arava) 10 mg tablet    Other Relevant Orders    C-Reactive Protein    C4 Complement    C3 Complement    Anti-DNA Antibody, Double-Stranded    Citrulline Antibody, IgG    Rheumatoid Factor    Sedimentation Rate    XR hand 3+ views bilateral            Active Ambulatory Problems     Diagnosis Date Noted    DVT, bilateral lower limbs (Multi) 03/10/2023    Vitamin D deficiency 07/27/2023    Heart murmur 07/27/2023    Diverticulosis 07/27/2023    COPD (chronic obstructive pulmonary disease) (Multi) 07/27/2023    Arthritis 07/27/2023    Asthma 07/27/2023    Atrial fibrillation (Multi) 07/27/2023    Anemia 07/27/2023    Abnormal mammogram 07/27/2023    MAURICE positive 10/07/2020    Ankle arthritis 08/23/2023    Arthritis of right hip 08/23/2023    Carpal tunnel syndrome 06/07/2012    Cataract 08/23/2023    Chronic bronchitis (Multi) 01/18/2012    Chronic low back pain 05/18/2015    Chronic ulcer of left calf limited to breakdown of skin 08/23/2023    Chronic venous insufficiency  08/23/2023    Connective tissue disease, undifferentiated (Multi) 08/23/2023    Essential hypertension 06/15/2012    Flat foot 10/07/2020    Gastroesophageal reflux disease with esophagitis without hemorrhage 08/23/2023    Hypokalemia 08/23/2023    Left breast mass 08/23/2023    Localized swelling of both lower legs 08/23/2023    Morbid obesity due to excess calories (Multi) 11/16/2015    Osteopenia, senile 12/29/2020    Postoperative incisional hernia 08/23/2023    Primary osteoarthritis of right knee 09/25/2013    Rosacea 02/13/2020    Umbilical hernia 08/23/2023    Ventral hernia 08/23/2023    Polyarthritis 10/07/2020    Lumbar post-laminectomy syndrome 11/28/2023    Lumbar radiculitis 11/28/2023    Impaired mobility and ADLs 12/23/2023    Generalized weakness 12/27/2023    Sacroiliac inflammation (CMS-HCC) 02/20/2024    Chronic anticoagulation 02/26/2024    Stage 3b chronic kidney disease (Multi) 02/26/2024    Hypothyroidism (acquired) 02/26/2024    Unspecified severe protein-calorie malnutrition (Multi) 02/26/2024    Varicose veins of right lower extremity with ulcer other part of lower leg (CODE) 02/26/2024    Fracture of vertebra due to osteoporosis (Multi) 04/02/2024    Malabsorption of iron ([facility]) 04/08/2024    Thyroid nodule 04/12/2024    Peripheral neuropathy, idiopathic 05/08/2024    Abdominal pain 05/16/2024    Abnormality of esophagus 05/16/2024    Disorder of esophagus 05/16/2024    Acute on chronic diastolic (congestive) heart failure 01/17/2023    Acute respiratory failure with hypoxia (Multi) 12/29/2023    PAULY (acute kidney injury) (CMS-HCC) 12/15/2023    C. difficile colitis 05/16/2024    Colitis due to Clostridioides difficile 05/16/2024    Candidiasis of mouth 05/16/2024    Chronic deep vein thrombosis (DVT) of proximal vein of lower extremity (Multi) 02/26/2024    Chronic venous hypertension (idiopathic) with ulcer of right lower extremity (CODE) 06/16/2022    Colitis 05/16/2024     Compression fracture of lumbar vertebra (Multi) 03/22/2024    Diastolic heart failure 05/16/2024    Difficulty in walking, not elsewhere classified 12/31/2023    Dysphagia 05/16/2024    Edema of foot 05/16/2024    Edema of lower extremity 01/18/2023    Symptom of leg swelling 05/16/2024    Localized swelling, mass and lump, lower limb, bilateral 06/16/2022    Pedal edema 05/16/2024    Swelling of lower extremity 01/18/2023    Fluid overload 05/16/2024    Other symptoms and signs involving the musculoskeletal system 12/30/2023    History of metabolic disorder 05/16/2024    Injury of tendon of rotator cuff 05/16/2024    Iron deficiency anemia 07/27/2023    Left ankle pain 05/16/2024    Knee pain 05/16/2024    Leukocytosis 05/16/2024    Localized edema 01/21/2023    Nausea 05/16/2024    Other symbolic dysfunctions 01/05/2024    Pain in both feet 05/16/2024    Partial small bowel obstruction (Multi) 05/16/2024    Pelvic mass 05/16/2024    Cough 12/18/2023    Productive cough 05/16/2024    Right shoulder pain 05/16/2024    History of total hip replacement 11/03/2022    Tachycardia 05/16/2024    Status post hip hemiarthroplasty 11/03/2022    Venous stasis ulcer with edema of lower leg 02/26/2024    Deep vein thrombosis (DVT) of both lower extremities (Multi) 03/10/2023    HCAP (healthcare-associated pneumonia) 05/16/2024    Pneumonia, unspecified organism 12/20/2023    Anemia, unspecified 12/29/2023    Osteoarthritis of knee 05/16/2024    Osteoarthritis of hip 05/16/2024    Polyarthritis, unspecified 05/16/2024    Polyarthropathy 10/07/2020    Rheumatoid arthritis 12/20/2023    Sepsis, unspecified organism (Multi) 12/29/2023    Sepsis (Multi) 12/19/2023    Unilateral primary osteoarthritis, right hip 06/16/2022    Unspecified osteoarthritis, unspecified site 06/16/2022    Unspecified asthma, uncomplicated ([facility]) 06/16/2022    Unspecified atrial fibrillation (Multi) 06/16/2022    Cellulitis of left lower leg 05/16/2024     Cellulitis 05/16/2024    Cellulitis of lower extremity 05/16/2024    Leg ulcer (Multi) 05/16/2024    Stasis dermatitis with venous ulcer of lower extremity due to chronic peripheral venous hypertension (Multi) 05/16/2024    Ulcer of lower extremity (Multi) 08/23/2023    Low back pain, unspecified 12/29/2023    Peripheral venous insufficiency 08/23/2023    Venous insufficiency (chronic) (peripheral) 06/16/2022    Undifferentiated connective tissue disease (Multi) 08/23/2023    Chronic obstructive pulmonary disease (Multi) 01/18/2012    Chronic obstructive pulmonary disease, unspecified 06/16/2022    Diverticular disease 07/27/2023    Diverticulosis of intestine, part unspecified, without perforation or abscess without bleeding 06/16/2022    Essential (primary) hypertension 06/16/2022    Hypertension 06/15/2012    Pes planus 10/07/2020    Pathological fracture of vertebra due to osteoporosis (Multi) 04/02/2024    Gastroesophageal reflux disease with esophagitis 08/23/2023    Weakness 12/27/2023    Mass of breast 08/23/2023    Postlaminectomy syndrome of lumbar region 11/28/2023    Iron malabsorption ([facility]) 04/08/2024    Morbid obesity (Multi) 11/16/2015    Obesity, unspecified 06/16/2022    Other specified soft tissue disorders 01/21/2023    Systemic involvement of connective tissue, unspecified (Multi) 06/16/2022    Senile osteopenia 12/29/2020    Idiopathic peripheral neuropathy 05/08/2024    Incisional hernia with obstruction but no gangrene 05/16/2024    Incisional hernia 08/23/2023    Stage 3 chronic kidney disease (Multi) 02/26/2024    Umbilical hernia with obstruction, without gangrene 06/16/2022    Abnormal voice 08/23/2023    Age-related osteoporosis with current pathological fracture of vertebra (Multi) 06/13/2024    Age-related osteoporosis with current pathological fracture, vertebra(e), initial encounter for fracture (Multi) 06/13/2024     Resolved Ambulatory Problems     Diagnosis Date Noted     Abscess of hip, left 08/23/2023    Cellulitis, leg 08/23/2023    Ulcer of right lower extremity (Multi) 08/23/2023    Closed fracture of lateral malleolus 08/23/2023    Decreased GFR 08/23/2023    DVT (deep venous thrombosis) (Multi) 08/23/2023    Dyspnea on exertion 08/23/2023    Fracture of distal fibula 08/23/2023    Hoarseness 08/23/2023    Multinodular goiter 08/23/2023    Stress fracture 08/23/2023    Voice disturbance 08/23/2023    Acute deep vein thrombosis (DVT) of both lower extremities (Multi) 08/23/2023    Acute pneumonia 12/20/2023    Thyroid nodule 01/19/2024     Past Medical History:   Diagnosis Date    Ankle fracture     Cellulitis, unspecified 08/24/2020    CKD (chronic kidney disease), stage III (Multi)     H/O deep venous thrombosis     Hypothyroidism     Lymphedema     Osteoporosis     Other fracture of upper and lower end of unspecified fibula, initial encounter for closed fracture 05/21/2021    Peripheral neuropathy     Personal history of (healed) stress fracture 06/26/2021    Personal history of other diseases of the circulatory system     Personal history of other diseases of the musculoskeletal system and connective tissue 10/20/2016    Venous ulcer of leg (Multi)     Vitamin D deficiency, unspecified 10/20/2016       Family History   Problem Relation Name Age of Onset    Diabetes Mother          type 2    Heart disease Father      Hypertension Father         Past Surgical History:   Procedure Laterality Date    BACK SURGERY  06/20/2016    Back Surgery L4-L5 fusion    CARDIAC ELECTROPHYSIOLOGY STUDY AND ABLATION      CHOLECYSTECTOMY  04/15/2016    Cholecystectomy    HERNIA REPAIR      OTHER SURGICAL HISTORY  05/03/2021    Cataract surgery    THYROIDECTOMY, PARTIAL      d/t goiter    TOTAL HIP ARTHROPLASTY Right     TOTAL KNEE ARTHROPLASTY Bilateral 12/01/2022    Knee Replacement    VERTEBROPLASTY         Social History     Tobacco Use   Smoking Status Former    Current packs/day: 0.00     Average packs/day: 0.2 packs/day for 24.0 years (3.6 ttl pk-yrs)    Types: Cigarettes    Start date:     Quit date:     Years since quittin.9   Smokeless Tobacco Never       Allergies  Sulfur, Cefprozil, and Sulfa (sulfonamide antibiotics)    Current Meds  Current Outpatient Medications   Medication Instructions    acetaminophen (TYLENOL) 650 mg, oral, Every 4 hours PRN    albuterol 2.5 mg, nebulization, Every 2 hour PRN    apixaban (ELIQUIS) 2.5 mg, oral, 2 times daily    Bacillus coagulans (PROBIOTIC, B. COAGULANS, ORAL) 1 tablet, Daily    calcium carbonate 600 mg calcium (1,500 mg) tablet 1 tablet, Daily    cholecalciferol (Vitamin D-3) 50 MCG ( UT) tablet Take by mouth.    denosumab (Prolia) 60 mg/mL syringe Provider to inject 1 mL (60 mg total) under the skin every 6 months.  For  ONLY.    fluticasone propion-salmeteroL (Advair Diskus) 250-50 mcg/dose diskus inhaler 1 puff, 2 times daily RT    gabapentin (NEURONTIN) 400 mg, oral, 3 times daily    hydrocortisone 2.5 % cream Topical, As needed, Use maximum 2-3 times a week.    hydroxychloroquine (PLAQUENIL) 200 mg, oral, 2 times daily    leflunomide (ARAVA) 10 mg, oral, Daily    levothyroxine (SYNTHROID) 25 mcg, oral, Daily before breakfast    losartan (Cozaar) 100 mg tablet 1 tablet, Daily    multivitamin capsule Take by mouth.    spironolactone (Aldactone) 25 mg tablet 1 tablet, Daily    torsemide (Demadex) 20 mg tablet 1 tablet, Daily (0630)        Lab Results   Component Value Date    SEDRATE 21 2024    CRP 1.41 (H) 2024    DNADS 2.0 2023    CKTOTAL 351 (H) 04/15/2022             Brent Sheridan MD

## 2024-11-25 NOTE — PATIENT INSTRUCTIONS
Take leflunomide 1 pill daily.  Continue hydroxychloroquine twice a day.  Continue calcium and vitamin D.  Call me if any question.  Follow-up in 4 months.

## 2024-11-27 ENCOUNTER — TELEPHONE (OUTPATIENT)
Dept: PAIN MEDICINE | Facility: CLINIC | Age: 79
End: 2024-11-27
Payer: MEDICARE

## 2024-11-27 NOTE — TELEPHONE ENCOUNTER
Pt calls stating she has follow up appointment with Dr. Reese in December and [facility] states they need a new order or further information on Qutenza. Pt requests a call back to be sure it will be there for her appointment.

## 2024-12-03 NOTE — TELEPHONE ENCOUNTER
Maria Eugenia confirmed that they had the WRONG number.    Qutenza delivery scheduled for 12-11-24

## 2024-12-03 NOTE — TELEPHONE ENCOUNTER
My QUTENZA Connect  12/03/2024 01:06 PM  Spoke to Christin MOMIN [facility]: She stated: they tried to contact with doctor office to schedule the delivery but still got no response.  My QUTENZA Connect  12/02/2024 10:06 AM  Spoke to Alley LEE [facility]: She stated: they tried to contact with doctor office on 11/27/2024 to schedule the delivery but still got no response.  My QUTENZA Connect  11/27/2024 03:18 PM  Spoke to Eunice LOMBARDI [facility]: She stated, they want to speak with doctor's office to schedule the delivery. They tried to contact with the office on 11/23/2024 but got no response.  My QUTEN[facility] Connect  11/26/2024 12:49 PM  Spoke to Nori RICE [facility]: She stated: they tried to contact with Medical Doctor Office to schedule the delivery but still got no response.  My QUT[facility] Connect  11/25/2024 01:51 PM  Spoke to Afshan ZABALA [facility]: She stated: prescription verification process was completed. Estimated copay is $0.00 (Paid Claim). They got patient’s consent on 11/22/2024. Now they want to schedule the delivery with doctor office and they tries to contact with them on 11/23/2024 but got no response.  My QUTENZA Connect  11/22/2024 04:05 PM  Spoke to Valerie BEY [facility]: She stated, prescription is still being processed.  My [facility]UTENZA Connect  11/21/2024 04:27 PM  Spoke to Barbara MORRISON [facility]: She stated, prescription is still being processed.  My [facility]  11/18/2024 04:03 PM  Spoke to Valerie MONTOYA [facility]: She stated, prescription is still being processed.  My QUT[facility]  11/15/2024 10:44 AM  Spoke to Merline K [facility]: She stated, prescription has been received and it’s currently being processed.  My QUTENZA Connect  11/15/2024 10:35 AM  We apologize. This patient's plan does not allow third party callers to complete  a pre-cert on your behalf. To obtain pre-cert, please contact [phone]. Office also has option to submit online via [employer]  System Note  11/15/2024 07:43 AM  Prescription Transferred by Office.

## 2024-12-03 NOTE — TELEPHONE ENCOUNTER
Dana  called again requesting follow up that her qutenza will be in the office on 12/13/24 for her appointment.  Requesting a call back for confirmation.

## 2024-12-04 ENCOUNTER — TELEPHONE (OUTPATIENT)
Dept: RHEUMATOLOGY | Facility: CLINIC | Age: 79
End: 2024-12-04
Payer: MEDICARE

## 2024-12-04 NOTE — TELEPHONE ENCOUNTER
Patient called she wants to know if its okay to get the RSV shot with the medications you have her on leflunomide and plaquenil

## 2024-12-13 ENCOUNTER — OFFICE VISIT (OUTPATIENT)
Dept: PAIN MEDICINE | Facility: CLINIC | Age: 79
End: 2024-12-13
Payer: MEDICARE

## 2024-12-13 VITALS — HEART RATE: 57 BPM | DIASTOLIC BLOOD PRESSURE: 70 MMHG | SYSTOLIC BLOOD PRESSURE: 134 MMHG | OXYGEN SATURATION: 95 %

## 2024-12-13 DIAGNOSIS — G60.9 PERIPHERAL NEUROPATHY, IDIOPATHIC: Primary | ICD-10-CM

## 2024-12-13 PROCEDURE — 1125F AMNT PAIN NOTED PAIN PRSNT: CPT | Performed by: ANESTHESIOLOGY

## 2024-12-13 PROCEDURE — 1159F MED LIST DOCD IN RCRD: CPT | Performed by: ANESTHESIOLOGY

## 2024-12-13 PROCEDURE — 3078F DIAST BP <80 MM HG: CPT | Performed by: ANESTHESIOLOGY

## 2024-12-13 PROCEDURE — 1157F ADVNC CARE PLAN IN RCRD: CPT | Performed by: ANESTHESIOLOGY

## 2024-12-13 PROCEDURE — 64640 INJECTION TREATMENT OF NERVE: CPT | Performed by: ANESTHESIOLOGY

## 2024-12-13 PROCEDURE — 3075F SYST BP GE 130 - 139MM HG: CPT | Performed by: ANESTHESIOLOGY

## 2024-12-13 ASSESSMENT — ENCOUNTER SYMPTOMS
OCCASIONAL FEELINGS OF UNSTEADINESS: 0
LOSS OF SENSATION IN FEET: 0
DEPRESSION: 0

## 2024-12-13 ASSESSMENT — PAIN - FUNCTIONAL ASSESSMENT: PAIN_FUNCTIONAL_ASSESSMENT: 0-10

## 2024-12-13 ASSESSMENT — PAIN SCALES - GENERAL
PAINLEVEL_OUTOF10: 8
PAINLEVEL_OUTOF10: 8

## 2024-12-13 ASSESSMENT — PAIN DESCRIPTION - DESCRIPTORS: DESCRIPTORS: BURNING;NUMBNESS;SHOOTING

## 2024-12-16 NOTE — PROGRESS NOTES
Qutenza (capsaicin 8%) Application  /70   Pulse 57   SpO2 95%   12/16/2024    Indication: Idiopathic peripheral neuropathy BL feet    Patient presents for Qutenza application today to treat the neuropathic syndrome mentioned above. The patient exhibits ongoing neuropathic pain refractive to extensive conservative care and conventional medications. The patient's exhibits no hypertensive urgency and does not have open wounds/cuts at the target site. Target site cleaned with wet towel and dried accordingly.    Lidocaine  was applied to numb the skin for application. This was then wiped clean.    4 Qutenza topical systems each containing 280 units were applied to bl feet for total of 1,120 units in total.    These were wrapped with polyethylene to maintain administration of the medication for 30 minutes.    The Qutenza systems were carefully removed without issue. Soothing topical medication was applied to the patients feet.     The patient tolerated the procedure well. They were discharged home in stable condition. RTC 4-6 w          Faith Reese MD  Anesthesiologist & Interventional Pain Physician   [facility] [place]  O: [phone]  F: [phone]  10:23 AM  12/16/24

## 2024-12-29 DIAGNOSIS — M35.9 CONNECTIVE TISSUE DISEASE, UNDIFFERENTIATED (MULTI): ICD-10-CM

## 2024-12-30 ENCOUNTER — TELEPHONE (OUTPATIENT)
Dept: HEMATOLOGY/ONCOLOGY | Facility: CLINIC | Age: 79
End: 2024-12-30
Payer: MEDICARE

## 2024-12-30 RX ORDER — HYDROXYCHLOROQUINE SULFATE 200 MG/1
200 TABLET, FILM COATED ORAL 2 TIMES DAILY
Qty: 180 TABLET | Refills: 0 | Status: SHIPPED | OUTPATIENT
Start: 2024-12-30 | End: 2025-03-30

## 2024-12-30 NOTE — TELEPHONE ENCOUNTER
Called to cx her 1/13/25 virtual with Casal due to having another appt same day and time. Is there a different day that we can schedule Jose's virtual appointment, next spot at [facility] isn't until 04/28/25. Patient does want to see Casal before April.

## 2025-01-06 ENCOUNTER — LAB (OUTPATIENT)
Dept: LAB | Facility: LAB | Age: 80
End: 2025-01-06
Payer: MEDICARE

## 2025-01-06 ENCOUNTER — TELEPHONE (OUTPATIENT)
Dept: PAIN MEDICINE | Facility: CLINIC | Age: 80
End: 2025-01-06

## 2025-01-06 DIAGNOSIS — D50.8 OTHER IRON DEFICIENCY ANEMIA: ICD-10-CM

## 2025-01-06 DIAGNOSIS — K90.9 MALABSORPTION OF IRON (HHS-HCC): ICD-10-CM

## 2025-01-06 LAB
ALBUMIN SERPL BCP-MCNC: 4.2 G/DL (ref 3.4–5)
ALP SERPL-CCNC: 74 U/L (ref 33–136)
ALT SERPL W P-5'-P-CCNC: 13 U/L (ref 7–45)
ANION GAP SERPL CALC-SCNC: 14 MMOL/L (ref 10–20)
AST SERPL W P-5'-P-CCNC: 17 U/L (ref 9–39)
BASOPHILS # BLD AUTO: 0.05 X10*3/UL (ref 0–0.1)
BASOPHILS NFR BLD AUTO: 0.8 %
BILIRUB SERPL-MCNC: 0.6 MG/DL (ref 0–1.2)
BUN SERPL-MCNC: 27 MG/DL (ref 6–23)
CALCIUM SERPL-MCNC: 9.4 MG/DL (ref 8.6–10.6)
CHLORIDE SERPL-SCNC: 106 MMOL/L (ref 98–107)
CO2 SERPL-SCNC: 27 MMOL/L (ref 21–32)
CREAT SERPL-MCNC: 1.21 MG/DL (ref 0.5–1.05)
EGFRCR SERPLBLD CKD-EPI 2021: 46 ML/MIN/1.73M*2
EOSINOPHIL # BLD AUTO: 0.29 X10*3/UL (ref 0–0.4)
EOSINOPHIL NFR BLD AUTO: 4.6 %
ERYTHROCYTE [DISTWIDTH] IN BLOOD BY AUTOMATED COUNT: 14.6 % (ref 11.5–14.5)
FERRITIN SERPL-MCNC: 46 NG/ML (ref 8–150)
FOLATE SERPL-MCNC: 18.3 NG/ML
GLUCOSE SERPL-MCNC: 71 MG/DL (ref 74–99)
HCT VFR BLD AUTO: 42.4 % (ref 36–46)
HGB BLD-MCNC: 12.8 G/DL (ref 12–16)
IMM GRANULOCYTES # BLD AUTO: 0.03 X10*3/UL (ref 0–0.5)
IMM GRANULOCYTES NFR BLD AUTO: 0.5 % (ref 0–0.9)
IRON SATN MFR SERPL: 14 % (ref 25–45)
IRON SERPL-MCNC: 55 UG/DL (ref 35–150)
LYMPHOCYTES # BLD AUTO: 1.03 X10*3/UL (ref 0.8–3)
LYMPHOCYTES NFR BLD AUTO: 16.4 %
MCH RBC QN AUTO: 28.1 PG (ref 26–34)
MCHC RBC AUTO-ENTMCNC: 30.2 G/DL (ref 32–36)
MCV RBC AUTO: 93 FL (ref 80–100)
MONOCYTES # BLD AUTO: 0.59 X10*3/UL (ref 0.05–0.8)
MONOCYTES NFR BLD AUTO: 9.4 %
NEUTROPHILS # BLD AUTO: 4.28 X10*3/UL (ref 1.6–5.5)
NEUTROPHILS NFR BLD AUTO: 68.3 %
NRBC BLD-RTO: 0 /100 WBCS (ref 0–0)
PLATELET # BLD AUTO: 241 X10*3/UL (ref 150–450)
POTASSIUM SERPL-SCNC: 4.7 MMOL/L (ref 3.5–5.3)
PROT SERPL-MCNC: 6.5 G/DL (ref 6.4–8.2)
RBC # BLD AUTO: 4.55 X10*6/UL (ref 4–5.2)
SODIUM SERPL-SCNC: 142 MMOL/L (ref 136–145)
TIBC SERPL-MCNC: 392 UG/DL (ref 240–445)
UIBC SERPL-MCNC: 337 UG/DL (ref 110–370)
VIT B12 SERPL-MCNC: 869 PG/ML (ref 211–911)
WBC # BLD AUTO: 6.3 X10*3/UL (ref 4.4–11.3)

## 2025-01-06 PROCEDURE — 85025 COMPLETE CBC W/AUTO DIFF WBC: CPT

## 2025-01-06 PROCEDURE — 83540 ASSAY OF IRON: CPT

## 2025-01-06 PROCEDURE — 82607 VITAMIN B-12: CPT

## 2025-01-06 PROCEDURE — 82746 ASSAY OF FOLIC ACID SERUM: CPT

## 2025-01-06 PROCEDURE — 80053 COMPREHEN METABOLIC PANEL: CPT

## 2025-01-06 PROCEDURE — 83550 IRON BINDING TEST: CPT

## 2025-01-06 PROCEDURE — 82728 ASSAY OF FERRITIN: CPT

## 2025-01-06 NOTE — TELEPHONE ENCOUNTER
I called Genesis today to remind her not to take her Eliquis until her SCS trial leads are pulled on 1/13/25.  I did a little preop teaching which she was receptive to .

## 2025-01-09 ENCOUNTER — HOSPITAL ENCOUNTER (OUTPATIENT)
Dept: GASTROENTEROLOGY | Facility: HOSPITAL | Age: 80
Discharge: HOME | End: 2025-01-09
Payer: MEDICARE

## 2025-01-09 VITALS
OXYGEN SATURATION: 98 % | WEIGHT: 220 LBS | BODY MASS INDEX: 40.48 KG/M2 | HEIGHT: 62 IN | DIASTOLIC BLOOD PRESSURE: 63 MMHG | TEMPERATURE: 97.9 F | RESPIRATION RATE: 17 BRPM | HEART RATE: 81 BPM | SYSTOLIC BLOOD PRESSURE: 143 MMHG

## 2025-01-09 DIAGNOSIS — G60.9 PERIPHERAL NEUROPATHY, IDIOPATHIC: ICD-10-CM

## 2025-01-09 PROCEDURE — 99153 MOD SED SAME PHYS/QHP EA: CPT | Performed by: ANESTHESIOLOGY

## 2025-01-09 PROCEDURE — 3700000012 HC SEDATION LEVEL 5+ TIME - INITIAL 15 MINUTES 5/> YEARS

## 2025-01-09 PROCEDURE — 3700000013 HC SEDATION LEVEL 5+ TIME - EACH ADDITIONAL 15 MINUTES

## 2025-01-09 PROCEDURE — 99152 MOD SED SAME PHYS/QHP 5/>YRS: CPT | Performed by: ANESTHESIOLOGY

## 2025-01-09 PROCEDURE — 63650 IMPLANT NEUROELECTRODES: CPT | Performed by: ANESTHESIOLOGY

## 2025-01-09 PROCEDURE — 2500000004 HC RX 250 GENERAL PHARMACY W/ HCPCS (ALT 636 FOR OP/ED): Performed by: ANESTHESIOLOGY

## 2025-01-09 PROCEDURE — 2780000003 HC OR 278 NO HCPCS

## 2025-01-09 PROCEDURE — 95972 ALYS CPLX SP/PN NPGT W/PRGRM: CPT | Performed by: ANESTHESIOLOGY

## 2025-01-09 RX ORDER — SODIUM CHLORIDE, SODIUM LACTATE, POTASSIUM CHLORIDE, CALCIUM CHLORIDE 600; 310; 30; 20 MG/100ML; MG/100ML; MG/100ML; MG/100ML
20 INJECTION, SOLUTION INTRAVENOUS CONTINUOUS
Status: DISCONTINUED | OUTPATIENT
Start: 2025-01-09 | End: 2025-01-10 | Stop reason: HOSPADM

## 2025-01-09 RX ORDER — CLINDAMYCIN PHOSPHATE 600 MG/50ML
600 INJECTION, SOLUTION INTRAVENOUS ONCE
Status: COMPLETED | OUTPATIENT
Start: 2025-01-09 | End: 2025-01-09

## 2025-01-09 RX ORDER — LIDOCAINE HYDROCHLORIDE 10 MG/ML
INJECTION, SOLUTION EPIDURAL; INFILTRATION; INTRACAUDAL; PERINEURAL AS NEEDED
Status: COMPLETED | OUTPATIENT
Start: 2025-01-09 | End: 2025-01-09

## 2025-01-09 RX ORDER — MIDAZOLAM HYDROCHLORIDE 1 MG/ML
INJECTION, SOLUTION INTRAMUSCULAR; INTRAVENOUS AS NEEDED
Status: COMPLETED | OUTPATIENT
Start: 2025-01-09 | End: 2025-01-09

## 2025-01-09 RX ORDER — LEFLUNOMIDE 10 MG/1
10 TABLET ORAL
COMMUNITY
Start: 2025-01-06

## 2025-01-09 RX ADMIN — LIDOCAINE HYDROCHLORIDE 6 ML: 10 INJECTION, SOLUTION EPIDURAL; INFILTRATION; INTRACAUDAL; PERINEURAL at 10:13

## 2025-01-09 RX ADMIN — MIDAZOLAM 2 MG: 1 INJECTION INTRAMUSCULAR; INTRAVENOUS at 10:08

## 2025-01-09 RX ADMIN — CLINDAMYCIN IN 5 PERCENT DEXTROSE 600 MG: 12 INJECTION, SOLUTION INTRAVENOUS at 10:07

## 2025-01-09 ASSESSMENT — PAIN - FUNCTIONAL ASSESSMENT
PAIN_FUNCTIONAL_ASSESSMENT: 0-10
PAIN_FUNCTIONAL_ASSESSMENT: 0-10

## 2025-01-09 ASSESSMENT — ENCOUNTER SYMPTOMS
GASTROINTESTINAL NEGATIVE: 1
CARDIOVASCULAR NEGATIVE: 1
WEAKNESS: 1
BACK PAIN: 1
HEMATOLOGIC/LYMPHATIC NEGATIVE: 1
OCCASIONAL FEELINGS OF UNSTEADINESS: 1
EYES NEGATIVE: 1
ENDOCRINE NEGATIVE: 1
RESPIRATORY NEGATIVE: 1
PSYCHIATRIC NEGATIVE: 1
CONSTITUTIONAL NEGATIVE: 1

## 2025-01-09 ASSESSMENT — PAIN DESCRIPTION - DESCRIPTORS: DESCRIPTORS: SHOOTING;ACHING

## 2025-01-09 ASSESSMENT — PAIN SCALES - GENERAL
PAINLEVEL_OUTOF10: 7
PAINLEVEL_OUTOF10: 6

## 2025-01-09 ASSESSMENT — COLUMBIA-SUICIDE SEVERITY RATING SCALE - C-SSRS
2. HAVE YOU ACTUALLY HAD ANY THOUGHTS OF KILLING YOURSELF?: NO
1. IN THE PAST MONTH, HAVE YOU WISHED YOU WERE DEAD OR WISHED YOU COULD GO TO SLEEP AND NOT WAKE UP?: NO
6. HAVE YOU EVER DONE ANYTHING, STARTED TO DO ANYTHING, OR PREPARED TO DO ANYTHING TO END YOUR LIFE?: NO

## 2025-01-09 NOTE — NURSING NOTE
Pt arrived in stable condition, denies any neuro deficits, dressing dry and intact. Discharge instructions reviewed by Anusha Mcknight.

## 2025-01-09 NOTE — DISCHARGE INSTRUCTIONS
DISCHARGE INSTRUCTIONS FOR SPINAL CORD STIMULATION TRIAL     You received spinal cord stimulation leads today. Do not resume your Eliquis during the trial. You will be instructed to resume Eliquis 1 day after trial lead removal in the office by Dr Mary Ann Nguyen to reinforce dressing if needed.   -    Sponge bath only (do not get leads/IPG wet)   -    Do not bend at the waist, twist and lift your arms above your shoulders during the trial. This will reduce the possibility of lead movement which may result in loss of stimulation.   -    For post procedure pain, take over-the-counter Tylenol 500 mg and ibuprofen 800 mg together every 8 hours for the next 5 days. .    -    Write in your pain journal to track your pain levels.   -    Do not drive or operate heavy machinery while stimulator is turned on.   -    Follow up with Dr. Reese in clinic in one week.     It is recommended that you relax and limit your activity for the remainder of the day unless you have been told otherwise by your pain physician. You should not drive a car, operate machinery, or make important legal decisions unless otherwise directed by your pain physician.      Some discomfort, bruising or slight swelling may occur at the injection site. This is not abnormal if it occurs.  If needed you may:        o    Take over the counter medication such as Tylenol or Motrin.       o    Apply an ice pack for 30 minutes, 2 to 3 times a day for the first 24 hours.     You do not need to discontinue non-aspirin-containing pain medications prior to an injection (examples: Celebrex, tramadol, hydrocodone and acetaminophen).      Call the Pain Medicine Practice at [phone] between 8am-4pm Monday - Friday if you are experiencing the following:       o    Headache that does not go away with medicine, is worse when sitting or standing up, and is greatly relieved upon lying down.       o    Severe pain worse than or different than your baseline pain.        o    Chills or fever (101º F or greater).       o    Drainage or signs of infection at the injection site     Go directly to the Emergency Department if you are experiencing the following and received an epidural or spinal injection:       o    Abrupt weakness or progressive weakness in your legs that starts after you leave the clinic.       o    Abrupt severe or worseningnumbness in your legs.       o    Inability to urinate after the injection or loss of bowel or bladder control without the urge to defecate or urinate.     If you have a clinical question that cannot wait until your next appointment, please call [phone] between 8am-4pm Monday - Friday or send a [employer] message. We do our best to return all non-emergency messages within 24 hours, Monday - Friday. A nurse or physician will return your message.      If you need to cancel an appointment please call the scheduling staff at [phone] during normal business hours or leave a message at least 24 hours in advance.

## 2025-01-09 NOTE — H&P
PAIN MANAGEMENT H&P    Date of Service: 1/9/2025  SUBJECTIVE    CHIEF COMPLAINT: BL foot pain    HISTORY OF PRESENT ILLNESS    Genesis Garcia is a 79 y.o. female with PMH  HTN, DVT on ELIQUIS (held 3 d), hypothyroidism, CKD, asthma, obesity, L4-S1 fusion, CREST syndrome, VCF s/p T12/L1 kypho  who presents for Abbott SCS trial    Pain and overall medical condition unchanged from previous visit. ASA 3. Pt is appropriately NPO. Pt denies new-onset numbness, weakness, bowel/bladder incontinence.  Pt denies recent infection/abx use, allergy to Latex/iodine/contrast. Patient is currently taking the following blood thinner(s): ELIQUIS (held 3 d)    REVIEW OF SYSTEMS  Review of Systems   Constitutional: Negative.    HENT: Negative.     Eyes: Negative.    Respiratory: Negative.     Cardiovascular: Negative.    Gastrointestinal: Negative.    Endocrine: Negative.    Musculoskeletal:  Positive for back pain.   Skin: Negative.    Neurological:  Positive for weakness.   Hematological: Negative.    Psychiatric/Behavioral: Negative.         PAST MEDICAL HISTORY  Past Medical History:   Diagnosis Date    Ankle fracture     left ankle fracture    Cellulitis, unspecified 08/24/2020    Cellulitis of skin    Chronic venous insufficiency     Chronic venous insufficiency     CKD (chronic kidney disease), stage III (Multi)     COPD (chronic obstructive pulmonary disease) (Multi)     Essential (primary) hypertension 01/04/2023    Hypertension    H/O deep venous thrombosis     Hypothyroidism     Lymphedema     Osteoporosis     Other fracture of upper and lower end of unspecified fibula, initial encounter for closed fracture 05/21/2021    Fracture of distal fibula    Other specified soft tissue disorders 09/19/2022    Leg swelling    Peripheral neuropathy     Personal history of (healed) stress fracture 06/26/2021    History of stress fracture    Personal history of other diseases of the circulatory system     History of atrial  fibrillation    Personal history of other diseases of the musculoskeletal system and connective tissue 10/20/2016    History of arthritis    Undifferentiated connective tissue disease (Multi)     Unspecified asthma, uncomplicated ([facility]) 12/01/2022    Asthma    Venous ulcer of leg (Multi)     Vitamin D deficiency, unspecified 10/20/2016    Vitamin D deficiency     Past Surgical History:   Procedure Laterality Date    BACK SURGERY  06/20/2016    Back Surgery L4-L5 fusion    CARDIAC ELECTROPHYSIOLOGY STUDY AND ABLATION      CHOLECYSTECTOMY  04/15/2016    Cholecystectomy    HERNIA REPAIR      OTHER SURGICAL HISTORY  05/03/2021    Cataract surgery    THYROIDECTOMY, PARTIAL      d/t goiter    TOTAL HIP ARTHROPLASTY Right     TOTAL KNEE ARTHROPLASTY Bilateral 12/01/2022    Knee Replacement    VERTEBROPLASTY       Family History   Problem Relation Name Age of Onset    Diabetes Mother          type 2    Heart disease Father      Hypertension Father         CURRENT MEDICATIONS  Current Outpatient Medications   Medication Sig Dispense Refill    acetaminophen (Tylenol) 325 mg tablet Take 2 tablets (650 mg) by mouth every 4 hours if needed for mild pain (1 - 3) or moderate pain (4 - 6). 30 tablet 0    albuterol 2.5 mg /3 mL (0.083 %) nebulizer solution Take 3 mL (2.5 mg) by nebulization every 2 hours if needed for wheezing. 75 mL 11    apixaban (Eliquis) 2.5 mg tablet Take 1 tablet (2.5 mg) by mouth 2 times a day. 180 tablet 3    calcium carbonate 600 mg calcium (1,500 mg) tablet Take 1 tablet (1,500 mg) by mouth once daily.      cholecalciferol (Vitamin D-3) 50 MCG (2000 UT) tablet Take by mouth.      fluticasone propion-salmeteroL (Advair Diskus) 250-50 mcg/dose diskus inhaler Inhale 1 puff 2 times a day. Rinse mouth with water after use to reduce aftertaste and incidence of candidiasis. Do not swallow.      hydrocortisone 2.5 % cream Apply topically if needed for irritation or rash. Use maximum 2-3 times a week. 30 g 2     hydroxychloroquine (Plaquenil) 200 mg tablet Take 1 tablet (200 mg) by mouth 2 times a day. 180 tablet 0    levothyroxine (Synthroid) 25 mcg tablet Take 1 tablet (25 mcg) by mouth once daily in the morning. Take before meals. 90 tablet 3    losartan (Cozaar) 100 mg tablet Take 1 tablet (100 mg) by mouth once daily.      multivitamin capsule Take by mouth.      spironolactone (Aldactone) 25 mg tablet Take 1 tablet (25 mg) by mouth once daily.      torsemide (Demadex) 20 mg tablet Take 1 tablet (20 mg) by mouth early in the morning..      Bacillus coagulans (PROBIOTIC, B. COAGULANS, ORAL) Take 1 tablet by mouth once daily.      denosumab (Prolia) 60 mg/mL syringe Provider to inject 1 mL (60 mg total) under the skin every 6 months.  For  ONLY. 1 mL 2    gabapentin (Neurontin) 400 mg capsule Take 1 capsule (400 mg) by mouth 3 times a day. 90 capsule 2     No current facility-administered medications for this encounter.       ALLERGIES AND DRUG REACTIONS  Allergies   Allergen Reactions    Sulfur Hives    Cefprozil Hives    Sulfa (Sulfonamide Antibiotics) Hives          OBJECTIVE  Visit Vitals  OB Status Postmenopausal   Smoking Status Former     General: Lying comfortably in bed, NAD  Head: NCAT  Eyes: Sclera/conjunctiva clear, EOMI, PERRL  Nose/mouth: MMM  CV: Good distal pulses  Lungs: Good/equal chest excursion  Abdomen: Soft, ND  Ext: No cyanosis/edema  MSK: Able to move extremities  Neuro: AAOx3, grossly normal  Psych: affect nl      REVIEW OF LABORATORY DATA  I have reviewed the following lab results:  WBC   Date Value Ref Range Status   01/06/2025 6.3 4.4 - 11.3 x10*3/uL Final     RBC   Date Value Ref Range Status   01/06/2025 4.55 4.00 - 5.20 x10*6/uL Final     Hemoglobin   Date Value Ref Range Status   01/06/2025 12.8 12.0 - 16.0 g/dL Final     Hematocrit   Date Value Ref Range Status   01/06/2025 42.4 36.0 - 46.0 % Final     MCV   Date Value Ref Range Status   01/06/2025 93 80 - 100 fL  "Final     MCH   Date Value Ref Range Status   01/06/2025 28.1 26.0 - 34.0 pg Final     MCHC   Date Value Ref Range Status   01/06/2025 30.2 (L) 32.0 - 36.0 g/dL Final     RDW   Date Value Ref Range Status   01/06/2025 14.6 (H) 11.5 - 14.5 % Final     Platelets   Date Value Ref Range Status   01/06/2025 241 150 - 450 x10*3/uL Final     Sodium   Date Value Ref Range Status   01/06/2025 142 136 - 145 mmol/L Final     Potassium   Date Value Ref Range Status   01/06/2025 4.7 3.5 - 5.3 mmol/L Final     Bicarbonate   Date Value Ref Range Status   01/06/2025 27 21 - 32 mmol/L Final     Urea Nitrogen   Date Value Ref Range Status   01/06/2025 27 (H) 6 - 23 mg/dL Final     Calcium   Date Value Ref Range Status   01/06/2025 9.4 8.6 - 10.6 mg/dL Final     No results found for: \"PROTIME\", \"PTT\", \"INR\", \"FIBRINOGEN\"          ASSESSMENT & PLAN  Genesis Garcia is a 79 y.o. female with PMH  HTN, DVT on ELIQUIS (held 3 d), hypothyroidism, CKD, asthma, obesity, L4-S1 fusion, VCF s/p T12/L1 kypho who presents for Abbott SCS trial         Discussed procedure risks/benefits in detail with patient. Pt meets medical necessity for procedure due to failure of conservative measures. Reviewed procedural risks including bleeding, infection, nerve damage, paralysis. Also reviewed mitigating factors such as screening for infection/blood thinner use, sterile precautions, and image-guidance when applicable. All questions answered. Pt/guardian expressed understanding and choose to proceed            Faith Reese MD  Anesthesiologist & Interventional Pain Physician   [facility] [place]  O: [phone]  F: [phone]  9:22 AM  01/09/25    "

## 2025-01-13 ENCOUNTER — TELEMEDICINE (OUTPATIENT)
Dept: HEMATOLOGY/ONCOLOGY | Facility: CLINIC | Age: 80
End: 2025-01-13
Payer: MEDICARE

## 2025-01-13 ENCOUNTER — APPOINTMENT (OUTPATIENT)
Dept: HEMATOLOGY/ONCOLOGY | Facility: CLINIC | Age: 80
End: 2025-01-13
Payer: MEDICARE

## 2025-01-13 ENCOUNTER — OFFICE VISIT (OUTPATIENT)
Dept: PAIN MEDICINE | Facility: CLINIC | Age: 80
End: 2025-01-13
Payer: MEDICARE

## 2025-01-13 VITALS — SYSTOLIC BLOOD PRESSURE: 174 MMHG | DIASTOLIC BLOOD PRESSURE: 80 MMHG | HEART RATE: 78 BPM | OXYGEN SATURATION: 93 %

## 2025-01-13 DIAGNOSIS — G60.9 IDIOPATHIC PERIPHERAL NEUROPATHY: Primary | ICD-10-CM

## 2025-01-13 DIAGNOSIS — Z79.01 CHRONIC ANTICOAGULATION: ICD-10-CM

## 2025-01-13 DIAGNOSIS — M79.671 PAIN IN BOTH FEET: ICD-10-CM

## 2025-01-13 DIAGNOSIS — K90.9 MALABSORPTION OF IRON (HHS-HCC): ICD-10-CM

## 2025-01-13 DIAGNOSIS — M79.672 PAIN IN BOTH FEET: ICD-10-CM

## 2025-01-13 DIAGNOSIS — D50.8 OTHER IRON DEFICIENCY ANEMIA: ICD-10-CM

## 2025-01-13 PROCEDURE — 1125F AMNT PAIN NOTED PAIN PRSNT: CPT | Performed by: ANESTHESIOLOGY

## 2025-01-13 PROCEDURE — 1157F ADVNC CARE PLAN IN RCRD: CPT | Performed by: NURSE PRACTITIONER

## 2025-01-13 PROCEDURE — 1157F ADVNC CARE PLAN IN RCRD: CPT | Performed by: ANESTHESIOLOGY

## 2025-01-13 PROCEDURE — 1160F RVW MEDS BY RX/DR IN RCRD: CPT | Performed by: ANESTHESIOLOGY

## 2025-01-13 PROCEDURE — 99024 POSTOP FOLLOW-UP VISIT: CPT | Performed by: ANESTHESIOLOGY

## 2025-01-13 PROCEDURE — 3077F SYST BP >= 140 MM HG: CPT | Performed by: ANESTHESIOLOGY

## 2025-01-13 PROCEDURE — 1036F TOBACCO NON-USER: CPT | Performed by: NURSE PRACTITIONER

## 2025-01-13 PROCEDURE — 1160F RVW MEDS BY RX/DR IN RCRD: CPT | Performed by: NURSE PRACTITIONER

## 2025-01-13 PROCEDURE — 99214 OFFICE O/P EST MOD 30 MIN: CPT | Performed by: ANESTHESIOLOGY

## 2025-01-13 PROCEDURE — 1159F MED LIST DOCD IN RCRD: CPT | Performed by: NURSE PRACTITIONER

## 2025-01-13 PROCEDURE — 1159F MED LIST DOCD IN RCRD: CPT | Performed by: ANESTHESIOLOGY

## 2025-01-13 PROCEDURE — 3079F DIAST BP 80-89 MM HG: CPT | Performed by: ANESTHESIOLOGY

## 2025-01-13 PROCEDURE — 99213 OFFICE O/P EST LOW 20 MIN: CPT | Performed by: NURSE PRACTITIONER

## 2025-01-13 ASSESSMENT — PATIENT HEALTH QUESTIONNAIRE - PHQ9: 2. FEELING DOWN, DEPRESSED OR HOPELESS: NOT AT ALL

## 2025-01-13 ASSESSMENT — PAIN - FUNCTIONAL ASSESSMENT: PAIN_FUNCTIONAL_ASSESSMENT: 0-10

## 2025-01-13 ASSESSMENT — ENCOUNTER SYMPTOMS
CARDIOVASCULAR NEGATIVE: 1
ENDOCRINE NEGATIVE: 1
OCCASIONAL FEELINGS OF UNSTEADINESS: 0
LOSS OF SENSATION IN FEET: 0
CONSTITUTIONAL NEGATIVE: 1
EYES NEGATIVE: 1
RESPIRATORY NEGATIVE: 1
WEAKNESS: 1
GASTROINTESTINAL NEGATIVE: 1
PSYCHIATRIC NEGATIVE: 1
BACK PAIN: 1
DEPRESSION: 0
HEMATOLOGIC/LYMPHATIC NEGATIVE: 1

## 2025-01-13 ASSESSMENT — PAIN SCALES - GENERAL
PAINLEVEL_OUTOF10: 6
PAINLEVEL_OUTOF10: 6

## 2025-01-13 ASSESSMENT — PAIN DESCRIPTION - DESCRIPTORS: DESCRIPTORS: PINS AND NEEDLES;OTHER (COMMENT)

## 2025-01-13 NOTE — LETTER
January 14, 2025     Leslee Freeman    Patient: Genesis Garcia   YOB: 1945   Date of Visit: 1/13/2025       Dear Leslee Freeman:    Plz submit for Abbott SCS implant appropriately off Eliquis. Thx       Sincerely,     Faith Reese MD      CC: No Recipients  ______________________________________________________________________________________    PAIN MANAGEMENT FOLLOW-UP OFFICE NOTE    Date of Service: 1/13/2025    SUBJECTIVE    CHIEF COMPLAINT: BLE pain    HISTORY OF PRESENT ILLNESS    Genesis Garcia is a 79 y.o. female with PMH HTN, DVT on ELIQUIS (holding), hypothyroidism, CKD, asthma, obesity, L4-S1 fusion, CREST syndrome, VCF s/p T12/L1 kypho who presents for F/U.    On 1/9, pt underwent SCS trial with >50% relief. Since that time, pt reports less relief today. Pt was able to stand, walk, sleep better. She would like to move fwd with implant.     Pt denies new-onset numbness, weakness, bowel/bladder incontinence.  Pt denies recent infection, allergy to Latex/iodine/contrast. Patient is currently taking the following blood thinner(s): ELIQUIS (holding for trial)    Procedure log:  -Mcknight SCS trial 1/9/25: >50% relief  -BL foot Qutenza #2 on 12/13/24  -BL foot Qutenza #1 on 8/23/24  -T12, L1 kypho 6/27/24: 75% relief      REVIEW OF SYSTEMS  Review of Systems   Constitutional: Negative.    HENT: Negative.     Eyes: Negative.    Respiratory: Negative.     Cardiovascular: Negative.    Gastrointestinal: Negative.    Endocrine: Negative.    Musculoskeletal:  Positive for back pain.   Skin: Negative.    Neurological:  Positive for weakness.   Hematological: Negative.    Psychiatric/Behavioral: Negative.         PAST MEDICAL HISTORY  Past Medical History:   Diagnosis Date   • Ankle fracture     left ankle fracture   • Cellulitis, unspecified 08/24/2020    Cellulitis of skin   • Chronic venous insufficiency    • Chronic venous insufficiency    • CKD (chronic kidney disease), stage III  (Multi)    • COPD (chronic obstructive pulmonary disease) (Multi)    • Essential (primary) hypertension 01/04/2023    Hypertension   • H/O deep venous thrombosis    • Hypothyroidism    • Lymphedema    • Osteoporosis    • Other fracture of upper and lower end of unspecified fibula, initial encounter for closed fracture 05/21/2021    Fracture of distal fibula   • Other specified soft tissue disorders 09/19/2022    Leg swelling   • Peripheral neuropathy    • Personal history of (healed) stress fracture 06/26/2021    History of stress fracture   • Personal history of other diseases of the circulatory system     History of atrial fibrillation   • Personal history of other diseases of the musculoskeletal system and connective tissue 10/20/2016    History of arthritis   • Undifferentiated connective tissue disease (Multi)    • Unspecified asthma, uncomplicated ([facility]) 12/01/2022    Asthma   • Venous ulcer of leg (Multi)    • Vitamin D deficiency, unspecified 10/20/2016    Vitamin D deficiency     Past Surgical History:   Procedure Laterality Date   • BACK SURGERY  06/20/2016    Back Surgery L4-L5 fusion   • CARDIAC ELECTROPHYSIOLOGY STUDY AND ABLATION     • CHOLECYSTECTOMY  04/15/2016    Cholecystectomy   • HERNIA REPAIR     • OTHER SURGICAL HISTORY  05/03/2021    Cataract surgery   • THYROIDECTOMY, PARTIAL      d/t goiter   • TOTAL HIP ARTHROPLASTY Right    • TOTAL KNEE ARTHROPLASTY Bilateral 12/01/2022    Knee Replacement   • VERTEBROPLASTY       Family History   Problem Relation Name Age of Onset   • Diabetes Mother          type 2   • Heart disease Father     • Hypertension Father         CURRENT MEDICATIONS  Current Outpatient Medications   Medication Sig Dispense Refill   • acetaminophen (Tylenol) 325 mg tablet Take 2 tablets (650 mg) by mouth every 4 hours if needed for mild pain (1 - 3) or moderate pain (4 - 6). 30 tablet 0   • albuterol 2.5 mg /3 mL (0.083 %) nebulizer solution Take 3 mL (2.5 mg) by nebulization  every 2 hours if needed for wheezing. 75 mL 11   • apixaban (Eliquis) 2.5 mg tablet Take 1 tablet (2.5 mg) by mouth 2 times a day. 180 tablet 3   • Bacillus coagulans (PROBIOTIC, B. COAGULANS, ORAL) Take 1 tablet by mouth once daily.     • calcium carbonate 600 mg calcium (1,500 mg) tablet Take 1 tablet (1,500 mg) by mouth once daily.     • cholecalciferol (Vitamin D-3) 50 MCG (2000 UT) tablet Take by mouth.     • denosumab (Prolia) 60 mg/mL syringe Provider to inject 1 mL (60 mg total) under the skin every 6 months.  For  ONLY. 1 mL 2   • fluticasone propion-salmeteroL (Advair Diskus) 250-50 mcg/dose diskus inhaler Inhale 1 puff 2 times a day. Rinse mouth with water after use to reduce aftertaste and incidence of candidiasis. Do not swallow.     • gabapentin (Neurontin) 400 mg capsule Take 1 capsule (400 mg) by mouth 3 times a day. 90 capsule 2   • hydrocortisone 2.5 % cream Apply topically if needed for irritation or rash. Use maximum 2-3 times a week. 30 g 2   • hydroxychloroquine (Plaquenil) 200 mg tablet Take 1 tablet (200 mg) by mouth 2 times a day. 180 tablet 0   • leflunomide (Arava) 10 mg tablet 1 tablet (10 mg).     • levothyroxine (Synthroid) 25 mcg tablet Take 1 tablet (25 mcg) by mouth once daily in the morning. Take before meals. 90 tablet 3   • losartan (Cozaar) 100 mg tablet Take 1 tablet (100 mg) by mouth once daily.     • multivitamin capsule Take by mouth.     • spironolactone (Aldactone) 25 mg tablet Take 1 tablet (25 mg) by mouth once daily.     • torsemide (Demadex) 20 mg tablet Take 1 tablet (20 mg) by mouth early in the morning..       No current facility-administered medications for this visit.       ALLERGIES AND DRUG REACTIONS  Allergies   Allergen Reactions   • Sulfur Hives   • Cefprozil Hives   • Sulfa (Sulfonamide Antibiotics) Hives          OBJECTIVE  Visit Vitals  /80   Pulse 78   SpO2 93%   OB Status Postmenopausal   Smoking Status Former       Last  Recorded Pain Score (if available):           Pain Score:   6       Physical Exam  General: Sitting in chair, NAD  Head: NCAT  Eyes: Sclera/conjunctiva clear, EOMI, PERRL  Nose/mouth: MMM  CV: Good distal pulses  Lungs: Good/equal chest excursion  Abdomen: Soft, ND  Ext: BLE edema, R ankle eversion  MSK: l-spine alignment: unremarkable    Neuro: AAOx3, CN grossly nl      Psych: affect nl  Skin: no rash/lesions. SCS leads removed w/o issue. Hemostasis achieved. Band-aids placed        REVIEW OF LABORATORY DATA  I have reviewed the following lab results:  WBC   Date Value Ref Range Status   01/06/2025 6.3 4.4 - 11.3 x10*3/uL Final     RBC   Date Value Ref Range Status   01/06/2025 4.55 4.00 - 5.20 x10*6/uL Final     Hemoglobin   Date Value Ref Range Status   01/06/2025 12.8 12.0 - 16.0 g/dL Final     Hematocrit   Date Value Ref Range Status   01/06/2025 42.4 36.0 - 46.0 % Final     MCV   Date Value Ref Range Status   01/06/2025 93 80 - 100 fL Final     MCH   Date Value Ref Range Status   01/06/2025 28.1 26.0 - 34.0 pg Final     MCHC   Date Value Ref Range Status   01/06/2025 30.2 (L) 32.0 - 36.0 g/dL Final     RDW   Date Value Ref Range Status   01/06/2025 14.6 (H) 11.5 - 14.5 % Final     Platelets   Date Value Ref Range Status   01/06/2025 241 150 - 450 x10*3/uL Final     Sodium   Date Value Ref Range Status   01/06/2025 142 136 - 145 mmol/L Final     Potassium   Date Value Ref Range Status   01/06/2025 4.7 3.5 - 5.3 mmol/L Final     Bicarbonate   Date Value Ref Range Status   01/06/2025 27 21 - 32 mmol/L Final     Urea Nitrogen   Date Value Ref Range Status   01/06/2025 27 (H) 6 - 23 mg/dL Final     Calcium   Date Value Ref Range Status   01/06/2025 9.4 8.6 - 10.6 mg/dL Final     Protime   Date Value Ref Range Status   01/18/2023 11.0 9.8 - 13.4 sec Final     INR   Date Value Ref Range Status   01/18/2023 1.0 0.9 - 1.1 Final         REVIEW OF RADIOLOGY   I have reviewed the following:  Radiology Studies            MRI L-spine w 5/8/24:  * Discitis/osteomyelitis at T12/L1 as described.  *No evidence of fluid collection to suggest epidural or paraspinal  abscess.        MRI L-spine wo 3/22/24:  There is increased signal within the disc space at T12-L1 with  increased anterior wedging at L1 and new anterior wedging at T12.  There is approximately 75% loss of vertebral body height anteriorly  at L1 and approximately 25% loss of vertebral body height anteriorly  at T12. There is no significant osseous retropulsion into the spinal  canal. There is no significant paraspinal edema or visualized  epidural collection. These findings may represent acute compression  deformities or sequela of trauma, however by imaging and early  discitis osteomyelitis can have a similar appearance. Please  correlate clinically for signs and symptoms of infection.      There is edema within the sacrum on the left which may represent a  sacral insufficiency fracture.      Postsurgical changes of posterolateral fusion and decompression at L4  through S1. No spinal canal and mild-to-moderate neural foraminal  narrowing at these levels.      Additional multilevel degenerative disc disease and facet arthrosis  with mild spinal canal stenosis. Neural foraminal narrowing most  pronounced at T12-L1 with severe left neural foraminal stenosis.               ASSESSMENT & PLAN  Geensis Garcia is a 79 y.o. female with PMH HTN, DVT on ELIQUIS, hypothyroidism, CKD, asthma, obesity, L4-S1 fusion, VCF s/p T12/L1 kypho who presents for F/U .    1) Peripheral neuropathy  -BL foot neuropathy since ~2019, idiopathic  -Refractive to yrs of conservative tx including Tylenol, NSAIDs, gabapentin, Qutenza  -No known DM. Nl B12, folate  -s/p Abbott SCS trial 1/9/25: >50% relief. Improved stand/walking tolerance. Improved sleep. Pt would like to move fwd with implant. Leads removed w/o issue. Ok to resume Eliquis tomorrow  --CT T-spine 7/25/24: mild T12-L1 stenosis  --Passed  psych eval  -Schedule Mcknight SCS implant      Discussed procedure risks/benefits in detail with patient. Pt meets medical necessity for procedure due to failure of conservative measures. Reviewed procedural risks including bleeding, infection, nerve damage, paralysis. Also reviewed mitigating factors such as screening for infection/blood thinner use, sterile precautions, and image-guidance when applicable. All questions answered. Pt/guardian expressed understanding and choose to proceed    Today's visit involved continuation of chronic pain care. In the context of the complexity of this patient's chronic pain diagnosis, long-term expectations and care planning discussed. Adequate time taken to ensure patient understanding and answer questions. Imaging studies ordered are placed do elucidate the patient's diagnosis, but also to evaluate the patient's candidacy for procedural and surgical interventions. The risks and benefits of these potential interventions are detailed as above.                     Faith Reese MD  Anesthesiologist & Interventional Pain Physician   [facility] [place]  O: [phone]  F: [phone]  9:43 AM  01/13/25

## 2025-01-13 NOTE — PROGRESS NOTES
PAIN MANAGEMENT FOLLOW-UP OFFICE NOTE    Date of Service: 1/13/2025    SUBJECTIVE    CHIEF COMPLAINT: BLE pain    HISTORY OF PRESENT ILLNESS    Genesis Garcia is a 79 y.o. female with PMH HTN, DVT on ELIQUIS (holding), hypothyroidism, CKD, asthma, obesity, L4-S1 fusion, CREST syndrome, VCF s/p T12/L1 kypho who presents for F/U.    On 1/9, pt underwent SCS trial with >50% relief. Since that time, pt reports less relief today. Pt was able to stand, walk, sleep better. She would like to move fwd with implant.     Pt denies new-onset numbness, weakness, bowel/bladder incontinence.  Pt denies recent infection, allergy to Latex/iodine/contrast. Patient is currently taking the following blood thinner(s): ELIQUIS (holding for trial)    Procedure log:  -Mcknight SCS trial 1/9/25: >50% relief  -BL foot Qutenza #2 on 12/13/24  -BL foot Qutenza #1 on 8/23/24  -T12, L1 kypho 6/27/24: 75% relief      REVIEW OF SYSTEMS  Review of Systems   Constitutional: Negative.    HENT: Negative.     Eyes: Negative.    Respiratory: Negative.     Cardiovascular: Negative.    Gastrointestinal: Negative.    Endocrine: Negative.    Musculoskeletal:  Positive for back pain.   Skin: Negative.    Neurological:  Positive for weakness.   Hematological: Negative.    Psychiatric/Behavioral: Negative.         PAST MEDICAL HISTORY  Past Medical History:   Diagnosis Date    Ankle fracture     left ankle fracture    Cellulitis, unspecified 08/24/2020    Cellulitis of skin    Chronic venous insufficiency     Chronic venous insufficiency     CKD (chronic kidney disease), stage III (Multi)     COPD (chronic obstructive pulmonary disease) (Multi)     Essential (primary) hypertension 01/04/2023    Hypertension    H/O deep venous thrombosis     Hypothyroidism     Lymphedema     Osteoporosis     Other fracture of upper and lower end of unspecified fibula, initial encounter for closed fracture 05/21/2021    Fracture of distal fibula    Other specified soft tissue  disorders 09/19/2022    Leg swelling    Peripheral neuropathy     Personal history of (healed) stress fracture 06/26/2021    History of stress fracture    Personal history of other diseases of the circulatory system     History of atrial fibrillation    Personal history of other diseases of the musculoskeletal system and connective tissue 10/20/2016    History of arthritis    Undifferentiated connective tissue disease (Multi)     Unspecified asthma, uncomplicated ([facility]) 12/01/2022    Asthma    Venous ulcer of leg (Multi)     Vitamin D deficiency, unspecified 10/20/2016    Vitamin D deficiency     Past Surgical History:   Procedure Laterality Date    BACK SURGERY  06/20/2016    Back Surgery L4-L5 fusion    CARDIAC ELECTROPHYSIOLOGY STUDY AND ABLATION      CHOLECYSTECTOMY  04/15/2016    Cholecystectomy    HERNIA REPAIR      OTHER SURGICAL HISTORY  05/03/2021    Cataract surgery    THYROIDECTOMY, PARTIAL      d/t goiter    TOTAL HIP ARTHROPLASTY Right     TOTAL KNEE ARTHROPLASTY Bilateral 12/01/2022    Knee Replacement    VERTEBROPLASTY       Family History   Problem Relation Name Age of Onset    Diabetes Mother          type 2    Heart disease Father      Hypertension Father         CURRENT MEDICATIONS  Current Outpatient Medications   Medication Sig Dispense Refill    acetaminophen (Tylenol) 325 mg tablet Take 2 tablets (650 mg) by mouth every 4 hours if needed for mild pain (1 - 3) or moderate pain (4 - 6). 30 tablet 0    albuterol 2.5 mg /3 mL (0.083 %) nebulizer solution Take 3 mL (2.5 mg) by nebulization every 2 hours if needed for wheezing. 75 mL 11    apixaban (Eliquis) 2.5 mg tablet Take 1 tablet (2.5 mg) by mouth 2 times a day. 180 tablet 3    Bacillus coagulans (PROBIOTIC, B. COAGULANS, ORAL) Take 1 tablet by mouth once daily.      calcium carbonate 600 mg calcium (1,500 mg) tablet Take 1 tablet (1,500 mg) by mouth once daily.      cholecalciferol (Vitamin D-3) 50 MCG (2000 UT) tablet Take by mouth.       denosumab (Prolia) 60 mg/mL syringe Provider to inject 1 mL (60 mg total) under the skin every 6 months.  For  ONLY. 1 mL 2    fluticasone propion-salmeteroL (Advair Diskus) 250-50 mcg/dose diskus inhaler Inhale 1 puff 2 times a day. Rinse mouth with water after use to reduce aftertaste and incidence of candidiasis. Do not swallow.      gabapentin (Neurontin) 400 mg capsule Take 1 capsule (400 mg) by mouth 3 times a day. 90 capsule 2    hydrocortisone 2.5 % cream Apply topically if needed for irritation or rash. Use maximum 2-3 times a week. 30 g 2    hydroxychloroquine (Plaquenil) 200 mg tablet Take 1 tablet (200 mg) by mouth 2 times a day. 180 tablet 0    leflunomide (Arava) 10 mg tablet 1 tablet (10 mg).      levothyroxine (Synthroid) 25 mcg tablet Take 1 tablet (25 mcg) by mouth once daily in the morning. Take before meals. 90 tablet 3    losartan (Cozaar) 100 mg tablet Take 1 tablet (100 mg) by mouth once daily.      multivitamin capsule Take by mouth.      spironolactone (Aldactone) 25 mg tablet Take 1 tablet (25 mg) by mouth once daily.      torsemide (Demadex) 20 mg tablet Take 1 tablet (20 mg) by mouth early in the morning..       No current facility-administered medications for this visit.       ALLERGIES AND DRUG REACTIONS  Allergies   Allergen Reactions    Sulfur Hives    Cefprozil Hives    Sulfa (Sulfonamide Antibiotics) Hives          OBJECTIVE  Visit Vitals  /80   Pulse 78   SpO2 93%   OB Status Postmenopausal   Smoking Status Former       Last Recorded Pain Score (if available):           Pain Score:   6       Physical Exam  General: Sitting in chair, NAD  Head: NCAT  Eyes: Sclera/conjunctiva clear, EOMI, PERRL  Nose/mouth: MMM  CV: Good distal pulses  Lungs: Good/equal chest excursion  Abdomen: Soft, ND  Ext: BLE edema, R ankle eversion  MSK: l-spine alignment: unremarkable    Neuro: AAOx3, CN grossly nl      Psych: affect nl  Skin: no rash/lesions. SCS leads removed w/o  issue. Hemostasis achieved. Band-aids placed        REVIEW OF LABORATORY DATA  I have reviewed the following lab results:  WBC   Date Value Ref Range Status   01/06/2025 6.3 4.4 - 11.3 x10*3/uL Final     RBC   Date Value Ref Range Status   01/06/2025 4.55 4.00 - 5.20 x10*6/uL Final     Hemoglobin   Date Value Ref Range Status   01/06/2025 12.8 12.0 - 16.0 g/dL Final     Hematocrit   Date Value Ref Range Status   01/06/2025 42.4 36.0 - 46.0 % Final     MCV   Date Value Ref Range Status   01/06/2025 93 80 - 100 fL Final     MCH   Date Value Ref Range Status   01/06/2025 28.1 26.0 - 34.0 pg Final     MCHC   Date Value Ref Range Status   01/06/2025 30.2 (L) 32.0 - 36.0 g/dL Final     RDW   Date Value Ref Range Status   01/06/2025 14.6 (H) 11.5 - 14.5 % Final     Platelets   Date Value Ref Range Status   01/06/2025 241 150 - 450 x10*3/uL Final     Sodium   Date Value Ref Range Status   01/06/2025 142 136 - 145 mmol/L Final     Potassium   Date Value Ref Range Status   01/06/2025 4.7 3.5 - 5.3 mmol/L Final     Bicarbonate   Date Value Ref Range Status   01/06/2025 27 21 - 32 mmol/L Final     Urea Nitrogen   Date Value Ref Range Status   01/06/2025 27 (H) 6 - 23 mg/dL Final     Calcium   Date Value Ref Range Status   01/06/2025 9.4 8.6 - 10.6 mg/dL Final     Protime   Date Value Ref Range Status   01/18/2023 11.0 9.8 - 13.4 sec Final     INR   Date Value Ref Range Status   01/18/2023 1.0 0.9 - 1.1 Final         REVIEW OF RADIOLOGY   I have reviewed the following:  Radiology Studies           MRI L-spine w 5/8/24:  * Discitis/osteomyelitis at T12/L1 as described.  *No evidence of fluid collection to suggest epidural or paraspinal  abscess.        MRI L-spine wo 3/22/24:  There is increased signal within the disc space at T12-L1 with  increased anterior wedging at L1 and new anterior wedging at T12.  There is approximately 75% loss of vertebral body height anteriorly  at L1 and approximately 25% loss of vertebral body height  anteriorly  at T12. There is no significant osseous retropulsion into the spinal  canal. There is no significant paraspinal edema or visualized  epidural collection. These findings may represent acute compression  deformities or sequela of trauma, however by imaging and early  discitis osteomyelitis can have a similar appearance. Please  correlate clinically for signs and symptoms of infection.      There is edema within the sacrum on the left which may represent a  sacral insufficiency fracture.      Postsurgical changes of posterolateral fusion and decompression at L4  through S1. No spinal canal and mild-to-moderate neural foraminal  narrowing at these levels.      Additional multilevel degenerative disc disease and facet arthrosis  with mild spinal canal stenosis. Neural foraminal narrowing most  pronounced at T12-L1 with severe left neural foraminal stenosis.               ASSESSMENT & PLAN  Genesis Garcia is a 79 y.o. female with PMH HTN, DVT on ELIQUIS, hypothyroidism, CKD, asthma, obesity, L4-S1 fusion, VCF s/p T12/L1 kypho who presents for F/U .    1) Peripheral neuropathy  -BL foot neuropathy since ~2019, idiopathic  -Refractive to yrs of conservative tx including Tylenol, NSAIDs, gabapentin, Qutenza  -No known DM. Nl B12, folate  -s/p Abbott SCS trial 1/9/25: >50% relief. Improved stand/walking tolerance. Improved sleep. Pt would like to move fwd with implant. Leads removed w/o issue. Ok to resume Eliquis tomorrow  --CT T-spine 7/25/24: mild T12-L1 stenosis  --Passed psych eval  -Schedule Mcknight SCS implant appropriately off Eliquis to target pain generator minimize risk/likelihood of chronic opioid use and/or surgery        Discussed procedure risks/benefits in detail with patient. Pt meets medical necessity for procedure due to failure of conservative measures. Reviewed procedural risks including bleeding, infection, nerve damage, paralysis. Also reviewed mitigating factors such as screening for  infection/blood thinner use, sterile precautions, and image-guidance when applicable. All questions answered. Pt/guardian expressed understanding and choose to proceed    Today's visit involved continuation of chronic pain care. In the context of the complexity of this patient's chronic pain diagnosis, long-term expectations and care planning discussed. Adequate time taken to ensure patient understanding and answer questions. Imaging studies ordered are placed do elucidate the patient's diagnosis, but also to evaluate the patient's candidacy for procedural and surgical interventions. The risks and benefits of these potential interventions are detailed as above.                     Faith Reese MD  Anesthesiologist & Interventional Pain Physician   [facility] [place]  O: [phone]  F: [phone]  9:43 AM  01/13/25

## 2025-01-13 NOTE — PROGRESS NOTES
Patient ID: Genesis Garcia is a 79 y.o. female.  Referring Physician: Rosanne M Casal, APRN-CNP, DNP  [id number] [place] [address]  Primary Care Provider: Albert Garner MD  Visit Type:  Follow Up     Verbal consent was requested and obtained from patient on this date for a telehealth visit.    Subjective    Location:  [facility]  Initial consult: 4/8/2024  Reason: Microcytic Anemia  Referred by Dr Garner     Patient is a 78 yo woman with history of UCTD (positive MAURICE, anticentromere antibody and arthralgia), OA s/p bilateral TKR, HTN, DVT on Eliquis, stage 3 CKD, chronic venous insufficiency, Lat summer she had cellulitis and leg ulcers, seeing the wound clinic under the care of Dr Rosy Atkinson.  She was hospitalized in December 2023 for sepsis secondary to pneumonia and cellulitis. She suffers from chronic lower extremity cellulitis, she has been fighting this for approximately 2 years.  When her cellulitis is flares up her left leg gives out.  She suffered from a fall in early December 23. Since the fall she has suffered from constant daily low back pain.  She does not have pain radiating down her legs.  Her cellulitis has completely healed. She is doing well today.  She is able to ambulate with a walker.  She is established with Dr. Wakefield from pain management.  She is maintained on gabapentin, tizanidine and oxycodone.     Today, patient presents for telephone follow up.  Denies abnormal weight loss, night sweats, fever. Patient does endorse fatigue, but denies chills, sob, cp, n/v/d, n/t. No PICA. Denies any abnormal bleeding. Minor bruising 2/2 to Eliquis, dose was reduced to 2.5 po bid by Dr clark. Up to date on GI screening. No recurrent infections or lymphadenopathy. No known blood disorders in family. Has had surgery in past w/o issue. Never had blood/blood products. Denies NSAID use.      10 point review of systems negative except as stated in HPI     Objective   BSA: There is no  height or weight on file to calculate BSA.  There were no vitals taken for this visit.     has a past medical history of Ankle fracture, Cellulitis, unspecified (08/24/2020), Chronic venous insufficiency, Chronic venous insufficiency, CKD (chronic kidney disease), stage III (Multi), COPD (chronic obstructive pulmonary disease) (Multi), Essential (primary) hypertension (01/04/2023), H/O deep venous thrombosis, Hypothyroidism, Lymphedema, Osteoporosis, Other fracture of upper and lower end of unspecified fibula, initial encounter for closed fracture (05/21/2021), Other specified soft tissue disorders (09/19/2022), Peripheral neuropathy, Personal history of (healed) stress fracture (06/26/2021), Personal history of other diseases of the circulatory system, Personal history of other diseases of the musculoskeletal system and connective tissue (10/20/2016), Undifferentiated connective tissue disease (Multi), Unspecified asthma, uncomplicated ([facility]) (12/01/2022), Venous ulcer of leg (Multi), and Vitamin D deficiency, unspecified (10/20/2016).   has a past surgical history that includes Cholecystectomy (04/15/2016); Back surgery (06/20/2016); Total knee arthroplasty (Bilateral, 12/01/2022); Other surgical history (05/03/2021); Thyroidectomy, partial; Total hip arthroplasty (Right); Cardiac electrophysiology study and ablation; Hernia repair; and Vertebroplasty.  Family History   Problem Relation Name Age of Onset    Diabetes Mother          type 2    Heart disease Father      Hypertension Father       Genesis Garcia  reports that she quit smoking about 33 years ago. Her smoking use included cigarettes. She started smoking about 57 years ago. She has a 3.6 pack-year smoking history. She has never used smokeless tobacco.  She  reports that she does not currently use alcohol.  She  reports no history of drug use.      WBC   Date/Time Value Ref Range Status   01/06/2025 09:45 AM 6.3 4.4 - 11.3 x10*3/uL Final  "  10/07/2024 10:48 AM 5.2 4.4 - 11.3 x10*3/uL Final   07/08/2024 10:11 AM 8.5 4.4 - 11.3 x10*3/uL Final     nRBC   Date Value Ref Range Status   01/06/2025 0.0 0.0 - 0.0 /100 WBCs Final   10/07/2024 0.0 0.0 - 0.0 /100 WBCs Final   07/08/2024   Final     Comment:     Not Measured     RBC   Date Value Ref Range Status   01/06/2025 4.55 4.00 - 5.20 x10*6/uL Final   10/07/2024 4.32 4.00 - 5.20 x10*6/uL Final   07/08/2024 4.25 4.00 - 5.20 x10*6/uL Final     Hemoglobin   Date Value Ref Range Status   01/06/2025 12.8 12.0 - 16.0 g/dL Final   10/07/2024 12.3 12.0 - 16.0 g/dL Final   07/08/2024 11.3 (L) 12.0 - 16.0 g/dL Final     Hematocrit   Date Value Ref Range Status   01/06/2025 42.4 36.0 - 46.0 % Final   10/07/2024 40.4 36.0 - 46.0 % Final   07/08/2024 36.3 36.0 - 46.0 % Final     MCV   Date/Time Value Ref Range Status   01/06/2025 09:45 AM 93 80 - 100 fL Final   10/07/2024 10:48 AM 94 80 - 100 fL Final   07/08/2024 10:11 AM 85 80 - 100 fL Final     MCH   Date/Time Value Ref Range Status   01/06/2025 09:45 AM 28.1 26.0 - 34.0 pg Final   10/07/2024 10:48 AM 28.5 26.0 - 34.0 pg Final   07/08/2024 10:11 AM 26.6 26.0 - 34.0 pg Final     MCHC   Date/Time Value Ref Range Status   01/06/2025 09:45 AM 30.2 (L) 32.0 - 36.0 g/dL Final   10/07/2024 10:48 AM 30.4 (L) 32.0 - 36.0 g/dL Final   07/08/2024 10:11 AM 31.1 (L) 32.0 - 36.0 g/dL Final     RDW   Date/Time Value Ref Range Status   01/06/2025 09:45 AM 14.6 (H) 11.5 - 14.5 % Final   10/07/2024 10:48 AM 15.0 (H) 11.5 - 14.5 % Final   07/08/2024 10:11 AM 21.5 (H) 11.5 - 14.5 % Final     Platelets   Date/Time Value Ref Range Status   01/06/2025 09:45  150 - 450 x10*3/uL Final   10/07/2024 10:48  150 - 450 x10*3/uL Final   07/08/2024 10:11  150 - 450 x10*3/uL Final     No results found for: \"MPV\"  Neutrophils %   Date/Time Value Ref Range Status   01/06/2025 09:45 AM 68.3 40.0 - 80.0 % Final   10/07/2024 10:48 AM 62.5 40.0 - 80.0 % Final   07/08/2024 10:11 AM 69.8 " 40.0 - 80.0 % Final     Immature Granulocytes %, Automated   Date/Time Value Ref Range Status   01/06/2025 09:45 AM 0.5 0.0 - 0.9 % Final     Comment:     Immature Granulocyte Count (IG) includes promyelocytes, myelocytes and metamyelocytes but does not include bands. Percent differential counts (%) should be interpreted in the context of the absolute cell counts (cells/UL).   10/07/2024 10:48 AM 0.4 0.0 - 0.9 % Final     Comment:     Immature Granulocyte Count (IG) includes promyelocytes, myelocytes and metamyelocytes but does not include bands. Percent differential counts (%) should be interpreted in the context of the absolute cell counts (cells/UL).   07/08/2024 10:11 AM 1.1 (H) 0.0 - 0.9 % Final     Comment:     Immature Granulocyte Count (IG) includes promyelocytes, myelocytes and metamyelocytes but does not include bands. Percent differential counts (%) should be interpreted in the context of the absolute cell counts (cells/UL).     Lymphocytes %   Date/Time Value Ref Range Status   01/06/2025 09:45 AM 16.4 13.0 - 44.0 % Final   10/07/2024 10:48 AM 19.0 13.0 - 44.0 % Final   07/08/2024 10:11 AM 15.1 13.0 - 44.0 % Final     Monocytes %   Date/Time Value Ref Range Status   01/06/2025 09:45 AM 9.4 2.0 - 10.0 % Final   10/07/2024 10:48 AM 12.6 2.0 - 10.0 % Final   07/08/2024 10:11 AM 11.2 2.0 - 10.0 % Final     Eosinophils %   Date/Time Value Ref Range Status   01/06/2025 09:45 AM 4.6 0.0 - 6.0 % Final   10/07/2024 10:48 AM 4.5 0.0 - 6.0 % Final   07/08/2024 10:11 AM 2.2 0.0 - 6.0 % Final     Basophils %   Date/Time Value Ref Range Status   01/06/2025 09:45 AM 0.8 0.0 - 2.0 % Final   10/07/2024 10:48 AM 1.0 0.0 - 2.0 % Final   07/08/2024 10:11 AM 0.6 0.0 - 2.0 % Final     Neutrophils Absolute   Date/Time Value Ref Range Status   01/06/2025 09:45 AM 4.28 1.60 - 5.50 x10*3/uL Final     Comment:     Percent differential counts (%) should be interpreted in the context of the absolute cell counts (cells/uL).    10/07/2024 10:48 AM 3.23 1.60 - 5.50 x10*3/uL Final     Comment:     Percent differential counts (%) should be interpreted in the context of the absolute cell counts (cells/uL).   07/08/2024 10:11 AM 5.91 (H) 1.60 - 5.50 x10*3/uL Final     Comment:     Percent differential counts (%) should be interpreted in the context of the absolute cell counts (cells/uL).     Immature Granulocytes Absolute, Automated   Date/Time Value Ref Range Status   01/06/2025 09:45 AM 0.03 0.00 - 0.50 x10*3/uL Final   10/07/2024 10:48 AM 0.02 0.00 - 0.50 x10*3/uL Final   07/08/2024 10:11 AM 0.09 0.00 - 0.50 x10*3/uL Final     Lymphocytes Absolute   Date/Time Value Ref Range Status   01/06/2025 09:45 AM 1.03 0.80 - 3.00 x10*3/uL Final   10/07/2024 10:48 AM 0.98 0.80 - 3.00 x10*3/uL Final   07/08/2024 10:11 AM 1.28 0.80 - 3.00 x10*3/uL Final     Monocytes Absolute   Date/Time Value Ref Range Status   01/06/2025 09:45 AM 0.59 0.05 - 0.80 x10*3/uL Final   10/07/2024 10:48 AM 0.65 0.05 - 0.80 x10*3/uL Final   07/08/2024 10:11 AM 0.95 (H) 0.05 - 0.80 x10*3/uL Final     Eosinophils Absolute   Date/Time Value Ref Range Status   01/06/2025 09:45 AM 0.29 0.00 - 0.40 x10*3/uL Final   10/07/2024 10:48 AM 0.23 0.00 - 0.40 x10*3/uL Final   07/08/2024 10:11 AM 0.19 0.00 - 0.40 x10*3/uL Final     Basophils Absolute   Date/Time Value Ref Range Status   01/06/2025 09:45 AM 0.05 0.00 - 0.10 x10*3/uL Final   10/07/2024 10:48 AM 0.05 0.00 - 0.10 x10*3/uL Final   07/08/2024 10:11 AM 0.05 0.00 - 0.10 x10*3/uL Final     Comment:     Automated WBC differential has been confirmed by manual smear.      Latest Reference Range & Units 01/06/25 09:45   GLUCOSE 74 - 99 mg/dL 71 (L)   SODIUM 136 - 145 mmol/L 142   POTASSIUM 3.5 - 5.3 mmol/L 4.7   CHLORIDE 98 - 107 mmol/L 106   Bicarbonate 21 - 32 mmol/L 27   Anion Gap 10 - 20 mmol/L 14   Blood Urea Nitrogen 6 - 23 mg/dL 27 (H)   Creatinine 0.50 - 1.05 mg/dL 1.21 (H)   EGFR >60 mL/min/1.73m*2 46 (L)   Calcium 8.6 - 10.6  mg/dL 9.4   Albumin 3.4 - 5.0 g/dL 4.2   Alkaline Phosphatase 33 - 136 U/L 74   ALT 7 - 45 U/L 13   AST 9 - 39 U/L 17   Bilirubin Total 0.0 - 1.2 mg/dL 0.6   FERRITIN 8 - 150 ng/mL 46   FOLATE >5.0 ng/mL 18.3   Total Protein 6.4 - 8.2 g/dL 6.5   IRON 35 - 150 ug/dL 55   TIBC 240 - 445 ug/dL 392   UIBC 110 - 370 ug/dL 337   % Saturation 25 - 45 % 14 (L)   Vitamin B12 211 - 911 pg/mL 869     Assessment/Plan    80 yo woman with history of UCTD (positive MAURICE, anticentromere antibody and arthralgia), OA s/p bilateral TKR, HTN, DVT on Eliquis, stage 3 CKD, chronic venous insufficiency, recent cellulitis and leg ulcers, seeing the wound clinic under the care of Dr Rosy Atkinsno.  She was hospitalized in December for sepsis secondary to pneumonia and cellulitis. Referred for consult for microcytic anemia.      I reviewed the patients medical record for labs and noted that this patient has had significant microcytic anemia for  at least 2 years. She denies blood loss that she is aware of. Discussed possible etiologies including multifactorial, nutritional deficiencies, anemia of chronic disease, malabsorption, hemopathy, medications, bleeding, malignancy, etc. We noted that we know she is iron deficient and has stage 3 CKD, and inflammation that could be contributing to her anemia. Also could be B12, folic acid or other deficiencies and being on a blood thinner. Patient has had oral iron in the past and it caused severe constipation. We discussed IV iron to improve her anemia and quality of life and she agreed. Hemoglobin has improved nicely since the IV iron which she tolerated without incident. We also discussed that once we replace iron and other vitamin deficiencies, we will discuss Procrit if her hemoglobin is still below 10g/dL.       Hemoglobin has improved with iron replacement. TS is 14%, should increase iron in her diet. Still has CKD but EPO is not warranted at this time.  Microcytosis has resolved. I suspect  bleeding from GI tract 2/2 to Eliquis. Recent kyphoplasty and feeling better with standing post op. Patient will continute        Plan:  1. Repeat CBCdiff, iron studies and B12 and folate every 3 weeks. Will plan to support with IV iron if needed.   2. RTC for inperson visit in April (already scheduled)  5. Follow up with Dr Atkinson regarding wound care and Eliquis management.      I had an extensive discussion with the patient regarding the diagnosis and discussed the plan of therapy, including general considerations regarding side effects and outcomes. Pt understood and gave appropriate teach back about the plan of care. All questions were answered to the patient's satisfaction. The patient is instructed to contact us at any time if questions or problems arise. Thank you for the opportunity to participate in the care of this very pleasant patient.                Rosanne M Casal, APRN-CNP, DNP

## 2025-01-24 ENCOUNTER — APPOINTMENT (OUTPATIENT)
Dept: PAIN MEDICINE | Facility: CLINIC | Age: 80
End: 2025-01-24
Payer: MEDICARE

## 2025-02-03 ENCOUNTER — TELEPHONE (OUTPATIENT)
Dept: RHEUMATOLOGY | Facility: CLINIC | Age: 80
End: 2025-02-03
Payer: MEDICARE

## 2025-02-03 ENCOUNTER — SPECIALTY PHARMACY (OUTPATIENT)
Dept: PHARMACY | Facility: CLINIC | Age: 80
End: 2025-02-03

## 2025-02-03 ENCOUNTER — PREP FOR PROCEDURE (OUTPATIENT)
Dept: PAIN MEDICINE | Facility: CLINIC | Age: 80
End: 2025-02-03
Payer: MEDICARE

## 2025-02-03 DIAGNOSIS — M80.08XA AGE-RELATED OSTEOPOROSIS WITH CURRENT PATHOLOGICAL FRACTURE OF VERTEBRA, INITIAL ENCOUNTER (MULTI): ICD-10-CM

## 2025-02-03 DIAGNOSIS — G60.9 IDIOPATHIC PERIPHERAL NEUROPATHY: Primary | ICD-10-CM

## 2025-02-03 DIAGNOSIS — M80.08XA AGE-RELATED OSTEOPOROSIS WITH CURRENT PATHOLOGICAL FRACTURE OF VERTEBRA, INITIAL ENCOUNTER (MULTI): Primary | ICD-10-CM

## 2025-02-03 PROCEDURE — RXMED WILLOW AMBULATORY MEDICATION CHARGE

## 2025-02-03 NOTE — TELEPHONE ENCOUNTER
Patient called needs lab orders for the prolia injection, already has the prolia ordered just needs labs

## 2025-02-07 ENCOUNTER — PHARMACY VISIT (OUTPATIENT)
Dept: PHARMACY | Facility: CLINIC | Age: 80
End: 2025-02-07
Payer: COMMERCIAL

## 2025-02-10 ENCOUNTER — PRE-ADMISSION TESTING (OUTPATIENT)
Dept: PREADMISSION TESTING | Facility: HOSPITAL | Age: 80
End: 2025-02-10
Payer: MEDICARE

## 2025-02-10 VITALS
DIASTOLIC BLOOD PRESSURE: 72 MMHG | SYSTOLIC BLOOD PRESSURE: 139 MMHG | BODY MASS INDEX: 41.41 KG/M2 | HEIGHT: 62 IN | TEMPERATURE: 98.4 F | WEIGHT: 225 LBS | OXYGEN SATURATION: 95 % | RESPIRATION RATE: 16 BRPM | HEART RATE: 76 BPM

## 2025-02-10 DIAGNOSIS — Z01.818 PREOP TESTING: Primary | ICD-10-CM

## 2025-02-10 LAB
ATRIAL RATE: 70 BPM
P OFFSET: 145 MS
P ONSET: 125 MS
Q ONSET: 213 MS
QRS COUNT: 11 BEATS
QRS DURATION: 92 MS
QT INTERVAL: 422 MS
QTC CALCULATION(BAZETT): 455 MS
QTC FREDERICIA: 444 MS
R AXIS: -18 DEGREES
T AXIS: 28 DEGREES
T OFFSET: 424 MS
VENTRICULAR RATE: 70 BPM

## 2025-02-10 PROCEDURE — 93010 ELECTROCARDIOGRAM REPORT: CPT | Performed by: INTERNAL MEDICINE

## 2025-02-10 PROCEDURE — 93005 ELECTROCARDIOGRAM TRACING: CPT

## 2025-02-10 PROCEDURE — 99204 OFFICE O/P NEW MOD 45 MIN: CPT | Performed by: PHYSICIAN ASSISTANT

## 2025-02-10 ASSESSMENT — PAIN - FUNCTIONAL ASSESSMENT: PAIN_FUNCTIONAL_ASSESSMENT: 0-10

## 2025-02-10 ASSESSMENT — ENCOUNTER SYMPTOMS
EYES NEGATIVE: 1
RESPIRATORY NEGATIVE: 1
NEUROLOGICAL NEGATIVE: 1
ENDOCRINE NEGATIVE: 1
GASTROINTESTINAL NEGATIVE: 1
NECK NEGATIVE: 1
CONSTITUTIONAL NEGATIVE: 1
MUSCULOSKELETAL NEGATIVE: 1
CARDIOVASCULAR NEGATIVE: 1

## 2025-02-10 ASSESSMENT — LIFESTYLE VARIABLES: SMOKING_STATUS: NONSMOKER

## 2025-02-10 ASSESSMENT — PAIN SCALES - GENERAL: PAINLEVEL_OUTOF10: 7

## 2025-02-10 NOTE — CPM/PAT H&P
CPM/PAT Evaluation       Name: Genesis Garcia (Genesis Garcia)  /Age: 1945/79 y.o.     Visit Type:   In-Person       Chief Complaint: back pain    HPI: Patient is a 78 yo F scheduled for spinal cord stimulator on 25 with Dr. Reese secondary to idiopathic peripheral neuropathy. Patient was referred by Dr. Reese to CPM today for perioperative risk stratification and optimization. Patient's PMHx is notable for Asthma/COPD, HTN, lower extremity DVTs, Hypothyroidism, CREST syndrome      Past Medical History:   Diagnosis Date    Anemia     Ankle fracture     left ankle fracture    Arthritis     Cellulitis, unspecified 2020    Cellulitis of skin    Chronic venous insufficiency     CKD (chronic kidney disease), stage III (Multi)     COPD (chronic obstructive pulmonary disease) (Multi)     Essential (primary) hypertension 2023    Hypertension    H/O deep venous thrombosis     Hernia, internal     History of blood transfusion     Hypothyroidism     Low back pain     Lymphedema     Osteoporosis     Other fracture of upper and lower end of unspecified fibula, initial encounter for closed fracture 2021    Fracture of distal fibula    Other specified soft tissue disorders 2022    Leg swelling    Peripheral neuropathy     Personal history of (healed) stress fracture 2021    History of stress fracture    Personal history of other diseases of the circulatory system     History of atrial fibrillation    Personal history of other diseases of the musculoskeletal system and connective tissue 10/20/2016    History of arthritis    Plantar fasciitis     Rotator cuff syndrome     Undifferentiated connective tissue disease (Multi)     Unspecified asthma, uncomplicated (HHS-HCC) 2022    Asthma    Venous ulcer of leg (Multi)     Vitamin D deficiency, unspecified 10/20/2016    Vitamin D deficiency       Past Surgical History:   Procedure Laterality Date    BACK SURGERY   06/20/2016    Back Surgery L4-L5 fusion    CARDIAC ELECTROPHYSIOLOGY STUDY AND ABLATION      CHOLECYSTECTOMY  04/15/2016    Cholecystectomy    EYE SURGERY  2018    HERNIA REPAIR      JOINT REPLACEMENT  2007, 2017, 2022    OTHER SURGICAL HISTORY  05/03/2021    Cataract surgery    SPINE SURGERY  2001    THYROIDECTOMY, PARTIAL      d/t goiter    TOTAL HIP ARTHROPLASTY Right     TOTAL KNEE ARTHROPLASTY Bilateral 12/01/2022    Knee Replacement    VERTEBROPLASTY         Patient Sexual activity questions deferred to the physician.    Family History   Problem Relation Name Age of Onset    Diabetes Mother mom         type 2    Heart disease Father dakota     Hypertension Father dakota     Cancer Sister svetlana     Clotting disorder Sister svetlana     Cancer Daughter dayne gishnock     Arthritis Sister svetlana pedley     Arthritis Sister laya penn     Clotting disorder Sister svetlana     Cancer Daughter dayne fierrohnock     Multiple sclerosis Daughter dayne     Arthritis Sister svetlana pedley     Arthritis Sister laya penn     Cancer Sister svetlana     Cancer Daughter dayne gishnock     Cancer Sister svetlana     Arthritis Sister svetlana pedley     Arthritis Sister laya penn     Cancer Sister svetlana     Clotting disorder Sister svetlana     Cancer Daughter dayne fierorhnock     Multiple sclerosis Daughter dayne     Arthritis Other laya penn     Cancer Other Svetlana     Cancer Other Perla        Allergies   Allergen Reactions    Sulfur Hives    Cefprozil Hives    Sulfa (Sulfonamide Antibiotics) Hives       Prior to Admission medications    Medication Sig Start Date End Date Taking? Authorizing Provider   acetaminophen (Tylenol) 325 mg tablet Take 2 tablets (650 mg) by mouth every 4 hours if needed for mild pain (1 - 3) or moderate pain (4 - 6). 12/26/23  Yes DENISA Jung-CNP   albuterol 2.5 mg /3 mL (0.083 %) nebulizer solution Take 3 mL (2.5 mg) by nebulization every 2 hours if needed for wheezing. 12/29/23  Yes Chon Lind MD    apixaban (Eliquis) 2.5 mg tablet Take 1 tablet (2.5 mg) by mouth 2 times a day. 4/22/24 4/22/25 Yes Rosy Atkinson MD   Bacillus coagulans (PROBIOTIC, B. COAGULANS, ORAL) Take 1 tablet by mouth once daily.   Yes Historical Provider, MD   calcium carbonate 600 mg calcium (1,500 mg) tablet Take 1 tablet (1,500 mg) by mouth once daily.   Yes Historical Provider, MD   cholecalciferol (Vitamin D-3) 50 MCG (2000 UT) tablet Take by mouth.   Yes Historical Provider, MD   denosumab (Prolia) 60 mg/mL syringe Provider to inject 1 mL (60 mg total) under the skin every 6 months.  For  ONLY. 8/20/24 8/21/25 Yes Brent Sheridan MD   fluticasone propion-salmeteroL (Advair Diskus) 250-50 mcg/dose diskus inhaler Inhale 1 puff 2 times a day. Rinse mouth with water after use to reduce aftertaste and incidence of candidiasis. Do not swallow.   Yes Historical Provider, MD   gabapentin (Neurontin) 400 mg capsule Take 1 capsule (400 mg) by mouth 3 times a day. 2/20/24  Yes Shine Wakefield MD   hydrocortisone 2.5 % cream Apply topically if needed for irritation or rash. Use maximum 2-3 times a week. 10/7/24 10/7/25 Yes Rosy Atkinson MD   hydroxychloroquine (Plaquenil) 200 mg tablet Take 1 tablet (200 mg) by mouth 2 times a day. 12/30/24 3/30/25 Yes Brent Sheridan MD   levothyroxine (Synthroid) 25 mcg tablet Take 1 tablet (25 mcg) by mouth once daily in the morning. Take before meals. 4/5/24 3/31/25 Yes Pedro Dawkins MD   losartan (Cozaar) 100 mg tablet Take 1 tablet (100 mg) by mouth once daily.   Yes Historical Provider, MD   multivitamin capsule Take by mouth.   Yes Historical Provider, MD   spironolactone (Aldactone) 25 mg tablet Take 1 tablet (25 mg) by mouth once daily. 2/24/23  Yes Historical Provider, MD   torsemide (Demadex) 20 mg tablet Take 1 tablet (20 mg) by mouth early in the morning.. 6/27/24  Yes Historical Provider, MD   leflunomide (Arava) 10 mg tablet 1 tablet (10 mg). 1/6/25   Historical Provider, MD  "       PAT ROS:   Constitutional:   neg    Neuro/Psych:   neg    Eyes:   neg    Ears:   neg    Nose:   neg    Mouth:   neg    Throat:   neg    Neck:   neg    Cardio:   neg    Respiratory:   neg    Endocrine:   neg    GI:   neg    :   neg    Musculoskeletal:   neg    Hematologic:   neg    Skin:  neg        Physical Exam  Vitals and nursing note reviewed.   Constitutional:       General: She is not in acute distress.     Appearance: She is not ill-appearing or toxic-appearing.   HENT:      Head: Normocephalic and atraumatic.      Nose: Nose normal.      Mouth/Throat:      Mouth: Mucous membranes are moist.   Eyes:      Extraocular Movements: Extraocular movements intact.      Conjunctiva/sclera: Conjunctivae normal.   Neck:      Vascular: No carotid bruit.   Cardiovascular:      Rate and Rhythm: Normal rate and regular rhythm.      Heart sounds: Murmur heard.   Pulmonary:      Effort: Pulmonary effort is normal.      Breath sounds: Normal breath sounds.   Abdominal:      General: There is no distension.      Palpations: Abdomen is soft.      Tenderness: There is no abdominal tenderness.   Musculoskeletal:      Cervical back: Normal range of motion.      Comments: Lower extremities in soft wraps, denies any wounds or swelling   Skin:     General: Skin is warm and dry.      Capillary Refill: Capillary refill takes less than 2 seconds.   Neurological:      General: No focal deficit present.      Mental Status: She is alert.   Psychiatric:         Mood and Affect: Mood normal.         Behavior: Behavior normal.          PAT AIRWAY:   Airway:     Mallampati::  II    TM distance::  >3 FB    Neck ROM::  Full   upper dentures and lower dentures          Visit Vitals  /72   Pulse 76   Temp 36.9 °C (98.4 °F)   Resp 16   Ht 1.575 m (5' 2\")   Wt 102 kg (225 lb)   SpO2 95%   BMI 41.15 kg/m²   OB Status Postmenopausal   Smoking Status Former   BSA 2.11 m²       DASI Risk Score      Flowsheet Row Questionnaire Series " Submission from 2/4/2025 in [facility] with Generic Provider Myah Pre-Admission Testing from 6/20/2024 in [facility]   Can you take care of yourself (eat, dress, bathe, or use toilet)?  2.75  filed at 02/04/2025 0956 2.75 filed at 06/20/2024 1217   Can you walk indoors, such as around your house? 1.75  filed at 02/04/2025 0956 1.75 filed at 06/20/2024 1217   Can you walk a block or two on level ground?  2.75  filed at 02/04/2025 0956 0 filed at 06/20/2024 1217   Can you climb a flight of stairs or walk up a hill? 0  filed at 02/04/2025 0956 0 filed at 06/20/2024 1217   Can you run a short distance? 0  filed at 02/04/2025 0956 0 filed at 06/20/2024 1217   Can you do light work around the house like dusting or washing dishes? 2.7  filed at 02/04/2025 0956 2.7 filed at 06/20/2024 1217   Can you do moderate work around the house like vacuuming, sweeping floors or carrying groceries? 3.5  filed at 02/04/2025 0956 3.5 filed at 06/20/2024 1217   Can you do heavy work around the house like scrubbing floors or lifting and moving heavy furniture?  0  filed at 02/04/2025 0956 0 filed at 06/20/2024 1217   Can you do yard work like raking leaves, weeding or pushing a mower? 4.5  filed at 02/04/2025 0956 0 filed at 06/20/2024 1217   Can you have sexual relations? 0  filed at 02/04/2025 0956 5.25 filed at 06/20/2024 1217   Can you participate in moderate recreational activities like golf, bowling, dancing, doubles tennis or throwing a baseball or football? 0  filed at 02/04/2025 0956 0 filed at 06/20/2024 1217   Can you participate in strenous sports like swimming, singles tennis, football, basketball, or skiing? 0  filed at 02/04/2025 0956 0 filed at 06/20/2024 1217   DASI SCORE 17.95  filed at 02/04/2025 0956 15.95 filed at 06/20/2024 1217   METS Score (Will be calculated only when all the questions are answered) 4.9  filed at 02/04/2025 0956 4.7 filed at 06/20/2024 1217          Caprini DVT Assessment       Flowsheet Row Pre-Admission Testing from 2/10/2025 in [facility] ED to Hosp-Admission (Discharged) from 12/27/2023 in [facility] Bldg A 7 with Chon Lind MD and Ale Batista MD   DVT Score (IF A SCORE IS NOT CALCULATING, MUST SELECT A BMI TO COMPLETE) 15 filed at 02/10/2025 1458 5 filed at 12/27/2023 1909   Medical Factors Family history of DVT/PE, History of DVT/PE, COPD, Swollen legs filed at 02/10/2025 1458 --   Surgical Factors Minor surgery planned filed at 02/10/2025 1458 --   BMI (BMI MUST BE CHOSEN) 41-50 (Morbid obesity) filed at 02/10/2025 1458 --   RETIRED: Current Status -- Medical patient at bedrest filed at 12/27/2023 1909   RETIRED: History -- Pneumonia filed at 12/27/2023 1909   RETIRED: Age -- Over 75 years filed at 12/27/2023 1909          Modified Frailty Index      Flowsheet Row Pre-Admission Testing from 2/10/2025 in [facility]   Non-independent functional status (problems with dressing, bathing, personal grooming, or cooking) 0 filed at 02/10/2025 1459   History of diabetes mellitus  0 filed at 02/10/2025 1459   History of COPD 0.0909 filed at 02/10/2025 1459   History of CHF No filed at 02/10/2025 1459   History of MI 0 filed at 02/10/2025 1459   History of Percutaneous Coronary Intervention, Cardiac Surgery, or Angina No filed at 02/10/2025 1459   Hypertension requiring the use of medication  0.0909 filed at 02/10/2025 1459   Peripheral vascular disease 0.0909 filed at 02/10/2025 1459   Impaired sensorium (cognitive impairement or loss, clouding, or delirium) 0 filed at 02/10/2025 1459   TIA or CVA withouy residual deficit 0 filed at 02/10/2025 1459   Cerebrovascular accident with deficit 0 filed at 02/10/2025 1459   Modified Frailty Index Calculator .2727 filed at 02/10/2025 1459          CHADS2 Stroke Risk  Current as of 29 minutes ago        12.5% 3 to 100%: High Risk   2 to < 3%: Medium Risk   0 to < 2%: Low Risk      No Change          This score determines the patient's risk of having a stroke if the patient has atrial fibrillation.          Points Metrics   1 Has Congestive Heart Failure:  Yes     Patients with congestive heart failure get 1 point.    Current as of 29 minutes ago   1 Has Hypertension:  Yes     Patients with hypertension get 1 point.    Current as of 29 minutes ago   1 Age:  79     Patients who are 75 years of age or older get 1 point.    Current as of 29 minutes ago   0 Has Diabetes Excluding Gestational Diabetes:  No     Patients with diabetes get 1 point.    Current as of 29 minutes ago   2 Had Stroke:  No  Had TIA:  No  Had Thromboembolism:  Yes     Patients who have had a stroke, TIA, or thromboembolism get 2 points.    Current as of 29 minutes ago             Revised Cardiac Risk Index      Flowsheet Row Pre-Admission Testing from 2/10/2025 in [facility] Pre-Admission Testing from 6/20/2024 in [facility]   High-Risk Surgery (Intraperitoneal, Intrathoracic,Suprainguinal vascular) 0 filed at 02/10/2025 1459 0 filed at 06/20/2024 1640   History of ischemic heart disease (History of MI, History of positive exercuse test, Current chest paint considered due to myocardial ischemia, Use of nitrate therapy, ECG with pathological Q Waves) 0 filed at 02/10/2025 1459 0 filed at 06/20/2024 1640   History of congestive heart failure (pulmonary edemia, bilateral rales or S3 gallop, Paroxysmal nocturnal dyspnea, CXR showing pulmonary vascular redistribution) 0 filed at 02/10/2025 1459 0 filed at 06/20/2024 1640   History of cerebrovascular disease (Prior TIA or stroke) 0 filed at 02/10/2025 1459 0 filed at 06/20/2024 1640   Pre-operative insulin treatment 0 filed at 02/10/2025 1459 0 filed at 06/20/2024 1640   Pre-operative creatinine>2 mg/dl 0 filed at 02/10/2025 1459 0 filed at 06/20/2024 1640   Revised Cardiac Risk Calculator 0 filed at 02/10/2025 1459 0 filed at 06/20/2024 1640           Apfel Simplified Score      Flowsheet Row Pre-Admission Testing from 2/10/2025 in [facility]   Smoking status 1 filed at 02/10/2025 1459   History of motion sickness or PONV  0 filed at 02/10/2025 1459   Use of postoperative opioids 0 filed at 02/10/2025 1459   Gender - Female 1=Yes filed at 02/10/2025 1459   Apfel Simplified Score Calculator 2 filed at 02/10/2025 1459          Risk Analysis Index Results This Encounter    No data found in the last 10 encounters.       Stop Bang Score      Flowsheet Row Pre-Admission Testing from 2/10/2025 in [facility] Questionnaire Series Submission from 2/4/2025 in [facility] with Generic Provider Myah   Do you snore loudly? 0 filed at 02/10/2025 1420 0  filed at 02/04/2025 0956   Do you often feel tired or fatigued after your sleep? 0 filed at 02/10/2025 1420 0  filed at 02/04/2025 0956   Has anyone ever observed you stop breathing in your sleep? 0 filed at 02/10/2025 1420 0  filed at 02/04/2025 0956   Do you have or are you being treated for high blood pressure? 1 filed at 02/10/2025 1420 1  filed at 02/04/2025 0956   Recent BMI (Calculated) 41.1 filed at 02/10/2025 1420 40.2  filed at 02/04/2025 0956   Is BMI greater than 35 kg/m2? 1=Yes filed at 02/10/2025 1420 1=Yes  filed at 02/04/2025 0956   Age older than 50 years old? 1=Yes filed at 02/10/2025 1420 1=Yes  filed at 02/04/2025 0956   Is your neck circumference greater than 17 inches (Male) or 16 inches (Female)? 0 filed at 02/10/2025 1420 --   Gender - Male 0=No filed at 02/10/2025 1420 0=No  filed at 02/04/2025 0956   STOP-BANG Total Score 3 filed at 02/10/2025 1420 --          Prodigy: High Risk  Total Score: 19              Prodigy Age Score      Prodigy CHF score          ARISCAT Score for Postoperative Pulmonary Complications      Flowsheet Row Pre-Admission Testing from 2/10/2025 in [facility]   Age Calculated Score 3 filed at 02/10/2025 2797   Preoperative  SpO2 8 filed at 02/10/2025 1459   Respiratory infection in the last month Either upper or lower (i.e., URI, bronchitis, pneumonia), with fever and antibiotic treatment 0 filed at 02/10/2025 1459   Preoperative anemia (Hgb less than 10 g/dl) 0 filed at 02/10/2025 1459   Surgical incision  0 filed at 02/10/2025 1459   Duration of surgery  0 filed at 02/10/2025 1459   Emergency Procedure  0 filed at 02/10/2025 1459   ARISCAT Total Score  11 filed at 02/10/2025 1459          Jesse Perioperative Risk for Myocardial Infarction or Cardiac Arrest (XOCHITL)      Flowsheet Row Pre-Admission Testing from 2/10/2025 in [facility]   Calculated Age Score 1.54 filed at 02/10/2025 1500   Functional Status  0 filed at 02/10/2025 1500   ASA Class  -3.29 filed at 02/10/2025 1500   Creatinine 0 filed at 02/10/2025 1500   Type of Procedure  0.21 filed at 02/10/2025 1500   XOCHITL Total Score  -6.79 filed at 02/10/2025 1500   XOCHITL % 0.11 filed at 02/10/2025 1500            Assessment & Plan    Neuro:   No neurologic diagnoses, however, the patient is at an increased risk for post operative delirium secondary to age >/= 65 and decreased functinoal status. Due to pain  Preoperative brain exercise educational handout provided to patient.    The patient is at an increased risk for perioperative stroke secondary to HTN, HLD, and female sex .    HEENT/Airway:   No diagnosis or significant findings on chart review or clinical presentation and evaluation.   STOP-BANG Score- 3 points moderaterisk for SHAYY    Mallampati::  II    TM distance::  >3 FB    Neck ROM::  Full  Dentures-reports upper and lower   Crowns-denies  Implants-denies    Cardiovascular:    -HTN, hx of venous insufficiency - on losartan, spironolactone, torsemide; wears compression stockings, and keeps legs wrapped  -Hx of afib s/p ablation; EKG today normal sinus  -Hx of heart murmur - pt denies any chest pain, shortness of breath, no GEIGER, no syncope/dizziness    METS:  4.9, limited by back pain  RCRI: 0 points, 3.9%    30 day risk of MACE (risk for cardiac death, nonfatal myocardial infarction, and nonfactal cardiac arrest  XOCHITL:   0.11 % risk of intraoperative or 30-day postoperative MACE  EKG -normal sinus, rate 73    Echo 12/21/23  CONCLUSIONS:   1. Left ventricular systolic function is normal with a 55-60% estimated ejection fraction.   2. Slightly elevated RVSP.    Pulmonary:     -Asthma/COPD- no recent exacerbations, on advair, uses albuterol as needed; lungs claer  ARISCAT: <26 points, 1.6% risk of in-hospital postoperative pulmonary complication  PRODIGY: Moderate risk for opioid induced respiratory depression  Smoking History-She quit smoking approximately  30  years ago.  Preoperative deep breathing educational handout provided to patient.    Renal:  No diagnosis or significant findings on chart review or clinical presentation and evaluation, however, the patient is at increased risk of perioperative renal complications secondary to age>/= 56, HTN, and renal insuff (preop creat >1.2 mg/dl). Preventative measures include BP monitoring, medication compliance, and hydration management.   CMP- reviewed from 1/6/25 at baseline    Endocrine:   -Hypothyroidism- on levothyroxine; TSH 10/7/24 WNL    Hematologic:    -Hx of DVT - left lower extremity, on eliquis, managed by Dr. Atkinson    The patient is not a Jehovah’s witness and will accept blood and blood products if medically indicated.   History of previous blood transfusions Yes - with prior surgery  CBC-reviewed from 1/6/25 at baseline    Caprini Score 15, patient at High for postoperative DVT. Pt supplied education/VTE handout  Anticoagulation use: Yes  - eliquis  Preoperative DVT educational handout provided to patient.    Gastrointestinal:     No diagnosis or significant findings on chart review or clinical presentation and evaluation.   Recreational drug use: Drug use No  Alcohol use none    Apfel: 2 points 39% risk for  post operative N/V    Infectious disease:    No diagnosis or significant findings on chart review or clinical presentation and evaluation.       Musculoskeletal:  No diagnosis or significant findings on chart review or clinical presentation and evaluation.    -Crest Syndrome-  on plaquenil and leflunomide     Anesthesia:  ASA 3 - Patient with moderate systemic disease with functional limitations    Scheduled for MAC procedure  History of General anesthesia- yes  Complications- No anesthesia complications  No family history of anesthesia complications    Nickel/metal allergy-negative  Shellfish allergy-negative    Discussed with patient medication instructions, NPO guidelines, and any questions or concerns. Patient does not need further workup prior to preceding with elective surgery based on based on risk assessment.  Patient does need instructions for holding her eliquis prior to the procedure from prescriber.      Pita Cruz PA-C 2/10/2025 5:21 PM        Follow up needed: needs instructions for eliquis

## 2025-02-10 NOTE — PREPROCEDURE INSTRUCTIONS
Medication List            Accurate as of February 10, 2025  2:45 PM. Always use your most recent med list.                acetaminophen 325 mg tablet  Commonly known as: Tylenol  Take 2 tablets (650 mg) by mouth every 4 hours if needed for mild pain (1 - 3) or moderate pain (4 - 6).  Medication Adjustments for Surgery: Take/Use as prescribed     albuterol 2.5 mg /3 mL (0.083 %) nebulizer solution  Take 3 mL (2.5 mg) by nebulization every 2 hours if needed for wheezing.  Medication Adjustments for Surgery: Take/Use as prescribed     apixaban 2.5 mg tablet  Commonly known as: Eliquis  Take 1 tablet (2.5 mg) by mouth 2 times a day.  Additional Medication Adjustments for Surgery: Other (Comment)  Notes to patient: Obtain instructions from prescriber     calcium carbonate 600 mg calcium (1,500 mg) tablet  Additional Medication Adjustments for Surgery: Take last dose 7 days before surgery  Notes to patient: Last dose 2/10/25     cholecalciferol 50 MCG (2000 UT) tablet  Commonly known as: Vitamin D-3  Additional Medication Adjustments for Surgery: Take last dose 7 days before surgery  Notes to patient: Last dose 2/10/25     fluticasone propion-salmeteroL 250-50 mcg/dose diskus inhaler  Commonly known as: Advair Diskus  Medication Adjustments for Surgery: Take/Use as prescribed     gabapentin 400 mg capsule  Commonly known as: Neurontin  Take 1 capsule (400 mg) by mouth 3 times a day.  Medication Adjustments for Surgery: Take/Use as prescribed     hydrocortisone 2.5 % cream  Apply topically if needed for irritation or rash. Use maximum 2-3 times a week.  Medication Adjustments for Surgery: Do Not take on the morning of surgery     hydroxychloroquine 200 mg tablet  Commonly known as: Plaquenil  Take 1 tablet (200 mg) by mouth 2 times a day.  Medication Adjustments for Surgery: Take/Use as prescribed     leflunomide 10 mg tablet  Commonly known as: Arava  Medication Adjustments for Surgery: Take/Use as prescribed      levothyroxine 25 mcg tablet  Commonly known as: Synthroid  Take 1 tablet (25 mcg) by mouth once daily in the morning. Take before meals.  Medication Adjustments for Surgery: Take/Use as prescribed     losartan 100 mg tablet  Commonly known as: Cozaar  Medication Adjustments for Surgery: Take last dose 1 day (24 hours) before surgery  Notes to patient: Last dose 2/17/25 MORNING     multivitamin capsule  Additional Medication Adjustments for Surgery: Take last dose 7 days before surgery  Notes to patient: Last dose 2/10/25     PROBIOTIC (B. COAGULANS) ORAL  Additional Medication Adjustments for Surgery: Take last dose 7 days before surgery  Notes to patient: Last dose 2/10/25     Prolia 60 mg/mL syringe  Generic drug: denosumab  Provider to inject 1 mL (60 mg total) under the skin every 6 months.  For  ONLY.  Medication Adjustments for Surgery: Take/Use as prescribed     spironolactone 25 mg tablet  Commonly known as: Aldactone  Medication Adjustments for Surgery: Take/Use as prescribed     torsemide 20 mg tablet  Commonly known as: Demadex  Medication Adjustments for Surgery: Take/Use as prescribed                        Thank you for visiting [facility].  If you have any changes to your health condition, please call the surgeons office to alert them and give them details of your symptoms.    Pita Cruz PA-C  P: [phone]  Department of Anesthesiology and Perioperative Medicine  --    Preoperative Fasting Guidelines    Why must I stop eating and drinking near surgery time?  With sedation, food or liquid in your stomach can enter your lungs causing serious complications  Increases nausea and vomiting    When do I need to stop eating and drinking before my surgery?  Do not eat any food after midnight the night before your surgery/procedure.  You may have up to 13.5 ounces of clear liquid until TWO hours before your instructed arrival time to the hospital.  This  includes water, black tea/coffee, (no milk or cream) apple juice, and electrolyte drinks (Gatorade)  You may chew gum until TWO hours before your surgery/procedure              Preoperative Brain Exercises    What are brain exercises?  A brain exercise is any activity that engages your thinking (cognitive) skills.    What types of activities are considered brain exercises?  Jigsaw puzzles, crossword puzzles, word jumble, memory games, word search, and many more.  Many can be found free online or on your phone via a mobile emelyn.    Why should I do brain exercises before my surgery?  More recent research has shown brain exercise before surgery can lower the risk of postoperative delirium (confusion) which can be especially important for older adults.  Patients who did brain exercises for 5 to 10 hours the days before surgery, cut their risk of postoperative delirium in half up to 1 week after surgery.                  [facility]    Preoperative Deep Breathing Exercises    Why it is important to do deep breathing exercises before my surgery?  Deep breathing exercises strengthen your breathing muscles.  This helps you to recover after your surgery and decreases the chance of breathing complications.      How are the deep breathing exercises done?  Sit straight with your back supported.  Breathe in deeply and slowly through your nose. Your lower rib cage should expand and your abdomen may move forward.  Hold that breath for 3 to 5 seconds.  Breathe out through pursed lips, slowly and completely.  Rest and repeat 10 times every hour while awake.  Rest longer if you become dizzy or lightheaded.      Patient Information: Incentive Spirometer  What is an incentive spirometer?  An incentive spirometer is a device used before and after surgery to “exercise” your lungs.  It helps you to take deeper breaths to expand your lungs.  Below is an example of a basic incentive spirometer.  The device you  receive may differ slightly but they all function the same.    Why do I need to use an incentive spirometer?  Using your incentive spirometer prepares your lungs for surgery and helps prevent lung problems after surgery.  How do I use my incentive spirometer?  When you're using your incentive spirometer, make sure to breathe through your mouth. If you breathe through your nose, the incentive spirometer won't work properly. You can hold your nose if you have trouble.  If you feel dizzy at any time, stop and rest. Try again at a later time.  Follow the steps below:  Set up your incentive spirometer, expand the flexible tubing and connect to the outlet.  Sit upright in a chair or bed. Hold the incentive spirometer at eye level.   Put the mouthpiece in your mouth and close your lips tightly around it. Slowly breathe out (exhale) completely.  Breathe in (inhale) slowly through your mouth as deeply as you can. As you take a breath, you will see the piston rise inside the large column. While the piston rises, the indicator should move upwards. It should stay in between the 2 arrows (see Figure).  Try to get the piston as high as you can, while keeping the indicator between the arrows.   If the indicator doesn't stay between the arrows, you're breathing either too fast or too slow.  When you get it as high as you can, hold your breath for 10 seconds, or as long as possible. While you're holding your breath, the piston will slowly fall to the base of the spirometer.  Once the piston reaches the bottom of the spirometer, breathe out slowly through your mouth. Rest for a few seconds.  Repeat 10 times. Try to get the piston to the same level with each breath.  Repeat every hour while awake  You can carefully clean the outside of the mouthpiece with an alcohol wipe or soap and water.            Patient and Family Education             Ways You Can Help Prevent Blood Clots             This handout explains some simple things you  can do to help prevent blood clots.      Blood clots are blockages that can form in the body's veins. When a blood clot forms in your deep veins, it may be called a deep vein thrombosis, or DVT for short. Blood clots can happen in any part of the body where blood flows, but they are most common in the arms and legs. If a piece of a blood clot breaks free and travels to the lungs, it is called a pulmonary embolus (PE). A PE can be a very serious problem.         Being in the hospital or having surgery can raise your chances of getting a blood clot because you may not be well enough to move around as much as you normally do.         Ways you can help prevent blood clots in the hospital         Wearing SCDs. SCDs stands for Sequential Compression Devices.   SCDs are special sleeves that wrap around your legs  They attach to a pump that fills them with air to gently squeeze your legs every few minutes.   This helps return the blood in your legs to your heart.   SCDs should only be taken off when walking or bathing.   SCDs may not be comfortable, but they can help save your life.               Wearing compression stockings - if your doctor orders them. These special snug fitting stockings gently squeeze your legs to help blood flow.       Walking. Walking helps move the blood in your legs.   If your doctor says it is ok, try walking the halls at least   5 times a day. Ask us to help you get up, so you don't fall.      Taking any blood thinning medicines your doctor orders.             [facility]; 3/23       Ways you can help prevent blood clots at home       Wearing compression stockings - if your doctor orders them. ? Walking - to help move the blood in your legs.       Taking any blood thinning medicines your doctor orders.      Signs of a blood clot or PE      Tell your doctor or nurse know right away if you have of the problems listed below.    If you are at home, seek medical care right away. Call 911 for  chest pain or problems breathing.               Signs of a blood clot (DVT) - such as pain,  swelling, redness or warmth in your arm or leg      Signs of a pulmonary embolism (PE) - such as chest     pain or feeling short of breath           The Week before Surgery        Seven days before Surgery  Check your [facility] medication instructions  Do the exercises provided to you by [facility]   Arrange for a responsible, adult licensed  to take you home after surgery and stay with you for 24 hours.  You will not be permitted to drive yourself home if you have received any anesthetic/sedation  Six days before surgery  Check your [facility] medication instructions  Do the exercises provided to you by [facility]   Start using Chlorhexidene (CHG) body wash if prescribed  Five days before surgery  Check your [facility] medication instructions  Do the exercises provided to you by [facility]   Continue to use CHG body wash if prescribed  Three days before surgery  Check your [facility] medication instructions  Do the exercises provided to you by [facility]   Continue to use CHG body wash if prescribed  Two days before surgery  Check your [facility] medication instructions  Do the exercises provided to you by [facility]   Continue to use CHG body wash if prescribed    The Day before Surgery       Check your [facility] medication and all other [facility] instructions including when to stop eating and drinking  You will be called with your arrival time for surgery in the late afternoon.  If you do not receive a call please reach out to your surgeon's office.  Do not smoke or drink 24 hours before surgery  Prepare items to bring with you to the hospital  Shower with your chlorhexidine wash if prescribed  Brush your teeth and use your chlorhexidine dental rinse if prescribed    The Day of Surgery       Check your [facility] medication instructions  Ensure you follow the instructions for when to stop eating and drinking  Shower, if prescribed use CHG.  Do not apply any lotions, creams, moisturizers, perfume or  deodorant  Brush your teeth and use your CHG dental rinse if prescribed  Wear loose comfortable clothing  Avoid make-up  Remove  jewelry and piercings, consider professional piercing removal with a plastic spacer if needed  Bring photo ID and Insurance card  Bring an accurate medication list that includes medication dose, frequency and allergies  Bring a copy of your advanced directives (will, health care power of )  Bring any devices and controllers as well as medical devices you have been provided with for surgery (CPAP, slings, braces, etc.)  Dentures, eyeglasses, and contacts will be removed before surgery, please bring cases for contacts or glasses

## 2025-02-15 LAB — CALCIUM SERPL-MCNC: 8.9 MG/DL (ref 8.6–10.4)

## 2025-02-18 ENCOUNTER — PHARMACY VISIT (OUTPATIENT)
Dept: PHARMACY | Facility: CLINIC | Age: 80
End: 2025-02-18
Payer: COMMERCIAL

## 2025-02-18 ENCOUNTER — ANESTHESIA (OUTPATIENT)
Dept: OPERATING ROOM | Facility: HOSPITAL | Age: 80
End: 2025-02-18
Payer: MEDICARE

## 2025-02-18 ENCOUNTER — APPOINTMENT (OUTPATIENT)
Dept: RADIOLOGY | Facility: HOSPITAL | Age: 80
End: 2025-02-18
Payer: MEDICARE

## 2025-02-18 ENCOUNTER — HOSPITAL ENCOUNTER (OUTPATIENT)
Facility: HOSPITAL | Age: 80
Setting detail: OUTPATIENT SURGERY
Discharge: HOME | End: 2025-02-18
Attending: ANESTHESIOLOGY | Admitting: ANESTHESIOLOGY
Payer: MEDICARE

## 2025-02-18 ENCOUNTER — ANESTHESIA EVENT (OUTPATIENT)
Dept: OPERATING ROOM | Facility: HOSPITAL | Age: 80
End: 2025-02-18
Payer: MEDICARE

## 2025-02-18 VITALS
HEART RATE: 72 BPM | RESPIRATION RATE: 16 BRPM | OXYGEN SATURATION: 96 % | TEMPERATURE: 97.5 F | DIASTOLIC BLOOD PRESSURE: 66 MMHG | WEIGHT: 225 LBS | SYSTOLIC BLOOD PRESSURE: 142 MMHG | BODY MASS INDEX: 41.15 KG/M2

## 2025-02-18 DIAGNOSIS — G60.9 IDIOPATHIC PERIPHERAL NEUROPATHY: Primary | ICD-10-CM

## 2025-02-18 DIAGNOSIS — G89.18 POSTOPERATIVE PAIN: ICD-10-CM

## 2025-02-18 DIAGNOSIS — Z79.2 NEED FOR ANTIBIOTIC PROPHYLAXIS FOR SURGICAL PROCEDURE: ICD-10-CM

## 2025-02-18 PROBLEM — I73.9 PERIPHERAL VASCULAR DISEASE (CMS-HCC): Status: ACTIVE | Noted: 2025-02-18

## 2025-02-18 PROBLEM — I50.9 CHF (CONGESTIVE HEART FAILURE): Status: ACTIVE | Noted: 2025-02-18

## 2025-02-18 PROBLEM — G89.4 CHRONIC PAIN SYNDROME: Status: ACTIVE | Noted: 2025-02-18

## 2025-02-18 PROCEDURE — 2500000004 HC RX 250 GENERAL PHARMACY W/ HCPCS (ALT 636 FOR OP/ED): Performed by: ANESTHESIOLOGIST ASSISTANT

## 2025-02-18 PROCEDURE — 99100 ANES PT EXTEME AGE<1 YR&>70: CPT | Performed by: ANESTHESIOLOGY

## 2025-02-18 PROCEDURE — 2720000007 HC OR 272 NO HCPCS: Performed by: ANESTHESIOLOGY

## 2025-02-18 PROCEDURE — 7100000010 HC PHASE TWO TIME - EACH INCREMENTAL 1 MINUTE: Performed by: ANESTHESIOLOGY

## 2025-02-18 PROCEDURE — C1713 ANCHOR/SCREW BN/BN,TIS/BN: HCPCS | Performed by: ANESTHESIOLOGY

## 2025-02-18 PROCEDURE — 7100000001 HC RECOVERY ROOM TIME - INITIAL BASE CHARGE: Performed by: ANESTHESIOLOGY

## 2025-02-18 PROCEDURE — 2780000003 HC OR 278 NO HCPCS: Performed by: ANESTHESIOLOGY

## 2025-02-18 PROCEDURE — 3700000002 HC GENERAL ANESTHESIA TIME - EACH INCREMENTAL 1 MINUTE: Performed by: ANESTHESIOLOGY

## 2025-02-18 PROCEDURE — 7100000002 HC RECOVERY ROOM TIME - EACH INCREMENTAL 1 MINUTE: Performed by: ANESTHESIOLOGY

## 2025-02-18 PROCEDURE — RXMED WILLOW AMBULATORY MEDICATION CHARGE

## 2025-02-18 PROCEDURE — 2500000005 HC RX 250 GENERAL PHARMACY W/O HCPCS: Performed by: ANESTHESIOLOGY

## 2025-02-18 PROCEDURE — 63685 INS/RPLC SPI NPG/RCVR POCKET: CPT | Performed by: ANESTHESIOLOGY

## 2025-02-18 PROCEDURE — 3600000009 HC OR TIME - EACH INCREMENTAL 1 MINUTE - PROCEDURE LEVEL FOUR: Performed by: ANESTHESIOLOGY

## 2025-02-18 PROCEDURE — 2500000004 HC RX 250 GENERAL PHARMACY W/ HCPCS (ALT 636 FOR OP/ED): Performed by: ANESTHESIOLOGY

## 2025-02-18 PROCEDURE — 7100000009 HC PHASE TWO TIME - INITIAL BASE CHARGE: Performed by: ANESTHESIOLOGY

## 2025-02-18 PROCEDURE — 3600000004 HC OR TIME - INITIAL BASE CHARGE - PROCEDURE LEVEL FOUR: Performed by: ANESTHESIOLOGY

## 2025-02-18 PROCEDURE — 63650 IMPLANT NEUROELECTRODES: CPT | Performed by: ANESTHESIOLOGY

## 2025-02-18 PROCEDURE — 95972 ALYS CPLX SP/PN NPGT W/PRGRM: CPT | Performed by: ANESTHESIOLOGY

## 2025-02-18 PROCEDURE — 3700000001 HC GENERAL ANESTHESIA TIME - INITIAL BASE CHARGE: Performed by: ANESTHESIOLOGY

## 2025-02-18 PROCEDURE — 2500000001 HC RX 250 WO HCPCS SELF ADMINISTERED DRUGS (ALT 637 FOR MEDICARE OP): Performed by: ANESTHESIOLOGY

## 2025-02-18 PROCEDURE — A63650 PR PERCUT IMPLNT NEUROELECT,EPIDURAL: Performed by: ANESTHESIOLOGY

## 2025-02-18 PROCEDURE — A63650 PR PERCUT IMPLNT NEUROELECT,EPIDURAL: Performed by: ANESTHESIOLOGIST ASSISTANT

## 2025-02-18 DEVICE — IMPLANTABLE DEVICE: Type: IMPLANTABLE DEVICE | Site: BACK | Status: FUNCTIONAL

## 2025-02-18 RX ORDER — ALBUTEROL SULFATE 0.83 MG/ML
2.5 SOLUTION RESPIRATORY (INHALATION) ONCE AS NEEDED
Status: DISCONTINUED | OUTPATIENT
Start: 2025-02-18 | End: 2025-02-18 | Stop reason: HOSPADM

## 2025-02-18 RX ORDER — HYDRALAZINE HYDROCHLORIDE 20 MG/ML
5 INJECTION INTRAMUSCULAR; INTRAVENOUS EVERY 30 MIN PRN
Status: DISCONTINUED | OUTPATIENT
Start: 2025-02-18 | End: 2025-02-18 | Stop reason: HOSPADM

## 2025-02-18 RX ORDER — FENTANYL CITRATE 50 UG/ML
INJECTION, SOLUTION INTRAMUSCULAR; INTRAVENOUS AS NEEDED
Status: DISCONTINUED | OUTPATIENT
Start: 2025-02-18 | End: 2025-02-18

## 2025-02-18 RX ORDER — BUPIVACAINE HYDROCHLORIDE 2.5 MG/ML
INJECTION, SOLUTION EPIDURAL; INFILTRATION; INTRACAUDAL AS NEEDED
Status: DISCONTINUED | OUTPATIENT
Start: 2025-02-18 | End: 2025-02-18 | Stop reason: HOSPADM

## 2025-02-18 RX ORDER — SODIUM CHLORIDE, SODIUM LACTATE, POTASSIUM CHLORIDE, CALCIUM CHLORIDE 600; 310; 30; 20 MG/100ML; MG/100ML; MG/100ML; MG/100ML
100 INJECTION, SOLUTION INTRAVENOUS CONTINUOUS
Status: DISCONTINUED | OUTPATIENT
Start: 2025-02-18 | End: 2025-02-18 | Stop reason: HOSPADM

## 2025-02-18 RX ORDER — MIDAZOLAM HYDROCHLORIDE 1 MG/ML
INJECTION, SOLUTION INTRAMUSCULAR; INTRAVENOUS AS NEEDED
Status: DISCONTINUED | OUTPATIENT
Start: 2025-02-18 | End: 2025-02-18

## 2025-02-18 RX ORDER — HYDROCODONE BITARTRATE AND ACETAMINOPHEN 5; 325 MG/1; MG/1
1 TABLET ORAL 3 TIMES DAILY PRN
Qty: 9 TABLET | Refills: 0 | Status: SHIPPED | OUTPATIENT
Start: 2025-02-18 | End: 2025-02-21

## 2025-02-18 RX ORDER — PROPOFOL 10 MG/ML
INJECTION, EMULSION INTRAVENOUS CONTINUOUS PRN
Status: DISCONTINUED | OUTPATIENT
Start: 2025-02-18 | End: 2025-02-18

## 2025-02-18 RX ORDER — HYDROMORPHONE HYDROCHLORIDE 0.2 MG/ML
0.2 INJECTION INTRAMUSCULAR; INTRAVENOUS; SUBCUTANEOUS EVERY 5 MIN PRN
Status: DISCONTINUED | OUTPATIENT
Start: 2025-02-18 | End: 2025-02-18 | Stop reason: HOSPADM

## 2025-02-18 RX ORDER — LIDOCAINE HYDROCHLORIDE 10 MG/ML
0.1 INJECTION, SOLUTION INFILTRATION; PERINEURAL ONCE
Status: DISCONTINUED | OUTPATIENT
Start: 2025-02-18 | End: 2025-02-18 | Stop reason: HOSPADM

## 2025-02-18 RX ORDER — CLINDAMYCIN PHOSPHATE 900 MG/50ML
INJECTION, SOLUTION INTRAVENOUS AS NEEDED
Status: DISCONTINUED | OUTPATIENT
Start: 2025-02-18 | End: 2025-02-18

## 2025-02-18 RX ORDER — HYDROMORPHONE HYDROCHLORIDE 0.2 MG/ML
0.1 INJECTION INTRAMUSCULAR; INTRAVENOUS; SUBCUTANEOUS EVERY 5 MIN PRN
Status: DISCONTINUED | OUTPATIENT
Start: 2025-02-18 | End: 2025-02-18 | Stop reason: HOSPADM

## 2025-02-18 RX ORDER — HYDROCODONE BITARTRATE AND ACETAMINOPHEN 5; 325 MG/1; MG/1
1 TABLET ORAL ONCE
Status: COMPLETED | OUTPATIENT
Start: 2025-02-18 | End: 2025-02-18

## 2025-02-18 RX ORDER — ONDANSETRON HYDROCHLORIDE 2 MG/ML
4 INJECTION, SOLUTION INTRAVENOUS ONCE AS NEEDED
Status: DISCONTINUED | OUTPATIENT
Start: 2025-02-18 | End: 2025-02-18 | Stop reason: HOSPADM

## 2025-02-18 RX ORDER — CLINDAMYCIN HYDROCHLORIDE 300 MG/1
300 CAPSULE ORAL 3 TIMES DAILY
Qty: 15 CAPSULE | Refills: 0 | Status: SHIPPED | OUTPATIENT
Start: 2025-02-18 | End: 2025-02-23

## 2025-02-18 RX ADMIN — FENTANYL CITRATE 25 MCG: 50 INJECTION, SOLUTION INTRAMUSCULAR; INTRAVENOUS at 14:46

## 2025-02-18 RX ADMIN — PROPOFOL 20 MG: 10 INJECTION, EMULSION INTRAVENOUS at 14:50

## 2025-02-18 RX ADMIN — FENTANYL CITRATE 50 MCG: 50 INJECTION, SOLUTION INTRAMUSCULAR; INTRAVENOUS at 13:18

## 2025-02-18 RX ADMIN — FENTANYL CITRATE 25 MCG: 50 INJECTION, SOLUTION INTRAMUSCULAR; INTRAVENOUS at 14:04

## 2025-02-18 RX ADMIN — FENTANYL CITRATE 25 MCG: 50 INJECTION, SOLUTION INTRAMUSCULAR; INTRAVENOUS at 13:30

## 2025-02-18 RX ADMIN — HYDROCODONE BITARTRATE AND ACETAMINOPHEN 1 TABLET: 5; 325 TABLET ORAL at 16:16

## 2025-02-18 RX ADMIN — CLINDAMYCIN IN 5 PERCENT DEXTROSE 900 MG: 18 INJECTION, SOLUTION INTRAVENOUS at 13:17

## 2025-02-18 RX ADMIN — FENTANYL CITRATE 25 MCG: 50 INJECTION, SOLUTION INTRAMUSCULAR; INTRAVENOUS at 13:35

## 2025-02-18 RX ADMIN — FENTANYL CITRATE 25 MCG: 50 INJECTION, SOLUTION INTRAMUSCULAR; INTRAVENOUS at 14:49

## 2025-02-18 RX ADMIN — PROPOFOL 50 MCG/KG/MIN: 10 INJECTION, EMULSION INTRAVENOUS at 13:18

## 2025-02-18 RX ADMIN — FENTANYL CITRATE 25 MCG: 50 INJECTION, SOLUTION INTRAMUSCULAR; INTRAVENOUS at 14:39

## 2025-02-18 RX ADMIN — MIDAZOLAM 2 MG: 1 INJECTION INTRAMUSCULAR; INTRAVENOUS at 13:18

## 2025-02-18 SDOH — HEALTH STABILITY: MENTAL HEALTH: CURRENT SMOKER: 0

## 2025-02-18 ASSESSMENT — ENCOUNTER SYMPTOMS
EYES NEGATIVE: 1
HEMATOLOGIC/LYMPHATIC NEGATIVE: 1
WEAKNESS: 1
BACK PAIN: 1
RESPIRATORY NEGATIVE: 1
PSYCHIATRIC NEGATIVE: 1
CARDIOVASCULAR NEGATIVE: 1
ENDOCRINE NEGATIVE: 1
CONSTITUTIONAL NEGATIVE: 1
GASTROINTESTINAL NEGATIVE: 1

## 2025-02-18 ASSESSMENT — PAIN - FUNCTIONAL ASSESSMENT
PAIN_FUNCTIONAL_ASSESSMENT: 0-10

## 2025-02-18 ASSESSMENT — PAIN DESCRIPTION - DESCRIPTORS
DESCRIPTORS: ACHING
DESCRIPTORS: ACHING;THROBBING
DESCRIPTORS: ACHING;DISCOMFORT

## 2025-02-18 ASSESSMENT — PAIN SCALES - GENERAL
PAINLEVEL_OUTOF10: 5 - MODERATE PAIN
PAINLEVEL_OUTOF10: 7
PAINLEVEL_OUTOF10: 3
PAINLEVEL_OUTOF10: 0 - NO PAIN
PAINLEVEL_OUTOF10: 0 - NO PAIN
PAIN_LEVEL: 3

## 2025-02-18 NOTE — H&P
PAIN MANAGEMENT H&P    Date of Service: 2/18/2025  SUBJECTIVE    CHIEF COMPLAINT: BLE pain    HISTORY OF PRESENT ILLNESS    Genesis Garcia is a 79 y.o. female with PMH HTN, DVT on ELIQUIS (holding), hypothyroidism, CKD, asthma, obesity, L4-S1 fusion, CREST syndrome, VCF s/p T12/L1 kypho  who presents for [facility] impant    Pain and overall medical condition unchanged from previous visit. Pt is appropriately NPO. Pt denies new-onset numbness, weakness, bowel/bladder incontinence.  Pt denies recent infection/abx use, allergy to Latex/iodine/contrast. Patient is currently taking the following blood thinner(s): ELIQUIS (held 3 d)    REVIEW OF SYSTEMS  Review of Systems   Constitutional: Negative.    HENT: Negative.     Eyes: Negative.    Respiratory: Negative.     Cardiovascular: Negative.    Gastrointestinal: Negative.    Endocrine: Negative.    Musculoskeletal:  Positive for back pain.   Skin: Negative.    Neurological:  Positive for weakness.   Hematological: Negative.    Psychiatric/Behavioral: Negative.         PAST MEDICAL HISTORY  Past Medical History:   Diagnosis Date    Anemia     Ankle fracture     left ankle fracture    Arthritis 1977    Cellulitis, unspecified 08/24/2020    Cellulitis of skin    Chronic venous insufficiency     CKD (chronic kidney disease), stage III (Multi)     COPD (chronic obstructive pulmonary disease) (Multi)     Essential (primary) hypertension 01/04/2023    Hypertension    H/O deep venous thrombosis     Hernia, internal 2022    History of blood transfusion     Hypothyroidism     Low back pain     Lymphedema     Osteoporosis     Other fracture of upper and lower end of unspecified fibula, initial encounter for closed fracture 05/21/2021    Fracture of distal fibula    Other specified soft tissue disorders 09/19/2022    Leg swelling    Peripheral neuropathy     Personal history of (healed) stress fracture 06/26/2021    History of stress fracture    Personal history of other  diseases of the circulatory system     History of atrial fibrillation    Personal history of other diseases of the musculoskeletal system and connective tissue 10/20/2016    History of arthritis    Plantar fasciitis     Rotator cuff syndrome     Undifferentiated connective tissue disease (Multi)     Unspecified asthma, uncomplicated (HHS-HCC) 12/01/2022    Asthma    Venous ulcer of leg (Multi)     Vitamin D deficiency, unspecified 10/20/2016    Vitamin D deficiency     Past Surgical History:   Procedure Laterality Date    BACK SURGERY  06/20/2016    Back Surgery L4-L5 fusion    CARDIAC ELECTROPHYSIOLOGY STUDY AND ABLATION      CHOLECYSTECTOMY  04/15/2016    Cholecystectomy    EYE SURGERY  2018    HERNIA REPAIR      JOINT REPLACEMENT  2007, 2017, 2022    OTHER SURGICAL HISTORY  05/03/2021    Cataract surgery    SPINE SURGERY  2001    THYROIDECTOMY, PARTIAL      d/t goiter    TOTAL HIP ARTHROPLASTY Right     TOTAL KNEE ARTHROPLASTY Bilateral 12/01/2022    Knee Replacement    VERTEBROPLASTY       Family History   Problem Relation Name Age of Onset    Diabetes Mother mom         type 2    Heart disease Father dakota     Hypertension Father dakota     Cancer Sister svetlana     Clotting disorder Sister svetlana     Cancer Daughter dayne gishnock     Arthritis Sister svetlana pedley     Arthritis Sister laya penn     Clotting disorder Sister svetlana     Cancer Daughter dayne gishnock     Multiple sclerosis Daughter dayne     Arthritis Sister svetlana pedley     Arthritis Sister laya penn     Cancer Sister svetlana     Cancer Daughter dayne gishnock     Cancer Sister svetlana     Arthritis Sister svetlana pedley     Arthritis Sister laya penn     Cancer Sister svetlana     Clotting disorder Sister svetlana     Cancer Daughter dayne gishnock     Multiple sclerosis Daughter dayne     Arthritis Other laya penn     Cancer Other Svetlana     Cancer Other Perla        CURRENT MEDICATIONS  No current facility-administered medications for this encounter.  "      ALLERGIES AND DRUG REACTIONS  Allergies   Allergen Reactions    Sulfur Hives    Cefprozil Hives    Sulfa (Sulfonamide Antibiotics) Hives          OBJECTIVE  Visit Vitals  /87   Pulse 88   Temp 36.7 °C (98.1 °F) (Temporal)   Resp 17   Wt 102 kg (225 lb)   SpO2 96%   BMI 41.15 kg/m²   OB Status Postmenopausal   Smoking Status Former   BSA 2.11 m²     General: Lying comfortably in bed, NAD  Head: NCAT  Eyes: Sclera/conjunctiva clear, EOMI, PERRL  Nose/mouth: MMM  CV: Good distal pulses  Lungs: Good/equal chest excursion  Abdomen: Soft, ND  Ext: No cyanosis/edema  MSK: Able to move extremities  Neuro: AAOx3, grossly normal  Psych: affect nl      REVIEW OF LABORATORY DATA  I have reviewed the following lab results:  No results found for: \"WBC\", \"RBC\", \"HGB\", \"HCT\", \"MCV\", \"MCH\", \"MCHC\", \"RDW\", \"PLT\", \"MPV\"  No results found for: \"NA\", \"K\", \"CO2\", \"BUN\", \"CALCIUM\"  No results found for: \"PROTIME\", \"PTT\", \"INR\", \"FIBRINOGEN\"      REVIEW OF RADIOLOGY DATA  I have reviewed the following:  Radiology Studies           CT T-spine 7/25/24:   mild T12-L1 stenosis      ASSESSMENT & PLAN  Genesis Garcia is a 79 y.o. female with PMH HTN, DVT on ELIQUIS (holding 3 d), hypothyroidism, CKD, asthma, obesity, L4-S1 fusion, CREST syndrome, VCF s/p T12/L1 kypho  who presents for [employer] [facility] impant     1) Peripheral neuropathy  -BL foot neuropathy since ~2019, idiopathic  -Refractive to yrs of conservative tx including Tylenol, NSAIDs, gabapentin, Qutenza  -No known DM. Nl B12, folate  -s/p Abbott SCS trial 1/9/25: >50% relief. Improved stand/walking tolerance. Improved sleep.  -Mcknight SCS implant today appropriately off Eliquis to target pain generator minimize risk/likelihood of chronic opioid use and/or surgery  -F/U 2 w          Discussed procedure risks/benefits in detail with patient. Pt meets medical necessity for procedure due to failure of conservative measures. Reviewed procedural risks including bleeding, " infection, nerve damage, paralysis. Also reviewed mitigating factors such as screening for infection/blood thinner use, sterile precautions, and image-guidance when applicable. All questions answered. Pt/guardian expressed understanding and choose to proceed            Faith Reese MD  Anesthesiologist & Interventional Pain Physician   [facility] [place]  O: [phone]  F: [phone]  11:22 AM  02/18/25

## 2025-02-18 NOTE — OP NOTE
Spinal Cord Stimulator Implant Operative Note     Date: 2025  OR Location: [facility]    Name: Genesis Garcia, : 1945, Age: 79 y.o., MRN: [id number], Sex: female    Diagnosis  Pre-op Diagnosis      * Idiopathic peripheral neuropathy [G60.9] Post-op Diagnosis     * Idiopathic peripheral neuropathy [G60.9]     Procedures  Spinal Cord Stimulator Implant  99616 - SC PRQ IMPLTJ NSTIM ELECTRODE ARRAY EPIDURAL    Spinal Cord Stimulator Implant  37658 - SC PRQ IMPLTJ NSTIM ELECTRODE ARRAY EPIDURAL    SC INSJ/RPLCMT SPINAL NPG/RCVR POCKET CRTJ&CONNJ [74334]  SC ELEC MARIA ESTHER IMPLT NPGT CPLX SP/PN PRGRMG [58003]  Surgeons      * Faith Reese - Primary    Resident/Fellow/Other Assistant:  Surgeons and Role:  * No surgeons found with a matching role *    Staff:   Amyulator: Edmundo Chiang Person: Doug    Anesthesia Staff: Anesthesiologist: Marco Pablo MD; Mayo Loco MD  CRNA: DENISA Bro-CRNA  C-AA: KAVYA Sargent    Procedure Summary  Anesthesia: Monitor Anesthesia Care  ASA: IV  Estimated Blood Loss: 5 mL  Intra-op Medications: Administrations occurring from 25 1617 to 25 1617:  * No intraprocedure medications in log *           Anesthesia Record               Intraprocedure I/O Totals          Intake    Propofol Drip 0.00 mL    The total shown is the total volume documented since Anesthesia Start was filed.    clindamycin 900 mg/50 mL 50.00 mL    Total Intake 50 mL          Specimen: No specimens collected              Drains and/or Catheters: * None in log *    Tourniquet Times:         Implants:  Implants       Type Name Action Serial No.       ZANDER MODEL S8, MONOPOLAR SURG STIMULATING PROBE .55, DISPOSABLE (EA) Implanted       ZANDER MODEL S8, MONOPOLAR SURG STIMULATING PROBE .55, DISPOSABLE (EA) Implanted      Spinal Hardware ANCHOR, LEAD, SINGH LOCK - [id number] Implanted       ETERNA SCS IMPLANTABLE PULSE GENERATOR W/ [employer] AND BURSTDR  STIMULATION Implanted               SCS DUAL LEAD+ IPG  IMPLANT PROCEDURE NOTE     PREPROCEDURAL DIAGNOSES: idiopathic peripheral neuropathy  POSTPROCEDURAL  DIAGNOSES: idiopathic peripheral neuropathy    NAME OF PROCEDURE:           1.    Bilateral Percutaneous placement of spinal cord stimulator electrode array for implantation of spinal cord stimulator.   2.    Implantation of programmable Internal Pulse Generator (IPG)     INDICATION FOR PROCEDURE:     Chronic R>LLE as well as back pain refractory to medical and interventional therapies, highly successful spinal cord stimulation trial for ~1 week, cleared by psychologist with behavioral evaluation     ANESTHESIA: MAC   FLUIDS: 500 ml LR   BLOOD LOSS: 5 mL     IMPLANTS:   60 cm 8-contact lead  60 cm 8-contact lead  Mcknight Eterna IPG     COMPLICATIONS: None     LOCATION:  [facility]    INFORMED CONSENT: The potential benefits, procedural risks and adverse effects of spinal cord stimulator implantation were discussed with the patient. Patient was told that severe complications such as nerve damage or spinal cord injury resulting in lower extremity weakness numbness, spasticity and that the potential benefit of this procedure would be decreased pain.  After reviewing the procedure with the patient, informed consent to proceed with the placement of spinal cord stimulation lead was obtained.       DESCRIPTION OF PROCEDURE:       The patient was given 900 mg clindamycin (preoperative antibiotics) approximately 30 minutes prior to incision.  The patient was placed prone on the operating room table and pressure points were padded. The patient's back was then prepped with chlorhexidine prep and and draped in a sterile fashion.  Anesthesia personnel were present to provide MAC anesthesia.     Under direct fluoroscopic guidance, thoracic and lumbar spine vertebral segments were identified in AP and lateral views.  Local anesthesia with 10 cc 1% lidocaine with epi was  injected subcutaneously at the T11-12 level. A vertical incision approximately 4 cm in length was made using a #15-blade.  Using Metzenbaum scissors and blunt dissection, the tissues were dissected down to the supraspinous ligaments/fascia, hemostasis was maintained with Bovie.  A 14-gauge, 4-inch epidural needle from the Abbott kit was then placed via left paramedian approach for loss of resistance at the T11-12 interspace using ADELITA to preservative-free saline.  Loss of resistance obtained at T11-12 w/o issue.  The stimulating electrode lead was then passed easily through the epidural needle into the epidural space in the lateral fluoroscopic view.  Its terminal position was at middle of T8 on the left side.       In a similar fashion, a second 14-gauge epidural needle was placed via left paramedian approach and loss of resistance at T11-12 level.  Once loss of resistance was confirmed, a second stimulating electrode lead was passed through this needle into the epidural space to the level of top of T9 on the right side. The patient reported no pain or paresthesia during electrode passage through the epidural space.     The stylets were removed from the electrodes and the electrodes were connected to a Ariisto[employer] power source with the assistance of the device representative in the operating room.  Impedences were evaluated and within expected limits.  The patient was awoken from anesthesia and confirmed adequate pain coverage.     With this optimal stimulation, the needles were pulled off the electrodes under live fluoroscopic guidance, to maintain positioning of the left and right electrode at the levels of mid T8 and top of T9 respectively.  Anchors were passed over the electrodes and secured to the electrodes using 2.0 Ethibond.  There was no lead migration during this portion of the procedure.      Next, the pocket for the IPG was made in the patient's right flank region after the skin was anesthetized with 0 cc  1% lidocaine with epi.   A #15 blade was used to make a 4 cm horizontal incision here.  Using Metzenbaum scissors and blunt dissection a subcutaneous pocket, about 1-2 cm below the skin surface was made to accommodate the IPB snugly.  Hemostasis was maintained with a Bovie here. A gentamicin-soaked gauze was placed in the pocket.  Next, a tunneling dorina was passed through the subcutaneous tissues from the midline back incision to the flank pocket site, and the electrodes were passed through this tract without complications.  The extension electrodes were then inserted into the battery device and attached securely with screwdriver.  The device representative confirmed adequate connections and impedances prior to closing the IPG pocket.  Tension loops were made at the back incision and IPG pocket site with the electrode and extension respectively. The IPG was not anchored in the pocket.    The gentamicin-soaked gauze was removed from the IPG pocket.   Next, both wounds were flushed with copious amounts of gentamicin saline and hemostasis was confirmed again.   The flank pocket fascia was closed with 2-0 interupted vicryl sutures and a running 2-0 vicryl.  The midline back incision was closed with 2-0 interupted vicryl sutures and a running 2-0 vicryl.  Final skin closure at both incision sites was made with a 4-0 monocryl running subcuticular stitch.  Mastisol and Steri-Strips were then placed over the incision sites.   The incisions were dressed with gauze and Tegaderm.    The patient was transferred to the recovery room whereupon motor and sensory testing of the lower extremities showed no new neurologic deficits.  The patient reported no new pain patterns radiating into the lower extremtities. The device representative, programmed the stimulator with patient feedback for optimal stimulation patterns.  The patient reported good paresthesia coverage.  After appropriate recovery time, the patient was discharged home  with a 5-day supply of prophylactic antibiotic.  The patient was instructed to take tylenol or ibuprofen, as needed at home for incisional and was instructed to call the clinic or on-call doctor with any further questions or concerns about the stimulator or the incisions.  The patient is to follow up in clinic in 10-14 days for a wound check and is to refrain from showering for 2 days and from soaking for 2 weeks.    DISPOSITION:                 Home       Faith Reese MD  Anesthesiologist & Interventional Pain Physician   [facility]  O: [phone]  F: [phone]  3:19 PM  02/18/25

## 2025-02-18 NOTE — ANESTHESIA POSTPROCEDURE EVALUATION
Patient: Genesis Garcia    Procedure Summary       Date: 02/18/25 Room / Location: [facility] / [facility]    Anesthesia Start: 1308 Anesthesia Stop: 1532    Procedure: Spinal Cord Stimulator Implant Diagnosis:       Idiopathic peripheral neuropathy      (Idiopathic peripheral neuropathy [G60.9])    Surgeons: Faith Reese MD Responsible Provider: Mayo Loco MD    Anesthesia Type: MAC ASA Status: 4            Anesthesia Type: MAC    Vitals Value Taken Time   /75 02/18/25 1601   Temp 36.5 °C (97.7 °F) 02/18/25 1555   Pulse 84 02/18/25 1601   Resp 16 02/18/25 1601   SpO2 92 % 02/18/25 1601   Vitals shown include unfiled device data.    Anesthesia Post Evaluation    Patient location during evaluation: PACU  Patient participation: complete - patient participated  Level of consciousness: awake and alert  Pain score: 3  Pain management: adequate  Multimodal analgesia pain management approach  Airway patency: patent  Two or more strategies used to mitigate risk of obstructive sleep apnea  Cardiovascular status: acceptable and blood pressure returned to baseline  Respiratory status: acceptable  Hydration status: acceptable  Postoperative Nausea and Vomiting: none        There were no known notable events for this encounter.

## 2025-02-18 NOTE — POST-PROCEDURE NOTE
Patient pulled from PACU, report from Christopher RN. Patient awake, rates pain to back 5/10. Dressings x2 to mid and lower right back dry and intact. Patient given ice pack will notify anesthesiologist that patient requesting pain pill. Patient given cookies and ginger ale.     1628- Patient tolerating PO well. Will monitor.    1642- Discharge instructions reviewed with patient and her friend, both verbalize understanding. Patient feeling better, would like to get dressed.     1649- RX to go here with meds. Patient waiting on transport.     1653- Patient discharged via w/c and transport.

## 2025-02-18 NOTE — ANESTHESIA PREPROCEDURE EVALUATION
Patient: Genesis Garcia    Procedure Information       Date/Time: 02/18/25 1250    Procedure: Spinal Cord Stimulator Implant - C-ARM, HIMA LUNDY, ORESTES FLANAGAN    [facility]    Surgeons: Faith Reese MD          Past Medical History:   Diagnosis Date    Anemia     Ankle fracture     left ankle fracture    Arthritis 1977    Cellulitis, unspecified 08/24/2020    Cellulitis of skin    CHF (congestive heart failure) 2/18/2025    Chronic venous insufficiency     CKD (chronic kidney disease), stage III (Multi)     COPD (chronic obstructive pulmonary disease) (Multi)     Essential (primary) hypertension 01/04/2023    Hypertension    H/O deep venous thrombosis     Hernia, internal 2022    History of blood transfusion     Hypothyroidism     Low back pain     Lymphedema     Osteoporosis     Other fracture of upper and lower end of unspecified fibula, initial encounter for closed fracture 05/21/2021    Fracture of distal fibula    Other specified soft tissue disorders 09/19/2022    Leg swelling    Peripheral neuropathy     Personal history of (healed) stress fracture 06/26/2021    History of stress fracture    Personal history of other diseases of the circulatory system     History of atrial fibrillation    Personal history of other diseases of the musculoskeletal system and connective tissue 10/20/2016    History of arthritis    Plantar fasciitis     Rotator cuff syndrome     Undifferentiated connective tissue disease (Multi)     Unspecified asthma, uncomplicated ([facility]) 12/01/2022    Asthma    Venous ulcer of leg (Multi)     Vitamin D deficiency, unspecified 10/20/2016    Vitamin D deficiency      Relevant Problems   Cardiac   (+) Atrial fibrillation (Multi)   (+) CHF (congestive heart failure)   (+) Essential (primary) hypertension   (+) Essential hypertension   (+) Heart murmur   (+) Hypertension   (+) Peripheral vascular disease ([facility])   (+) Unspecified atrial fibrillation  (Multi)      Pulmonary   (+) Asthma   (+) COPD (chronic obstructive pulmonary disease) (Multi)   (+) Chronic bronchitis (Multi)   (+) Chronic obstructive pulmonary disease (Multi)   (+) Chronic obstructive pulmonary disease, unspecified   (+) HCAP (healthcare-associated pneumonia)   (+) Pneumonia, unspecified organism   (+) Unspecified asthma, uncomplicated ([facility])      Neuro   (+) Carpal tunnel syndrome   (+) Idiopathic peripheral neuropathy   (+) Lumbar radiculitis   (+) Peripheral neuropathy, idiopathic      GI   (+) Dysphagia   (+) Gastroesophageal reflux disease with esophagitis   (+) Gastroesophageal reflux disease with esophagitis without hemorrhage      /Renal   (+) Chronic renal impairment, stage 3 (moderate) (Multi)      Endocrine   (+) Hypothyroidism (acquired)   (+) Morbid obesity (Multi)   (+) Morbid obesity due to excess calories (Multi)   (+) Obesity, unspecified      Hematology   (+) Anemia   (+) Anemia, unspecified   (+) Chronic anticoagulation   (+) Chronic deep vein thrombosis (DVT) of proximal vein of lower extremity (Multi)   (+) DVT, bilateral lower limbs (Multi)   (+) Deep vein thrombosis (DVT) of both lower extremities (Multi)   (+) Iron deficiency anemia      Musculoskeletal   (+) Carpal tunnel syndrome   (+) Chronic low back pain   (+) Chronic pain syndrome   (+) Osteoarthritis of hip   (+) Osteoarthritis of knee   (+) Primary osteoarthritis of right knee   (+) Rheumatoid arthritis   (+) Unilateral primary osteoarthritis, right hip   (+) Unspecified osteoarthritis, unspecified site      ID   (+) C. difficile colitis   (+) Candidiasis of mouth   (+) Colitis due to Clostridioides difficile   (+) HCAP (healthcare-associated pneumonia)   (+) Pneumonia, unspecified organism     Past Surgical History:   Procedure Laterality Date    BACK SURGERY  06/20/2016    Back Surgery L4-L5 fusion    CARDIAC ELECTROPHYSIOLOGY STUDY AND ABLATION      CHOLECYSTECTOMY  04/15/2016    Cholecystectomy    EYE  SURGERY  2018    HERNIA REPAIR      JOINT REPLACEMENT  2007, 2017, 2022    OTHER SURGICAL HISTORY  05/03/2021    Cataract surgery    SPINE SURGERY  2001    THYROIDECTOMY, PARTIAL      d/t goiter    TOTAL HIP ARTHROPLASTY Right     TOTAL KNEE ARTHROPLASTY Bilateral 12/01/2022    Knee Replacement    VERTEBROPLASTY        Clinical information reviewed:   Tobacco  Allergies  Meds   Med Hx  Surg Hx  OB Status  Fam Hx  Soc   Hx        NPO Detail:  NPO/Void Status  Carbohydrate Drink Given Prior to Surgery? : N  Date of Last Liquid: 02/18/25  Time of Last Liquid: 0530  Date of Last Solid: 02/17/25  Time of Last Solid: 1900  Last Intake Type: Clear fluids  Time of Last Void: 1030         Physical Exam    Airway  Mallampati: III  TM distance: >3 FB  Neck ROM: limited     Cardiovascular    Dental    Pulmonary    Abdominal            Anesthesia Plan    History of general anesthesia?: yes  History of complications of general anesthesia?: no    ASA 4     MAC     The patient is not a current smoker.  Patient was not previously instructed to abstain from smoking on day of procedure.  Patient did not smoke on day of procedure.    intravenous induction   Postoperative administration of opioids is intended.  Anesthetic plan and risks discussed with patient.    Plan discussed with attending.

## 2025-02-18 NOTE — DISCHARGE INSTRUCTIONS
DISCHARGE INSTRUCTIONS FOR SPINAL CORD STIMULATION IMPLANT     You received a spinal cord stimulator today. You may resume your Eliquis tomorrow    -    Okay to reinforce dressing if needed.   -    Do not shower for 2 days. Do not soak/hot tub/jacuzzi/pool for 2 weeks to prevent infection   -    Do not bend at the waist, twist and lift your arms above your shoulders for 6 weeks. This will reduce the possibility of lead movement which may result in loss of stimulation.   -    For post procedure pain, take over-the-counter Tylenol 500 mg and ibuprofen 800 mg together every 8 hours for the next 5 days. .   -    Take your antibiotics as prescribed for 5 days as written to prevent infection  -    Do not drive or operate heavy machinery while stimulator is turned on.   -    Follow up with Dr. Reese in clinic in 2 weeks.     It is recommended that you relax and limit your activity for the remainder of the day unless you have been told otherwise by your pain physician. You should not drive a car, operate machinery, or make important legal decisions unless otherwise directed by your pain physician.      Some discomfort, bruising or slight swelling may occur at the injection site. This is not abnormal if it occurs.  If needed you may:        o    Take over the counter medication such as Tylenol or Motrin.       o    Apply an ice pack for 30 minutes, 2 to 3 times a day for the first 24 hours.     You do not need to discontinue non-aspirin-containing pain medications prior to an injection (examples: Celebrex, tramadol, hydrocodone and acetaminophen).      Call the Pain Medicine Practice at [phone] between 8am-4pm Monday - Friday if you are experiencing the following:       o    Headache that does not go away with medicine, is worse when sitting or standing up, and is greatly relieved upon lying down.       o    Severe pain worse than or different than your baseline pain.       o    Chills or fever (101º F or greater).        o    Drainage or signs of infection at the injection site     Go directly to the Emergency Department if you are experiencing the following and received an epidural or spinal injection:       o    Abrupt weakness or progressive weakness in your legs that starts after you leave the clinic.       o    Abrupt severe or worseningnumbness in your legs.       o    Inability to urinate after the injection or loss of bowel or bladder control without the urge to defecate or urinate.     If you have a clinical question that cannot wait until your next appointment, please call [phone] between 8am-4pm Monday - Friday or send a Chat& (ChatAnd) message. We do our best to return all non-emergency messages within 24 hours, Monday - Friday. A nurse or physician will return your message.      If you need to cancel an appointment please call the scheduling staff at [phone] during normal business hours or leave a message at least 24 hours in advance.

## 2025-02-19 ENCOUNTER — TELEPHONE (OUTPATIENT)
Dept: PAIN MEDICINE | Facility: CLINIC | Age: 80
End: 2025-02-19
Payer: MEDICARE

## 2025-02-19 NOTE — TELEPHONE ENCOUNTER
I SPOKE WITH VIOLETA TODAY AND SHE STATES SHE ISN'T FEELING TOO BAD.  SHE IS HAVING SOME PAIN WHICH SHE EXPECTED.  I SUGGESTED SHE TAKE ADVANTAGE OF HER PAIN PILLS FOR 24-28 HRS ANDTAKE THEM LIKE THEY ARE ORDERED. THEN SHE CAN TRY STRETCHING THEM OUT WITH SOME TYLENOL IN BETWEEN IF SHE WANTS.  WE TALKED ABOUT NO BENDING , LIFTING OR TWISTING AND USING ICE TO HER SURGICAL SITES. SHE DID RESTART HER ELIQUIS THIS AM.  AWARE OF HER FOLLOW UP APPOINTMENT ON 2/28 WITH ALEJANDRO IN [place].

## 2025-02-19 NOTE — ADDENDUM NOTE
"DAYA Emily, called to discuss her fathers Eliquis.  He is currently in the [facility] home and they have changed his coumadin to eliquis however the price is over 800.00.  They cannot afford that amount.  Explained to her that I have on numerous occasions discussed assistance application with him and once we start discussing that they have to bring in their SS award letters for both individuals living in house they have declined to move forward so that is why he is taking the coumadin.  I explained that I am working with the insurance to see if I can get a tier exception to make the price of eliquis more affordable.  She is going to discuss the assistance program again with them and see if she can make them understand the \"government\" is not trying to take their assets.  She states the [facility] discussed another med, pradaxa, as an option.  Advised I would discuss with DR. Javier.                   I called the insurance company and they are advising I am going to receive a denial on the the request for tier reduction, that eliquis is no longer on the list to ask for this.                                        " Addended by: YASMIN SMITH on: 8/26/2024 08:19 AM     Modules accepted: Orders

## 2025-02-28 ENCOUNTER — OFFICE VISIT (OUTPATIENT)
Dept: PAIN MEDICINE | Facility: CLINIC | Age: 80
End: 2025-02-28
Payer: MEDICARE

## 2025-02-28 VITALS
HEIGHT: 62 IN | WEIGHT: 225 LBS | HEART RATE: 73 BPM | OXYGEN SATURATION: 94 % | BODY MASS INDEX: 41.41 KG/M2 | RESPIRATION RATE: 18 BRPM | DIASTOLIC BLOOD PRESSURE: 76 MMHG | SYSTOLIC BLOOD PRESSURE: 158 MMHG

## 2025-02-28 DIAGNOSIS — M96.1 LUMBAR POST-LAMINECTOMY SYNDROME: Primary | ICD-10-CM

## 2025-02-28 PROCEDURE — 3077F SYST BP >= 140 MM HG: CPT | Performed by: PHYSICIAN ASSISTANT

## 2025-02-28 PROCEDURE — 1159F MED LIST DOCD IN RCRD: CPT | Performed by: PHYSICIAN ASSISTANT

## 2025-02-28 PROCEDURE — 1160F RVW MEDS BY RX/DR IN RCRD: CPT | Performed by: PHYSICIAN ASSISTANT

## 2025-02-28 PROCEDURE — 3078F DIAST BP <80 MM HG: CPT | Performed by: PHYSICIAN ASSISTANT

## 2025-02-28 PROCEDURE — 1125F AMNT PAIN NOTED PAIN PRSNT: CPT | Performed by: PHYSICIAN ASSISTANT

## 2025-02-28 PROCEDURE — 99211 OFF/OP EST MAY X REQ PHY/QHP: CPT | Performed by: PHYSICIAN ASSISTANT

## 2025-02-28 PROCEDURE — 1036F TOBACCO NON-USER: CPT | Performed by: PHYSICIAN ASSISTANT

## 2025-02-28 PROCEDURE — 1157F ADVNC CARE PLAN IN RCRD: CPT | Performed by: PHYSICIAN ASSISTANT

## 2025-02-28 ASSESSMENT — LIFESTYLE VARIABLES
SKIP TO QUESTIONS 9-10: 1
HOW OFTEN DO YOU HAVE SIX OR MORE DRINKS ON ONE OCCASION: NEVER
AUDIT-C TOTAL SCORE: 0
HOW OFTEN DO YOU HAVE A DRINK CONTAINING ALCOHOL: NEVER
HOW MANY STANDARD DRINKS CONTAINING ALCOHOL DO YOU HAVE ON A TYPICAL DAY: PATIENT DOES NOT DRINK

## 2025-02-28 ASSESSMENT — ENCOUNTER SYMPTOMS
CONSTITUTIONAL NEGATIVE: 1
PSYCHIATRIC NEGATIVE: 1
EYES NEGATIVE: 1
CARDIOVASCULAR NEGATIVE: 1
ENDOCRINE NEGATIVE: 1
GASTROINTESTINAL NEGATIVE: 1
BACK PAIN: 1
RESPIRATORY NEGATIVE: 1
WEAKNESS: 1
HEMATOLOGIC/LYMPHATIC NEGATIVE: 1

## 2025-02-28 ASSESSMENT — PATIENT HEALTH QUESTIONNAIRE - PHQ9
2. FEELING DOWN, DEPRESSED OR HOPELESS: NOT AT ALL
SUM OF ALL RESPONSES TO PHQ9 QUESTIONS 1 & 2: 0
1. LITTLE INTEREST OR PLEASURE IN DOING THINGS: NOT AT ALL

## 2025-02-28 ASSESSMENT — PAIN SCALES - GENERAL
PAINLEVEL_OUTOF10: 7
PAINLEVEL_OUTOF10: 7

## 2025-02-28 ASSESSMENT — PAIN DESCRIPTION - DESCRIPTORS: DESCRIPTORS: ACHING

## 2025-02-28 ASSESSMENT — PAIN - FUNCTIONAL ASSESSMENT: PAIN_FUNCTIONAL_ASSESSMENT: 0-10

## 2025-02-28 NOTE — PROGRESS NOTES
Subjective   Patient ID: Genesis Garcia is a 79 y.o. female who presents for Back Pain.  Is a 79-year-old female with lumbar postlaminectomy syndrome here today for follow-up after SCS implant.  Patient has not had the system activated yet.  She is noting that her pain is relatively well-controlled from the surgical standpoint as long as she is not impacting the surgical sites.  She denies any fevers chills drainage.  She finished her antibiotic course.        Review of Systems   Constitutional: Negative.    HENT: Negative.     Eyes: Negative.    Respiratory: Negative.     Cardiovascular: Negative.    Gastrointestinal: Negative.    Endocrine: Negative.    Musculoskeletal:  Positive for back pain.   Skin: Negative.    Neurological:  Positive for weakness.   Hematological: Negative.    Psychiatric/Behavioral: Negative.         Objective   Physical Exam  Musculoskeletal:        Back:       Comments: Bandages were removed Steri-Strips intact no drainage warmth redness or tenderness noted.         Assessment/Plan   Problem List Items Addressed This Visit             ICD-10-CM    Lumbar post-laminectomy syndrome - Primary M96.1     Successful spinal cord stimulator implant.  We did talk about restrictions to help make sure that the leads stay within their normal and appropriate placement.  We did talk about communication with the [employer] representatives if there are issues with stimulation.  She can also call the office if there is any additional issues or concerns.  Patient advised to call us if there is any drainage extreme pain in the incision sites fevers or chills.        Osman Moore PA-C 02/28/25 9:16 AM

## 2025-03-03 ENCOUNTER — APPOINTMENT (OUTPATIENT)
Dept: RHEUMATOLOGY | Facility: CLINIC | Age: 80
End: 2025-03-03
Payer: MEDICARE

## 2025-03-03 VITALS — BODY MASS INDEX: 41.15 KG/M2 | WEIGHT: 225 LBS

## 2025-03-03 DIAGNOSIS — M35.9 CONNECTIVE TISSUE DISEASE, UNDIFFERENTIATED (MULTI): Primary | ICD-10-CM

## 2025-03-03 DIAGNOSIS — M80.08XA AGE-RELATED OSTEOPOROSIS WITH CURRENT PATHOLOGICAL FRACTURE OF VERTEBRA, INITIAL ENCOUNTER (MULTI): ICD-10-CM

## 2025-03-03 PROCEDURE — 99213 OFFICE O/P EST LOW 20 MIN: CPT | Performed by: INTERNAL MEDICINE

## 2025-03-03 PROCEDURE — 1159F MED LIST DOCD IN RCRD: CPT | Performed by: INTERNAL MEDICINE

## 2025-03-03 PROCEDURE — 1157F ADVNC CARE PLAN IN RCRD: CPT | Performed by: INTERNAL MEDICINE

## 2025-03-03 PROCEDURE — 96372 THER/PROPH/DIAG INJ SC/IM: CPT | Performed by: INTERNAL MEDICINE

## 2025-03-03 RX ORDER — METHOTREXATE 2.5 MG/1
10 TABLET ORAL WEEKLY
Qty: 48 TABLET | Refills: 0 | Status: SHIPPED | OUTPATIENT
Start: 2025-03-03 | End: 2025-06-01

## 2025-03-03 RX ORDER — FOLIC ACID 1 MG/1
1 TABLET ORAL DAILY
Qty: 90 TABLET | Refills: 0 | Status: SHIPPED | OUTPATIENT
Start: 2025-03-03 | End: 2025-06-01

## 2025-03-03 NOTE — PROGRESS NOTES
Subjective . Genesis Garcia is a 79 y.o. female who presents for prolia.    HPI. 79-year-old female with history of UCTD (positive MAURICE, anticentromere antibody and arthralgia), osteoporosis, T12/L1 compression fracture status post kyphoplasty (6/2024),OA s/p bilateral TKR, HTN, CKD, DVT, postlaminectomy syndrome, chronic venous insufficiency, and asthma presented for follow-up.     She reports persistent pain in her hands and swelling.  She has not noticed much relief with leflunomide.  No recent fall or fractures.  Takes calcium and vitamin D.      Immunosuppressive therapy: HCQ and leflunomide (11/2024).    Antiresorptive therapy: Prolia. (8/2024).     DEXA scan obtained on July 26, 2024 showed left total femur BMD of 0.703 g/cm² reflecting a T-score of -2.4, left femoral neck BMD of 0.778 g/cm² reflecting a T-score of -1.9 and left 33% forearm BMD of 0.772 g/cm² reflecting a T-score of -1.2. FRAX score for major osteoporotic fracture is 18.3% and hip fracture is 4.1%.     11/25/2024.  RF and anti-CCP.  Negative.  Anti-dsDNA, C3 and C4 within normal range.  Sed rate and CRP within normal range.    11/25/2024: X-ray hands:  -Left hand.  CMC, moderate to severe third and fourth MCP and mild second MCP joint degenerative changes.  Severe third and fifth DIP joint degenerative changes.    -Right hand:Severe 2/3 metacarpophalangeal joint degenerative changes with erosions of the metacarpal heads. Moderate 1st CMC and mild triscaphe joint degenerative changes. Moderate 2/3 distal interphalangeal joint  degenerative    Review of Systems   All other systems reviewed and are negative.    Objective     Weight 102 kg (225 lb).    Physical Exam.  Gen. AAO x3, NAD.  HEENT: No pallor or icterus, PERRLA, EOMI.  No cervical lymphadenopathy .  Skin: No rashes.  Heart: S1, S2/ RRR.   Lungs: CTA B.  Abdomen: Soft, NT/ND.  MSK: Bilateral CMC squaring with positive grinding.  Bilateral 2-5 MCP with chronic synovial proliferation  and mild tenderness.  Left middle finger and little finger DIP joint with Heberden's nodes.   Neuro: Sensation to touch intact.Strength 5/5 throughout.   Psych:Appropriate mood and behavior  EXT: No edema    Assessment/Plan .     #1: UCTD.  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  -Discontinue leflunomide.  -Begin methotrexate.  Side effect discussed in length.  -Continue hydroxychloroquine.    #2: Osteopenia with T12/L1 osteoporotic fracture.  -Prolia injection #2  given on left upper arm.  -Continue calcium and vitamin D.       Follow-up in 2 months.     This note was partially generated using the Dragon Voice recognition system. There may be some incorrect wording, spelling and/or spelling errors or punctuation errors that were not corrected prior to committing the note to the medical record.      Problem List Items Addressed This Visit       Connective tissue disease, undifferentiated (Multi) - Primary    Relevant Medications    methotrexate (Trexall) 2.5 mg tablet    folic acid (Folvite) 1 mg tablet            Active Ambulatory Problems     Diagnosis Date Noted    DVT, bilateral lower limbs (Multi) 03/10/2023    Vitamin D deficiency 07/27/2023    Heart murmur 07/27/2023    Diverticulosis 07/27/2023    COPD (chronic obstructive pulmonary disease) (Multi) 07/27/2023    Arthritis 07/27/2023    Asthma 07/27/2023    Atrial fibrillation (Multi) 07/27/2023    Anemia 07/27/2023    Abnormal mammogram 07/27/2023    MAURICE positive 10/07/2020    Ankle arthritis 08/23/2023    Arthritis of right hip 08/23/2023    Carpal tunnel syndrome 06/07/2012    Cataract 08/23/2023    Chronic bronchitis (Multi) 01/18/2012    Chronic low back pain 05/18/2015    Chronic ulcer of left calf limited to breakdown of skin 08/23/2023    Chronic venous insufficiency 08/23/2023    Connective tissue disease, undifferentiated (Multi) 08/23/2023    Essential hypertension 06/15/2012    Flat foot 10/07/2020    Gastroesophageal  reflux disease with esophagitis without hemorrhage 08/23/2023    Hypokalemia 08/23/2023    Left breast mass 08/23/2023    Localized swelling of both lower legs 08/23/2023    Morbid obesity due to excess calories (Multi) 11/16/2015    Osteopenia, senile 12/29/2020    Postoperative incisional hernia 08/23/2023    Primary osteoarthritis of right knee 09/25/2013    Rosacea 02/13/2020    Umbilical hernia 08/23/2023    Ventral hernia 08/23/2023    Polyarthritis 10/07/2020    Lumbar post-laminectomy syndrome 11/28/2023    Lumbar radiculitis 11/28/2023    Impaired mobility and ADLs 12/23/2023    Generalized weakness 12/27/2023    Sacroiliac inflammation (CMS-HCC) 02/20/2024    Chronic anticoagulation 02/26/2024    Stage 3b chronic kidney disease (Multi) 02/26/2024    Hypothyroidism (acquired) 02/26/2024    Unspecified severe protein-calorie malnutrition (Multi) 02/26/2024    Varicose veins of right lower extremity with ulcer other part of lower leg (CODE) 02/26/2024    Fracture of vertebra due to osteoporosis (Multi) 04/02/2024    Malabsorption of iron ([facility]) 04/08/2024    Thyroid nodule 04/12/2024    Peripheral neuropathy, idiopathic 05/08/2024    Abdominal pain 05/16/2024    Abnormality of esophagus 05/16/2024    Disorder of esophagus 05/16/2024    Acute on chronic diastolic (congestive) heart failure 01/17/2023    Acute respiratory failure with hypoxia (Multi) 12/29/2023    PAULY (acute kidney injury) (CMS-HCC) 12/15/2023    C. difficile colitis 05/16/2024    Colitis due to Clostridioides difficile 05/16/2024    Candidiasis of mouth 05/16/2024    Chronic deep vein thrombosis (DVT) of proximal vein of lower extremity (Multi) 02/26/2024    Chronic venous hypertension (idiopathic) with ulcer of right lower extremity (CODE) 06/16/2022    Colitis 05/16/2024    Compression fracture of lumbar vertebra (Multi) 03/22/2024    Diastolic heart failure 05/16/2024    Difficulty in walking, not elsewhere classified 12/31/2023     Dysphagia 05/16/2024    Edema of foot 05/16/2024    Edema of lower extremity 01/18/2023    Symptom of leg swelling 05/16/2024    Localized swelling, mass and lump, lower limb, bilateral 06/16/2022    Pedal edema 05/16/2024    Swelling of lower extremity 01/18/2023    Fluid overload 05/16/2024    Other symptoms and signs involving the musculoskeletal system 12/30/2023    History of metabolic disorder 05/16/2024    Injury of tendon of rotator cuff 05/16/2024    Iron deficiency anemia 07/27/2023    Left ankle pain 05/16/2024    Knee pain 05/16/2024    Leukocytosis 05/16/2024    Localized edema 01/21/2023    Nausea 05/16/2024    Other symbolic dysfunctions 01/05/2024    Pain in both feet 05/16/2024    Partial small bowel obstruction (Multi) 05/16/2024    Pelvic mass 05/16/2024    Cough 12/18/2023    Productive cough 05/16/2024    Right shoulder pain 05/16/2024    History of total hip replacement 11/03/2022    Tachycardia 05/16/2024    Status post hip hemiarthroplasty 11/03/2022    Venous stasis ulcer with edema of lower leg 02/26/2024    Deep vein thrombosis (DVT) of both lower extremities (Multi) 03/10/2023    HCAP (healthcare-associated pneumonia) 05/16/2024    Pneumonia, unspecified organism 12/20/2023    Anemia, unspecified 12/29/2023    Osteoarthritis of knee 05/16/2024    Osteoarthritis of hip 05/16/2024    Polyarthritis, unspecified 05/16/2024    Polyarthropathy 10/07/2020    Rheumatoid arthritis 12/20/2023    Sepsis, unspecified organism (Multi) 12/29/2023    Sepsis (Multi) 12/19/2023    Unilateral primary osteoarthritis, right hip 06/16/2022    Unspecified osteoarthritis, unspecified site 06/16/2022    Unspecified asthma, uncomplicated ([facility]) 06/16/2022    Unspecified atrial fibrillation (Multi) 06/16/2022    Cellulitis of left lower leg 05/16/2024    Cellulitis 05/16/2024    Cellulitis of lower extremity 05/16/2024    Leg ulcer (Multi) 05/16/2024    Stasis dermatitis with venous ulcer of lower extremity due  to chronic peripheral venous hypertension (Multi) 05/16/2024    Ulcer of lower extremity (Multi) 08/23/2023    Low back pain, unspecified 12/29/2023    Peripheral venous insufficiency 08/23/2023    Venous insufficiency (chronic) (peripheral) 06/16/2022    Undifferentiated connective tissue disease (Multi) 08/23/2023    Chronic obstructive pulmonary disease (Multi) 01/18/2012    Chronic obstructive pulmonary disease, unspecified 06/16/2022    Diverticular disease 07/27/2023    Diverticulosis of intestine, part unspecified, without perforation or abscess without bleeding 06/16/2022    Essential (primary) hypertension 06/16/2022    Hypertension 06/15/2012    Pes planus 10/07/2020    Pathological fracture of vertebra due to osteoporosis (Multi) 04/02/2024    Gastroesophageal reflux disease with esophagitis 08/23/2023    Weakness 12/27/2023    Mass of breast 08/23/2023    Postlaminectomy syndrome of lumbar region 11/28/2023    Iron malabsorption ([facility]) 04/08/2024    Morbid obesity (Multi) 11/16/2015    Obesity, unspecified 06/16/2022    Other specified soft tissue disorders 01/21/2023    Systemic involvement of connective tissue, unspecified (Multi) 06/16/2022    Senile osteopenia 12/29/2020    Idiopathic peripheral neuropathy 05/08/2024    Incisional hernia with obstruction but no gangrene 05/16/2024    Incisional hernia 08/23/2023    Chronic renal impairment, stage 3 (moderate) (Multi) 02/26/2024    Umbilical hernia with obstruction, without gangrene 06/16/2022    Abnormal voice 08/23/2023    Age-related osteoporosis with current pathological fracture of vertebra (Multi) 06/13/2024    Age-related osteoporosis with current pathological fracture, vertebra(e), initial encounter for fracture (Multi) 06/13/2024    CHF (congestive heart failure) 02/18/2025    Peripheral vascular disease (CMS-[facility]) 02/18/2025    Chronic pain syndrome 02/18/2025     Resolved Ambulatory Problems     Diagnosis Date Noted    Abscess of hip,  left 08/23/2023    Cellulitis, leg 08/23/2023    Ulcer of right lower extremity (Multi) 08/23/2023    Closed fracture of lateral malleolus 08/23/2023    Decreased GFR 08/23/2023    DVT (deep venous thrombosis) (Multi) 08/23/2023    Dyspnea on exertion 08/23/2023    Fracture of distal fibula 08/23/2023    Hoarseness 08/23/2023    Multinodular goiter 08/23/2023    Stress fracture 08/23/2023    Voice disturbance 08/23/2023    Acute deep vein thrombosis (DVT) of both lower extremities (Multi) 08/23/2023    Acute pneumonia 12/20/2023    Thyroid nodule 01/19/2024     Past Medical History:   Diagnosis Date    Ankle fracture     Cellulitis, unspecified 08/24/2020    CKD (chronic kidney disease), stage III (Multi)     H/O deep venous thrombosis     Hernia, internal 2022    History of blood transfusion     Hypothyroidism     Low back pain     Lymphedema     Osteoporosis     Other fracture of upper and lower end of unspecified fibula, initial encounter for closed fracture 05/21/2021    Peripheral neuropathy     Personal history of (healed) stress fracture 06/26/2021    Personal history of other diseases of the circulatory system     Personal history of other diseases of the musculoskeletal system and connective tissue 10/20/2016    Plantar fasciitis     Rotator cuff syndrome     Venous ulcer of leg (Multi)     Vitamin D deficiency, unspecified 10/20/2016       Family History   Problem Relation Name Age of Onset    Diabetes Mother mom         type 2    Heart disease Father dakota     Hypertension Father dakota     Cancer Sister juan     Clotting disorder Sister juan     Cancer Daughter dayne michel     Arthritis Sister juan hector     Arthritis Sister laya penn     Clotting disorder Sister juan     Cancer Daughter dayne fierrohernán     Multiple sclerosis Daughter dayne     Arthritis Sister juan hector     Arthritis Sister laya penn     Cancer Sister juan     Cancer Daughter dayne michel     Cancer Sister juan      Arthritis Sister svetlana pedley     Arthritis Sister lyaa penn     Cancer Sister svetlana     Clotting disorder Sister svetlana     Cancer Daughter dayne pearson     Multiple sclerosis Daughter dayne     Arthritis Other laya penn     Cancer Other Svetlana     Cancer Other Perla        Past Surgical History:   Procedure Laterality Date    BACK SURGERY  2016    Back Surgery L4-L5 fusion    CARDIAC ELECTROPHYSIOLOGY STUDY AND ABLATION      CHOLECYSTECTOMY  04/15/2016    Cholecystectomy    EYE SURGERY  2018    HERNIA REPAIR      JOINT REPLACEMENT  2007, ,     OTHER SURGICAL HISTORY  2021    Cataract surgery    SPINE SURGERY      THYROIDECTOMY, PARTIAL      d/t goiter    TOTAL HIP ARTHROPLASTY Right     TOTAL KNEE ARTHROPLASTY Bilateral 2022    Knee Replacement    VERTEBROPLASTY         Social History     Tobacco Use   Smoking Status Former    Current packs/day: 0.00    Average packs/day: 0.2 packs/day for 24.0 years (3.6 ttl pk-yrs)    Types: Cigarettes    Start date:     Quit date:     Years since quittin.1   Smokeless Tobacco Never       Allergies  Sulfur, Cefprozil, and Sulfa (sulfonamide antibiotics)    Current Meds  Current Outpatient Medications   Medication Instructions    acetaminophen (TYLENOL) 650 mg, oral, Every 4 hours PRN    albuterol 2.5 mg, nebulization, Every 2 hour PRN    apixaban (ELIQUIS) 2.5 mg, oral, 2 times daily    Bacillus coagulans (PROBIOTIC, B. COAGULANS, ORAL) 1 tablet, Daily    calcium carbonate 600 mg calcium (1,500 mg) tablet 1 tablet, Daily    cholecalciferol (Vitamin D-3) 50 MCG (2000 UT) tablet Take by mouth.    denosumab (Prolia) 60 mg/mL syringe Provider to inject 1 mL (60 mg total) under the skin every 6 months.  For  ONLY.    fluticasone propion-salmeteroL (Advair Diskus) 250-50 mcg/dose diskus inhaler 1 puff, 2 times daily RT    folic acid (FOLVITE) 1 mg, oral, Daily    gabapentin (NEURONTIN) 400 mg, oral, 3 times daily     hydrocortisone 2.5 % cream Topical, As needed, Use maximum 2-3 times a week.    hydroxychloroquine (PLAQUENIL) 200 mg, oral, 2 times daily    levothyroxine (SYNTHROID) 25 mcg, oral, Daily before breakfast    losartan (Cozaar) 100 mg tablet 1 tablet, Daily    methotrexate (TREXALL) 10 mg, oral, Weekly, Follow directions carefully, and ask to explain any part you do not understand. Take exactly as directed.    multivitamin capsule Take by mouth.    spironolactone (Aldactone) 25 mg tablet 1 tablet, Daily    torsemide (Demadex) 20 mg tablet 1 tablet, Daily (0630)        Lab Results   Component Value Date    C3 113 11/25/2024    C4 21 11/25/2024    RF 11 11/25/2024    SEDRATE 14 11/25/2024    CRP 0.35 11/25/2024    DNADS 2.0 11/25/2024    CKTOTAL 351 (H) 04/15/2022             Brent Sheridan MD

## 2025-03-11 ENCOUNTER — APPOINTMENT (OUTPATIENT)
Dept: PRIMARY CARE | Facility: CLINIC | Age: 80
End: 2025-03-11
Payer: MEDICARE

## 2025-03-11 ENCOUNTER — TELEPHONE (OUTPATIENT)
Dept: PRIMARY CARE | Facility: CLINIC | Age: 80
End: 2025-03-11

## 2025-03-11 VITALS
BODY MASS INDEX: 41.15 KG/M2 | HEIGHT: 62 IN | SYSTOLIC BLOOD PRESSURE: 148 MMHG | DIASTOLIC BLOOD PRESSURE: 80 MMHG | HEART RATE: 80 BPM

## 2025-03-11 DIAGNOSIS — I10 ESSENTIAL HYPERTENSION: ICD-10-CM

## 2025-03-11 DIAGNOSIS — N18.32 STAGE 3B CHRONIC KIDNEY DISEASE (MULTI): ICD-10-CM

## 2025-03-11 DIAGNOSIS — R79.9 ABNORMAL FINDING OF BLOOD CHEMISTRY, UNSPECIFIED: ICD-10-CM

## 2025-03-11 DIAGNOSIS — I82.5Y9 CHRONIC DEEP VEIN THROMBOSIS (DVT) OF PROXIMAL VEIN OF LOWER EXTREMITY, UNSPECIFIED LATERALITY (MULTI): ICD-10-CM

## 2025-03-11 DIAGNOSIS — E55.9 VITAMIN D DEFICIENCY: ICD-10-CM

## 2025-03-11 DIAGNOSIS — M85.80 OSTEOPENIA, SENILE: ICD-10-CM

## 2025-03-11 DIAGNOSIS — M13.0 POLYARTHRITIS: ICD-10-CM

## 2025-03-11 DIAGNOSIS — I87.2 CHRONIC VENOUS INSUFFICIENCY: ICD-10-CM

## 2025-03-11 DIAGNOSIS — E03.9 HYPOTHYROIDISM (ACQUIRED): ICD-10-CM

## 2025-03-11 DIAGNOSIS — R01.1 HEART MURMUR: ICD-10-CM

## 2025-03-11 DIAGNOSIS — Z00.00 MEDICARE ANNUAL WELLNESS VISIT, SUBSEQUENT: Primary | ICD-10-CM

## 2025-03-11 DIAGNOSIS — M35.9 CONNECTIVE TISSUE DISEASE, UNDIFFERENTIATED (MULTI): ICD-10-CM

## 2025-03-11 DIAGNOSIS — Z13.1 DIABETES MELLITUS SCREENING: ICD-10-CM

## 2025-03-11 DIAGNOSIS — J44.9 CHRONIC OBSTRUCTIVE PULMONARY DISEASE, UNSPECIFIED COPD TYPE (MULTI): ICD-10-CM

## 2025-03-11 PROBLEM — J96.01 ACUTE RESPIRATORY FAILURE WITH HYPOXIA (MULTI): Status: RESOLVED | Noted: 2023-12-29 | Resolved: 2025-03-11

## 2025-03-11 PROBLEM — M79.89 SWELLING OF LOWER EXTREMITY: Status: RESOLVED | Noted: 2023-01-18 | Resolved: 2025-03-11

## 2025-03-11 PROBLEM — K21.00 GASTROESOPHAGEAL REFLUX DISEASE WITH ESOPHAGITIS WITHOUT HEMORRHAGE: Status: RESOLVED | Noted: 2023-08-23 | Resolved: 2025-03-11

## 2025-03-11 PROBLEM — K42.9 UMBILICAL HERNIA: Status: RESOLVED | Noted: 2023-08-23 | Resolved: 2025-03-11

## 2025-03-11 PROBLEM — S37.009A INJURY OF KIDNEY: Status: ACTIVE | Noted: 2025-03-11

## 2025-03-11 PROBLEM — A41.9 SEPSIS, UNSPECIFIED ORGANISM (MULTI): Status: RESOLVED | Noted: 2023-12-29 | Resolved: 2025-03-11

## 2025-03-11 PROBLEM — D64.9 ANEMIA, UNSPECIFIED: Status: RESOLVED | Noted: 2023-12-29 | Resolved: 2025-03-11

## 2025-03-11 PROBLEM — M79.89 SYMPTOM OF LEG SWELLING: Status: RESOLVED | Noted: 2024-05-16 | Resolved: 2025-03-11

## 2025-03-11 PROBLEM — A41.9 SEPSIS (MULTI): Status: RESOLVED | Noted: 2023-12-19 | Resolved: 2025-03-11

## 2025-03-11 PROBLEM — R53.1 GENERALIZED WEAKNESS: Status: RESOLVED | Noted: 2023-12-27 | Resolved: 2025-03-11

## 2025-03-11 PROBLEM — I50.33 ACUTE ON CHRONIC DIASTOLIC (CONGESTIVE) HEART FAILURE: Status: RESOLVED | Noted: 2023-01-17 | Resolved: 2025-03-11

## 2025-03-11 PROBLEM — I48.91 UNSPECIFIED ATRIAL FIBRILLATION (MULTI): Status: RESOLVED | Noted: 2022-06-16 | Resolved: 2025-03-11

## 2025-03-11 PROBLEM — I82.403 DVT, BILATERAL LOWER LIMBS: Status: RESOLVED | Noted: 2023-03-10 | Resolved: 2025-03-11

## 2025-03-11 PROBLEM — L03.119 CELLULITIS OF LOWER EXTREMITY: Status: RESOLVED | Noted: 2024-05-16 | Resolved: 2025-03-11

## 2025-03-11 PROBLEM — A04.72 C. DIFFICILE COLITIS: Status: RESOLVED | Noted: 2024-05-16 | Resolved: 2025-03-11

## 2025-03-11 PROBLEM — J45.909 UNSPECIFIED ASTHMA, UNCOMPLICATED (HHS-HCC): Status: RESOLVED | Noted: 2022-06-16 | Resolved: 2025-03-11

## 2025-03-11 PROBLEM — K57.90 DIVERTICULOSIS OF INTESTINE, PART UNSPECIFIED, WITHOUT PERFORATION OR ABSCESS WITHOUT BLEEDING: Status: RESOLVED | Noted: 2022-06-16 | Resolved: 2025-03-11

## 2025-03-11 PROBLEM — I48.91 ATRIAL FIBRILLATION (MULTI): Status: RESOLVED | Noted: 2023-07-27 | Resolved: 2025-03-11

## 2025-03-11 PROBLEM — L97.909 ULCER OF LOWER EXTREMITY (MULTI): Status: RESOLVED | Noted: 2023-08-23 | Resolved: 2025-03-11

## 2025-03-11 PROBLEM — J18.9 PNEUMONIA, UNSPECIFIED ORGANISM: Status: RESOLVED | Noted: 2023-12-20 | Resolved: 2025-03-11

## 2025-03-11 PROBLEM — I82.403 DEEP VEIN THROMBOSIS (DVT) OF BOTH LOWER EXTREMITIES (MULTI): Status: RESOLVED | Noted: 2023-03-10 | Resolved: 2025-03-11

## 2025-03-11 PROBLEM — M54.50 LOW BACK PAIN, UNSPECIFIED: Status: RESOLVED | Noted: 2023-12-29 | Resolved: 2025-03-11

## 2025-03-11 PROCEDURE — G0439 PPPS, SUBSEQ VISIT: HCPCS | Performed by: INTERNAL MEDICINE

## 2025-03-11 PROCEDURE — 1124F ACP DISCUSS-NO DSCNMKR DOCD: CPT | Performed by: INTERNAL MEDICINE

## 2025-03-11 PROCEDURE — 99397 PER PM REEVAL EST PAT 65+ YR: CPT | Performed by: INTERNAL MEDICINE

## 2025-03-11 PROCEDURE — 1157F ADVNC CARE PLAN IN RCRD: CPT | Performed by: INTERNAL MEDICINE

## 2025-03-11 PROCEDURE — 99215 OFFICE O/P EST HI 40 MIN: CPT | Performed by: INTERNAL MEDICINE

## 2025-03-11 PROCEDURE — 1170F FXNL STATUS ASSESSED: CPT | Performed by: INTERNAL MEDICINE

## 2025-03-11 PROCEDURE — G2211 COMPLEX E/M VISIT ADD ON: HCPCS | Performed by: INTERNAL MEDICINE

## 2025-03-11 PROCEDURE — 3077F SYST BP >= 140 MM HG: CPT | Performed by: INTERNAL MEDICINE

## 2025-03-11 PROCEDURE — 1036F TOBACCO NON-USER: CPT | Performed by: INTERNAL MEDICINE

## 2025-03-11 PROCEDURE — 3079F DIAST BP 80-89 MM HG: CPT | Performed by: INTERNAL MEDICINE

## 2025-03-11 PROCEDURE — 1159F MED LIST DOCD IN RCRD: CPT | Performed by: INTERNAL MEDICINE

## 2025-03-11 ASSESSMENT — ENCOUNTER SYMPTOMS
OCCASIONAL FEELINGS OF UNSTEADINESS: 1
LOSS OF SENSATION IN FEET: 0

## 2025-03-11 ASSESSMENT — ACTIVITIES OF DAILY LIVING (ADL)
BATHING: INDEPENDENT
DRESSING: INDEPENDENT
GROCERY_SHOPPING: NEEDS ASSISTANCE
TAKING_MEDICATION: INDEPENDENT
MANAGING_FINANCES: INDEPENDENT
DOING_HOUSEWORK: INDEPENDENT

## 2025-03-11 NOTE — ASSESSMENT & PLAN NOTE
Systolic is slightly elevated and I have advised patient to return in 3 months to recheck her blood pressure

## 2025-03-11 NOTE — ASSESSMENT & PLAN NOTE
Patient does have braces in bilateral lower extremities to help with chronic edema and she follows up with wound care

## 2025-03-11 NOTE — PROGRESS NOTES
"Subjective   Reason for Visit: Genesis Garcia is an 79 y.o. female here for a Medicare Wellness visit.          Reviewed all medications by prescribing practitioner or clinical pharmacist (such as prescriptions, OTCs, herbal therapies and supplements) and documented in the medical record.    ALEKS Carrillo is a 79-year-old  female who comes to establish primary care.  She is accompanied by her friend Patricia who is in the room with patient's consent to provide support and coordinate care.  Patient is also due for Medicare annual wellness exam and some labs.  Labs done in January 2025 were reviewed and discussed with patient-creatinine showed improvement.  Medications were reviewed and updated in the medical record, patient is compliant with them and reports no significant side effects.  Patient sees pain management as well as wound care and rheumatology.  She is up-to-date on immunizations, does not get mammograms anymore and last DEXA was done in 2024 and revealed osteopenia.  Patient lives with her friend Patricia, they share the household chores and patient does ambulate with a walker.  She would like to be referred to a new nephrologist for history of CKD.  No history of fever, chills, chest pain, shortness of breath, cough, palpitations, syncope, hemoptysis, abdominal pain, nausea, vomiting, diarrhea, melena, rectal bleeding, loss of weight, loss of appetite, dysuria, hematuria, headaches, mood or sleep issues reported.  Patient Care Team:  Precious Rodriguez MD as PCP - General (Internal Medicine)  Albert Garner MD as PCP - O Medicare Advantage PCP  Rosy Atkinson MD as Consulting Physician (Cardiology)     Review of Systems    Objective   Vitals:  /80 (BP Location: Right arm, Patient Position: Sitting, BP Cuff Size: Large adult)   Pulse 80   Ht 1.575 m (5' 2\")   BMI 41.15 kg/m²       Physical Exam  General - well developed, well appearing, morbidly obese, elderly  female in no " acute respiratory distress  Eyes - normal sclera and conjunctiva with no pallor or icterus, normal extraocular movements  ENT - normal external auditory canals and tympanic membranes, throat clear with no exudates, dentures present  Neck - No JVD, thyromegaly or lymphadenopathy  Lungs - no respiratory distress and lungs clear to auscultation bilaterally  Heart - normal S1, S2 with normal heart rate, rhythm and 2/6 systolic murmur heard in the right sternal border  Abdomen - soft, nontender with no masses or organomegaly  Extremities -bilateral lower extremity edema present with braces in place  Neuro - grossly normal neuro exam with no focal neuro deficits  Psych - normal mental status, mood and affect   Skin - no rashes or ulcers  MSK -patient ambulates slowly with a walker and deformities are noted in joints of hands  Assessment & Plan  Medicare annual wellness visit, subsequent  Patient is up-to-date on immunizations and DEXA, some labs are due and will be ordered       Stage 3b chronic kidney disease (Multi)    Orders:    Referral to Nephrology; Future    Albumin-Creatinine Ratio, Urine Random; Future    Heart murmur    Orders:    Transthoracic Echo (TTE) Complete; Future    Polyarthritis  Being managed by rheumatology and patient will follow-up with them as recommended       Connective tissue disease, undifferentiated (Multi)  Being managed by rheumatology       Chronic venous insufficiency  Patient does have braces in bilateral lower extremities to help with chronic edema and she follows up with wound care       Hypothyroidism (acquired)  Patient is on levothyroxine  Orders:    TSH with reflex to Free T4 if abnormal; Future    Chronic obstructive pulmonary disease, unspecified COPD type (Multi)  Patient will continue current inhaler therapy       Essential hypertension  Systolic is slightly elevated and I have advised patient to return in 3 months to recheck her blood pressure       Diabetes mellitus  screening    Orders:    Hemoglobin A1C; Future    Abnormal finding of blood chemistry, unspecified    Orders:    Hemoglobin A1C; Future    Vitamin D deficiency    Orders:    Vitamin D 25-Hydroxy,Total (for eval of Vitamin D levels); Future    Osteopenia, senile  Management per rheumatology  Orders:    Vitamin D 25-Hydroxy,Total (for eval of Vitamin D levels); Future    Chronic deep vein thrombosis (DVT) of proximal vein of lower extremity, unspecified laterality (Multi)  Patient is on Eliquis for anticoagulation       Follow-up in 3 months to recheck blood pressure and follow-up on above chronic medical issues.  45 minutes spent rooming the patient, reviewing records, eliciting history, examining patient, counseling, coordination of care and in documentation.  This note was partially generated using the Dragon voice recognition system. There may be some incorrect words, spelling and punctuation errors that were not corrected prior to committing the note to the patient's medical record.

## 2025-03-11 NOTE — ASSESSMENT & PLAN NOTE
Management per rheumatology  Orders:    Vitamin D 25-Hydroxy,Total (for eval of Vitamin D levels); Future

## 2025-03-11 NOTE — TELEPHONE ENCOUNTER
Please give the  number and have her check to see who can see her sooner. She wants an MD not a nurse practitioner. I believe there is a Dr. Huddleston as well but I am not sure about his locations.

## 2025-03-12 LAB
25(OH)D3+25(OH)D2 SERPL-MCNC: 40 NG/ML (ref 30–100)
ALBUMIN/CREAT UR: 7 MG/G CREAT
CREAT UR-MCNC: 28 MG/DL (ref 20–275)
EST. AVERAGE GLUCOSE BLD GHB EST-MCNC: 108 MG/DL
EST. AVERAGE GLUCOSE BLD GHB EST-SCNC: 6 MMOL/L
HBA1C MFR BLD: 5.4 % OF TOTAL HGB
MICROALBUMIN UR-MCNC: 0.2 MG/DL
TSH SERPL-ACNC: 2.19 MIU/L (ref 0.4–4.5)

## 2025-03-17 ENCOUNTER — OFFICE VISIT (OUTPATIENT)
Dept: SURGERY | Facility: CLINIC | Age: 80
End: 2025-03-17
Payer: MEDICARE

## 2025-03-17 ENCOUNTER — APPOINTMENT (OUTPATIENT)
Dept: CARDIOLOGY | Facility: CLINIC | Age: 80
End: 2025-03-17
Payer: MEDICARE

## 2025-03-17 VITALS
BODY MASS INDEX: 41.15 KG/M2 | DIASTOLIC BLOOD PRESSURE: 79 MMHG | HEART RATE: 78 BPM | WEIGHT: 225 LBS | SYSTOLIC BLOOD PRESSURE: 167 MMHG

## 2025-03-17 DIAGNOSIS — K43.2 INCISIONAL HERNIA, WITHOUT OBSTRUCTION OR GANGRENE: Primary | ICD-10-CM

## 2025-03-17 PROCEDURE — 3077F SYST BP >= 140 MM HG: CPT | Performed by: SURGERY

## 2025-03-17 PROCEDURE — 1157F ADVNC CARE PLAN IN RCRD: CPT | Performed by: SURGERY

## 2025-03-17 PROCEDURE — 1036F TOBACCO NON-USER: CPT | Performed by: SURGERY

## 2025-03-17 PROCEDURE — 99213 OFFICE O/P EST LOW 20 MIN: CPT | Performed by: SURGERY

## 2025-03-17 PROCEDURE — 3078F DIAST BP <80 MM HG: CPT | Performed by: SURGERY

## 2025-03-17 PROCEDURE — 1159F MED LIST DOCD IN RCRD: CPT | Performed by: SURGERY

## 2025-03-17 PROCEDURE — 1126F AMNT PAIN NOTED NONE PRSNT: CPT | Performed by: SURGERY

## 2025-03-17 ASSESSMENT — ENCOUNTER SYMPTOMS
DEPRESSION: 0
LOSS OF SENSATION IN FEET: 0
OCCASIONAL FEELINGS OF UNSTEADINESS: 0

## 2025-03-17 ASSESSMENT — PAIN SCALES - GENERAL: PAINLEVEL_OUTOF10: 0-NO PAIN

## 2025-03-17 NOTE — LETTER
"March 17, 2025     Precious Rodriguez MD  [address] [facility] [address]  [facility] [address]    Patient: Genesis Garcia   YOB: 1945   Date of Visit: 3/17/2025       Dear Dr. Precious Rodriguez MD:    Thank you for referring Genesis Garcia to me for evaluation. Below are my notes for this consultation.  If you have questions, please do not hesitate to call me. I look forward to following your patient along with you.       Sincerely,     Kalen Russo MD      CC: No Recipients  ______________________________________________________________________________________    79-year-old female with multiple comorbid medical conditions to include obesity. She is known to me having taken to surgery for incisional hernia repair with mesh. This procedure was performed on 8/1/20 for an indication of incarcerated incisional hernia associated with small bowel obstruction     She is being followed on a watchful waiting basis for a fairly asymptomatic incisional hernia just above the umbilicus.  I last saw her last in July 2024.       She continues to do well but she has had a few medical setbacks recently related to right hip replacement surgery  and most recently placement of a spinal stimulator for neuropathy.      Regarding her hernia, she is having more pain.   She feels it is gotten somewhat larger.     She has a lot going on \"she is raising 2 great grandchildren    She comes in with a friend      Review of Systems  On review of systems patient denies any weight loss, fever, change in bowel habits, melena, hematochezia, coronary artery disease, TIA/CVA, bleeding, jaundice, pancreatitis, hepatitis.     Former smoker. Patient does not drink alcohol.   Review of Systems     Medical History        Past Medical History:   Diagnosis Date   • Ankle fracture       left ankle fracture   • Cellulitis, unspecified 08/24/2020     Cellulitis of skin   • Chronic venous insufficiency     • Chronic venous " insufficiency     • CKD (chronic kidney disease), stage III (Multi)     • COPD (chronic obstructive pulmonary disease) (Multi)     • Essential (primary) hypertension 01/04/2023     Hypertension   • H/O deep venous thrombosis     • Hypothyroidism     • Lymphedema     • Osteoporosis     • Other fracture of upper and lower end of unspecified fibula, initial encounter for closed fracture 05/21/2021     Fracture of distal fibula   • Other specified soft tissue disorders 09/19/2022     Leg swelling   • Peripheral neuropathy     • Personal history of (healed) stress fracture 06/26/2021     History of stress fracture   • Personal history of other diseases of the circulatory system       History of atrial fibrillation   • Personal history of other diseases of the musculoskeletal system and connective tissue 10/20/2016     History of arthritis   • Undifferentiated connective tissue disease (Multi)     • Unspecified asthma, uncomplicated ([facility]) 12/01/2022     Asthma   • Venous ulcer of leg (Multi)     • Vitamin D deficiency, unspecified 10/20/2016     Vitamin D deficiency       Chronic DVT on Eliquis, CKD, Polyarthritis     Surgical History         Past Surgical History:   Procedure Laterality Date   • BACK SURGERY   06/20/2016     Back Surgery L4-L5 fusion   • CARDIAC ELECTROPHYSIOLOGY STUDY AND ABLATION       • CHOLECYSTECTOMY   04/15/2016     Cholecystectomy   • HERNIA REPAIR       • OTHER SURGICAL HISTORY   05/03/2021     Cataract surgery   • THYROIDECTOMY, PARTIAL         d/t goiter   • TOTAL HIP ARTHROPLASTY Right     • TOTAL KNEE ARTHROPLASTY Bilateral 12/01/2022     Knee Replacement              CT 7/25/24  The largest ventral hernia has increased in size since the prior  exam. There is a larger amount of bowel within the hernia than there  was previously.        Objective  Physical Exam  Deconditioned   morbidly obese (BMI 41), uses a walker.  in no distress  Lungs are clear to auscultation bilaterally.  Cardiac  exam is regular rhythm and rate.  Abdomen is soft nontender nondistended. moderate sized hernia bulge just above and to the right of the umbilicus. Reduces easily   Neurologic exam is without focal deficit.        Assessment/Plan    Impression: incisional hernia.  I have recommended robotic incisional hernia repair with mesh.  We discussed the procedure to include risks, benefits and alternatives.  Patient agrees to the operation      She will need presurgical testing    Will need to hold Eliquis several days prior to the operation

## 2025-03-17 NOTE — PROGRESS NOTES
"79-year-old female with multiple comorbid medical conditions to include obesity. She is known to me having taken to surgery for incisional hernia repair with mesh. This procedure was performed on 8/1/20 for an indication of incarcerated incisional hernia associated with small bowel obstruction     She is being followed on a watchful waiting basis for a fairly asymptomatic incisional hernia just above the umbilicus.  I last saw her last in July 2024.       She continues to do well but she has had a few medical setbacks recently related to right hip replacement surgery  and most recently placement of a spinal stimulator for neuropathy.      Regarding her hernia, she is having more pain.   She feels it is gotten somewhat larger.     She has a lot going on \"she is raising 2 great grandchildren    She comes in with a friend      Review of Systems  On review of systems patient denies any weight loss, fever, change in bowel habits, melena, hematochezia, coronary artery disease, TIA/CVA, bleeding, jaundice, pancreatitis, hepatitis.     Former smoker. Patient does not drink alcohol.   Review of Systems     Medical History        Past Medical History:   Diagnosis Date    Ankle fracture       left ankle fracture    Cellulitis, unspecified 08/24/2020     Cellulitis of skin    Chronic venous insufficiency      Chronic venous insufficiency      CKD (chronic kidney disease), stage III (Multi)      COPD (chronic obstructive pulmonary disease) (Multi)      Essential (primary) hypertension 01/04/2023     Hypertension    H/O deep venous thrombosis      Hypothyroidism      Lymphedema      Osteoporosis      Other fracture of upper and lower end of unspecified fibula, initial encounter for closed fracture 05/21/2021     Fracture of distal fibula    Other specified soft tissue disorders 09/19/2022     Leg swelling    Peripheral neuropathy      Personal history of (healed) stress fracture 06/26/2021     History of stress fracture    " Personal history of other diseases of the circulatory system       History of atrial fibrillation    Personal history of other diseases of the musculoskeletal system and connective tissue 10/20/2016     History of arthritis    Undifferentiated connective tissue disease (Multi)      Unspecified asthma, uncomplicated ([facility]) 12/01/2022     Asthma    Venous ulcer of leg (Multi)      Vitamin D deficiency, unspecified 10/20/2016     Vitamin D deficiency       Chronic DVT on Eliquis, CKD, Polyarthritis     Surgical History         Past Surgical History:   Procedure Laterality Date    BACK SURGERY   06/20/2016     Back Surgery L4-L5 fusion    CARDIAC ELECTROPHYSIOLOGY STUDY AND ABLATION        CHOLECYSTECTOMY   04/15/2016     Cholecystectomy    HERNIA REPAIR        OTHER SURGICAL HISTORY   05/03/2021     Cataract surgery    THYROIDECTOMY, PARTIAL         d/t goiter    TOTAL HIP ARTHROPLASTY Right      TOTAL KNEE ARTHROPLASTY Bilateral 12/01/2022     Knee Replacement              CT 7/25/24  The largest ventral hernia has increased in size since the prior  exam. There is a larger amount of bowel within the hernia than there  was previously.        Objective  Physical Exam  Deconditioned   morbidly obese (BMI 41), uses a walker.  in no distress  Lungs are clear to auscultation bilaterally.  Cardiac exam is regular rhythm and rate.  Abdomen is soft nontender nondistended. moderate sized hernia bulge just above and to the right of the umbilicus. Reduces easily   Neurologic exam is without focal deficit.        Assessment/Plan    Impression: incisional hernia.  I have recommended robotic incisional hernia repair with mesh.  We discussed the procedure to include risks, benefits and alternatives.  Patient agrees to the operation      She will need presurgical testing    Will need to hold Eliquis several days prior to the operation

## 2025-03-24 ENCOUNTER — HOSPITAL ENCOUNTER (OUTPATIENT)
Dept: CARDIOLOGY | Facility: CLINIC | Age: 80
Discharge: HOME | End: 2025-03-24
Payer: MEDICARE

## 2025-03-24 DIAGNOSIS — R01.1 HEART MURMUR: Primary | ICD-10-CM

## 2025-03-24 DIAGNOSIS — I51.7 LVH (LEFT VENTRICULAR HYPERTROPHY): Primary | ICD-10-CM

## 2025-03-24 DIAGNOSIS — R01.1 HEART MURMUR: ICD-10-CM

## 2025-03-24 LAB
AORTIC VALVE PEAK VELOCITY: 1.27 M/S
AV PEAK GRADIENT: 6 MMHG
AVA (PEAK VEL): 2.85 CM2
EJECTION FRACTION APICAL 4 CHAMBER: 65.4
EJECTION FRACTION: 63 %
LEFT ATRIUM VOLUME AREA LENGTH INDEX BSA: 35.2 ML/M2
LEFT VENTRICLE INTERNAL DIMENSION DIASTOLE: 5.15 CM (ref 3.5–6)
LEFT VENTRICULAR OUTFLOW TRACT DIAMETER: 2.15 CM
MITRAL VALVE E/A RATIO: 0.63
RIGHT VENTRICLE FREE WALL PEAK S': 17.87 CM/S

## 2025-03-24 PROCEDURE — 93306 TTE W/DOPPLER COMPLETE: CPT | Performed by: INTERNAL MEDICINE

## 2025-03-24 PROCEDURE — C8929 TTE W OR WO FOL WCON,DOPPLER: HCPCS

## 2025-03-24 PROCEDURE — 2500000004 HC RX 250 GENERAL PHARMACY W/ HCPCS (ALT 636 FOR OP/ED): Performed by: STUDENT IN AN ORGANIZED HEALTH CARE EDUCATION/TRAINING PROGRAM

## 2025-03-24 RX ADMIN — PERFLUTREN 2 ML OF DILUTION: 6.52 INJECTION, SUSPENSION INTRAVENOUS at 11:50

## 2025-03-25 DIAGNOSIS — M35.9 CONNECTIVE TISSUE DISEASE, UNDIFFERENTIATED (MULTI): ICD-10-CM

## 2025-03-25 RX ORDER — METHOTREXATE 2.5 MG/1
10 TABLET ORAL WEEKLY
Qty: 48 TABLET | Refills: 0 | Status: SHIPPED | OUTPATIENT
Start: 2025-03-25 | End: 2025-06-23

## 2025-03-25 RX ORDER — HYDROXYCHLOROQUINE SULFATE 200 MG/1
200 TABLET, FILM COATED ORAL 2 TIMES DAILY
Qty: 180 TABLET | Refills: 0 | Status: SHIPPED | OUTPATIENT
Start: 2025-03-25 | End: 2025-06-23

## 2025-03-25 RX ORDER — FOLIC ACID 1 MG/1
1 TABLET ORAL DAILY
Qty: 90 TABLET | Refills: 1 | Status: SHIPPED | OUTPATIENT
Start: 2025-03-25 | End: 2025-06-23

## 2025-03-31 ENCOUNTER — TELEPHONE (OUTPATIENT)
Dept: OTOLARYNGOLOGY | Facility: HOSPITAL | Age: 80
End: 2025-03-31
Payer: MEDICARE

## 2025-03-31 DIAGNOSIS — E03.9 HYPOTHYROIDISM (ACQUIRED): ICD-10-CM

## 2025-03-31 RX ORDER — LEVOTHYROXINE SODIUM 25 UG/1
25 TABLET ORAL
Qty: 90 TABLET | Refills: 3 | Status: SHIPPED | OUTPATIENT
Start: 2025-03-31 | End: 2026-03-26

## 2025-03-31 NOTE — TELEPHONE ENCOUNTER
Patient needs a refill on the Levothyroxine,.  Please send to the [facility] on file.  Please advise patient.     no

## 2025-04-07 ENCOUNTER — OFFICE VISIT (OUTPATIENT)
Dept: WOUND CARE | Facility: CLINIC | Age: 80
End: 2025-04-07
Payer: MEDICARE

## 2025-04-07 VITALS
HEART RATE: 74 BPM | SYSTOLIC BLOOD PRESSURE: 161 MMHG | BODY MASS INDEX: 41.41 KG/M2 | WEIGHT: 225 LBS | DIASTOLIC BLOOD PRESSURE: 87 MMHG | HEIGHT: 62 IN | RESPIRATION RATE: 18 BRPM

## 2025-04-07 DIAGNOSIS — I87.2 CHRONIC VENOUS INSUFFICIENCY: Primary | ICD-10-CM

## 2025-04-07 DIAGNOSIS — I82.5Y2 CHRONIC DEEP VEIN THROMBOSIS (DVT) OF PROXIMAL VEIN OF LEFT LOWER EXTREMITY: ICD-10-CM

## 2025-04-07 DIAGNOSIS — Z79.01 CHRONIC ANTICOAGULATION: ICD-10-CM

## 2025-04-07 PROCEDURE — 3079F DIAST BP 80-89 MM HG: CPT | Performed by: INTERNAL MEDICINE

## 2025-04-07 PROCEDURE — 99214 OFFICE O/P EST MOD 30 MIN: CPT | Performed by: INTERNAL MEDICINE

## 2025-04-07 PROCEDURE — 1157F ADVNC CARE PLAN IN RCRD: CPT | Performed by: INTERNAL MEDICINE

## 2025-04-07 PROCEDURE — 3077F SYST BP >= 140 MM HG: CPT | Performed by: INTERNAL MEDICINE

## 2025-04-07 PROCEDURE — G2211 COMPLEX E/M VISIT ADD ON: HCPCS | Performed by: INTERNAL MEDICINE

## 2025-04-07 PROCEDURE — 1160F RVW MEDS BY RX/DR IN RCRD: CPT | Performed by: INTERNAL MEDICINE

## 2025-04-07 PROCEDURE — 1159F MED LIST DOCD IN RCRD: CPT | Performed by: INTERNAL MEDICINE

## 2025-04-07 PROCEDURE — 1036F TOBACCO NON-USER: CPT | Performed by: INTERNAL MEDICINE

## 2025-04-07 NOTE — PROGRESS NOTES
"REFERRAL REQUESTED BY:  Precious Rodriguez MD Ref Provider (PCP)    LAST SEEN:  10/07/2024    Patient ID: Genesis Garcia is a 80 y.o. female     HPI:   Here for follow up CVHTN and h/o ulcer. Using circaid,. Walking more,. Reports sleeping in the hospital bed bc the bed is too high.    Reports having hernia surgery July 24th.  Remains on eiquis 2.5 mg q 12 hours for extended phase DVT management. Last available Cr 1.2 - discussed holding the eliquis  x 3 days pre-op and resuming when OK with gen surgery. If admitted rec SCDs and continue either eliquis or lovenox 40 mg daily during the admission.      CURRENT DRESSING:  Who is performing the dressing change:    Dressing applied:    Dressing Frequency:     EDEMA MANAGEMENT:  Compression:  Right: Circaid CALF 41.0 CM   Left: Circaid   CALF 41.5 CM     Other Modality  Sleeping in Bed: yes  Elevating FOB:  yes    Offloading/Pressure Relief  Activity Level:  ad travon  Protection Devices:      Offloading/Pressure Relief Effective?     ROS:      Objective     VITALS:  /87 (BP Location: Left arm, Patient Position: Sitting)   Pulse 74   Resp 18   Ht 1.575 m (5' 2\")   Wt 102 kg (225 lb) Comment: Pt verbally stated wt  BMI 41.15 kg/m²       Physical Exam    ARRIVAL:  Ambulating and Other Rollator  TRANSFER:  One Assist, Minimal    PSYCHIATRIC:  Oriented to person, place and time: A & O x 3    CONSTITUTIONAL:    Appearance:  Well Groomed and Well Nourished    SKIN:   LDS and hemosiderin staining.      PULSES:     EXTREMITY TEMPERATURE:    RIGHT: normal  LEFT: normal    EDEMA:     WOUND ASSESSMENT:    Wound 02/18/25 Back Lower;Medial (Active)   Date First Assessed/Time First Assessed: 02/18/25 1339   Location: Back  Wound Location Orientation: Lower;Medial      Assessments 2/18/2025  3:12 PM 2/18/2025  4:03 PM   Drainage Amount -- None   Dressing Other (Comment) Other (Comment)   Dressing Changed New --   Dressing Status Clean;Dry Clean;Dry       No associated " orders.       Wound 02/18/25 Back Lateral;Lower;Right (Active)   Date First Assessed/Time First Assessed: 02/18/25 1339   Location: Back  Wound Location Orientation: Lateral;Lower;Right      Assessments 2/18/2025  3:13 PM 2/18/2025  4:02 PM   Drainage Amount -- None   Dressing Other (Comment) Other (Comment)   Dressing Changed New --   Dressing Status Dry Dry       No associated orders.          Procedures    Assessment/Plan   CVHTN and skin changes. No sores or weeping. Doing well with the skin care, compression, elevation and walking.     Reports having hernia surgery July 24th.  Remains on eiquis 2.5 mg q 12 hours for extended phase DVT management. Last available Cr 1.2 - discussed holding the eliquis  x 3 days pre-op and resuming when OK with gen surgery. If admitted rec SCDs and continue either eliquis or lovenox 40 mg daily during the admission.     Follow up August    VISIT ORDERS:  Vascular Study Required: n  Labs Required: n  Radiology Imaging Required: n  Consultation Required: n  Rx Provided:   Changes to Plan of Care: n      NEW ORDERS:  Wash: soap and water  Irrigate/Soak:  Skin Care: eucerin  Dressing:   Compression: liner and circaid  Offloading:     DISCHARGE PLANNING:    Follow Up: August  Nurse Visit:    General Instructions:  Center RN to apply EMLA/Lidocaine 1% jelly/LMX  topically to wound(s) prior to debridement by physician.  Center RN my see patient as a nurse consult in my absence.      RN Post Procedure Note:        HCC/ECF/Assisted Living  Agency/Facility:   days/week:  Contacted Regarding Changes:

## 2025-04-07 NOTE — PATIENT INSTRUCTIONS
Assessment/Plan   CVHTN and skin changes. No sores or weeping. Doing well with the skin care, compression, elevation and walking.     Reports having hernia surgery July 24th.  Remains on eiquis 2.5 mg q 12 hours for extended phase DVT management. Last available Cr 1.2 - discussed holding the eliquis  x 3 days pre-op and resuming when OK with gen surgery. If admitted rec SCDs and continue either eliquis or lovenox 40 mg daily during the admission.     Follow up August    VISIT ORDERS:  Vascular Study Required: n  Labs Required: n  Radiology Imaging Required: n  Consultation Required: n  Rx Provided:   Changes to Plan of Care: n      NEW ORDERS:  Wash: soap and water  Irrigate/Soak:  Skin Care: eucerin  Dressing:   Compression: liner and circaid  Offloading:     DISCHARGE PLANNING:    Follow Up: August  Nurse Visit:

## 2025-04-14 ENCOUNTER — OFFICE VISIT (OUTPATIENT)
Dept: PAIN MEDICINE | Facility: CLINIC | Age: 80
End: 2025-04-14
Payer: MEDICARE

## 2025-04-14 VITALS — SYSTOLIC BLOOD PRESSURE: 144 MMHG | DIASTOLIC BLOOD PRESSURE: 76 MMHG | HEART RATE: 78 BPM | OXYGEN SATURATION: 94 %

## 2025-04-14 DIAGNOSIS — Z96.82 PRESENCE OF NEUROSTIMULATOR: Primary | ICD-10-CM

## 2025-04-14 DIAGNOSIS — G60.9 IDIOPATHIC PERIPHERAL NEUROPATHY: ICD-10-CM

## 2025-04-14 DIAGNOSIS — Z79.01 CHRONIC ANTICOAGULATION: ICD-10-CM

## 2025-04-14 PROCEDURE — 95972 ALYS CPLX SP/PN NPGT W/PRGRM: CPT | Performed by: ANESTHESIOLOGY

## 2025-04-14 PROCEDURE — 1125F AMNT PAIN NOTED PAIN PRSNT: CPT | Performed by: ANESTHESIOLOGY

## 2025-04-14 PROCEDURE — 1157F ADVNC CARE PLAN IN RCRD: CPT | Performed by: ANESTHESIOLOGY

## 2025-04-14 PROCEDURE — 99214 OFFICE O/P EST MOD 30 MIN: CPT | Performed by: ANESTHESIOLOGY

## 2025-04-14 PROCEDURE — 3077F SYST BP >= 140 MM HG: CPT | Performed by: ANESTHESIOLOGY

## 2025-04-14 PROCEDURE — 1160F RVW MEDS BY RX/DR IN RCRD: CPT | Performed by: ANESTHESIOLOGY

## 2025-04-14 PROCEDURE — 1036F TOBACCO NON-USER: CPT | Performed by: ANESTHESIOLOGY

## 2025-04-14 PROCEDURE — 1159F MED LIST DOCD IN RCRD: CPT | Performed by: ANESTHESIOLOGY

## 2025-04-14 PROCEDURE — 99214 OFFICE O/P EST MOD 30 MIN: CPT | Mod: 25 | Performed by: ANESTHESIOLOGY

## 2025-04-14 PROCEDURE — 3078F DIAST BP <80 MM HG: CPT | Performed by: ANESTHESIOLOGY

## 2025-04-14 ASSESSMENT — ENCOUNTER SYMPTOMS
EYES NEGATIVE: 1
GASTROINTESTINAL NEGATIVE: 1
HEMATOLOGIC/LYMPHATIC NEGATIVE: 1
BACK PAIN: 1
CONSTITUTIONAL NEGATIVE: 1
CARDIOVASCULAR NEGATIVE: 1
PSYCHIATRIC NEGATIVE: 1
LOSS OF SENSATION IN FEET: 0
DEPRESSION: 0
RESPIRATORY NEGATIVE: 1
WEAKNESS: 1
ENDOCRINE NEGATIVE: 1
OCCASIONAL FEELINGS OF UNSTEADINESS: 0

## 2025-04-14 ASSESSMENT — PAIN SCALES - GENERAL
PAINLEVEL_OUTOF10: 6
PAINLEVEL_OUTOF10: 6

## 2025-04-14 ASSESSMENT — PAIN DESCRIPTION - DESCRIPTORS: DESCRIPTORS: ACHING

## 2025-04-14 ASSESSMENT — PAIN - FUNCTIONAL ASSESSMENT: PAIN_FUNCTIONAL_ASSESSMENT: 0-10

## 2025-04-14 NOTE — PROGRESS NOTES
PAIN MANAGEMENT FOLLOW-UP OFFICE NOTE    Date of Service: 4/14/2025    SUBJECTIVE    CHIEF COMPLAINT: BLE pain    HISTORY OF PRESENT ILLNESS    Genesis Garcia is a 80 y.o. female with PMH HTN, DVT on ELIQUIS, hypothyroidism, CKD, asthma, obesity, L4-S1 fusion, CREST syndrome, VCF s/p T12/L1 kypho who presents for F/U.    On 2/18, pt underwent Abbott SCS implant with minimal relief. Feels coverage in BL feet is lacking. Otherwise, no issues with wounds.     Pt denies new-onset numbness, weakness, bowel/bladder incontinence.  Pt denies recent infection, allergy to Latex/iodine/contrast. Patient is currently taking the following blood thinner(s): ELIQUIS    Procedure log:  -Mcknight SCS implant 2/18/25  -ihiji SCS trial 1/9/25: >50% relief  -BL foot Qutenza #2 on 12/13/24  -BL foot Qutenza #1 on 8/23/24  -T12, L1 kypho 6/27/24: 75% relief      REVIEW OF SYSTEMS  Review of Systems   Constitutional: Negative.    HENT: Negative.     Eyes: Negative.    Respiratory: Negative.     Cardiovascular: Negative.    Gastrointestinal: Negative.    Endocrine: Negative.    Musculoskeletal:  Positive for back pain.   Skin: Negative.    Neurological:  Positive for weakness.   Hematological: Negative.    Psychiatric/Behavioral: Negative.         PAST MEDICAL HISTORY  Past Medical History:   Diagnosis Date    Anemia     Ankle fracture     left ankle fracture    Arthritis 1977    Cellulitis, unspecified 08/24/2020    Cellulitis of skin    CHF (congestive heart failure) 02/18/2025    Chronic venous insufficiency     CKD (chronic kidney disease), stage III (Multi)     COPD (chronic obstructive pulmonary disease) (Multi)     Essential (primary) hypertension 01/04/2023    Hypertension    H/O deep venous thrombosis     Hernia, internal 2022    History of blood transfusion     Hypothyroidism     Joint pain     Low back pain     Lymphedema     Osteoporosis     Other fracture of upper and lower end of unspecified fibula, initial encounter for  closed fracture 05/21/2021    Fracture of distal fibula    Other specified soft tissue disorders 09/19/2022    Leg swelling    Peripheral neuropathy     Personal history of (healed) stress fracture 06/26/2021    History of stress fracture    Personal history of other diseases of the circulatory system     History of atrial fibrillation    Personal history of other diseases of the musculoskeletal system and connective tissue 10/20/2016    History of arthritis    Plantar fasciitis     Rotator cuff syndrome     Undifferentiated connective tissue disease (Multi)     Unspecified asthma, uncomplicated (HHS-HCC) 12/01/2022    Asthma    Venous ulcer of leg (Multi)     Vitamin D deficiency, unspecified 10/20/2016    Vitamin D deficiency     Past Surgical History:   Procedure Laterality Date    BACK SURGERY  06/20/2016    Back Surgery L4-L5 fusion    CARDIAC ELECTROPHYSIOLOGY STUDY AND ABLATION      CHOLECYSTECTOMY  04/15/2016    Cholecystectomy    EYE SURGERY  2018    HERNIA REPAIR      JOINT REPLACEMENT  2007, 2017, 2022    OTHER SURGICAL HISTORY  05/03/2021    Cataract surgery    SPINE SURGERY  2001    THYROIDECTOMY, PARTIAL      d/t goiter    TOTAL HIP ARTHROPLASTY Right     TOTAL KNEE ARTHROPLASTY Bilateral 12/01/2022    Knee Replacement    VERTEBROPLASTY       Family History   Problem Relation Name Age of Onset    Diabetes Mother mom         type 2    Heart disease Father dakota     Hypertension Father dakota     Cancer Sister juan     Clotting disorder Sister juan     Cancer Daughter dayne fierrorebaock     Arthritis Sister juan hector     Arthritis Sister laya penn     Clotting disorder Sister juan     Cancer Daughter dayne pearson     Multiple sclerosis Daughter dayne     Arthritis Sister juan hector     Clotting disorder Sister juan hector     Cancer Sister juan hector     Arthritis Sister laya penn     Cancer Sister juan     Cancer Daughter dayne fierrorebaock     Cancer Sister juan     Arthritis Sister juan  tawny     Arthritis Sister laya penn     Cancer Sister svetlana     Clotting disorder Sister svetlana     Cancer Daughter dayne pearson     Multiple sclerosis Daughter dayne     Arthritis Other laya penn     Cancer Other Svetlana     Cancer Other Perla     Arthritis Sister svetlaan hector     Arthritis Sister laya penn     Cancer Sister svetlana     Cancer Daughter dayne pearson        CURRENT MEDICATIONS  Current Outpatient Medications   Medication Sig Dispense Refill    acetaminophen (Tylenol) 325 mg tablet Take 2 tablets (650 mg) by mouth every 4 hours if needed for mild pain (1 - 3) or moderate pain (4 - 6). 30 tablet 0    albuterol 2.5 mg /3 mL (0.083 %) nebulizer solution Take 3 mL (2.5 mg) by nebulization every 2 hours if needed for wheezing. 75 mL 11    apixaban (Eliquis) 2.5 mg tablet Take 1 tablet (2.5 mg) by mouth 2 times a day. 180 tablet 3    Bacillus coagulans (PROBIOTIC, B. COAGULANS, ORAL) Take 1 tablet by mouth once daily.      calcium carbonate 600 mg calcium (1,500 mg) tablet Take 1 tablet (1,500 mg) by mouth once daily.      cholecalciferol (Vitamin D-3) 50 MCG (2000 UT) tablet Take by mouth.      denosumab (Prolia) 60 mg/mL syringe Provider to inject 1 mL (60 mg total) under the skin every 6 months.  For  ONLY. 1 mL 2    fluticasone propion-salmeteroL (Advair Diskus) 250-50 mcg/dose diskus inhaler Inhale 1 puff 2 times a day. Rinse mouth with water after use to reduce aftertaste and incidence of candidiasis. Do not swallow.      folic acid (Folvite) 1 mg tablet Take 1 tablet (1 mg) by mouth once daily. 90 tablet 1    gabapentin (Neurontin) 400 mg capsule Take 1 capsule (400 mg) by mouth 3 times a day. 90 capsule 2    hydrocortisone 2.5 % cream Apply topically if needed for irritation or rash. Use maximum 2-3 times a week. 30 g 2    hydroxychloroquine (Plaquenil) 200 mg tablet Take 1 tablet (200 mg) by mouth 2 times a day. 180 tablet 0    levothyroxine (Synthroid) 25 mcg tablet  Take 1 tablet (25 mcg) by mouth once daily in the morning. Take before meals. 90 tablet 3    losartan (Cozaar) 100 mg tablet Take 1 tablet (100 mg) by mouth once daily.      methotrexate (Trexall) 2.5 mg tablet Take 4 tablets (10 mg total) by mouth 1 (one) time per week.  Follow directions carefully, and ask to explain any part you do not understand. Take exactly as directed. 48 tablet 0    multivitamin capsule Take by mouth.      spironolactone (Aldactone) 25 mg tablet Take 1 tablet (25 mg) by mouth once daily.      torsemide (Demadex) 20 mg tablet Take 1 tablet (20 mg) by mouth early in the morning..       No current facility-administered medications for this visit.       ALLERGIES AND DRUG REACTIONS  Allergies   Allergen Reactions    Sulfur Hives    Cefprozil Hives    Sulfa (Sulfonamide Antibiotics) Hives          OBJECTIVE  Visit Vitals  /76   Pulse 78   SpO2 94%   OB Status Postmenopausal   Smoking Status Former       Last Recorded Pain Score (if available):           Pain Score:   6       Physical Exam  General: Sitting in chair, NAD  Head: NCAT  Eyes: Sclera/conjunctiva clear, EOMI, PERRL  Nose/mouth: MMM  CV: Good distal pulses  Lungs: Good/equal chest excursion  Abdomen: Soft, ND  Ext: BLE edema, R ankle eversion  MSK: l-spine alignment: unremarkable    Neuro: AAOx3, CN grossly nl      Psych: affect nl  Skin: no rash/lesions. SCS midline and R IPG incisions well-healed, non-tender        REVIEW OF LABORATORY DATA  I have reviewed the following lab results:  WBC   Date Value Ref Range Status   01/06/2025 6.3 4.4 - 11.3 x10*3/uL Final     RBC   Date Value Ref Range Status   01/06/2025 4.55 4.00 - 5.20 x10*6/uL Final     Hemoglobin   Date Value Ref Range Status   01/06/2025 12.8 12.0 - 16.0 g/dL Final     Hematocrit   Date Value Ref Range Status   01/06/2025 42.4 36.0 - 46.0 % Final     MCV   Date Value Ref Range Status   01/06/2025 93 80 - 100 fL Final     MCH   Date Value Ref Range Status   01/06/2025  28.1 26.0 - 34.0 pg Final     MCHC   Date Value Ref Range Status   01/06/2025 30.2 (L) 32.0 - 36.0 g/dL Final     RDW   Date Value Ref Range Status   01/06/2025 14.6 (H) 11.5 - 14.5 % Final     Platelets   Date Value Ref Range Status   01/06/2025 241 150 - 450 x10*3/uL Final     Sodium   Date Value Ref Range Status   01/06/2025 142 136 - 145 mmol/L Final     Potassium   Date Value Ref Range Status   01/06/2025 4.7 3.5 - 5.3 mmol/L Final     Bicarbonate   Date Value Ref Range Status   01/06/2025 27 21 - 32 mmol/L Final     Urea Nitrogen   Date Value Ref Range Status   01/06/2025 27 (H) 6 - 23 mg/dL Final     CALCIUM   Date Value Ref Range Status   02/14/2025 8.9 8.6 - 10.4 mg/dL Final     Protime   Date Value Ref Range Status   01/18/2023 11.0 9.8 - 13.4 sec Final     INR   Date Value Ref Range Status   01/18/2023 1.0 0.9 - 1.1 Final         REVIEW OF RADIOLOGY   I have reviewed the following:  Radiology Studies           MRI L-spine w 5/8/24:  * Discitis/osteomyelitis at T12/L1 as described.  *No evidence of fluid collection to suggest epidural or paraspinal  abscess.        MRI L-spine wo 3/22/24:  There is increased signal within the disc space at T12-L1 with  increased anterior wedging at L1 and new anterior wedging at T12.  There is approximately 75% loss of vertebral body height anteriorly  at L1 and approximately 25% loss of vertebral body height anteriorly  at T12. There is no significant osseous retropulsion into the spinal  canal. There is no significant paraspinal edema or visualized  epidural collection. These findings may represent acute compression  deformities or sequela of trauma, however by imaging and early  discitis osteomyelitis can have a similar appearance. Please  correlate clinically for signs and symptoms of infection.      There is edema within the sacrum on the left which may represent a  sacral insufficiency fracture.      Postsurgical changes of posterolateral fusion and decompression at  L4  through S1. No spinal canal and mild-to-moderate neural foraminal  narrowing at these levels.      Additional multilevel degenerative disc disease and facet arthrosis  with mild spinal canal stenosis. Neural foraminal narrowing most  pronounced at T12-L1 with severe left neural foraminal stenosis.               ASSESSMENT & PLAN  Genesis Garcia is a 80 y.o. female with PMH HTN, DVT on ELIQUIS, hypothyroidism, CKD, asthma, obesity, L4-S1 fusion, VCF s/p T12/L1 kypho who presents for F/U .    1) Peripheral neuropathy  -BL foot neuropathy since ~2019, idiopathic  -Refractive to yrs of conservative tx including Tylenol, NSAIDs, gabapentin, Qutenza  -No known DM. Nl B12, folate  -s/p Abbott SCS trial 1/9/25: >50% relief. Improved stand/walking tolerance. Improved sleep.   -s/p Mcknight SCS implant 2/18/25 w/o complication. Pt initially focused on LB, but reviewed goal is to target BL foot pain. Testing revealed adequate BL foot coverage. Complex reprogramming provided  -F/U 3 mo        Discussed procedure risks/benefits in detail with patient. Pt meets medical necessity for procedure due to failure of conservative measures. Reviewed procedural risks including bleeding, infection, nerve damage, paralysis. Also reviewed mitigating factors such as screening for infection/blood thinner use, sterile precautions, and image-guidance when applicable. All questions answered. Pt/guardian expressed understanding and choose to proceed    Today's visit involved continuation of chronic pain care. In the context of the complexity of this patient's chronic pain diagnosis, long-term expectations and care planning discussed. Adequate time taken to ensure patient understanding and answer questions. Imaging studies ordered are placed do elucidate the patient's diagnosis, but also to evaluate the patient's candidacy for procedural and surgical interventions. The risks and benefits of these potential interventions are detailed as  above.                     Faith Reese MD  Anesthesiologist & Interventional Pain Physician   [facility] [place]  O: [phone]  F: [phone]  11:04 AM  04/14/25

## 2025-04-16 ENCOUNTER — OFFICE VISIT (OUTPATIENT)
Dept: CARDIOLOGY | Facility: HOSPITAL | Age: 80
End: 2025-04-16
Payer: MEDICARE

## 2025-04-16 VITALS
DIASTOLIC BLOOD PRESSURE: 75 MMHG | WEIGHT: 225 LBS | SYSTOLIC BLOOD PRESSURE: 135 MMHG | HEIGHT: 62 IN | BODY MASS INDEX: 41.41 KG/M2 | HEART RATE: 75 BPM

## 2025-04-16 DIAGNOSIS — I51.7 LVH (LEFT VENTRICULAR HYPERTROPHY): ICD-10-CM

## 2025-04-16 LAB
ATRIAL RATE: 75 BPM
P AXIS: 68 DEGREES
P OFFSET: 159 MS
P ONSET: 103 MS
PR INTERVAL: 220 MS
Q ONSET: 213 MS
QRS COUNT: 13 BEATS
QRS DURATION: 94 MS
QT INTERVAL: 414 MS
QTC CALCULATION(BAZETT): 462 MS
QTC FREDERICIA: 445 MS
R AXIS: 7 DEGREES
T AXIS: 62 DEGREES
T OFFSET: 420 MS
VENTRICULAR RATE: 75 BPM

## 2025-04-16 PROCEDURE — 93005 ELECTROCARDIOGRAM TRACING: CPT | Performed by: INTERNAL MEDICINE

## 2025-04-16 PROCEDURE — 1159F MED LIST DOCD IN RCRD: CPT | Performed by: INTERNAL MEDICINE

## 2025-04-16 PROCEDURE — 93010 ELECTROCARDIOGRAM REPORT: CPT | Performed by: INTERNAL MEDICINE

## 2025-04-16 PROCEDURE — 3078F DIAST BP <80 MM HG: CPT | Performed by: INTERNAL MEDICINE

## 2025-04-16 PROCEDURE — 99215 OFFICE O/P EST HI 40 MIN: CPT | Performed by: INTERNAL MEDICINE

## 2025-04-16 PROCEDURE — 1157F ADVNC CARE PLAN IN RCRD: CPT | Performed by: INTERNAL MEDICINE

## 2025-04-16 PROCEDURE — 99215 OFFICE O/P EST HI 40 MIN: CPT | Mod: 25 | Performed by: INTERNAL MEDICINE

## 2025-04-16 PROCEDURE — 3075F SYST BP GE 130 - 139MM HG: CPT | Performed by: INTERNAL MEDICINE

## 2025-04-16 PROCEDURE — 1160F RVW MEDS BY RX/DR IN RCRD: CPT | Performed by: INTERNAL MEDICINE

## 2025-04-16 NOTE — PROGRESS NOTES
Chief complaint:  I am seeing patient in consultation for Dr. Rodriguez to establish follow-up for a heart murmur and an abnormal echocardiogram.    HPI:  Patient is a very pleasant 80-year-old female.  She has a longstanding history of hypertension which has reportedly been reasonably well-controlled.  She denies any diabetes or known hypercholesterolemia.  She was a smoker but quit 35 years ago.  There does appear to be some family history of premature coronary disease.    Patient denies any prior history of MI or stroke.  There is no history of rheumatic fever but there is a history of a heart murmur.  She has a remote history of a cardioversion for atrial fibrillation but denies any recent need for cardioversion.  She denies any exertionally mediated chest discomfort or claudication.  She has some mild exertional dyspnea and edema.  She is following with Dr. Atkinson for some venous ulcers.    She was recently noted to have a heart murmur and underwent an echocardiogram.  This did show some markedconcentric LVH but no compelling valve disease and no pulmonary hypertension.  She comes in today to reestablish cardiology follow-up.    She generally is pretty happy and comfortable with her activity level.  She gets around with a walker due to some significant arthritis.  She remains compliant with her medications and has not had any recent hospitalizations.    PMH/PSH:  Past medical history is remarkable for the hypertension and obesity.  She also has rheumatoid and osteoarthritis as well as hypothyroidism.  She also has some COPD.  She has also had prior DVTs as well as remote atrial fibrillation.  Previous surgeries includes a left and right total knee replacement as well as a right total hip replacement.  She has also had a cholecystectomy and hernia surgery.  She has also had prior back surgery.    Allergies: Cefazolin.  EtOH: Denies use.  Caffeine: Moderate use.    FH/SH:  Patient is a pleasant 80 female who  lives in [place] and grew up in [place].  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  She is unmarried and has 2 children.  She is accompanied by a supportive daughter today.  She is following with Dr. Rodriguez now.    Review of systems:  All other systems have been reviewed and are negative for complaint.    Objective:  Vital signs:  P-75    BP-135/75 (blood pressure was checked in both arms and was equal bilaterally at 135/75).  General: Alert, pleasant overweight female in no obvious distress.  HEENT: Arcus senilis with normal EOMs but no thyromegaly or lymphadenopathy.  Lungs: Mildly diminished air exchange with expiratory prolongation but no crackles or wheezing.  Cardiac: Distant heart tones with 1-2/6 systolic     murmur.  I could appreciate no diastolic murmur, rumble or rub.  Abdomen: Nontender with normal bowel sounds and no bruits.  Extremities: Moderate bilateral edema.  Skin: No acute rash.  Neuro: Grossly intact  Vascular: Normal brachial, radial and ulnar pulses bilaterally.  Normal popliteal and distal pulses bilaterally.    EKG: Normal sinus rhythm with first-degree AV block.  Low voltage.  No acute ST or T wave changes.    Lipid panel: Cholesterol-153, HDL-45, LDL-96, TG-63.    Impression/plan:  Patient presents with a recently noted heart murmur.  Her echo shows marked concentric LVH which is probably the etiology of the murmur.  She does not hav any concerning valvular disease and does not have any significant pulmonary hypertension.  She is not an overly active individual but is not having any compelling symptoms referable to this.  I thus reassured the patient and her daughter that we simply needed to monitor her to be sure that her activity tolerance remains tolerable and stable and that we did not need to embark on any further testing at this time.    Her blood pressure remains under very good control at this time on her current right  medical regimen so we will continue this unchanged.    She remains appropriately anticoagulated for her prior DVTs as well as her atrial fibrillation.    She is not having any anginal type symptoms at this time, so I did not think we need to embark on an ischemic workup.    Her lipid panel looks reasonable.  Given her advanced age, I was not inclined to initiating lipid-lowering therapy at this time.    She knows to call for any intercurrent concerns and I will see her back for routine follow-up in 4 months.    Patient instructions:    Continue current meds unchanged.    RTC in 4 months.

## 2025-04-21 ENCOUNTER — OFFICE VISIT (OUTPATIENT)
Dept: HEMATOLOGY/ONCOLOGY | Facility: CLINIC | Age: 80
End: 2025-04-21
Payer: MEDICARE

## 2025-04-21 VITALS
OXYGEN SATURATION: 97 % | WEIGHT: 225 LBS | TEMPERATURE: 97.9 F | DIASTOLIC BLOOD PRESSURE: 82 MMHG | BODY MASS INDEX: 41.41 KG/M2 | RESPIRATION RATE: 16 BRPM | HEART RATE: 69 BPM | SYSTOLIC BLOOD PRESSURE: 174 MMHG | HEIGHT: 62 IN

## 2025-04-21 DIAGNOSIS — K90.9 MALABSORPTION OF IRON (HHS-HCC): ICD-10-CM

## 2025-04-21 DIAGNOSIS — D50.8 OTHER IRON DEFICIENCY ANEMIA: ICD-10-CM

## 2025-04-21 LAB
ALBUMIN SERPL BCP-MCNC: 4 G/DL (ref 3.4–5)
ALP SERPL-CCNC: 60 U/L (ref 33–136)
ALT SERPL W P-5'-P-CCNC: 15 U/L (ref 7–45)
ANION GAP SERPL CALC-SCNC: 8 MMOL/L (ref 10–20)
AST SERPL W P-5'-P-CCNC: 21 U/L (ref 9–39)
BASOPHILS # BLD AUTO: 0.03 X10*3/UL (ref 0–0.1)
BASOPHILS NFR BLD AUTO: 0.5 %
BILIRUB SERPL-MCNC: 0.4 MG/DL (ref 0–1.2)
BUN SERPL-MCNC: 24 MG/DL (ref 6–23)
CALCIUM SERPL-MCNC: 8.4 MG/DL (ref 8.6–10.3)
CHLORIDE SERPL-SCNC: 110 MMOL/L (ref 98–107)
CO2 SERPL-SCNC: 27 MMOL/L (ref 21–32)
CREAT SERPL-MCNC: 1.16 MG/DL (ref 0.5–1.05)
EGFRCR SERPLBLD CKD-EPI 2021: 48 ML/MIN/1.73M*2
EOSINOPHIL # BLD AUTO: 0.26 X10*3/UL (ref 0–0.4)
EOSINOPHIL NFR BLD AUTO: 4.2 %
ERYTHROCYTE [DISTWIDTH] IN BLOOD BY AUTOMATED COUNT: 16 % (ref 11.5–14.5)
FERRITIN SERPL-MCNC: 34 NG/ML (ref 8–150)
FOLATE SERPL-MCNC: >24 NG/ML
GLUCOSE SERPL-MCNC: 84 MG/DL (ref 74–99)
HCT VFR BLD AUTO: 37.5 % (ref 36–46)
HGB BLD-MCNC: 11.4 G/DL (ref 12–16)
IMM GRANULOCYTES # BLD AUTO: 0.04 X10*3/UL (ref 0–0.5)
IMM GRANULOCYTES NFR BLD AUTO: 0.6 % (ref 0–0.9)
IRON SATN MFR SERPL: 11 % (ref 25–45)
IRON SERPL-MCNC: 39 UG/DL (ref 35–150)
LYMPHOCYTES # BLD AUTO: 0.95 X10*3/UL (ref 0.8–3)
LYMPHOCYTES NFR BLD AUTO: 15.3 %
MCH RBC QN AUTO: 27.7 PG (ref 26–34)
MCHC RBC AUTO-ENTMCNC: 30.4 G/DL (ref 32–36)
MCV RBC AUTO: 91 FL (ref 80–100)
MONOCYTES # BLD AUTO: 0.68 X10*3/UL (ref 0.05–0.8)
MONOCYTES NFR BLD AUTO: 10.9 %
NEUTROPHILS # BLD AUTO: 4.26 X10*3/UL (ref 1.6–5.5)
NEUTROPHILS NFR BLD AUTO: 68.5 %
PLATELET # BLD AUTO: 195 X10*3/UL (ref 150–450)
POTASSIUM SERPL-SCNC: 4.4 MMOL/L (ref 3.5–5.3)
PROT SERPL-MCNC: 6.4 G/DL (ref 6.4–8.2)
RBC # BLD AUTO: 4.12 X10*6/UL (ref 4–5.2)
SODIUM SERPL-SCNC: 141 MMOL/L (ref 136–145)
TIBC SERPL-MCNC: 365 UG/DL (ref 240–445)
UIBC SERPL-MCNC: 326 UG/DL (ref 110–370)
VIT B12 SERPL-MCNC: 816 PG/ML (ref 211–911)
WBC # BLD AUTO: 6.2 X10*3/UL (ref 4.4–11.3)

## 2025-04-21 PROCEDURE — 82607 VITAMIN B-12: CPT | Mod: PORLAB | Performed by: NURSE PRACTITIONER

## 2025-04-21 PROCEDURE — 1160F RVW MEDS BY RX/DR IN RCRD: CPT | Performed by: NURSE PRACTITIONER

## 2025-04-21 PROCEDURE — 82746 ASSAY OF FOLIC ACID SERUM: CPT | Mod: PORLAB | Performed by: NURSE PRACTITIONER

## 2025-04-21 PROCEDURE — 1125F AMNT PAIN NOTED PAIN PRSNT: CPT | Performed by: NURSE PRACTITIONER

## 2025-04-21 PROCEDURE — 1036F TOBACCO NON-USER: CPT | Performed by: NURSE PRACTITIONER

## 2025-04-21 PROCEDURE — 36415 COLL VENOUS BLD VENIPUNCTURE: CPT | Performed by: NURSE PRACTITIONER

## 2025-04-21 PROCEDURE — 3077F SYST BP >= 140 MM HG: CPT | Performed by: NURSE PRACTITIONER

## 2025-04-21 PROCEDURE — 1159F MED LIST DOCD IN RCRD: CPT | Performed by: NURSE PRACTITIONER

## 2025-04-21 PROCEDURE — 80053 COMPREHEN METABOLIC PANEL: CPT | Performed by: NURSE PRACTITIONER

## 2025-04-21 PROCEDURE — 85025 COMPLETE CBC W/AUTO DIFF WBC: CPT | Performed by: NURSE PRACTITIONER

## 2025-04-21 PROCEDURE — 82728 ASSAY OF FERRITIN: CPT | Performed by: NURSE PRACTITIONER

## 2025-04-21 PROCEDURE — 1157F ADVNC CARE PLAN IN RCRD: CPT | Performed by: NURSE PRACTITIONER

## 2025-04-21 PROCEDURE — 99214 OFFICE O/P EST MOD 30 MIN: CPT | Performed by: NURSE PRACTITIONER

## 2025-04-21 PROCEDURE — 3079F DIAST BP 80-89 MM HG: CPT | Performed by: NURSE PRACTITIONER

## 2025-04-21 PROCEDURE — 83540 ASSAY OF IRON: CPT | Performed by: NURSE PRACTITIONER

## 2025-04-21 ASSESSMENT — PAIN SCALES - GENERAL: PAINLEVEL_OUTOF10: 5

## 2025-04-21 NOTE — PROGRESS NOTES
Patient ID: Genesis Garcia is a 80 y.o. female.  Referring Physician: Rosanne M Casal, APRN-CNP, DNP  [id number] [place] [address]  Primary Care Provider: Precious Rodriguez MD  Visit Type: Follow Up      Subjective    Location:  [facility]  Initial consult: 4/8/2024  Reason: Microcytic Anemia  Referred by Dr Garner     Patient is a 79 yo woman with history of UCTD (positive MAURICE, anticentromere antibody and arthralgia), OA s/p bilateral TKR, HTN, DVT on Eliquis, stage 3 CKD, chronic venous insufficiency, Lat summer she had cellulitis and leg ulcers, seeing the wound clinic under the care of Dr Rosy Atkinson.  She was hospitalized in December 2023 for sepsis secondary to pneumonia and cellulitis. She suffers from chronic lower extremity cellulitis, she has been fighting this for approximately 2 years.  When her cellulitis is flares up her left leg gives out.  She suffered from a fall in early December 23. Since the fall she has suffered from constant daily low back pain.  She does not have pain radiating down her legs.  Her cellulitis has completely healed. She is doing well today.  She is able to ambulate with a walker.  She is established with Dr. Wakefield from pain management.  She is maintained on gabapentin, tizanidine and oxycodone.     Today, patient presentsfor follow up.  Denies abnormal weight loss, night sweats, fever. Patient does endorse fatigue, but denies chills, sob, cp, n/v/d, n/t. No PICA. Denies any abnormal bleeding. Minor bruising 2/2 to Eliquis, dose was reduced to 2.5 po bid by Dr Mckeon. Up to date on GI screening. No recurrent infections or lymphadenopathy. No known blood disorders in family. Has had surgery in past w/o issue. Never had blood/blood products. Denies NSAID use.      10 point review of systems negative except as stated in HPI    Objective   BSA: 2.11 meters squared  /82 (BP Location: Left arm, Patient Position: Sitting)   Pulse 69   Temp 36.6 °C (97.9  "°F)   Resp 16   Ht 1.575 m (5' 2.01\")   Wt 102 kg (225 lb) Comment: per patient -refused to weigh today  SpO2 97%   BMI 41.14 kg/m²      has a past medical history of Anemia, Ankle fracture, Arthritis (1977), Cellulitis, unspecified (08/24/2020), CHF (congestive heart failure) (02/18/2025), Chronic venous insufficiency, CKD (chronic kidney disease), stage III (Multi), COPD (chronic obstructive pulmonary disease) (Multi), Essential (primary) hypertension (01/04/2023), H/O deep venous thrombosis, Hernia, internal (2022), History of blood transfusion, Hypothyroidism, Joint pain, Low back pain, Lymphedema, Osteoporosis, Other fracture of upper and lower end of unspecified fibula, initial encounter for closed fracture (05/21/2021), Other specified soft tissue disorders (09/19/2022), Peripheral neuropathy, Personal history of (healed) stress fracture (06/26/2021), Personal history of other diseases of the circulatory system, Personal history of other diseases of the musculoskeletal system and connective tissue (10/20/2016), Plantar fasciitis, Rotator cuff syndrome, Undifferentiated connective tissue disease (Multi), Unspecified asthma, uncomplicated ([facility]) (12/01/2022), Venous ulcer of leg (Multi), and Vitamin D deficiency, unspecified (10/20/2016).    She has no past medical history of Awareness under anesthesia, Cerebral vascular accident (Multi), Coronary artery disease, Delayed emergence from general anesthesia, GERD (gastroesophageal reflux disease), HL (hearing loss), Malignant hyperthermia, Myocardial infarction (Multi), PONV (postoperative nausea and vomiting), Refusal of blood product, Seizure disorder (Multi), Sleep apnea, Stroke (Multi), TIA (transient ischemic attack), Type 2 diabetes mellitus, or Vision loss.   has a past surgical history that includes Cholecystectomy (04/15/2016); Back surgery (06/20/2016); Total knee arthroplasty (Bilateral, 12/01/2022); Other surgical history (05/03/2021); " Thyroidectomy, partial; Total hip arthroplasty (Right); Cardiac electrophysiology study and ablation; Hernia repair; Vertebroplasty; Eye surgery (2018); Joint replacement (2007, 2017, 2022); and Spine surgery (2001).  Family History[1]      Genesis Garcia  reports that she quit smoking about 33 years ago. Her smoking use included cigarettes. She started smoking about 57 years ago. She has a 7.5 pack-year smoking history. She has never used smokeless tobacco.  She  reports that she does not currently use alcohol.  She  reports that she does not currently use drugs.    Physical Exam  HENT:      Head: Normocephalic.      Nose: Nose normal.      Mouth/Throat:      Mouth: Mucous membranes are moist.   Eyes:      Pupils: Pupils are equal, round, and reactive to light.   Cardiovascular:      Rate and Rhythm: Normal rate and regular rhythm.      Pulses: Normal pulses.      Heart sounds: Normal heart sounds.   Pulmonary:      Effort: Pulmonary effort is normal.      Breath sounds: Normal breath sounds.   Abdominal:      General: Bowel sounds are normal.      Palpations: Abdomen is soft.   Musculoskeletal:         General: Normal range of motion.   Skin:     General: Skin is warm and dry.   Neurological:      General: No focal deficit present.      Mental Status: She is alert and oriented to person, place, and time.   Psychiatric:         Mood and Affect: Mood normal.         Behavior: Behavior normal.         WBC   Date/Time Value Ref Range Status   04/21/2025 10:30 AM 6.2 4.4 - 11.3 x10*3/uL Final   01/06/2025 09:45 AM 6.3 4.4 - 11.3 x10*3/uL Final   10/07/2024 10:48 AM 5.2 4.4 - 11.3 x10*3/uL Final     nRBC   Date Value Ref Range Status   01/06/2025 0.0 0.0 - 0.0 /100 WBCs Final   10/07/2024 0.0 0.0 - 0.0 /100 WBCs Final   07/08/2024   Final     Comment:     Not Measured     RBC   Date Value Ref Range Status   04/21/2025 4.12 4.00 - 5.20 x10*6/uL Final   01/06/2025 4.55 4.00 - 5.20 x10*6/uL Final   10/07/2024 4.32  "4.00 - 5.20 x10*6/uL Final     Hemoglobin   Date Value Ref Range Status   04/21/2025 11.4 (L) 12.0 - 16.0 g/dL Final   01/06/2025 12.8 12.0 - 16.0 g/dL Final   10/07/2024 12.3 12.0 - 16.0 g/dL Final     Hematocrit   Date Value Ref Range Status   04/21/2025 37.5 36.0 - 46.0 % Final   01/06/2025 42.4 36.0 - 46.0 % Final   10/07/2024 40.4 36.0 - 46.0 % Final     MCV   Date/Time Value Ref Range Status   04/21/2025 10:30 AM 91 80 - 100 fL Final   01/06/2025 09:45 AM 93 80 - 100 fL Final   10/07/2024 10:48 AM 94 80 - 100 fL Final     MCH   Date/Time Value Ref Range Status   04/21/2025 10:30 AM 27.7 26.0 - 34.0 pg Final   01/06/2025 09:45 AM 28.1 26.0 - 34.0 pg Final   10/07/2024 10:48 AM 28.5 26.0 - 34.0 pg Final     MCHC   Date/Time Value Ref Range Status   04/21/2025 10:30 AM 30.4 (L) 32.0 - 36.0 g/dL Final   01/06/2025 09:45 AM 30.2 (L) 32.0 - 36.0 g/dL Final   10/07/2024 10:48 AM 30.4 (L) 32.0 - 36.0 g/dL Final     RDW   Date/Time Value Ref Range Status   04/21/2025 10:30 AM 16.0 (H) 11.5 - 14.5 % Final   01/06/2025 09:45 AM 14.6 (H) 11.5 - 14.5 % Final   10/07/2024 10:48 AM 15.0 (H) 11.5 - 14.5 % Final     Platelets   Date/Time Value Ref Range Status   04/21/2025 10:30  150 - 450 x10*3/uL Final   01/06/2025 09:45  150 - 450 x10*3/uL Final   10/07/2024 10:48  150 - 450 x10*3/uL Final     No results found for: \"MPV\"  Neutrophils %   Date/Time Value Ref Range Status   04/21/2025 10:30 AM 68.5 40.0 - 80.0 % Final   01/06/2025 09:45 AM 68.3 40.0 - 80.0 % Final   10/07/2024 10:48 AM 62.5 40.0 - 80.0 % Final     Immature Granulocytes %, Automated   Date/Time Value Ref Range Status   04/21/2025 10:30 AM 0.6 0.0 - 0.9 % Final     Comment:     Immature Granulocyte Count (IG) includes promyelocytes, myelocytes and metamyelocytes but does not include bands. Percent differential counts (%) should be interpreted in the context of the absolute cell counts (cells/UL).   01/06/2025 09:45 AM 0.5 0.0 - 0.9 % Final     " Comment:     Immature Granulocyte Count (IG) includes promyelocytes, myelocytes and metamyelocytes but does not include bands. Percent differential counts (%) should be interpreted in the context of the absolute cell counts (cells/UL).   10/07/2024 10:48 AM 0.4 0.0 - 0.9 % Final     Comment:     Immature Granulocyte Count (IG) includes promyelocytes, myelocytes and metamyelocytes but does not include bands. Percent differential counts (%) should be interpreted in the context of the absolute cell counts (cells/UL).     Lymphocytes %   Date/Time Value Ref Range Status   04/21/2025 10:30 AM 15.3 13.0 - 44.0 % Final   01/06/2025 09:45 AM 16.4 13.0 - 44.0 % Final   10/07/2024 10:48 AM 19.0 13.0 - 44.0 % Final     Monocytes %   Date/Time Value Ref Range Status   04/21/2025 10:30 AM 10.9 2.0 - 10.0 % Final   01/06/2025 09:45 AM 9.4 2.0 - 10.0 % Final   10/07/2024 10:48 AM 12.6 2.0 - 10.0 % Final     Eosinophils %   Date/Time Value Ref Range Status   04/21/2025 10:30 AM 4.2 0.0 - 6.0 % Final   01/06/2025 09:45 AM 4.6 0.0 - 6.0 % Final   10/07/2024 10:48 AM 4.5 0.0 - 6.0 % Final     Basophils %   Date/Time Value Ref Range Status   04/21/2025 10:30 AM 0.5 0.0 - 2.0 % Final   01/06/2025 09:45 AM 0.8 0.0 - 2.0 % Final   10/07/2024 10:48 AM 1.0 0.0 - 2.0 % Final     Neutrophils Absolute   Date/Time Value Ref Range Status   04/21/2025 10:30 AM 4.26 1.60 - 5.50 x10*3/uL Final     Comment:     Percent differential counts (%) should be interpreted in the context of the absolute cell counts (cells/uL).   01/06/2025 09:45 AM 4.28 1.60 - 5.50 x10*3/uL Final     Comment:     Percent differential counts (%) should be interpreted in the context of the absolute cell counts (cells/uL).   10/07/2024 10:48 AM 3.23 1.60 - 5.50 x10*3/uL Final     Comment:     Percent differential counts (%) should be interpreted in the context of the absolute cell counts (cells/uL).     Immature Granulocytes Absolute, Automated   Date/Time Value Ref Range  Status   04/21/2025 10:30 AM 0.04 0.00 - 0.50 x10*3/uL Final   01/06/2025 09:45 AM 0.03 0.00 - 0.50 x10*3/uL Final   10/07/2024 10:48 AM 0.02 0.00 - 0.50 x10*3/uL Final     Lymphocytes Absolute   Date/Time Value Ref Range Status   04/21/2025 10:30 AM 0.95 0.80 - 3.00 x10*3/uL Final   01/06/2025 09:45 AM 1.03 0.80 - 3.00 x10*3/uL Final   10/07/2024 10:48 AM 0.98 0.80 - 3.00 x10*3/uL Final     Monocytes Absolute   Date/Time Value Ref Range Status   04/21/2025 10:30 AM 0.68 0.05 - 0.80 x10*3/uL Final   01/06/2025 09:45 AM 0.59 0.05 - 0.80 x10*3/uL Final   10/07/2024 10:48 AM 0.65 0.05 - 0.80 x10*3/uL Final     Eosinophils Absolute   Date/Time Value Ref Range Status   04/21/2025 10:30 AM 0.26 0.00 - 0.40 x10*3/uL Final   01/06/2025 09:45 AM 0.29 0.00 - 0.40 x10*3/uL Final   10/07/2024 10:48 AM 0.23 0.00 - 0.40 x10*3/uL Final     Basophils Absolute   Date/Time Value Ref Range Status   04/21/2025 10:30 AM 0.03 0.00 - 0.10 x10*3/uL Final   01/06/2025 09:45 AM 0.05 0.00 - 0.10 x10*3/uL Final   10/07/2024 10:48 AM 0.05 0.00 - 0.10 x10*3/uL Final      Latest Reference Range & Units 04/21/25 10:30   FERRITIN 8 - 150 ng/mL 34   Total Protein 6.4 - 8.2 g/dL 6.4   IRON 35 - 150 ug/dL 39   TIBC 240 - 445 ug/dL 365   % Saturation 25 - 45 % 11 (L)   UIBC 110 - 370 ug/dL 326   (L): Data is abnormally low    Assessment/Plan    81 yo woman with history of UCTD (positive MAURICE, anticentromere antibody and arthralgia), OA s/p bilateral TKR, HTN, DVT on Eliquis, stage 3 CKD, chronic venous insufficiency, recent cellulitis and leg ulcers, seeing the wound clinic under the care of Dr Rosy Atkinson.  She was hospitalized in December for sepsis secondary to pneumonia and cellulitis. Referred for consult for microcytic anemia.      I reviewed the patients medical record for labs and noted that this patient has had significant microcytic anemia for  at least 2 years. She denies blood loss that she is aware of. Discussed possible etiologies  including multifactorial, nutritional deficiencies, anemia of chronic disease, malabsorption, hemopathy, medications, bleeding, malignancy, etc. We noted that we know she is iron deficient and has stage 3 CKD, and inflammation that could be contributing to her anemia. Also could be B12, folic acid or other deficiencies and being on a blood thinner. Patient has had oral iron in the past and it caused severe constipation. We discussed IV iron to improve her anemia and quality of life and she agreed. Hemoglobin improved nicely since the IV iron which she tolerated without incident. We also discussed that once we replace iron and other vitamin deficiencies, we will discuss Procrit if her hemoglobin is still below 10g/dL.       Hemoglobin improved with iron replacement but is now decreased. TS is now 11%. We would like to replace iron prior to sugery scheduled for July. should increase iron in her diet. Still has CKD but EPO is not warranted at this time.  Microcytosis has resolved. I suspect bleeding from GI tract 2/2 to Eliquis. Recent kyphoplasty and feeling better with standing post op. Patient will continute        Plan:  1. Repeat CBCdiff, iron studies and B12 and folate every 3 months. Will plan to support with IV iron if needed.   2. Will try oral iron replacement per patient request   Follow up with Dr Atkinson regarding wound care and Eliquis management.      I had an extensive discussion with the patient regarding the diagnosis and discussed the plan of therapy, including general considerations regarding side effects and outcomes. Pt understood and gave appropriate teach back about the plan of care. All questions were answered to the patient's satisfaction. The patient is instructed to contact us at any time if questions or problems arise. Thank you for the opportunity to participate in the care of this very pleasant patient.                 Rosanne M Casal, APRN-CNP, DNP                              [1]    Family History  Problem Relation Name Age of Onset    Diabetes Mother mom         type 2    Heart disease Father dakota     Hypertension Father dakota     Cancer Sister svetlana     Clotting disorder Sister svetlana     Cancer Daughter dayne pearson     Arthritis Sister svetlana pedley     Arthritis Sister laya penn     Clotting disorder Sister svetlaan     Cancer Daughter dayne pearson     Multiple sclerosis Daughter dayne     Arthritis Sister svetlana mullenley     Clotting disorder Sister svetlana mullenley     Cancer Sister svetlana pedley     Arthritis Sister laya penn     Cancer Sister svetlana     Cancer Daughter dayne pearson     Cancer Sister svetlana     Arthritis Sister svetlana pedley     Arthritis Sister laya penn     Cancer Sister svetlana     Clotting disorder Sister svetlana     Cancer Daughter dayne fierrohernán     Multiple sclerosis Daughter dayne     Arthritis Other laya penn     Cancer Other Svetlana     Cancer Other Perla     Arthritis Sister svetlana hector     Arthritis Sister laya penn     Cancer Sister svetlana     Cancer Daughter dayne fierrohernán

## 2025-04-22 ENCOUNTER — TELEPHONE (OUTPATIENT)
Dept: HEMATOLOGY/ONCOLOGY | Facility: CLINIC | Age: 80
End: 2025-04-22
Payer: MEDICARE

## 2025-04-22 NOTE — TELEPHONE ENCOUNTER
Reason for Conversation  New Med Request    Background   Genesis Gomez was supposed to send new prescription for iron to   [id number] [place]   Yesterday but they do not have it.      Disposition   No disposition on file.

## 2025-04-24 ENCOUNTER — TELEPHONE (OUTPATIENT)
Dept: HEMATOLOGY/ONCOLOGY | Facility: CLINIC | Age: 80
End: 2025-04-24
Payer: MEDICARE

## 2025-04-24 DIAGNOSIS — D50.8 OTHER IRON DEFICIENCY ANEMIA: Primary | ICD-10-CM

## 2025-04-24 RX ORDER — FERROUS SULFATE 137(45) MG
137 TABLET, EXTENDED RELEASE ORAL DAILY
Qty: 30 TABLET | Refills: 6 | Status: SHIPPED | OUTPATIENT
Start: 2025-04-24

## 2025-04-24 NOTE — TELEPHONE ENCOUNTER
Reason for Conversation  Medication Problem    Background   Pt saw Cheryl Corado and was to have a 40 mg rx sent in for iron. Pharmacy is [facility] in [place]. As of today no rx was sent.    Disposition   No disposition on file.

## 2025-04-28 ENCOUNTER — HOSPITAL ENCOUNTER (OUTPATIENT)
Dept: RADIOLOGY | Facility: CLINIC | Age: 80
Discharge: HOME | End: 2025-04-28
Payer: MEDICARE

## 2025-04-28 DIAGNOSIS — E04.1 THYROID NODULE: ICD-10-CM

## 2025-04-28 PROCEDURE — 76536 US EXAM OF HEAD AND NECK: CPT

## 2025-04-28 PROCEDURE — 76536 US EXAM OF HEAD AND NECK: CPT | Performed by: RADIOLOGY

## 2025-04-29 DIAGNOSIS — I82.5Y2 CHRONIC DEEP VEIN THROMBOSIS (DVT) OF PROXIMAL VEIN OF LEFT LOWER EXTREMITY: ICD-10-CM

## 2025-05-02 ENCOUNTER — APPOINTMENT (OUTPATIENT)
Dept: OTOLARYNGOLOGY | Facility: CLINIC | Age: 80
End: 2025-05-02
Payer: MEDICARE

## 2025-05-05 ENCOUNTER — APPOINTMENT (OUTPATIENT)
Dept: RHEUMATOLOGY | Facility: CLINIC | Age: 80
End: 2025-05-05
Payer: MEDICARE

## 2025-05-12 ENCOUNTER — TELEPHONE (OUTPATIENT)
Dept: OTOLARYNGOLOGY | Facility: CLINIC | Age: 80
End: 2025-05-12
Payer: MEDICARE

## 2025-05-12 NOTE — TELEPHONE ENCOUNTER
Spoke to patient. She is aware the ultrasound is stable and her PCP will take over ordering follow up ultrasound.

## 2025-05-12 NOTE — TELEPHONE ENCOUNTER
----- Message from Pedro Dawkins sent at 5/12/2025  7:32 AM EDT -----  Thank you!  ----- Message -----  From: Precious Rodriguez MD  Sent: 5/9/2025  11:31 PM EDT  To: Pedro Dawkins MD    Sure, no problem.  I can monitor this.  ----- Message -----  From: Pedro Dawkins MD  Sent: 5/9/2025  12:27 PM EDT  To: Lani Powers RN; MD Kvng Dumont, can you please let her know the ultrasound shows stability and if okay with Dr. Wells for consolidation inefficiencies could be part of an annual exam and I would be happy to   reevaluate the patient if the report in the future sites any changes.  If after 2 years of stability then can become every other year process    Dr Wells would this be okay with you?  Patient does drive quite a bit to come see me and had expressed potential consolidation thank you for considering    Also able to help you interpret next year if there is any questions  ----- Message -----  From: Interface, Radiology Results In  Sent: 4/29/2025   3:40 PM EDT  To: Pedro Dawkins MD

## 2025-05-16 ENCOUNTER — APPOINTMENT (OUTPATIENT)
Dept: OTOLARYNGOLOGY | Facility: CLINIC | Age: 80
End: 2025-05-16
Payer: MEDICARE

## 2025-05-23 ENCOUNTER — APPOINTMENT (OUTPATIENT)
Facility: CLINIC | Age: 80
End: 2025-05-23
Payer: MEDICARE

## 2025-05-23 VITALS
BODY MASS INDEX: 41.41 KG/M2 | HEART RATE: 85 BPM | HEIGHT: 62 IN | OXYGEN SATURATION: 93 % | SYSTOLIC BLOOD PRESSURE: 166 MMHG | WEIGHT: 225 LBS | DIASTOLIC BLOOD PRESSURE: 74 MMHG

## 2025-05-23 DIAGNOSIS — I10 HYPERTENSION, UNSPECIFIED TYPE: ICD-10-CM

## 2025-05-23 DIAGNOSIS — N18.30 STAGE 3 CHRONIC KIDNEY DISEASE, UNSPECIFIED WHETHER STAGE 3A OR 3B CKD (MULTI): Primary | ICD-10-CM

## 2025-05-23 DIAGNOSIS — D64.9 ANEMIA, UNSPECIFIED TYPE: ICD-10-CM

## 2025-05-23 PROCEDURE — 1159F MED LIST DOCD IN RCRD: CPT | Performed by: NURSE PRACTITIONER

## 2025-05-23 PROCEDURE — 99214 OFFICE O/P EST MOD 30 MIN: CPT | Performed by: NURSE PRACTITIONER

## 2025-05-23 PROCEDURE — 3077F SYST BP >= 140 MM HG: CPT | Performed by: NURSE PRACTITIONER

## 2025-05-23 PROCEDURE — 3078F DIAST BP <80 MM HG: CPT | Performed by: NURSE PRACTITIONER

## 2025-05-23 ASSESSMENT — ENCOUNTER SYMPTOMS
ENDOCRINE NEGATIVE: 1
PSYCHIATRIC NEGATIVE: 1
RESPIRATORY NEGATIVE: 1
GASTROINTESTINAL NEGATIVE: 1
NEUROLOGICAL NEGATIVE: 1
CONSTITUTIONAL NEGATIVE: 1
HEMATOLOGIC/LYMPHATIC NEGATIVE: 1
WOUND: 0
EYES NEGATIVE: 1
MUSCULOSKELETAL NEGATIVE: 1

## 2025-05-23 NOTE — PROGRESS NOTES
History Of Present Illness  Genesis Pearson is a 80 y.o. female with medical history significant for CKD3, HTN, HLD, a-fib s/p ablation, COPD/asthma, bilateral total knee arthroplasty, right total hip arthroplasty (11/2022), and chronic venous insufficiency who presents with a significant other for a 6-month fuv for CKD.     Past Medical History  As above.    Surgical History  She has a past surgical history that includes Cholecystectomy (04/15/2016); Back surgery (06/20/2016); Total knee arthroplasty (Bilateral, 12/01/2022); Other surgical history (05/03/2021); Thyroidectomy, partial; Total hip arthroplasty (Right); Cardiac electrophysiology study and ablation; Hernia repair; Vertebroplasty; Eye surgery (2018); Joint replacement (2007, 2017, 2022); and Spine surgery (2001).     Social History  She reports that she quit smoking about 33 years ago. Her smoking use included cigarettes. She started smoking about 57 years ago. She has a 7.5 pack-year smoking history. She has never used smokeless tobacco. She reports that she does not currently use alcohol. She reports that she does not currently use drugs.    Family History  Family History   Problem Relation Name Age of Onset    Diabetes Mother mom         type 2    Heart disease Father dakota     Hypertension Father dakota     Cancer Sister juan     Clotting disorder Sister juan     Cancer Daughter dayne pearson     Arthritis Sister juan pedley     Arthritis Sister laya penn     Clotting disorder Sister juan     Cancer Daughter dayne pearson     Multiple sclerosis Daughter dayne     Arthritis Sister juan pedley     Clotting disorder Sister juan pedley     Cancer Sister juan pedley     Arthritis Sister laya penn     Cancer Sister juan     Cancer Daughter dayne fierrohnock     Cancer Sister juan     Arthritis Sister juan pedley     Arthritis Sister laya penn     Cancer Sister juan     Clotting disorder Sister juan     Cancer Daughter dayne pearson      "Multiple sclerosis Daughter dayne     Arthritis Other laya penn     Cancer Other Svetlana     Cancer Other Perla     Arthritis Sister svetlana hector     Arthritis Sister laya penn     Cancer Sister svetlana     Cancer Daughter dayne pearson         Allergies  Sulfur, Cefprozil, and Sulfa (sulfonamide antibiotics)    Review of Systems   Constitutional: Negative.    HENT: Negative.     Eyes: Negative.    Respiratory: Negative.     Cardiovascular:  Positive for leg swelling.   Gastrointestinal: Negative.    Endocrine: Negative.    Genitourinary: Negative.    Musculoskeletal: Negative.    Skin:  Negative for wound.   Neurological: Negative.    Hematological: Negative.    Psychiatric/Behavioral: Negative.          Physical Exam  Constitutional:       Appearance: Normal appearance.   HENT:      Head: Normocephalic and atraumatic.      Mouth/Throat:      Mouth: Mucous membranes are moist.   Cardiovascular:      Rate and Rhythm: Normal rate and regular rhythm.      Pulses: Normal pulses.      Heart sounds: Normal heart sounds.   Pulmonary:      Effort: Pulmonary effort is normal.      Breath sounds: Normal breath sounds.   Musculoskeletal:         General: Normal range of motion.      Right lower leg: Edema present.      Left lower leg: Edema present.   Skin:     General: Skin is warm and dry.   Neurological:      General: No focal deficit present.      Mental Status: She is alert and oriented to person, place, and time.   Psychiatric:         Mood and Affect: Mood normal.         Behavior: Behavior normal.         Thought Content: Thought content normal.         Judgment: Judgment normal.          Last Recorded Vitals  Blood pressure 166/74, pulse 85, height 1.575 m (5' 2\"), weight 102 kg (225 lb), SpO2 93%.    Relevant Results  Recent Results (from the past 8 weeks)   ECG 12 lead    Collection Time: 04/16/25  9:00 AM   Result Value Ref Range    Ventricular Rate 75 BPM    Atrial Rate 75 BPM    WY Interval 220 ms    QRS " Duration 94 ms    QT Interval 414 ms    QTC Calculation(Bazett) 462 ms    P Axis 68 degrees    R Axis 7 degrees    T Axis 62 degrees    QRS Count 13 beats    Q Onset 213 ms    P Onset 103 ms    P Offset 159 ms    T Offset 420 ms    QTC Fredericia 445 ms   CBC and Auto Differential    Collection Time: 04/21/25 10:30 AM   Result Value Ref Range    WBC 6.2 4.4 - 11.3 x10*3/uL    RBC 4.12 4.00 - 5.20 x10*6/uL    Hemoglobin 11.4 (L) 12.0 - 16.0 g/dL    Hematocrit 37.5 36.0 - 46.0 %    MCV 91 80 - 100 fL    MCH 27.7 26.0 - 34.0 pg    MCHC 30.4 (L) 32.0 - 36.0 g/dL    RDW 16.0 (H) 11.5 - 14.5 %    Platelets 195 150 - 450 x10*3/uL    Neutrophils % 68.5 40.0 - 80.0 %    Immature Granulocytes %, Automated 0.6 0.0 - 0.9 %    Lymphocytes % 15.3 13.0 - 44.0 %    Monocytes % 10.9 2.0 - 10.0 %    Eosinophils % 4.2 0.0 - 6.0 %    Basophils % 0.5 0.0 - 2.0 %    Neutrophils Absolute 4.26 1.60 - 5.50 x10*3/uL    Immature Granulocytes Absolute, Automated 0.04 0.00 - 0.50 x10*3/uL    Lymphocytes Absolute 0.95 0.80 - 3.00 x10*3/uL    Monocytes Absolute 0.68 0.05 - 0.80 x10*3/uL    Eosinophils Absolute 0.26 0.00 - 0.40 x10*3/uL    Basophils Absolute 0.03 0.00 - 0.10 x10*3/uL   Comprehensive Metabolic Panel    Collection Time: 04/21/25 10:30 AM   Result Value Ref Range    Glucose 84 74 - 99 mg/dL    Sodium 141 136 - 145 mmol/L    Potassium 4.4 3.5 - 5.3 mmol/L    Chloride 110 (H) 98 - 107 mmol/L    Bicarbonate 27 21 - 32 mmol/L    Anion Gap 8 (L) 10 - 20 mmol/L    Urea Nitrogen 24 (H) 6 - 23 mg/dL    Creatinine 1.16 (H) 0.50 - 1.05 mg/dL    eGFR 48 (L) >60 mL/min/1.73m*2    Calcium 8.4 (L) 8.6 - 10.3 mg/dL    Albumin 4.0 3.4 - 5.0 g/dL    Alkaline Phosphatase 60 33 - 136 U/L    Total Protein 6.4 6.4 - 8.2 g/dL    AST 21 9 - 39 U/L    Bilirubin, Total 0.4 0.0 - 1.2 mg/dL    ALT 15 7 - 45 U/L   Ferritin    Collection Time: 04/21/25 10:30 AM   Result Value Ref Range    Ferritin 34 8 - 150 ng/mL   Folate    Collection Time: 04/21/25 10:30 AM    Result Value Ref Range    Folate, Serum >24.0 >5.0 ng/mL   Iron and TIBC    Collection Time: 25 10:30 AM   Result Value Ref Range    Iron 39 35 - 150 ug/dL    UIBC 326 110 - 370 ug/dL    TIBC 365 240 - 445 ug/dL    % Saturation 11 (L) 25 - 45 %   Vitamin B12    Collection Time: 25 10:30 AM   Result Value Ref Range    Vitamin B12 816 211 - 911 pg/mL         Assessment/Plan     80 y.o. female with medical history significant for CKD3, HTN, HLD, a-fib s/p ablation, COPD/asthma, bilateral total knee arthroplasty, right total hip arthroplasty (2022), and chronic venous insufficiency who presents with a significant other for a 6-month fuv for CKD.    # CKD3: baseline sCr 1.0 - 1.6 mg/dL with eGFR 34 - 55 ml/min/1.73m2. Likely due to hypertensive kidney disease. Most recent sCr 1.16 mg/dL with eGFR 48 ml/min/1.73m2 (25) - at baseline. Currently stable.    # HTN: BP was slightly elevated in office today. Reportedly home BPs are at target 130/80 mmHg.    # Anemia: chronic. Significantly improved. Follows up with heme/onc.     Plan:  - Advised regular home BP checks and keep a log to bring it to next visit. Target < 130/80 mmHg. To call providers if BPs are persistently > 130/80 mmHg.  - Continue to follow up with PCP for optimal HTN management. Goal /80 mmHg for renal protection.   - Continue to follow up with heme/onc for optimal anemia management.  - No changes with current medications.  - Reviewed strategies for protecting kidney function includin) Avoidance of NSAIDs including Aleve (Naprosyn), Motrin (Ibuprofen), Mobic (Meloxicam), Celebrex (Celecoxib) and aspirin as well as PPI acid blocking medications such as Prilosec (omeprazole), Protonix (pantoprazole), and Nexium (esomeprazole), as well as other nephrotoxic agents.   2) Avoidance of tobacco or alcohol use.   3) Adequate hydration daily.   4) Blood pressure target 130/80 mmHg.   5) Daily dietary sodium intake less than 2 grams per  day.    6) Maintain healthy lifestyle. Healthy diet and regular exercises.   7) Maintain ideal weight.  - FUV: in 6 months or sooner if concerns arise.    Patient and her significant other verbalized understanding of above. They will not hesitate to contact Division of Nephrology at [phone] with concerns/questions.    I spent 30 minutes in the professional and overall care of this patient.    Torrey Myers, APRN-CNP

## 2025-05-30 ENCOUNTER — APPOINTMENT (OUTPATIENT)
Dept: OTOLARYNGOLOGY | Facility: CLINIC | Age: 80
End: 2025-05-30
Payer: MEDICARE

## 2025-06-01 ENCOUNTER — APPOINTMENT (OUTPATIENT)
Dept: RADIOLOGY | Facility: HOSPITAL | Age: 80
End: 2025-06-01
Payer: MEDICARE

## 2025-06-01 ENCOUNTER — HOSPITAL ENCOUNTER (EMERGENCY)
Facility: HOSPITAL | Age: 80
Discharge: HOME | End: 2025-06-01
Attending: EMERGENCY MEDICINE
Payer: MEDICARE

## 2025-06-01 VITALS
BODY MASS INDEX: 39.56 KG/M2 | WEIGHT: 215 LBS | RESPIRATION RATE: 18 BRPM | HEART RATE: 88 BPM | HEIGHT: 62 IN | DIASTOLIC BLOOD PRESSURE: 82 MMHG | OXYGEN SATURATION: 96 % | TEMPERATURE: 98.3 F | SYSTOLIC BLOOD PRESSURE: 157 MMHG

## 2025-06-01 DIAGNOSIS — M54.6 ACUTE RIGHT-SIDED THORACIC BACK PAIN: Primary | ICD-10-CM

## 2025-06-01 DIAGNOSIS — J18.9 COMMUNITY ACQUIRED PNEUMONIA, UNSPECIFIED LATERALITY: ICD-10-CM

## 2025-06-01 LAB
APPEARANCE UR: CLEAR
BACTERIA #/AREA URNS AUTO: ABNORMAL /HPF
BILIRUB UR STRIP.AUTO-MCNC: NEGATIVE MG/DL
COLOR UR: ABNORMAL
GLUCOSE UR STRIP.AUTO-MCNC: NORMAL MG/DL
KETONES UR STRIP.AUTO-MCNC: NEGATIVE MG/DL
LEUKOCYTE ESTERASE UR QL STRIP.AUTO: ABNORMAL
NITRITE UR QL STRIP.AUTO: NEGATIVE
PH UR STRIP.AUTO: 6.5 [PH]
PROT UR STRIP.AUTO-MCNC: NEGATIVE MG/DL
RBC # UR STRIP.AUTO: NEGATIVE MG/DL
RBC #/AREA URNS AUTO: ABNORMAL /HPF
SP GR UR STRIP.AUTO: 1.01
SQUAMOUS #/AREA URNS AUTO: ABNORMAL /HPF
UROBILINOGEN UR STRIP.AUTO-MCNC: NORMAL MG/DL
WBC #/AREA URNS AUTO: ABNORMAL /HPF

## 2025-06-01 PROCEDURE — 2500000001 HC RX 250 WO HCPCS SELF ADMINISTERED DRUGS (ALT 637 FOR MEDICARE OP)

## 2025-06-01 PROCEDURE — 72128 CT CHEST SPINE W/O DYE: CPT

## 2025-06-01 PROCEDURE — 72128 CT CHEST SPINE W/O DYE: CPT | Performed by: RADIOLOGY

## 2025-06-01 PROCEDURE — 72131 CT LUMBAR SPINE W/O DYE: CPT | Performed by: RADIOLOGY

## 2025-06-01 PROCEDURE — 2500000005 HC RX 250 GENERAL PHARMACY W/O HCPCS

## 2025-06-01 PROCEDURE — 72131 CT LUMBAR SPINE W/O DYE: CPT

## 2025-06-01 PROCEDURE — 99285 EMERGENCY DEPT VISIT HI MDM: CPT | Mod: 25 | Performed by: EMERGENCY MEDICINE

## 2025-06-01 PROCEDURE — 71046 X-RAY EXAM CHEST 2 VIEWS: CPT

## 2025-06-01 PROCEDURE — 87086 URINE CULTURE/COLONY COUNT: CPT | Mod: AHULAB

## 2025-06-01 PROCEDURE — 71046 X-RAY EXAM CHEST 2 VIEWS: CPT | Performed by: RADIOLOGY

## 2025-06-01 PROCEDURE — 81001 URINALYSIS AUTO W/SCOPE: CPT

## 2025-06-01 RX ORDER — TRAMADOL HYDROCHLORIDE 50 MG/1
50 TABLET, FILM COATED ORAL EVERY 6 HOURS PRN
Qty: 15 TABLET | Refills: 0 | Status: SHIPPED | OUTPATIENT
Start: 2025-06-01 | End: 2025-06-05

## 2025-06-01 RX ORDER — LIDOCAINE 560 MG/1
1 PATCH PERCUTANEOUS; TOPICAL; TRANSDERMAL ONCE
Status: DISCONTINUED | OUTPATIENT
Start: 2025-06-01 | End: 2025-06-01 | Stop reason: HOSPADM

## 2025-06-01 RX ORDER — DOXYCYCLINE 100 MG/1
100 CAPSULE ORAL 2 TIMES DAILY
Qty: 20 CAPSULE | Refills: 0 | Status: SHIPPED | OUTPATIENT
Start: 2025-06-01 | End: 2025-06-11

## 2025-06-01 RX ORDER — ACETAMINOPHEN 325 MG/1
975 TABLET ORAL ONCE
Status: COMPLETED | OUTPATIENT
Start: 2025-06-01 | End: 2025-06-01

## 2025-06-01 RX ORDER — BACLOFEN 10 MG/1
10 TABLET ORAL 3 TIMES DAILY
Qty: 15 TABLET | Refills: 0 | Status: SHIPPED | OUTPATIENT
Start: 2025-06-01 | End: 2025-06-06

## 2025-06-01 RX ORDER — AMOXICILLIN 500 MG/1
1000 CAPSULE ORAL ONCE
Status: COMPLETED | OUTPATIENT
Start: 2025-06-01 | End: 2025-06-01

## 2025-06-01 RX ADMIN — ACETAMINOPHEN 975 MG: 325 TABLET, FILM COATED ORAL at 08:53

## 2025-06-01 RX ADMIN — LIDOCAINE 4% 1 PATCH: 40 PATCH TOPICAL at 08:52

## 2025-06-01 RX ADMIN — AMOXICILLIN 1000 MG: 500 CAPSULE ORAL at 09:28

## 2025-06-01 ASSESSMENT — PAIN SCALES - GENERAL: PAINLEVEL_OUTOF10: 8

## 2025-06-01 ASSESSMENT — PAIN - FUNCTIONAL ASSESSMENT: PAIN_FUNCTIONAL_ASSESSMENT: 0-10

## 2025-06-01 ASSESSMENT — PAIN DESCRIPTION - FREQUENCY: FREQUENCY: CONSTANT/CONTINUOUS

## 2025-06-01 ASSESSMENT — PAIN DESCRIPTION - DESCRIPTORS: DESCRIPTORS: ACHING

## 2025-06-01 ASSESSMENT — PAIN DESCRIPTION - LOCATION: LOCATION: BACK

## 2025-06-01 ASSESSMENT — PAIN DESCRIPTION - PAIN TYPE: TYPE: CHRONIC PAIN

## 2025-06-01 ASSESSMENT — PAIN DESCRIPTION - ORIENTATION: ORIENTATION: MID;LOWER

## 2025-06-01 NOTE — ED TRIAGE NOTES
Patient c/o chronic mid to lower back pain that started 1 month ago. Pt denies any new fall or injury. Pt has a spinal stimulator.

## 2025-06-01 NOTE — ED PROVIDER NOTES
HPI   Chief Complaint   Patient presents with    Back Pain       80-year-old female past medical history of hypertension, chronic bronchitis, history of VTE on Eliquis here today for right-sided low back pain.  Symptoms started 1 month ago.  No known trauma or injury to the area.  Describes the pain as aching worse with bending and twisting.  Goes away with not moving.  No fevers.  No nausea or vomiting.  No chest pain or shortness of breath.  Pain is not pleuritic.  Occasionally radiates having to the right shoulder blade.  Nothing in the left lower side.  Does have a spinal stimulator.  Has been taking Tylenol.  No saddle anesthesia or urinary/fecal incontinence or retention.  No numbness or tingling or weakness.  Has no other symptoms or concerns at this time.      History provided by:  Patient   used: No            Patient History   Medical History[1]  Surgical History[2]  Family History[3]  Social History[4]    Physical Exam   ED Triage Vitals [06/01/25 0817]   Temperature Heart Rate Respirations BP   36.8 °C (98.3 °F) 94 20 168/84      Pulse Ox Temp Source Heart Rate Source Patient Position   95 % Temporal Monitor --      BP Location FiO2 (%)     -- --       Physical Exam  General: The pain the patient is sitting comfortably no acute distress.  Vital signs per nursing note.  Skin: No rashes, lesions, scars.  Normal skin turgor.  Head: Atraumatic normocephalic  Neck: The neck is supple.  The trachea is midline.  Lungs: Lungs are clear to auscultation bilaterally.  No rhonchi, wheezing, or rales.  No stridor.  Symmetric chest expansion  Heart: Normal S1-S2 no murmurs, rubs, gallops.  Abdomen: Abdomen is flat, nontender, nondistended.  No rebound tenderness or guarding. No pulsatile mass.  No CVA tenderness  Peripheral vascular: Symmetric 2+ radial, dorsalis pedis, and posterior tibial pulses.  Capillary refill is under 3 seconds.  Neurologic: Alert and oriented x4.    5/5 strength in the  upper and lower extremity.  Sensation is intact in the upper and lower extremity.  Sensation is intact in the inner thigh and groin.  Patient is able to cross the legs, extend and flex the knee, point the great toe up, dorsiflex and plantarflex the ankle.  2+ reflexes at the patellar tendon. Normal Babinski.  Normal gait.  Musculoskeletal: No overlying skin changes throughout the entire back.  No tenderness over the spinal processes throughout the back.  There is tenderness to palpation over the right paraspinal muscles in the lumbar spine.  Full range of motion of the back.    : Deferred      ED Course & MDM   ED Course as of 06/04/25 0824   Sun Jun 01, 2025   0856 IMPRESSION:  Left basilar airspace consolidation, as above. Clinical correlation  and continued follow-up until clearing is recommended.      MACRO:  None.      Signed by: Daniel Hilton 6/1/2025 8:52 AM  Dictation workstation:   [id number]   [MC]   1147 Leukocyte Esterase, Urine(!): 500 Dilma/uL [MC]   1147 WBC, Urine(!): 6-10 [MC]      ED Course User Index  [MC] Hamzah Duke PA-C         Diagnoses as of 06/04/25 0824   Acute right-sided thoracic back pain   Community acquired pneumonia, unspecified laterality                 No data recorded     [place] Coma Scale Score: 15 (06/01/25 0816 : Dustin Bishop RN)                           Medical Decision Making  80-year-old female past medical history of hypertension, chronic bronchitis, history of VTE on Eliquis here today for right-sided low back pain.  Vital signs reassuring.  Patient overall appears well and is nontoxic-appearing.Patient has full range of motion of the neck without meningismus.  Satting well on room air.  Not hypoxic.  Not tachycardic.  Afebrile.  Her urinalysis shows positive leukocyte esterase and 6-10 white blood cells in the urine.  Will send for culture.  Her chest x-ray shows left basilar airspace consolidation concerning for pneumonia.  CT T and L-spine show T12 and L1  compression status post vertebroplasty.  No acute fracture identified.  Chronic changes noted.  She has no signs of cord compression.  No emergent MRI indicated at this time.  She has been compliant with her Eliquis low suspicion for PE at this time.  The pain is not ripping or tearing.  Low suspicion for dissection at this time.  Her abdomen is soft and nontender.  Neurovascular status intact in lower extremities bilaterally.  No risk factors for epidural abscess.  Low suspicion for epidural hematoma at this time.  Low suspicion for  pathology at this time.  Discussed impression and findings with patient she feels comfortable returning home.  We discussed very strict return precautions including returning for any new or worsening signs symptoms.  Patient is in agreement with this plan.  She will follow-up with the PCP within 3 days.  Will treat pneumonia with doxycycline.  Curb 65 does not require hospitalization.  MD did prescribe baclofen and tramadol as well.    Differential diagnosis: back strain, AAA rupture, cauda equina syndrome, cord compression, compression fracture, epidural abscess, epidural hematoma, herniated disc, sciatica, nephrolithiasis, ureterolithiasis, PE, pyelonephritis, vertebral fracture, pancreatitis, zoster    Disposition/treatment  1.  See diagnosis    Shared decision-making was used patient feels comfortable returning home     Patient was advised to follow up with recommended provider in 1 day for another evaluation and exam. I advised patient/guardian to return or go to closest emergency room immediately if symptoms change, get worse, new symptoms develop prior to follow up. If there is no improvement in symptoms in the next 24 hours they are advised to return for further evaluation and exam. I also explained the plan and treatment course. Patient/guardian is in agreement with plan, treatment course, and follow up and states verbally that they will comply.    Patient is homegoing. I  discussed the differential; results and discharge plan with the patient and/or family/friend/caregiver if present.  I emphasized the importance of follow-up with the physician I referred them to in the timeframe recommended.  I explained reasons for the patient to return to the Emergency Department. They agreed that if they feel their condition is worsening or if they have any other concern they should call 911 immediately for further assistance. I gave the patient an opportunity to ask all questions they had and answered all of them accordingly. They understand return precautions and discharge instructions. The patient and/or family/friend/caregiver expressed understanding verbally and that they would comply.        This note has been transcribed using voice recognition and may contain grammatical errors, misplaced words, incorrect words, incorrect phrases or other errors.        Procedure  Procedures       [1]   Past Medical History:  Diagnosis Date    Anemia     Ankle fracture     left ankle fracture    Arthritis 1977    Cellulitis, unspecified 08/24/2020    Cellulitis of skin    CHF (congestive heart failure) 02/18/2025    Chronic venous insufficiency     CKD (chronic kidney disease), stage III (Multi)     COPD (chronic obstructive pulmonary disease) (Multi)     Essential (primary) hypertension 01/04/2023    Hypertension    H/O deep venous thrombosis     Hernia, internal 2022    History of blood transfusion     Hypothyroidism     Joint pain     Low back pain     Lymphedema     Osteoporosis     Other fracture of upper and lower end of unspecified fibula, initial encounter for closed fracture 05/21/2021    Fracture of distal fibula    Other specified soft tissue disorders 09/19/2022    Leg swelling    Peripheral neuropathy     Personal history of (healed) stress fracture 06/26/2021    History of stress fracture    Personal history of other diseases of the circulatory system     History of atrial fibrillation     Personal history of other diseases of the musculoskeletal system and connective tissue 10/20/2016    History of arthritis    Plantar fasciitis     Rotator cuff syndrome     Undifferentiated connective tissue disease (Multi)     Unspecified asthma, uncomplicated (HHS-HCC) 12/01/2022    Asthma    Venous ulcer of leg (Multi)     Vitamin D deficiency, unspecified 10/20/2016    Vitamin D deficiency   [2]   Past Surgical History:  Procedure Laterality Date    BACK SURGERY  06/20/2016    Back Surgery L4-L5 fusion    CARDIAC ELECTROPHYSIOLOGY STUDY AND ABLATION      CHOLECYSTECTOMY  04/15/2016    Cholecystectomy    EYE SURGERY  2018    HERNIA REPAIR      JOINT REPLACEMENT  2007, 2017, 2022    OTHER SURGICAL HISTORY  05/03/2021    Cataract surgery    SPINE SURGERY  2001    THYROIDECTOMY, PARTIAL      d/t goiter    TOTAL HIP ARTHROPLASTY Right     TOTAL KNEE ARTHROPLASTY Bilateral 12/01/2022    Knee Replacement    VERTEBROPLASTY     [3]   Family History  Problem Relation Name Age of Onset    Diabetes Mother mom         type 2    Heart disease Father dakota     Hypertension Father dakota     Cancer Sister svetlana     Clotting disorder Sister svetlana     Cancer Daughter dayne gishnock     Arthritis Sister svetlana pedley     Arthritis Sister laya penn     Clotting disorder Sister svetlana     Cancer Daughter dayne [place]ishnock     Multiple sclerosis Daughter dayne     Arthritis Sister svetlana pedley     Clotting disorder Sister svetlana pedley     Cancer Sister svetlana pedley     Arthritis Sister laya penn     Cancer Sister svetlana     Cancer Daughter dayne [place]     Cancer Sister svetlana     Arthritis Sister svetlana pedley     Arthritis Sister laya penn     Cancer Sister svetlana     Clotting disorder Sister svetlana     Cancer Daughter dayne gishnock     Multiple sclerosis Daughter dayne     Arthritis Other laya penn     Cancer Other Svetlana     Cancer Other Perla     Arthritis Sister svetlana pedley     Arthritis Sister laya penn     Cancer Sister  juan     Cancer Daughter dayne pearson    [4]   Social History  Tobacco Use    Smoking status: Former     Current packs/day: 0.00     Average packs/day: 0.2 packs/day for 39.6 years (7.5 ttl pk-yrs)     Types: Cigarettes     Start date:      Quit date:      Years since quittin.4    Smokeless tobacco: Never   Vaping Use    Vaping status: Never Used   Substance Use Topics    Alcohol use: Not Currently    Drug use: Not Currently        Hamzah Duke PA-C  [phone]

## 2025-06-04 LAB — BACTERIA UR CULT: NORMAL

## 2025-06-10 ENCOUNTER — OFFICE VISIT (OUTPATIENT)
Dept: URGENT CARE | Age: 80
End: 2025-06-10
Payer: MEDICARE

## 2025-06-10 ENCOUNTER — ANCILLARY PROCEDURE (OUTPATIENT)
Dept: URGENT CARE | Age: 80
End: 2025-06-10
Payer: MEDICARE

## 2025-06-10 VITALS
RESPIRATION RATE: 14 BRPM | WEIGHT: 225 LBS | OXYGEN SATURATION: 95 % | DIASTOLIC BLOOD PRESSURE: 81 MMHG | HEART RATE: 90 BPM | SYSTOLIC BLOOD PRESSURE: 174 MMHG | BODY MASS INDEX: 41.15 KG/M2

## 2025-06-10 DIAGNOSIS — M79.671 RIGHT FOOT PAIN: ICD-10-CM

## 2025-06-10 DIAGNOSIS — S92.909A MULTIPLE CLOSED FRACTURES OF FOOT, INITIAL ENCOUNTER: Primary | ICD-10-CM

## 2025-06-10 PROCEDURE — 73630 X-RAY EXAM OF FOOT: CPT | Mod: RIGHT SIDE

## 2025-06-10 NOTE — PROGRESS NOTES
Subjective   Patient ID: Genesis Garcia is a 80 y.o. female. They present today with a chief complaint of Foot Injury (Right leg).    History of Present Illness  HPI a 80-year-old female arrives to clinic with chief complaint of right foot injury.  The patient reports that she uses a walker at baseline to help with ambulation.  She reports that she sometimes leans on the walker and at times, she leans too much to where she toppled over.  She states that she rolled her right foot.  She does report some blue discoloration to her toes.  She is limping.  She is here for evaluation.  She denies any head pain, neck pain, loss of consciousness.    Past Medical History  Allergies as of 06/10/2025 - Reviewed 06/10/2025   Allergen Reaction Noted    Sulfur Hives 01/30/2017    Cefprozil Hives 06/16/2022    Sulfa (sulfonamide antibiotics) Hives 07/27/2023       Prescriptions Prior to Admission[1]     Medical History[2]    Surgical History[3]     reports that she quit smoking about 33 years ago. Her smoking use included cigarettes. She started smoking about 57 years ago. She has a 7.5 pack-year smoking history. She has never used smokeless tobacco. She reports that she does not currently use alcohol. She reports that she does not currently use drugs.    Review of Systems  Review of Systems   joint pain, ecchymosis, bruising  Gait abnormality,  Objective    Vitals:    06/10/25 1615   BP: 174/81   Pulse: 90   Resp: 14   SpO2: 95%   Weight: 102 kg (225 lb)     No LMP recorded. Patient is postmenopausal.    Physical Exam  Generalized swelling, ecchymosis to the right foot  Procedures    Point of Care Test & Imaging Results from this visit  No results found for this visit on 06/10/25.   Imaging  XR foot right 3+ views  Result Date: 6/10/2025  Potential nondisplaced fractures include proximal metaphysis 1st proximal phalanx, proximal metaphysis 3rd proximal phalanx, neck of the 5th metatarsal bone.   MACRO: None   Signed by: Kalen  Dirkjay 6/10/2025 5:24 PM Dictation workstation:   [id number]      Cardiology, Vascular, and Other Imaging  No other imaging results found for the past 2 days      Diagnostic study results (if any) were reviewed by SREEDHAR Thrasher.    Assessment/Plan   Allergies, medications, history, and pertinent labs/EKGs/Imaging reviewed by SREEDHAR Thrasher.     Medical Decision Making   upon initial assessment, the patient was sitting calmly in the bedside chair in no acute distress.  The patient was found with her foot wrapped with her sock and makeshift Ace wrap.  She was wearing her walker.  She does have a limp.  Given the patient's symptoms, right foot x-ray was ordered.    Right foot x-ray: Potential nondisplaced fractures include proximal metaphysis 1st proximal phalanx, proximal metaphysis 3rd proximal phalanx, neck of  the 5th metatarsal bone.    Given the results, ortho shoe dispensed. OTC tylenol as discussed. Ortho referral placed. Follow up with pcp    As a result of the work-up, the patient was discharged home.  she was informed of her diagnosis and instructed to come back with any concerns or worsening of condition.  she and was agreeable to the plan as discussed above.  she was given the opportunity to ask questions.  All of the patient's questions were answered.    This document was generated using the assistance of voice recognition software. If there are any errors of spelling, grammar, syntax, or meaning; please feel free to contact me directly for clarification.     Orders and Diagnoses  Diagnoses and all orders for this visit:  Multiple closed fractures of foot, initial encounter  -     Referral to Orthopedics and Sports Medicine; Future  Right foot pain  -     XR foot right 3+ views; Future  -     Referral to Orthopedics and Sports Medicine; Future      Medical Admin Record      Patient disposition: Home    Electronically signed by SREEDHAR Thrasher  6:06 PM           [1]  (Not in a hospital admission)   [2]   Past Medical History:  Diagnosis Date    Anemia     Ankle fracture     left ankle fracture    Arthritis 1977    Cellulitis, unspecified 08/24/2020    Cellulitis of skin    CHF (congestive heart failure) 02/18/2025    Chronic venous insufficiency     CKD (chronic kidney disease), stage III (Multi)     COPD (chronic obstructive pulmonary disease) (Multi)     Essential (primary) hypertension 01/04/2023    Hypertension    H/O deep venous thrombosis     Hernia, internal 2022    History of blood transfusion     Hypothyroidism     Joint pain     Low back pain     Lymphedema     Osteoporosis     Other fracture of upper and lower end of unspecified fibula, initial encounter for closed fracture 05/21/2021    Fracture of distal fibula    Other specified soft tissue disorders 09/19/2022    Leg swelling    Peripheral neuropathy     Personal history of (healed) stress fracture 06/26/2021    History of stress fracture    Personal history of other diseases of the circulatory system     History of atrial fibrillation    Personal history of other diseases of the musculoskeletal system and connective tissue 10/20/2016    History of arthritis    Plantar fasciitis     Rotator cuff syndrome     Undifferentiated connective tissue disease (Multi)     Unspecified asthma, uncomplicated ([facility]) 12/01/2022    Asthma    Venous ulcer of leg (Multi)     Vitamin D deficiency, unspecified 10/20/2016    Vitamin D deficiency   [3]   Past Surgical History:  Procedure Laterality Date    BACK SURGERY  06/20/2016    Back Surgery L4-L5 fusion    CARDIAC ELECTROPHYSIOLOGY STUDY AND ABLATION      CHOLECYSTECTOMY  04/15/2016    Cholecystectomy    EYE SURGERY  2018    HERNIA REPAIR      JOINT REPLACEMENT  2007, 2017, 2022    OTHER SURGICAL HISTORY  05/03/2021    Cataract surgery    SPINE SURGERY  2001    THYROIDECTOMY, PARTIAL      d/t goiter    TOTAL HIP ARTHROPLASTY Right     TOTAL KNEE ARTHROPLASTY Bilateral  12/01/2022    Knee Replacement    VERTEBROPLASTY

## 2025-06-13 DIAGNOSIS — M35.9 CONNECTIVE TISSUE DISEASE, UNDIFFERENTIATED (MULTI): ICD-10-CM

## 2025-06-13 RX ORDER — FOLIC ACID 1 MG/1
1 TABLET ORAL DAILY
Qty: 90 TABLET | Refills: 0 | Status: SHIPPED | OUTPATIENT
Start: 2025-06-13 | End: 2025-09-11

## 2025-06-18 ENCOUNTER — TELEPHONE (OUTPATIENT)
Dept: PAIN MEDICINE | Facility: CLINIC | Age: 80
End: 2025-06-18

## 2025-06-18 ENCOUNTER — OFFICE VISIT (OUTPATIENT)
Dept: PAIN MEDICINE | Facility: CLINIC | Age: 80
End: 2025-06-18
Payer: MEDICARE

## 2025-06-18 VITALS — HEART RATE: 77 BPM | DIASTOLIC BLOOD PRESSURE: 60 MMHG | OXYGEN SATURATION: 97 % | SYSTOLIC BLOOD PRESSURE: 122 MMHG

## 2025-06-18 DIAGNOSIS — Z79.01 CHRONIC ANTICOAGULATION: ICD-10-CM

## 2025-06-18 DIAGNOSIS — G60.9 IDIOPATHIC PERIPHERAL NEUROPATHY: ICD-10-CM

## 2025-06-18 DIAGNOSIS — Z96.82 PRESENCE OF NEUROSTIMULATOR: ICD-10-CM

## 2025-06-18 DIAGNOSIS — M96.1 LUMBAR POSTLAMINECTOMY SYNDROME: Primary | ICD-10-CM

## 2025-06-18 PROCEDURE — 1160F RVW MEDS BY RX/DR IN RCRD: CPT | Performed by: ANESTHESIOLOGY

## 2025-06-18 PROCEDURE — 99214 OFFICE O/P EST MOD 30 MIN: CPT | Performed by: ANESTHESIOLOGY

## 2025-06-18 PROCEDURE — 1159F MED LIST DOCD IN RCRD: CPT | Performed by: ANESTHESIOLOGY

## 2025-06-18 PROCEDURE — G2211 COMPLEX E/M VISIT ADD ON: HCPCS | Performed by: ANESTHESIOLOGY

## 2025-06-18 PROCEDURE — 3074F SYST BP LT 130 MM HG: CPT | Performed by: ANESTHESIOLOGY

## 2025-06-18 PROCEDURE — 1125F AMNT PAIN NOTED PAIN PRSNT: CPT | Performed by: ANESTHESIOLOGY

## 2025-06-18 PROCEDURE — 3078F DIAST BP <80 MM HG: CPT | Performed by: ANESTHESIOLOGY

## 2025-06-18 ASSESSMENT — ENCOUNTER SYMPTOMS
ENDOCRINE NEGATIVE: 1
RESPIRATORY NEGATIVE: 1
PND: 0
PSYCHIATRIC NEGATIVE: 1
SHORTNESS OF BREATH: 0
GASTROINTESTINAL NEGATIVE: 1
HEADACHES: 0
ORTHOPNEA: 0
BLURRED VISION: 0
OCCASIONAL FEELINGS OF UNSTEADINESS: 0
BACK PAIN: 1
NECK PAIN: 0
LOSS OF SENSATION IN FEET: 0
CARDIOVASCULAR NEGATIVE: 1
PALPITATIONS: 0
EYES NEGATIVE: 1
HEMATOLOGIC/LYMPHATIC NEGATIVE: 1
WEAKNESS: 1
DEPRESSION: 0
HYPERTENSION: 1
CONSTITUTIONAL NEGATIVE: 1

## 2025-06-18 ASSESSMENT — PAIN SCALES - GENERAL
PAINLEVEL_OUTOF10: 9
PAINLEVEL_OUTOF10: 9

## 2025-06-18 ASSESSMENT — PAIN - FUNCTIONAL ASSESSMENT: PAIN_FUNCTIONAL_ASSESSMENT: 0-10

## 2025-06-18 ASSESSMENT — PAIN DESCRIPTION - DESCRIPTORS: DESCRIPTORS: ACHING

## 2025-06-18 NOTE — PROGRESS NOTES
"PAIN MANAGEMENT FOLLOW-UP OFFICE NOTE    Date of Service: 6/18/2025    SUBJECTIVE    CHIEF COMPLAINT: BLE pain    HISTORY OF PRESENT ILLNESS    Genesis Gracia is a 80 y.o. female with PMH HTN, DVT on ELIQUIS, hypothyroidism, CKD, asthma, obesity, L4-S1 fusion, CREST syndrome, VCF s/p T12/L1 kypho who presents for F/U.    Since her last visit, pt feels SCS not helping BL foot pain. Notes worsening LBP and claims to have reached out to office, but says second time she called she was able to get appointment moved up. Went to urgent care on 6/1 and underwent CT T/L-spine that showed stable SCS system up to T8 and no acute pathology. Dx'd with pna and placed on doxycycline. On 6/4, pt fell after leaning too hard on walker and broke foot. XR 6/10 shows 1st, 3rd, and 5th toe fx.     Reviews LBP since age 32 after back trauma getting out of bed/. Claims she was dx'd with spine \"never being connected.\" Underwent L4-S1 fusion 2000. LBP radiates down RLE worse with standing/walking. Uses walker or sits down to alleviate pain. Refractive to Tylenol, m relaxants, oxycodone, topical medication, R SIJ CSI, >6 w PT. Denies LIYA since surgery.     Of note, pt undergoing robotic ventral hernia repair  with Dr Russo 7/24    Pt denies new-onset numbness, weakness, bowel/bladder incontinence.  Pt denies recent infection, allergy to Latex/iodine/contrast. Patient is currently taking the following blood thinner(s): ELIQUIS    Procedure log:  -Mcknight SCS implant 2/18/25  -Mcknight SCS trial 1/9/25: >50% relief  -BL foot Qutenza #2 on 12/13/24  -BL foot Qutenza #1 on 8/23/24  -T12, L1 kypho 6/27/24: 75% relief      REVIEW OF SYSTEMS  Review of Systems   Constitutional: Negative.    HENT: Negative.     Eyes: Negative.    Respiratory: Negative.     Cardiovascular: Negative.    Gastrointestinal: Negative.    Endocrine: Negative.    Musculoskeletal:  Positive for back pain.   Skin: Negative.    Neurological:  Positive for weakness. "   Hematological: Negative.    Psychiatric/Behavioral: Negative.         PAST MEDICAL HISTORY  Past Medical History:   Diagnosis Date    Anemia     Ankle fracture     left ankle fracture    Arthritis 1977    Cellulitis, unspecified 08/24/2020    Cellulitis of skin    CHF (congestive heart failure) 02/18/2025    Chronic venous insufficiency     CKD (chronic kidney disease), stage III (Multi)     COPD (chronic obstructive pulmonary disease) (Multi)     Essential (primary) hypertension 01/04/2023    Hypertension    H/O deep venous thrombosis     Hernia, internal 2022    History of blood transfusion     Hypothyroidism     Joint pain     Low back pain     Lymphedema     Osteoporosis     Other fracture of upper and lower end of unspecified fibula, initial encounter for closed fracture 05/21/2021    Fracture of distal fibula    Other specified soft tissue disorders 09/19/2022    Leg swelling    Peripheral neuropathy     Personal history of (healed) stress fracture 06/26/2021    History of stress fracture    Personal history of other diseases of the circulatory system     History of atrial fibrillation    Personal history of other diseases of the musculoskeletal system and connective tissue 10/20/2016    History of arthritis    Plantar fasciitis     Rotator cuff syndrome     Undifferentiated connective tissue disease (Multi)     Unspecified asthma, uncomplicated ([facility]) 12/01/2022    Asthma    Venous ulcer of leg (Multi)     Vitamin D deficiency, unspecified 10/20/2016    Vitamin D deficiency     Past Surgical History:   Procedure Laterality Date    BACK SURGERY  06/20/2016    Back Surgery L4-L5 fusion    CARDIAC ELECTROPHYSIOLOGY STUDY AND ABLATION      CHOLECYSTECTOMY  04/15/2016    Cholecystectomy    EYE SURGERY  2018    HERNIA REPAIR      JOINT REPLACEMENT  2007, 2017, 2022    OTHER SURGICAL HISTORY  05/03/2021    Cataract surgery    SPINE SURGERY  2001    THYROIDECTOMY, PARTIAL      d/t goiter    TOTAL HIP  ARTHROPLASTY Right     TOTAL KNEE ARTHROPLASTY Bilateral 12/01/2022    Knee Replacement    VERTEBROPLASTY       Family History   Problem Relation Name Age of Onset    Diabetes Mother mom         type 2    Heart disease Father dakota     Hypertension Father dakota     Cancer Sister svetlana     Clotting disorder Sister svetlana     Cancer Daughter dayne pearson     Arthritis Sister svetlana pedley     Arthritis Sister laya penn     Clotting disorder Sister svetlana     Cancer Daughter dayne pearson     Multiple sclerosis Daughter dayne     Arthritis Sister svetlana pedalisha     Clotting disorder Sister svetlana hector     Cancer Sister svetlana pedley     Arthritis Sister laya penn     Cancer Sister svetlana     Cancer Daughter dayne pearson     Cancer Sister svetlana     Arthritis Sister svetlana pedley     Arthritis Sister laya penn     Cancer Sister svetlana     Clotting disorder Sister svetlana     Cancer Daughter dayne pearson     Multiple sclerosis Daughter dayne     Arthritis Other laya penn     Cancer Other Svetlana     Cancer Other Perla     Arthritis Sister svetlana hector     Arthritis Sister laya penn     Cancer Sister svetlana     Cancer Daughter dayne pearson        CURRENT MEDICATIONS  Current Outpatient Medications   Medication Sig Dispense Refill    acetaminophen (Tylenol) 325 mg tablet Take 2 tablets (650 mg) by mouth every 4 hours if needed for mild pain (1 - 3) or moderate pain (4 - 6). 30 tablet 0    albuterol 2.5 mg /3 mL (0.083 %) nebulizer solution Take 3 mL (2.5 mg) by nebulization every 2 hours if needed for wheezing. 75 mL 11    apixaban (Eliquis) 2.5 mg tablet Take 1 tablet (2.5 mg) by mouth 2 times a day. 180 tablet 3    Bacillus coagulans (PROBIOTIC, B. COAGULANS, ORAL) Take 1 tablet by mouth once daily.      calcium carbonate 600 mg calcium (1,500 mg) tablet Take 1 tablet (1,500 mg) by mouth once daily.      cholecalciferol (Vitamin D-3) 50 MCG (2000 UT) tablet Take by mouth.      denosumab (Prolia) 60 mg/mL syringe  Provider to inject 1 mL (60 mg total) under the skin every 6 months.  For  ONLY. 1 mL 2    ferrous sulfate (Slow Fe) 137 mg (45 mg iron) tablet extended release Take 1 tablet (137 mg) by mouth once daily. 30 tablet 6    fluticasone propion-salmeteroL (Advair Diskus) 250-50 mcg/dose diskus inhaler Inhale 1 puff 2 times a day. Rinse mouth with water after use to reduce aftertaste and incidence of candidiasis. Do not swallow.      folic acid (Folvite) 1 mg tablet Take 1 tablet (1 mg) by mouth once daily. 90 tablet 0    gabapentin (Neurontin) 400 mg capsule Take 1 capsule (400 mg) by mouth 3 times a day. 90 capsule 2    hydrocortisone 2.5 % cream Apply topically if needed for irritation or rash. Use maximum 2-3 times a week. 30 g 2    hydroxychloroquine (Plaquenil) 200 mg tablet Take 1 tablet (200 mg) by mouth 2 times a day. 180 tablet 0    levothyroxine (Synthroid) 25 mcg tablet Take 1 tablet (25 mcg) by mouth once daily in the morning. Take before meals. 90 tablet 3    losartan (Cozaar) 100 mg tablet Take 1 tablet (100 mg) by mouth once daily.      methotrexate (Trexall) 2.5 mg tablet Take 4 tablets (10 mg total) by mouth 1 (one) time per week.  Follow directions carefully, and ask to explain any part you do not understand. Take exactly as directed. 48 tablet 0    multivitamin capsule Take by mouth.      spironolactone (Aldactone) 25 mg tablet Take 1 tablet (25 mg) by mouth once daily.      torsemide (Demadex) 20 mg tablet Take 1 tablet (20 mg) by mouth early in the morning..      baclofen (Lioresal) 10 mg tablet Take 1 tablet (10 mg) by mouth 3 times a day for 5 days. As needed for muscle spasm 15 tablet 0     No current facility-administered medications for this visit.       ALLERGIES AND DRUG REACTIONS  Allergies   Allergen Reactions    Sulfur Hives    Cefprozil Hives    Sulfa (Sulfonamide Antibiotics) Hives          OBJECTIVE  Visit Vitals  /60   Pulse 77   SpO2 97%   OB Status  Postmenopausal   Smoking Status Former       Last Recorded Pain Score (if available):           Pain Score:   9       Physical Exam  General: Sitting in chair, NAD  Head: NCAT  Eyes: Sclera/conjunctiva clear, EOMI, PERRL  Nose/mouth: MMM  CV: Good distal pulses  Lungs: Good/equal chest excursion  Abdomen: Soft, ND  Ext: BLE edema, R ankle orthotic  MSK: l-spine alignment: unremarkable    Neuro: AAOx3, CN grossly nl  Dermatome sensation to light touch  LEFT L1 (lower pelvis/upper thigh): WNL    RIGHT L1: WNL      LEFT L2 (upper thigh): WNL       RIGHT: L2:WNL      LEFT L3 (medial knee): WNL       RIGHT L3: WNL      Decreased BL feet in stocking fashion to ankle level     Motor strength  LEFT L2 (hip flexion): 5/5   RIGHT L2: 5/5  LEFT L3 (knee extension): 5/5     RIGHT L3: 5/5  LEFT L4 (dorsiflexion): 5/5     RIGHT L4: 5/5  LEFT L5 (EHL extension): 5/5     RIGHT L5: 5/5  LEFT S1 (plantarflexion): 5/5     RIGHT S1: 5/5  LEFT S2 (knee flexion): 5/5      RIGHT S2: 5/5        Psych: affect nl  Skin: no rash/lesions. SCS midline and R IPG incisions well-healed, non-tender. Mild tenderness in between where may be tunneled lead w/o swelling/redness        REVIEW OF LABORATORY DATA  I have reviewed the following lab results:  WBC   Date Value Ref Range Status   04/21/2025 6.2 4.4 - 11.3 x10*3/uL Final     RBC   Date Value Ref Range Status   04/21/2025 4.12 4.00 - 5.20 x10*6/uL Final     Hemoglobin   Date Value Ref Range Status   04/21/2025 11.4 (L) 12.0 - 16.0 g/dL Final     Hematocrit   Date Value Ref Range Status   04/21/2025 37.5 36.0 - 46.0 % Final     MCV   Date Value Ref Range Status   04/21/2025 91 80 - 100 fL Final     MCH   Date Value Ref Range Status   04/21/2025 27.7 26.0 - 34.0 pg Final     MCHC   Date Value Ref Range Status   04/21/2025 30.4 (L) 32.0 - 36.0 g/dL Final     RDW   Date Value Ref Range Status   04/21/2025 16.0 (H) 11.5 - 14.5 % Final     Platelets   Date Value Ref Range Status   04/21/2025 195 150 -  450 x10*3/uL Final     Sodium   Date Value Ref Range Status   04/21/2025 141 136 - 145 mmol/L Final     Potassium   Date Value Ref Range Status   04/21/2025 4.4 3.5 - 5.3 mmol/L Final     Bicarbonate   Date Value Ref Range Status   04/21/2025 27 21 - 32 mmol/L Final     Urea Nitrogen   Date Value Ref Range Status   04/21/2025 24 (H) 6 - 23 mg/dL Final     CALCIUM   Date Value Ref Range Status   02/14/2025 8.9 8.6 - 10.4 mg/dL Final     Calcium   Date Value Ref Range Status   04/21/2025 8.4 (L) 8.6 - 10.3 mg/dL Final     Protime   Date Value Ref Range Status   01/18/2023 11.0 9.8 - 13.4 sec Final     INR   Date Value Ref Range Status   01/18/2023 1.0 0.9 - 1.1 Final         REVIEW OF RADIOLOGY   I have reviewed the following:  Radiology Studies           MRI L-spine w 5/8/24:  * Discitis/osteomyelitis at T12/L1 as described.  *No evidence of fluid collection to suggest epidural or paraspinal  abscess.        MRI L-spine wo 3/22/24:  There is increased signal within the disc space at T12-L1 with  increased anterior wedging at L1 and new anterior wedging at T12.  There is approximately 75% loss of vertebral body height anteriorly  at L1 and approximately 25% loss of vertebral body height anteriorly  at T12. There is no significant osseous retropulsion into the spinal  canal. There is no significant paraspinal edema or visualized  epidural collection. These findings may represent acute compression  deformities or sequela of trauma, however by imaging and early  discitis osteomyelitis can have a similar appearance. Please  correlate clinically for signs and symptoms of infection.      There is edema within the sacrum on the left which may represent a  sacral insufficiency fracture.      Postsurgical changes of posterolateral fusion and decompression at L4  through S1. No spinal canal and mild-to-moderate neural foraminal  narrowing at these levels.      Additional multilevel degenerative disc disease and facet  arthrosis  with mild spinal canal stenosis. Neural foraminal narrowing most  pronounced at T12-L1 with severe left neural foraminal stenosis.               ASSESSMENT & PLAN  Genesis Garcia is a 80 y.o. female with PMH HTN, DVT on ELIQUIS, hypothyroidism, CKD, asthma, obesity, L4-S1 fusion, VCF s/p T12/L1 kypho who presents for F/U .    1) Peripheral neuropathy  -BL foot neuropathy since ~2019, idiopathic  -Refractive to yrs of conservative tx including Tylenol, NSAIDs, gabapentin, Qutenza  -No known DM. Nl B12, folate  -s/p Abbott SCS trial 1/9/25: >50% relief. Improved stand/walking tolerance. Improved sleep.   -s/p Mcknight SCS implant 2/18/25 w/o complication. Testing revealed adequate BL foot coverage, but pt feels minimal relief as of yet. Feels confused re programming. Will contact rep for analysis, programming, and education    2) LBP  -30 y LBP since traumatic incident getting out of bed s/p L4-S1 fusion 2000 on hx T12, L1 VCF 2024 s/p T12, L1 kypho. LBP radiates down RLE in radicular fashion wo focal deficit  -Refractive to Tylenol, m relaxants, oxycodone, topical medication, R SIJ CSI  -Reviewed/discussed MRI L-spine w/wo 5/8/24: stable L4-S1 fusion, multilevel spondylosis featuring tr L2-3 listhesis w disc complex contributing to mod-severe BL NFS  -Reviewed/discussed CT T/L-spine 6/1/25: stable T12, L1 kypho, SCS intact up to T8, L4-S1 fusion  -Schedule caudal LIYA appropriately off Eliquis to target pain generator as seen on imaging and minimize risk/likelihood of chronic opioid use and/or surgery          Discussed procedure risks/benefits in detail with patient. Pt meets medical necessity for procedure due to failure of conservative measures. Reviewed procedural risks including bleeding, infection, nerve damage, paralysis. Also reviewed mitigating factors such as screening for infection/blood thinner use, sterile precautions, and image-guidance when applicable. All questions answered. Pt/guardian  expressed understanding and choose to proceed    Today's visit involved continuation of chronic pain care. In the context of the complexity of this patient's chronic pain diagnosis, long-term expectations and care planning discussed. Adequate time taken to ensure patient understanding and answer questions. Imaging studies ordered are placed do elucidate the patient's diagnosis, but also to evaluate the patient's candidacy for procedural and surgical interventions. The risks and benefits of these potential interventions are detailed as above.                     Faith Reese MD  Anesthesiologist & Interventional Pain Physician   [facility] [place]  O: [phone]  F: [phone]  1:50 PM  06/18/25

## 2025-06-25 ENCOUNTER — APPOINTMENT (OUTPATIENT)
Dept: PRIMARY CARE | Facility: CLINIC | Age: 80
End: 2025-06-25
Payer: MEDICARE

## 2025-06-25 VITALS
OXYGEN SATURATION: 96 % | SYSTOLIC BLOOD PRESSURE: 120 MMHG | DIASTOLIC BLOOD PRESSURE: 80 MMHG | BODY MASS INDEX: 42.51 KG/M2 | HEART RATE: 73 BPM | HEIGHT: 62 IN | WEIGHT: 231 LBS

## 2025-06-25 DIAGNOSIS — J44.9 CHRONIC OBSTRUCTIVE PULMONARY DISEASE, UNSPECIFIED COPD TYPE (MULTI): ICD-10-CM

## 2025-06-25 DIAGNOSIS — R60.0 EDEMA OF LOWER EXTREMITY: ICD-10-CM

## 2025-06-25 DIAGNOSIS — E03.9 HYPOTHYROIDISM (ACQUIRED): Primary | ICD-10-CM

## 2025-06-25 DIAGNOSIS — M35.9 UNDIFFERENTIATED CONNECTIVE TISSUE DISEASE (MULTI): ICD-10-CM

## 2025-06-25 DIAGNOSIS — I48.0 PAROXYSMAL ATRIAL FIBRILLATION (MULTI): ICD-10-CM

## 2025-06-25 DIAGNOSIS — I10 ESSENTIAL (PRIMARY) HYPERTENSION: ICD-10-CM

## 2025-06-25 DIAGNOSIS — N18.32 STAGE 3B CHRONIC KIDNEY DISEASE (MULTI): ICD-10-CM

## 2025-06-25 DIAGNOSIS — I82.5Y9 CHRONIC DEEP VEIN THROMBOSIS (DVT) OF PROXIMAL VEIN OF LOWER EXTREMITY, UNSPECIFIED LATERALITY: ICD-10-CM

## 2025-06-25 PROBLEM — L97.909 LEG ULCER (MULTI): Status: RESOLVED | Noted: 2024-05-16 | Resolved: 2025-06-25

## 2025-06-25 PROBLEM — I87.339 STASIS DERMATITIS WITH VENOUS ULCER OF LOWER EXTREMITY DUE TO CHRONIC PERIPHERAL VENOUS HYPERTENSION: Status: RESOLVED | Noted: 2024-05-16 | Resolved: 2025-06-25

## 2025-06-25 PROBLEM — I87.311 CHRONIC VENOUS HYPERTENSION (IDIOPATHIC) WITH ULCER OF RIGHT LOWER EXTREMITY (CODE): Status: RESOLVED | Noted: 2022-06-16 | Resolved: 2025-06-25

## 2025-06-25 PROBLEM — L97.221: Status: RESOLVED | Noted: 2023-08-23 | Resolved: 2025-06-25

## 2025-06-25 PROBLEM — I83.018: Status: RESOLVED | Noted: 2024-02-26 | Resolved: 2025-06-25

## 2025-06-25 PROBLEM — R60.9 VENOUS STASIS ULCER WITH EDEMA OF LOWER LEG: Status: RESOLVED | Noted: 2024-02-26 | Resolved: 2025-06-25

## 2025-06-25 PROBLEM — L97.909 VENOUS STASIS ULCER WITH EDEMA OF LOWER LEG: Status: RESOLVED | Noted: 2024-02-26 | Resolved: 2025-06-25

## 2025-06-25 PROBLEM — J45.20 MILD INTERMITTENT ASTHMA: Status: ACTIVE | Noted: 2025-06-25

## 2025-06-25 PROBLEM — Z86.0100 HISTORY OF COLONIC POLYPS: Status: ACTIVE | Noted: 2025-06-25
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
## 2025-06-25 PROBLEM — I83.899 VENOUS STASIS ULCER WITH EDEMA OF LOWER LEG: Status: RESOLVED | Noted: 2024-02-26 | Resolved: 2025-06-25

## 2025-06-25 PROBLEM — I83.009 VENOUS STASIS ULCER WITH EDEMA OF LOWER LEG: Status: RESOLVED | Noted: 2024-02-26 | Resolved: 2025-06-25

## 2025-06-25 PROCEDURE — 1159F MED LIST DOCD IN RCRD: CPT | Performed by: INTERNAL MEDICINE

## 2025-06-25 PROCEDURE — 3074F SYST BP LT 130 MM HG: CPT | Performed by: INTERNAL MEDICINE

## 2025-06-25 PROCEDURE — 99214 OFFICE O/P EST MOD 30 MIN: CPT | Performed by: INTERNAL MEDICINE

## 2025-06-25 PROCEDURE — 1036F TOBACCO NON-USER: CPT | Performed by: INTERNAL MEDICINE

## 2025-06-25 PROCEDURE — 3079F DIAST BP 80-89 MM HG: CPT | Performed by: INTERNAL MEDICINE

## 2025-06-25 PROCEDURE — G2211 COMPLEX E/M VISIT ADD ON: HCPCS | Performed by: INTERNAL MEDICINE

## 2025-06-25 PROCEDURE — 1160F RVW MEDS BY RX/DR IN RCRD: CPT | Performed by: INTERNAL MEDICINE

## 2025-06-25 RX ORDER — METHOTREXATE 2.5 MG/1
TABLET ORAL
COMMUNITY
Start: 2025-05-15 | End: 2025-06-25 | Stop reason: WASHOUT

## 2025-06-25 RX ORDER — HYDROXYCHLOROQUINE SULFATE 200 MG/1
1 TABLET, FILM COATED ORAL
COMMUNITY
Start: 2025-05-15

## 2025-06-25 RX ORDER — ALBUTEROL SULFATE 90 UG/1
2 INHALANT RESPIRATORY (INHALATION) EVERY 6 HOURS PRN
Qty: 18 G | Refills: 3 | Status: SHIPPED | OUTPATIENT
Start: 2025-06-25 | End: 2026-06-25

## 2025-06-25 ASSESSMENT — ENCOUNTER SYMPTOMS
ORTHOPNEA: 0
HYPERTENSION: 1
SHORTNESS OF BREATH: 0
PND: 0
PALPITATIONS: 0
BLURRED VISION: 0
HEADACHES: 0
NECK PAIN: 0

## 2025-06-25 NOTE — PROGRESS NOTES
Subjective   Patient ID: Genesis Garcia is a 80 y.o. female who presents for Follow-up.  Genesis is a 80-year-old female who comes today for a follow-up visit.  Medications were reviewed and updated in the medical record, patient is compliant with them and reports no significant side effects.  Patient had a Medicare annual wellness exam done in March 2025 and she had labs done earlier this year which were reviewed and noted-creatinine was 1.1 with a GFR of 48, hemoglobin was slightly decreased at 11.4, vitamin D level was normal and so was her TSH and hemoglobin A1c.  Patient has been evaluated by nephrology for history of CKD and she continues to follow-up with rheumatology as well as pain management.  Patient was also evaluated by cardiology for history of heart murmur/LVH and has been advised to follow-up in 4 months.  Patient continues to ambulate with a walker.  Though she has quit smoking several years ago, patient continues to have exertional dyspnea on and off despite using her Advair inhaler regularly.  She would like me to refill her albuterol MDI to use on a as needed basis.  No history of fever, chills, chest pain, cough, hemoptysis, palpitations, syncope, abdominal pain, vomiting, melena, rectal bleeding, dysuria, hematuria, unintentional loss of weight or loss of appetite reported.  All previous lower extremity wounds have healed and resolved.  Hypertension  This is a recurrent problem. The problem has been waxing and waning since onset. Associated symptoms include peripheral edema. Pertinent negatives include no anxiety, blurred vision, chest pain, headaches, malaise/fatigue, neck pain, orthopnea, palpitations, PND or shortness of breath. Agents associated with hypertension include thyroid hormones. Risk factors for coronary artery disease include family history and obesity. There are no compliance problems.         Review of Systems   Constitutional:  Negative for malaise/fatigue.   Eyes:   "Negative for blurred vision.   Respiratory:  Negative for shortness of breath.    Cardiovascular:  Negative for chest pain, palpitations, orthopnea and PND.   Musculoskeletal:  Negative for neck pain.   Neurological:  Negative for headaches.       Objective   /80 (BP Location: Right arm, Patient Position: Sitting, BP Cuff Size: Large adult)   Pulse 73   Ht 1.575 m (5' 2\")   Wt 105 kg (231 lb)   SpO2 96%   BMI 42.25 kg/m²     Physical Exam  General - well developed, well appearing, morbidly obese, elderly  female in no acute respiratory distress  Eyes - normal sclera and conjunctiva with no pallor or icterus, normal extraocular movements  ENT - normal external auditory canals and tympanic membranes, throat clear with no exudates, dentures present  Neck - No JVD, thyromegaly or lymphadenopathy  Lungs - no respiratory distress and lungs with end-expiratory wheezing B/L  Heart - normal S1, S2 with normal heart rate, rhythm and 2/6 systolic murmur heard in the right sternal border  Abdomen - soft, nontender with no masses or organomegaly  Extremities -bilateral lower extremity edema present with braces in place  Neuro - grossly normal neuro exam with no focal neuro deficits  Psych - normal mental status, mood and affect   Skin - no rashes or ulcers  MSK -patient ambulates slowly with a walker and deformities are noted in joints of hands  Assessment/Plan       1.  Hypothyroidism-last TSH was okay, patient will continue current dose of levothyroxine  2.  COPD-patient will continue her maintenance inhaler therapy, refill provided for albuterol MDI to use as needed  3.  Chronic DVT of lower extremity-patient will continue anticoagulation with Eliquis as prescribed  4.  Stage IIIb CKD-patient has been evaluated by nephrology and will follow-up as recommended  5.  Hypertension-controlled and patient will continue current treatment  6.  Paroxysmal atrial fibrillation, LVH, heart murmur-patient has been " evaluated by cardiology and will follow-up in August as recommended, she is on anticoagulation with Eliquis  7.  Chronic lower extremity edema, venous stasis-patient will continue torsemide  8.  Undifferentiated connective tissue disease-patient has scheduled an appointment to see rheumatology in the near future  Follow-up in 4 to 5 months.  34 minutes spent rooming the patient, reviewing records, eliciting history, examining patient, counseling, coordination of care and in documentation.  This note was partially generated using the Dragon voice recognition system. There may be some incorrect words, spelling and punctuation errors that were not corrected prior to committing the note to the patient's medical record.

## 2025-07-01 ENCOUNTER — TELEPHONE (OUTPATIENT)
Dept: HEMATOLOGY/ONCOLOGY | Facility: HOSPITAL | Age: 80
End: 2025-07-01

## 2025-07-01 ENCOUNTER — LAB (OUTPATIENT)
Dept: LAB | Facility: HOSPITAL | Age: 80
End: 2025-07-01
Payer: MEDICARE

## 2025-07-01 DIAGNOSIS — D50.8 OTHER IRON DEFICIENCY ANEMIA: Primary | ICD-10-CM

## 2025-07-01 DIAGNOSIS — D50.8 OTHER IRON DEFICIENCY ANEMIA: ICD-10-CM

## 2025-07-01 LAB
ALBUMIN SERPL BCP-MCNC: 4 G/DL (ref 3.4–5)
ALP SERPL-CCNC: 79 U/L (ref 33–136)
ALT SERPL W P-5'-P-CCNC: 13 U/L (ref 7–45)
ANION GAP SERPL CALC-SCNC: 9 MMOL/L (ref 10–20)
AST SERPL W P-5'-P-CCNC: 20 U/L (ref 9–39)
BASOPHILS # BLD AUTO: 0.05 X10*3/UL (ref 0–0.1)
BASOPHILS NFR BLD AUTO: 1 %
BILIRUB SERPL-MCNC: 0.5 MG/DL (ref 0–1.2)
BUN SERPL-MCNC: 23 MG/DL (ref 6–23)
CALCIUM SERPL-MCNC: 8.8 MG/DL (ref 8.6–10.6)
CHLORIDE SERPL-SCNC: 106 MMOL/L (ref 98–107)
CO2 SERPL-SCNC: 29 MMOL/L (ref 21–32)
CREAT SERPL-MCNC: 1.08 MG/DL (ref 0.5–1.05)
EGFRCR SERPLBLD CKD-EPI 2021: 53 ML/MIN/1.73M*2
EOSINOPHIL # BLD AUTO: 0.29 X10*3/UL (ref 0–0.4)
EOSINOPHIL NFR BLD AUTO: 5.6 %
ERYTHROCYTE [DISTWIDTH] IN BLOOD BY AUTOMATED COUNT: 16.1 % (ref 11.5–14.5)
FERRITIN SERPL-MCNC: 48 NG/ML (ref 8–150)
FOLATE SERPL-MCNC: >24 NG/ML
GLUCOSE SERPL-MCNC: 87 MG/DL (ref 74–99)
HCT VFR BLD AUTO: 37.8 % (ref 36–46)
HGB BLD-MCNC: 11.6 G/DL (ref 12–16)
IMM GRANULOCYTES # BLD AUTO: 0.02 X10*3/UL (ref 0–0.5)
IMM GRANULOCYTES NFR BLD AUTO: 0.4 % (ref 0–0.9)
IRON SATN MFR SERPL: 13 % (ref 25–45)
IRON SERPL-MCNC: 42 UG/DL (ref 35–150)
LYMPHOCYTES # BLD AUTO: 1 X10*3/UL (ref 0.8–3)
LYMPHOCYTES NFR BLD AUTO: 19.3 %
MCH RBC QN AUTO: 29.7 PG (ref 26–34)
MCHC RBC AUTO-ENTMCNC: 30.7 G/DL (ref 32–36)
MCV RBC AUTO: 97 FL (ref 80–100)
MONOCYTES # BLD AUTO: 0.59 X10*3/UL (ref 0.05–0.8)
MONOCYTES NFR BLD AUTO: 11.4 %
NEUTROPHILS # BLD AUTO: 3.23 X10*3/UL (ref 1.6–5.5)
NEUTROPHILS NFR BLD AUTO: 62.3 %
NRBC BLD-RTO: 0 /100 WBCS (ref 0–0)
PLATELET # BLD AUTO: 220 X10*3/UL (ref 150–450)
POTASSIUM SERPL-SCNC: 4.4 MMOL/L (ref 3.5–5.3)
PROT SERPL-MCNC: 5.9 G/DL (ref 6.4–8.2)
RBC # BLD AUTO: 3.91 X10*6/UL (ref 4–5.2)
SODIUM SERPL-SCNC: 140 MMOL/L (ref 136–145)
TIBC SERPL-MCNC: 321 UG/DL (ref 240–445)
UIBC SERPL-MCNC: 279 UG/DL (ref 110–370)
VIT B12 SERPL-MCNC: 805 PG/ML (ref 211–911)
WBC # BLD AUTO: 5.2 X10*3/UL (ref 4.4–11.3)

## 2025-07-01 PROCEDURE — 80053 COMPREHEN METABOLIC PANEL: CPT

## 2025-07-01 PROCEDURE — 83550 IRON BINDING TEST: CPT

## 2025-07-01 PROCEDURE — 83540 ASSAY OF IRON: CPT

## 2025-07-01 PROCEDURE — 82728 ASSAY OF FERRITIN: CPT

## 2025-07-01 PROCEDURE — 82746 ASSAY OF FOLIC ACID SERUM: CPT

## 2025-07-01 PROCEDURE — 82607 VITAMIN B-12: CPT

## 2025-07-01 PROCEDURE — 85025 COMPLETE CBC W/AUTO DIFF WBC: CPT

## 2025-07-15 ASSESSMENT — RHEUMATOLOGY NEW PATIENT QUESTIONNAIRE
INCREASED SUSCEPTIBILITY TO INFECTION: N
SHORTNESS OF BREATH: Y
JAUNDICE: N
MUSCLE WEAKNESS: N
VOMITING OF BLOOD OR COFFEE GROUND CONSISTENCY MATERIAL: N
DIFFICULTY SWALLOWING: Y
SWOLLEN LEGS OR FEET: Y
COUGH: Y
COUGHING OF BLOOD: N
DOUBLE OR BLURRED VISION: N
NAUSEA: N
ANXIETY: N
BEHAVIORAL CHANGES: N
UNEXPLAINED HEARING LOSS: N
EASY BRUISING: Y
STOMACH PAIN: N
SKIN TIGHTNESS: N
EYE PAIN: N
UNUSUALLY RAPID OR SLOWED HEART RATE: N
DIFFICULTY STAYING ASLEEP: N
DIFFICULTY BREATHING LYING DOWN: N
COLOR CHANGES OF HANDS OR FEET IN THE COLD: N
FAINTING: N
NUMBNESS OR TINGLING IN HANDS OR FEET: Y
FEVER: N
RASH OR ULCERS: N
UNUSUAL BLEEDING: N
EYE REDNESS: N
SUN SENSITIVE (SUN ALLERGY): N
JOINT SWELLING: Y
ABNORMAL URINE: N
NIGHT SWEATS: N
PAIN OR BURNING ON URINATION: N
HOW WOULD YOU DESCRIBE YOUR STIFFNESS ON AVERAGE: MODERATE
SORES IN MOUTH OR NOSE: N
JOINT PAIN: Y
BLACK STOOLS: N
SKIN REDNESS: N
MEMORY LOSS: N
EASILY LOSING TEMPER: N
UNUSUAL FATIGUE: N
EXCESSIVE HAIR LOSS (MORE THAN YOUR NORM): N
MORNING STIFFNESS: Y
DEPRESSION: N
VAGINAL DRYNESS: N
HEARTBURN OR REFLUX: N
PERSISTENT DIARRHEA: N
SWOLLEN OR TENDER GLANDS: N
MORNING STIFFNESS IN LOWER BACK: Y
UNEXPLAINED WEIGHT CHANGE: N
WHEEZING: N
DRYNESS OF MOUTH: N
LOSS OF VISION: N
DIFFICULTY FALLING ASLEEP: N
SEIZURES: N
LOSS OF CONSCIOUSNESS: N
EYE DRYNESS: N
HEADACHES: N
ANEMIA: N
HOARSE VOICE: Y
RASH: N
CHEST PAIN: N
NODULES/BUMPS: N
BLOOD IN STOOLS: N
AGITATION: N

## 2025-07-16 ENCOUNTER — CLINICAL SUPPORT (OUTPATIENT)
Dept: PREADMISSION TESTING | Facility: HOSPITAL | Age: 80
End: 2025-07-16
Payer: MEDICARE

## 2025-07-16 RX ORDER — METHOTREXATE 2.5 MG/1
4 TABLET ORAL WEEKLY
COMMUNITY

## 2025-07-16 NOTE — CPM/PAT NURSE NOTE
CPM/PAT Nurse Note      Name: Genesis Garcia (Genesis Garcia)  /Age: 1945/80 y.o.       Medical History[1]    Surgical History[2]    Patient  reports that she is not currently sexually active.    Family History[3]    Allergies[4]    Prior to Admission medications   Medication Sig Start Date End Date Taking? Authorizing Provider   acetaminophen (Tylenol) 325 mg tablet Take 2 tablets (650 mg) by mouth every 4 hours if needed for mild pain (1 - 3) or moderate pain (4 - 6). 23   SREEDHAR Jung   albuterol (ProAir HFA) 90 mcg/actuation inhaler Inhale 2 puffs every 6 hours if needed for wheezing. 25  Precious Rodriguez MD   albuterol 2.5 mg /3 mL (0.083 %) nebulizer solution Take 3 mL (2.5 mg) by nebulization every 2 hours if needed for wheezing. 23   Chon Lind MD   apixaban (Eliquis) 2.5 mg tablet Take 1 tablet (2.5 mg) by mouth 2 times a day. 25  Rosy Atkinson MD   Bacillus coagulans (PROBIOTIC, B. COAGULANS, ORAL) Take 1 tablet by mouth once daily.    Historical Provider, MD   calcium carbonate 600 mg calcium (1,500 mg) tablet Take 1 tablet (1,500 mg) by mouth once daily.    Historical Provider, MD   cholecalciferol (Vitamin D-3) 50 MCG (2000 UT) tablet Take by mouth.    Historical Provider, MD   denosumab (Prolia) 60 mg/mL syringe Provider to inject 1 mL (60 mg total) under the skin every 6 months.  For  ONLY. 24  Brent Sheridan MD   ferrous sulfate (Slow Fe) 137 mg (45 mg iron) tablet extended release Take 1 tablet (137 mg) by mouth once daily. 25   Rosanne M Casal, APRN-CNP, DNP   fluticasone propion-salmeteroL (Advair Diskus) 250-50 mcg/dose diskus inhaler Inhale 1 puff 2 times a day. Rinse mouth with water after use to reduce aftertaste and incidence of candidiasis. Do not swallow.    Historical Provider, MD   folic acid (Folvite) 1 mg tablet Take 1 tablet (1 mg) by mouth once daily. 25  9/11/25  Marietta Graff DO   gabapentin (Neurontin) 400 mg capsule Take 1 capsule (400 mg) by mouth 3 times a day. 2/20/24   Shine Wakefield MD   hydrocortisone 2.5 % cream Apply topically if needed for irritation or rash. Use maximum 2-3 times a week. 10/7/24 10/7/25  Rosy Atkinson MD   hydroxychloroquine (Plaquenil) 200 mg tablet Take 1 tablet (200 mg) by mouth every 12 hours. 5/15/25   Historical Provider, MD   levothyroxine (Synthroid) 25 mcg tablet Take 1 tablet (25 mcg) by mouth once daily in the morning. Take before meals. 3/31/25 3/26/26  Pedro Dawkins MD   losartan (Cozaar) 100 mg tablet Take 1 tablet (100 mg) by mouth once daily.    Historical Provider, MD   methotrexate (Trexall) 2.5 mg tablet Take 4 tablets (10 mg total) by mouth 1 (one) time per week.  Follow directions carefully, and ask to explain any part you do not understand. Take exactly as directed. On Thursday    Historical Provider, MD   multivitamin capsule Take by mouth.    Historical Provider, MD   spironolactone (Aldactone) 25 mg tablet Take 1 tablet (25 mg) by mouth once daily. 2/24/23   Historical Provider, MD   torsemide (Demadex) 20 mg tablet Take 1 tablet (20 mg) by mouth early in the morning.. 6/27/24   Historical Provider, MD KASANDRA BENITO     DASI Risk Score      Flowsheet Row Questionnaire Series Submission from 3/26/2025 in [facility] with Generic Provider Wesleyt Questionnaire Series Submission from 2/4/2025 in [facility] with Generic Provider Wesleyt   Can you take care of yourself (eat, dress, bathe, or use toilet)?  2.75  filed at 03/26/2025 1831 2.75  filed at 02/04/2025 0956   Can you walk indoors, such as around your house? 1.75  filed at 03/26/2025 1831 1.75  filed at 02/04/2025 0956   Can you walk a block or two on level ground?  2.75  filed at 03/26/2025 1831 2.75  filed at 02/04/2025 0956   Can you climb a flight of stairs or walk up a hill? 0  filed at 03/26/2025 1831 0  filed at 02/04/2025 0956   Can you  run a short distance? 0  filed at 03/26/2025 1831 0  filed at 02/04/2025 0956   Can you do light work around the house like dusting or washing dishes? 2.7  filed at 03/26/2025 1831 2.7  filed at 02/04/2025 0956   Can you do moderate work around the house like vacuuming, sweeping floors or carrying groceries? 3.5  filed at 03/26/2025 1831 3.5  filed at 02/04/2025 0956   Can you do heavy work around the house like scrubbing floors or lifting and moving heavy furniture?  0  filed at 03/26/2025 1831 0  filed at 02/04/2025 0956   Can you do yard work like raking leaves, weeding or pushing a mower? 0  filed at 03/26/2025 1831 4.5  filed at 02/04/2025 0956   Can you have sexual relations? 0  filed at 03/26/2025 1831 0  filed at 02/04/2025 0956   Can you participate in moderate recreational activities like golf, bowling, dancing, doubles tennis or throwing a baseball or football? 0  filed at 03/26/2025 1831 0  filed at 02/04/2025 0956   Can you participate in strenous sports like swimming, singles tennis, football, basketball, or skiing? 0  filed at 03/26/2025 1831 0  filed at 02/04/2025 0956   DASI SCORE 13.45  filed at 03/26/2025 1831 17.95  filed at 02/04/2025 0956   METS Score (Will be calculated only when all the questions are answered) 4.4  filed at 03/26/2025 1831 4.9  filed at 02/04/2025 0956     Caprini DVT Assessment      Flowsheet Row Pre-Admission Testing from 2/10/2025 in [facility] ED to Hosp-Admission (Discharged) from 12/27/2023 in [facility] Bldg A 7 with Chon Lind MD and Ale Batista MD   DVT Score (IF A SCORE IS NOT CALCULATING, MUST SELECT A BMI TO COMPLETE) 15 filed at 02/10/2025 1458 5 filed at 12/27/2023 1909   Medical Factors Family history of DVT/PE, History of DVT/PE, COPD, Swollen legs filed at 02/10/2025 1458 --   Surgical Factors Minor surgery planned filed at 02/10/2025 1458 --   BMI (BMI MUST BE CHOSEN) 41-50 (Morbid obesity) filed at  02/10/2025 1458 --   RETIRED: Current Status -- Medical patient at bedrest filed at 12/27/2023 1909   RETIRED: History -- Pneumonia filed at 12/27/2023 1909   RETIRED: Age -- Over 75 years filed at 12/27/2023 1909     Modified Frailty Index      Flowsheet Row Pre-Admission Testing from 2/10/2025 in [facility]   Non-independent functional status (problems with dressing, bathing, personal grooming, or cooking) 0 filed at 02/10/2025 1459   History of diabetes mellitus  0 filed at 02/10/2025 1459   History of COPD 0.0909 filed at 02/10/2025 1459   History of CHF No filed at 02/10/2025 1459   History of MI 0 filed at 02/10/2025 1459   History of Percutaneous Coronary Intervention, Cardiac Surgery, or Angina No filed at 02/10/2025 1459   Hypertension requiring the use of medication  0.0909 filed at 02/10/2025 1459   Peripheral vascular disease 0.0909 filed at 02/10/2025 1459   Impaired sensorium (cognitive impairement or loss, clouding, or delirium) 0 filed at 02/10/2025 1459   TIA or CVA withouy residual deficit 0 filed at 02/10/2025 1459   Cerebrovascular accident with deficit 0 filed at 02/10/2025 1459   Modified Frailty Index Calculator .2727 filed at 02/10/2025 1459     [id number] Stroke Risk Points  Current as of just now        6 0 to 9 Points:      Last Change:           The [id number] risk score (Lip ALISIA, et al. 2009. © 2010 [employer]) quantifies the risk of stroke for a patient with atrial fibrillation. For patients without atrial fibrillation or under the age of 18 this score appears as N/A. Higher score values generally indicate higher risk of stroke.          Points Metrics   1 Has Congestive Heart Failure:  Yes     Patients with congestive heart failure get 1 point.    Current as of just now   1 Has Hypertension:  Yes     Patients with hypertension get 1 point.    Current as of just now   2 Age:  80     Patients 65 to 74 years old get 1 point, or patients 75  years and older get 2 points.    Current as of just now   0 Has Diabetes:  No     Patients with diabetes get 1 point.    Current as of just now   0 Had Stroke:  No  Had TIA:  No  Had Thromboembolism:  No     Patients who have had a stroke, TIA, or thromboembolism get 2 points.    Current as of just now   1 Has Vascular Disease:  Yes     Patients with vascular disease get 1 point.    Current as of just now   1 Clinically Relevant Sex:  Female     Patients with a clinically relevant sex of Female get 1 point.    Current as of just now             Revised Cardiac Risk Index      Flowsheet Row Pre-Admission Testing from 2/10/2025 in [facility] Pre-Admission Testing from 6/20/2024 in [facility]   High-Risk Surgery (Intraperitoneal, Intrathoracic,Suprainguinal vascular) 0 filed at 02/10/2025 1459 0 filed at 06/20/2024 1640   History of ischemic heart disease (History of MI, History of positive exercuse test, Current chest paint considered due to myocardial ischemia, Use of nitrate therapy, ECG with pathological Q Waves) 0 filed at 02/10/2025 1459 0 filed at 06/20/2024 1640   History of congestive heart failure (pulmonary edemia, bilateral rales or S3 gallop, Paroxysmal nocturnal dyspnea, CXR showing pulmonary vascular redistribution) 0 filed at 02/10/2025 1459 0 filed at 06/20/2024 1640   History of cerebrovascular disease (Prior TIA or stroke) 0 filed at 02/10/2025 1459 0 filed at 06/20/2024 1640   Pre-operative insulin treatment 0 filed at 02/10/2025 1459 0 filed at 06/20/2024 1640   Pre-operative creatinine>2 mg/dl 0 filed at 02/10/2025 1459 0 filed at 06/20/2024 1640   Revised Cardiac Risk Calculator 0 filed at 02/10/2025 1459 0 filed at 06/20/2024 1640     Apfel Simplified Score      Flowsheet Row Pre-Admission Testing from 2/10/2025 in [facility]   Smoking status 1 filed at 02/10/2025 1459   History of motion sickness or PONV  0 filed at 02/10/2025 1459   Use of  postoperative opioids 0 filed at 02/10/2025 1459   Gender - Female 1=Yes filed at 02/10/2025 1459   Apfel Simplified Score Calculator 2 filed at 02/10/2025 1456     Risk Analysis Index Results This Encounter    No data found in the last 10 encounters.       Stop Bang Score      Flowsheet Row Questionnaire Series Submission from 3/26/2025 in [facility] with Generic Provider Myah Admission (Discharged) from 2/18/2025 in [facility] with Faith Reese MD   Do you snore loudly? 0  filed at 03/26/2025 1831 0 filed at 02/18/2025 1104   Do you often feel tired or fatigued after your sleep? 0  filed at 03/26/2025 1831 0 filed at 02/18/2025 1104   Has anyone ever observed you stop breathing in your sleep? 0  filed at 03/26/2025 1831 0 filed at 02/18/2025 1104   Do you have or are you being treated for high blood pressure? 1  filed at 03/26/2025 1831 1 filed at 02/18/2025 1104   Recent BMI (Calculated) 41.1  filed at 03/26/2025 1831 41.1 filed at 02/18/2025 1104   Is BMI greater than 35 kg/m2? 1=Yes  filed at 03/26/2025 1831 1=Yes filed at 02/18/2025 1104   Age older than 50 years old? 1=Yes  filed at 03/26/2025 1831 1=Yes filed at 02/18/2025 1104   Is your neck circumference greater than 17 inches (Male) or 16 inches (Female)? -- 0 filed at 02/18/2025 1104   Gender - Male 0=No  filed at 03/26/2025 1831 0=No filed at 02/18/2025 1104   STOP-BANG Total Score -- 3 filed at 02/18/2025 1104     Prodigy: High Risk  Total Score: 19              Prodigy Age Score      Prodigy CHF score          ARISCAT Score for Postoperative Pulmonary Complications      Flowsheet Row Pre-Admission Testing from 2/10/2025 in [facility]   Age Calculated Score 3 filed at 02/10/2025 1459   Preoperative SpO2 8 filed at 02/10/2025 1459   Respiratory infection in the last month Either upper or lower (i.e., URI, bronchitis, pneumonia), with fever and antibiotic treatment 0 filed at 02/10/2025 1459   Preoperative  anemia (Hgb less than 10 g/dl) 0 filed at 02/10/2025 1459   Surgical incision  0 filed at 02/10/2025 1459   Duration of surgery  0 filed at 02/10/2025 1459   Emergency Procedure  0 filed at 02/10/2025 1459   ARISCAT Total Score  11 filed at 02/10/2025 1459     Jesse Perioperative Risk for Myocardial Infarction or Cardiac Arrest (XOCHITL)      Flowsheet Row Pre-Admission Testing from 2/10/2025 in [facility]   Calculated Age Score 1.54 filed at 02/10/2025 1500   Functional Status  0 filed at 02/10/2025 1500   ASA Class  -3.29 filed at 02/10/2025 1500   Creatinine 0 filed at 02/10/2025 1500   Type of Procedure  0.21 filed at 02/10/2025 1500   XOCHITL Total Score  -6.79 filed at 02/10/2025 1500   XOCHITL % 0.11 filed at 02/10/2025 1500       Nurse Plan of Action: RN screening call complete.  Reviewed allergies, medications and pharmacy, medical, surgical and social history with patient.  Chart updated.  Instructed patient to stop vitamins and supplements 7 days prior to surgery.                  [1]   Past Medical History:  Diagnosis Date    Abnormal echocardiogram     Anemia     Ankle fracture     left ankle fracture    Arthritis     Asthma     Autoimmune disorder (Multi)     Cardiology follow-up encounter     Hernan Fonseca MD  LOV 4/16/25    CHF (congestive heart failure) 02/18/2025    Chronic kidney disease     Chronic venous insufficiency     CKD (chronic kidney disease), stage III (Multi)     7/1/25 Cr 1.08 GFR 53    COPD (chronic obstructive pulmonary disease) (Multi)     Dysphagia     Essential (primary) hypertension 01/04/2023    Hypertension    Fall 12/29/2023    H/O deep venous thrombosis     H/O echocardiogram 03/24/2025    CONCLUSIONS:  1. The left ventricular systolic function is normal, with a Arroyo's biplane calculated ejection fraction of 63%.  2. Left ventricular diastolic filling is indeterminate.  3. There is moderately increased septal thickness.  4. There is severely increased  eccentric left ventricular hypertrophy.  5. Normal right ventricular global systolic function.  6. Left atrium is mildly dilated.    Heart murmur     Hernia, internal 2022    History of blood transfusion     Hypothyroidism     Idiopathic peripheral neuropathy     s/p Spinal Cord Stimulator Implant    Incisional hernia without obstruction or gangrene     Plan: Robot Assisted Ventral Hernia Repair; Mesh Placement 7/24/25    Lymphedema     Osteoarthritis     Osteoporosis     Peripheral neuropathy     Plantar fasciitis     Rotator cuff syndrome     Venous ulcer of leg (Multi)     Vitamin D deficiency, unspecified 10/20/2016    Vitamin D deficiency   [2]   Past Surgical History:  Procedure Laterality Date    BACK SURGERY  06/27/2024    Back Surgery L4-L5 fusion    CARDIAC ELECTROPHYSIOLOGY STUDY AND ABLATION      CATARACT EXTRACTION      CHOLECYSTECTOMY  04/15/2016    Cholecystectomy    EYE SURGERY  2018    HERNIA REPAIR  2020    HIP ARTHROPLASTY      JOINT REPLACEMENT      2007, 2017, 2022    KNEE ARTHROPLASTY      SPINAL CORD STIMULATOR IMPLANT  02/18/2025    SPINE SURGERY  2001    THYROIDECTOMY, PARTIAL      d/t goiter    TOTAL HIP ARTHROPLASTY Right     TOTAL KNEE ARTHROPLASTY Bilateral 12/01/2022    Knee Replacement    VERTEBROPLASTY     [3]   Family History  Problem Relation Name Age of Onset    Diabetes Mother mom         type 2    Heart disease Father dakota     Hypertension Father dakota     Arthritis Sister juan hector sister     Clotting disorder Sister juan hector sister     Cancer Sister juan mullenley sister     Arthritis Sister laya penn     Cancer Daughter dayne pearson     Multiple sclerosis Daughter dayne pearson     Cancer Other Perla     Arthritis Sister juan hector     Arthritis Sister laya penn     Clotting disorder Sister juan     Cancer Daughter dayne gishnock     Multiple sclerosis Daughter dayne fierrorebaock     Arthritis Sister juan mullenley     Arthritis Sister laya penn     Cancer Sister  juan     Cancer Daughter dayne pearson     Cancer Sister juan     Cancer Sister juan     Arthritis Sister juan hector     Arthritis Sister laya penn     Clotting disorder Sister juan     Cancer Sister juan     Cancer Daughter dayne pearson     Multiple sclerosis Daughter dayne     Multiple sclerosis Daughter dayne pearson     Multiple sclerosis Daughter dayne pearson    [4]   Allergies  Allergen Reactions    Cefprozil Hives    Sulfa (Sulfonamide Antibiotics) Hives    Sulfur Hives

## 2025-07-17 ENCOUNTER — DOCUMENTATION (OUTPATIENT)
Dept: PREADMISSION TESTING | Facility: HOSPITAL | Age: 80
End: 2025-07-17

## 2025-07-17 ENCOUNTER — PRE-ADMISSION TESTING (OUTPATIENT)
Dept: PREADMISSION TESTING | Facility: HOSPITAL | Age: 80
End: 2025-07-17
Payer: MEDICARE

## 2025-07-17 VITALS
RESPIRATION RATE: 18 BRPM | HEART RATE: 73 BPM | DIASTOLIC BLOOD PRESSURE: 73 MMHG | HEIGHT: 60 IN | BODY MASS INDEX: 44.15 KG/M2 | TEMPERATURE: 98.5 F | SYSTOLIC BLOOD PRESSURE: 146 MMHG | OXYGEN SATURATION: 96 % | WEIGHT: 224.87 LBS

## 2025-07-17 DIAGNOSIS — Z01.818 PREOP TESTING: Primary | ICD-10-CM

## 2025-07-17 PROCEDURE — 87081 CULTURE SCREEN ONLY: CPT | Mod: AHULAB | Performed by: PHYSICIAN ASSISTANT

## 2025-07-17 PROCEDURE — 99204 OFFICE O/P NEW MOD 45 MIN: CPT | Performed by: PHYSICIAN ASSISTANT

## 2025-07-17 RX ORDER — CHLORHEXIDINE GLUCONATE ORAL RINSE 1.2 MG/ML
SOLUTION DENTAL
Qty: 475 ML | Refills: 0 | Status: SHIPPED | OUTPATIENT
Start: 2025-07-17 | End: 2025-07-25 | Stop reason: HOSPADM

## 2025-07-17 ASSESSMENT — ENCOUNTER SYMPTOMS
SKIN CHANGES: 0
NAUSEA: 0
ABDOMINAL PAIN: 0
LIMITED RANGE OF MOTION: 1
EXCESSIVE BLEEDING: 0
NECK STIFFNESS: 0
CONSTIPATION: 0
NUMBNESS: 0
RHINORRHEA: 0
MYALGIAS: 0
COUGH: 0
DYSPNEA AT REST: 0
CONFUSION: 0
PALPITATIONS: 0
EYE DISCHARGE: 0
DYSURIA: 0
CHILLS: 0
DIFFICULTY URINATING: 0
WEAKNESS: 0
WHEEZING: 0
SINUS CONGESTION: 0
VOMITING: 0
SHORTNESS OF BREATH: 0
TROUBLE SWALLOWING: 0
HEMOPTYSIS: 0
ABDOMINAL DISTENTION: 0
EYE PAIN: 0
NECK PAIN: 0
BRUISES/BLEEDS EASILY: 1
BLOOD IN STOOL: 0
DIARRHEA: 0
DYSPNEA WITH EXERTION: 0
DOUBLE VISION: 0
UNEXPECTED WEIGHT CHANGE: 0
LIGHT-HEADEDNESS: 0
VISUAL CHANGE: 0
ARTHRALGIAS: 1
FEVER: 0
WOUND: 0

## 2025-07-17 NOTE — H&P (VIEW-ONLY)
[facility] Evaluation       Name: Genesis Garcia (Genesis Garcia)  /Age: 1945/80 y.o.       Date of Consult: 25    Referring Provider: Dr. Russo    Surgery, Date, and Length: Robot Assisted Ventral Hernia Repair; Mesh Placement , 25, 140MIN    Genesis Garcia is a 80 y.o. year-old female who presents to the [facility] for perioperative risk assessment prior to surgery.    Patient presents with a primary diagnosis of ventral hernia. Pt refers to protrusion for about the past 2 years. She is known to Dr. Russo having taken to surgery for incisional hernia repair with mesh. This procedure was performed on 20 for an indication of incarcerated incisional hernia associated with small bowel obstruction. She denies any f/c/n/v.  She denies any pain in affected area.  Hernia is not reducible.  She denies any changes to bowel habits.  No constipation.  She wishes to proceed with surgery as planned.      This note was created in part upon personal review of patient's medical records.      Patient is scheduled to have Robot Assisted Ventral Hernia Repair; Mesh Placement       Pt denies any past history of anesthetic complications such as PONV, awareness, prolonged sedation, dental damage, aspiration, cardiac arrest, difficult intubation, difficult I.V. access or unexpected hospital admissions.  NO malignant hyperthermia and or pseudocholinesterase deficiency.  +history of blood transfusions -  at time of back surgery    The patient is not a [facility] and will accept blood and blood products if medically indicated.   Type and screen NOT sent.     Past Medical History:   Diagnosis Date    Abnormal echocardiogram     Anemia     Ankle fracture     left ankle fracture    Arthritis     Asthma     Autoimmune disorder (Multi)     Cardiology follow-up encounter     Hernan Fonseca MD  LOV 25    CHF (congestive heart failure) 2025    Chronic kidney disease     Chronic venous  insufficiency     CKD (chronic kidney disease), stage III (Multi)     7/1/25 Cr 1.08 GFR 53    COPD (chronic obstructive pulmonary disease) (Multi)     Dysphagia     Essential (primary) hypertension 01/04/2023    Hypertension    Fall 12/29/2023    H/O deep venous thrombosis     H/O echocardiogram 03/24/2025    CONCLUSIONS:  1. The left ventricular systolic function is normal, with a Arroyo's biplane calculated ejection fraction of 63%.  2. Left ventricular diastolic filling is indeterminate.  3. There is moderately increased septal thickness.  4. There is severely increased eccentric left ventricular hypertrophy.  5. Normal right ventricular global systolic function.  6. Left atrium is mildly dilated.    Heart murmur     Hernia, internal 2022    History of blood transfusion     Hypothyroidism     Idiopathic peripheral neuropathy     s/p Spinal Cord Stimulator Implant    Incisional hernia without obstruction or gangrene     Plan: Robot Assisted Ventral Hernia Repair; Mesh Placement 7/24/25    Lymphedema     Osteoarthritis     Osteoporosis     Peripheral neuropathy     Plantar fasciitis     Rotator cuff syndrome     Venous ulcer of leg (Multi)     Vitamin D deficiency, unspecified 10/20/2016    Vitamin D deficiency       Past Surgical History:   Procedure Laterality Date    BACK SURGERY  06/27/2024    Back Surgery L4-L5 fusion    CARDIAC ELECTROPHYSIOLOGY STUDY AND ABLATION      CATARACT EXTRACTION      CHOLECYSTECTOMY  04/15/2016    Cholecystectomy    EYE SURGERY  2018    HERNIA REPAIR  2020    HIP ARTHROPLASTY      JOINT REPLACEMENT      2007, 2017, 2022    KNEE ARTHROPLASTY      SPINAL CORD STIMULATOR IMPLANT  02/18/2025    SPINE SURGERY  2001    THYROIDECTOMY, PARTIAL      d/t goiter    TOTAL HIP ARTHROPLASTY Right     TOTAL KNEE ARTHROPLASTY Bilateral 12/01/2022    Knee Replacement    VERTEBROPLASTY         Patient  reports that she is not currently sexually active.    Family History   Problem Relation Name Age  of Onset    Diabetes Mother mom         type 2    Heart disease Father dakota     Hypertension Father dakota     Arthritis Sister juan pedley sister     Clotting disorder Sister juan pedley sister     Cancer Sister juan pedley sister     Arthritis Sister laya penn     Cancer Daughter dayne pearson     Multiple sclerosis Daughter dayne pearson     Cancer Other Perla     Arthritis Sister juan pedley     Arthritis Sister laya penn     Clotting disorder Sister juan     Cancer Daughter dayne pearson     Multiple sclerosis Daughter dayne pearson     Arthritis Sister juan pedley     Arthritis Sister laya penn     Cancer Sister juan     Cancer Daughter dayne pearson     Cancer Sister juan     Cancer Sister juan     Arthritis Sister juan pedley     Arthritis Sister laya penn     Clotting disorder Sister juan     Cancer Sister juan     Cancer Daughter dayne pearson     Multiple sclerosis Daughter dayne     Multiple sclerosis Daughter dayne pearson     Multiple sclerosis Daughter dayne pearson        Social History     Socioeconomic History    Marital status:      Spouse name: Not on file    Number of children: Not on file    Years of education: Not on file    Highest education level: Not on file   Occupational History    Not on file   Tobacco Use    Smoking status: Former     Current packs/day: 0.00     Average packs/day: 0.2 packs/day for 49.2 years (9.9 ttl pk-yrs)     Types: Cigarettes     Start date:      Quit date:      Years since quittin.5    Smokeless tobacco: Never   Vaping Use    Vaping status: Never Used   Substance and Sexual Activity    Alcohol use: Not Currently    Drug use: Not Currently    Sexual activity: Not Currently   Other Topics Concern    Not on file   Social History Narrative    Not on file     Social Drivers of Health     Financial Resource Strain: Low Risk  (2023)    Overall Financial Resource Strain (CARDIA)     Difficulty of Paying Living  Expenses: Not hard at all   Food Insecurity: No Food Insecurity (3/18/2021)    Received from [facility]    Hunger Vital Sign     Within the past 12 months, you worried that your food would run out before you got the money to buy more.: Never true     Within the past 12 months, the food you bought just didn't last and you didn't have money to get more.: Never true   Transportation Needs: No Transportation Needs (12/27/2023)    PRAPARE - Transportation     Lack of Transportation (Medical): No     Lack of Transportation (Non-Medical): No   Physical Activity: Insufficiently Active (3/18/2021)    Received from [facility]    Exercise Vital Sign     On average, how many days per week do you engage in moderate to strenuous exercise (like a brisk walk)?: 3 days     On average, how many minutes do you engage in exercise at this level?: 20 min   Stress: No Stress Concern Present (3/18/2021)    Received from [facility]    [employer] - Occupational Stress Questionnaire     Feeling of Stress : Not at all   Social Connections: Moderately Isolated (3/18/2021)    Received from [facility]    Social Connection and Isolation Panel     In a typical week, how many times do you talk on the phone with family, friends, or neighbors?: More than three times a week     How often do you get together with friends or relatives?: Never     How often do you attend Yazdanism or Scientology services?: 1 to 4 times per year     Do you belong to any clubs or organizations such as Yazdanism groups, unions, fraternal or athletic groups, or school groups?: No     Attends Club or Organization Meetings: Not on file     Are you , , , , never , or living with a partner?:    Intimate Partner Violence: Not on file   Housing Stability: Low Risk  (12/27/2023)    Housing Stability Vital Sign     Unable to Pay for Housing in the Last Year: No     Number of Places Lived in  the Last Year: 1     Unstable Housing in the Last Year: No        Allergies   Allergen Reactions    Cefprozil Hives    Sulfa (Sulfonamide Antibiotics) Hives    Sulfur Hives       Current Outpatient Medications   Medication Instructions    acetaminophen (TYLENOL) 650 mg, oral, Every 4 hours PRN    albuterol (ProAir HFA) 90 mcg/actuation inhaler 2 puffs, inhalation, Every 6 hours PRN    albuterol 2.5 mg, nebulization, Every 2 hour PRN    apixaban (ELIQUIS) 2.5 mg, oral, 2 times daily    Bacillus coagulans (PROBIOTIC, B. COAGULANS, ORAL) 1 tablet, Daily    calcium carbonate 600 mg calcium (1,500 mg) tablet 1 tablet, Daily    chlorhexidine (Peridex) 0.12 % solution Swish for 30 seconds and spit 15mL of solution the night before and morning of surgery    cholecalciferol (Vitamin D-3) 50 MCG (2000 UT) tablet Take by mouth.    denosumab (Prolia) 60 mg/mL syringe Provider to inject 1 mL (60 mg total) under the skin every 6 months.  For  ONLY.    fluticasone propion-salmeteroL (Advair Diskus) 250-50 mcg/dose diskus inhaler 1 puff, 2 times daily RT    folic acid (FOLVITE) 1 mg, oral, Daily    gabapentin (NEURONTIN) 400 mg, oral, 3 times daily    hydrocortisone 2.5 % cream Topical, As needed, Use maximum 2-3 times a week.    hydroxychloroquine (Plaquenil) 200 mg tablet 1 tablet, Every 12 hours scheduled (0630,1830)    levothyroxine (SYNTHROID) 25 mcg, oral, Daily before breakfast    losartan (Cozaar) 100 mg tablet 1 tablet, Daily    methotrexate (Trexall) 2.5 mg tablet 4 tablets, Weekly    multivitamin capsule Take by mouth.    Slow Fe 137 mg, oral, Daily    spironolactone (Aldactone) 25 mg tablet 1 tablet, Daily    torsemide (Demadex) 20 mg tablet 1 tablet, Daily (0630)           PAT ROS:   Constitutional:    no fever   no chills   no unexpected weight change  Neuro/Psych:    no numbness   no weakness   no light-headedness   no confusion  Eyes:    no discharge   no pain   no vision loss   no diplopia   no  visual disturbance  Ears:    no ear pain   no hearing loss   no tinnitus  Nose:    no nasal discharge   no sinus congestion   no epistaxis  Mouth:    no dental issues   no mouth pain   no oral bleeding   no mouth lesions  Throat:    no throat pain   no dysphagia  Neck:    no neck pain   no neck stiffness  Cardio:    Functional 4 Mets. Patient denies SOB walking in from parking lot today (no steps in home)   Walking ; weights ; shopping/ cooking / cleaning   no chest pain   no palpitations   no peripheral edema   no dyspnea   no GEIGER  Respiratory:    no cough   no wheezing   no hemoptysis   no shortness of breath  Endocrine:    no cold intolerance   no heat intolerance  GI:    no abdominal distention   no abdominal pain   no constipation   no diarrhea   no nausea   no vomiting   no blood in stool  :    no difficulty urinating   no dysuria   no oliguria  Musculoskeletal:    arthralgias (right foot; in surgical shoe)   no myalgias   decreased ROM (right foot)  Hematologic:    bruises/bleeds easily (eliquis)   no excessive bleeding   history of blood transfusion (2000)   blood clots (1/2023)  Skin:   no skin changes   no sores/wound   no rash      Physical Exam  Constitutional:       General: She is not in acute distress.     Appearance: Normal appearance. She is not ill-appearing, toxic-appearing or diaphoretic.   HENT:      Head: Normocephalic and atraumatic.      Nose: Nose normal. No congestion or rhinorrhea.      Mouth/Throat:      Mouth: Mucous membranes are moist.      Pharynx: No posterior oropharyngeal erythema.     Eyes:      Extraocular Movements: Extraocular movements intact.      Conjunctiva/sclera: Conjunctivae normal.       Cardiovascular:      Rate and Rhythm: Normal rate and regular rhythm.      Pulses: Normal pulses.      Heart sounds: Murmur (2/6 systolic murmur heard in the right sternal border) heard.      No friction rub. No gallop.   Pulmonary:      Effort: Pulmonary effort is normal. No  respiratory distress.      Breath sounds: Normal breath sounds. No stridor. No wheezing, rhonchi or rales.   Abdominal:      General: Bowel sounds are normal. There is no distension.      Palpations: Abdomen is soft. There is no mass.      Tenderness: There is no abdominal tenderness. There is no guarding or rebound.      Hernia: A hernia (ventral; moderate sized) is present.     Musculoskeletal:         General: Swelling (BLE; compression / wraps in place) present. No tenderness, deformity or signs of injury.      Cervical back: Normal range of motion and neck supple. No rigidity or tenderness.      Comments: Right foot in surgical shoe (fracture sustained 6/4/25; three toes fractured)     Skin:     General: Skin is warm and dry.      Coloration: Skin is not jaundiced or pale.      Findings: No bruising, erythema or rash.     Neurological:      General: No focal deficit present.      Mental Status: She is alert and oriented to person, place, and time.      Cranial Nerves: No cranial nerve deficit.      Sensory: No sensory deficit.      Motor: No weakness.      Coordination: Coordination normal.     Psychiatric:         Mood and Affect: Mood normal.         Behavior: Behavior normal.          PAT AIRWAY:   Airway:     Mallampati::  III    Neck ROM::  Full   Full upper and lower dentures          Visit Vitals  /73   Pulse 73   Temp 36.9 °C (98.5 °F) (Temporal)   Resp 18   Ht (!) 1.524 m (5')   Wt 102 kg (224 lb 13.9 oz)   SpO2 96%   BMI 43.92 kg/m²   OB Status Postmenopausal   Smoking Status Former   BSA 2.08 m²     Labs:  Lab Results   Component Value Date    WBC 5.2 07/01/2025    HGB 11.6 (L) 07/01/2025    HCT 37.8 07/01/2025    MCV 97 07/01/2025     07/01/2025     Lab Results   Component Value Date    GLUCOSE 87 07/01/2025    CALCIUM 8.8 07/01/2025     07/01/2025    K 4.4 07/01/2025    CO2 29 07/01/2025     07/01/2025    BUN 23 07/01/2025    CREATININE 1.08 (H) 07/01/2025      Lab Results    Component Value Date    ALT 13 07/01/2025    AST 20 07/01/2025    ALKPHOS 79 07/01/2025    BILITOT 0.5 07/01/2025      Lab Results   Component Value Date    HGBA1C 5.4 03/11/2025      EKG 4/16/25  Sinus rhythm with 1st degree AV block  Low voltage QRS  Borderline ECG      ECHO 3/24/25  CONCLUSIONS:   1. The left ventricular systolic function is normal, with a Arroyo's biplane calculated ejection fraction of 63%.   2. Left ventricular diastolic filling is indeterminate.   3. There is moderately increased septal thickness.   4. There is severely increased eccentric left ventricular hypertrophy.   5. There is normal right ventricular global systolic function.   6. The left atrium is mildly dilated.    Duplex US 8/4/23  CONCLUSIONS:  Right Lower Venous: The remainder of the right leg is negative for deep vein thrombosis. Additional Findings; Lymph nodes measuring approxiamtely 0.6 cm x 1.5 cm.  Left Lower Venous: There is age indeterminate deep vein thrombosis visualized in the proximal femoral, mid femoral, distal femoral and common femoral veins. The remainder of the left leg is negative for deep vein thrombosis.    Duplex US 1/18/23    **CRITICAL RESULT**  Critical Result: There is acute occlusive deep vein thrombosis visualized in the right mid femoral, distal femoral, popliteal and posterior tibial veins.  There is acute occlusive deep vein thrombosis visualized in the left distal external iliac, common femoral, proximal femoral, mid femoral, distal femoral and popliteal veins.  Notification called to Diogo Thompson M.D. with results. on 1/18/2023 at 10:31:13 AM.     CONCLUSIONS:  Right Lower Venous: There is acute occlusive deep vein thrombosis visualized in the mid femoral, distal femoral, popliteal and posterior tibial veins. Right calf veins visaulized in segments due to severe swelling/edema.  Left Lower Venous: There is acute occlusive deep vein thrombosis visualized in the distal external iliac,  common femoral, proximal femoral, mid femoral, distal femoral and popliteal veins. Cannot rule out thrombus in non-visualized peroneal and posterior tibial veins due to dressings and wounds. Technically difficult study due to severe swelling/edema and limited positioning.    Assessment and Plan:     80 y.o.  female  scheduled for Robot Assisted Ventral Hernia Repair; Mesh Placement  on 7/24/25 with Dr. Russo for  ventral hernia.   Presents to [facility] today for perioperative risk stratification and optimization      Cardiovascular:  Patient has no active cardiac symptoms.   Patient denies any chest pain, tightness, heaviness, pressure, radiating pain, palpitations, irregular heartbeats, lightheadedness, cough, congestion, shortness of breath, GEIGER, PND, near syncope, weight loss or gain.    METS: 4.4  RCRI: 0 points; 0.5% risk for postoperative MACE     A fib - [id number] 6 - hold eliquis 3 days prior to surgery ; s/p ablation in 1980's     Murmur - 2/6 systolic heard at RSB    CHF - 63% per ECHO 3/2025; cont torsemide and spironolactone on dos     HTN - losartan HOLD evening prior to surgery and morning of surgery     Encouraged lifestyle modifications, low-sodium diet, and increase activity as tolerated.  Monitor BP and follow up with managing physician for readings sustaining >140/90.     Pulmonary:  No pulmonary diagnosis, however patient is at increased risk of perioperative complications secondary to  age > 60, COPD, obesity, duration of surgery > 2 hours  Stop Bang score is 3 placing patient at intermediate risk for SHAYY  ARISCAT: 26-44 points, 13.3% risk of in-hospital postoperative pulmonary complication  PRODIGY: High risk for opioid induced respiratory depression    **Pt provided with deep breathing exercises, incentive spirometer and instructions for use during PAT visit today**    COPD - cont inhalers as prescribed    GI:  H/o C diff - last bout was 12/2023 - caution with antibiotics    Renal:  CKD3A  7/1/25  "crt 1.08; GFR 53    Recommendations to avoid nephrotoxic drugs and carefully monitor fluid status to maintain euvolemia. Use dose adjusted medications as needed for the underlying level of renal function.    Rheum:  RA / connective tissue d/o - cont methotrexate as prescribed and plaquenil as prescribed     Spine:  Chronic pain - SCS in place; pt aware to bring remote on dos    Vascular:  DVT - 1/18/23 - per duplex US    --pt follows with Dr. Atkinson with recs which include:  \"Reports having hernia surgery July 24th.  Remains on eiquis 2.5 mg q 12 hours for extended phase DVT management. Last available Cr 1.2 - discussed holding the eliquis  x 3 days pre-op and resuming when OK with gen surgery. If admitted rec SCDs and continue either eliquis or lovenox 40 mg daily during the admission.     \"    Hematology:  Patient instructed to ambulate as soon as possible postoperatively to decrease thromboembolic risk.   Initiate mechanical DVT prophylaxis as soon as possible and initiate chemical prophylaxis when deemed safe from a bleeding standpoint post surgery.     LABS: CBC and CMP reviewed from 7/1/25; no need to repeat.  MRSA ordered    Followup: MRSA pending    Caprini: 9    Risk assessment complete.  Patient is scheduled for a intermediate surgical risk procedure.        Preoperative medication instructions were provided and reviewed with the patient.  Any additional testing or evaluation was explained to the patient.  Nothing by mouth instructions were discussed and patient's questions were answered prior to conclusion to this encounter.  Patient verbalized understanding of preoperative instructions given in preadmission testing; discharge instructions available in EMR.    This note was dictated by a speech recognition.  Minor errors may have been detected in a speech recognition.         "

## 2025-07-17 NOTE — CPM/PAT NURSE NOTE
CPM/PAT Nurse Note      Name: Genesis Garcia (Genesis Garcia)  /Age: 1945/80 y.o.       Medical History[1]    Surgical History[2]    Patient  reports that she is not currently sexually active.    Family History[3]    Allergies[4]    Prior to Admission medications   Medication Sig Start Date End Date Taking? Authorizing Provider   acetaminophen (Tylenol) 325 mg tablet Take 2 tablets (650 mg) by mouth every 4 hours if needed for mild pain (1 - 3) or moderate pain (4 - 6). 23   SREEDHAR Jung   albuterol (ProAir HFA) 90 mcg/actuation inhaler Inhale 2 puffs every 6 hours if needed for wheezing. 25  Precious Rodriguez MD   albuterol 2.5 mg /3 mL (0.083 %) nebulizer solution Take 3 mL (2.5 mg) by nebulization every 2 hours if needed for wheezing. 23   Chon Lind MD   apixaban (Eliquis) 2.5 mg tablet Take 1 tablet (2.5 mg) by mouth 2 times a day. 25  Rosy Atkinson MD   Bacillus coagulans (PROBIOTIC, B. COAGULANS, ORAL) Take 1 tablet by mouth once daily.    Historical Provider, MD   calcium carbonate 600 mg calcium (1,500 mg) tablet Take 1 tablet (1,500 mg) by mouth once daily.    Historical Provider, MD   cholecalciferol (Vitamin D-3) 50 MCG (2000 UT) tablet Take by mouth.    Historical Provider, MD   denosumab (Prolia) 60 mg/mL syringe Provider to inject 1 mL (60 mg total) under the skin every 6 months.  For  ONLY. 24  Brent Sheridan MD   ferrous sulfate (Slow Fe) 137 mg (45 mg iron) tablet extended release Take 1 tablet (137 mg) by mouth once daily. 25   Rosanne M Casal, APRN-CNP, DNP   fluticasone propion-salmeteroL (Advair Diskus) 250-50 mcg/dose diskus inhaler Inhale 1 puff 2 times a day. Rinse mouth with water after use to reduce aftertaste and incidence of candidiasis. Do not swallow.    Historical Provider, MD   folic acid (Folvite) 1 mg tablet Take 1 tablet (1 mg) by mouth once daily. 25  9/11/25  Marietta Graff,    gabapentin (Neurontin) 400 mg capsule Take 1 capsule (400 mg) by mouth 3 times a day. 2/20/24   Shine Wakefield MD   hydrocortisone 2.5 % cream Apply topically if needed for irritation or rash. Use maximum 2-3 times a week.  Patient not taking: Reported on 7/17/2025 10/7/24 10/7/25  Rosy Atkinson MD   hydroxychloroquine (Plaquenil) 200 mg tablet Take 1 tablet (200 mg) by mouth every 12 hours. 5/15/25   Historical Provider, MD   levothyroxine (Synthroid) 25 mcg tablet Take 1 tablet (25 mcg) by mouth once daily in the morning. Take before meals. 3/31/25 3/26/26  Pedro Dawkins MD   losartan (Cozaar) 100 mg tablet Take 1 tablet (100 mg) by mouth once daily.    Historical Provider, MD   methotrexate (Trexall) 2.5 mg tablet Take 4 tablets (10 mg total) by mouth 1 (one) time per week.  Follow directions carefully, and ask to explain any part you do not understand. Take exactly as directed. On Thursday    Historical Provider, MD   multivitamin capsule Take by mouth.    Historical Provider, MD   spironolactone (Aldactone) 25 mg tablet Take 1 tablet (25 mg) by mouth once daily. 2/24/23   Historical Provider, MD   torsemide (Demadex) 20 mg tablet Take 1 tablet (20 mg) by mouth early in the morning.. 6/27/24   Historical Provider, MD KASANDRA BENITO     DASI Risk Score      Flowsheet Row Questionnaire Series Submission from 3/26/2025 in Virtual Care with Generic Provider Mycbarbt Questionnaire Series Submission from 2/4/2025 in [facility] Care with Generic Provider Mychart   Can you take care of yourself (eat, dress, bathe, or use toilet)?  2.75  filed at 03/26/2025 1831 2.75  filed at 02/04/2025 0956   Can you walk indoors, such as around your house? 1.75  filed at 03/26/2025 1831 1.75  filed at 02/04/2025 0956   Can you walk a block or two on level ground?  2.75  filed at 03/26/2025 1831 2.75  filed at 02/04/2025 0956   Can you climb a flight of stairs or walk up a hill? 0  filed at 03/26/2025  1831 0  filed at 02/04/2025 0956   Can you run a short distance? 0  filed at 03/26/2025 1831 0  filed at 02/04/2025 0956   Can you do light work around the house like dusting or washing dishes? 2.7  filed at 03/26/2025 1831 2.7  filed at 02/04/2025 0956   Can you do moderate work around the house like vacuuming, sweeping floors or carrying groceries? 3.5  filed at 03/26/2025 1831 3.5  filed at 02/04/2025 0956   Can you do heavy work around the house like scrubbing floors or lifting and moving heavy furniture?  0  filed at 03/26/2025 1831 0  filed at 02/04/2025 0956   Can you do yard work like raking leaves, weeding or pushing a mower? 0  filed at 03/26/2025 1831 4.5  filed at 02/04/2025 0956   Can you have sexual relations? 0  filed at 03/26/2025 1831 0  filed at 02/04/2025 0956   Can you participate in moderate recreational activities like golf, bowling, dancing, doubles tennis or throwing a baseball or football? 0  filed at 03/26/2025 1831 0  filed at 02/04/2025 0956   Can you participate in strenous sports like swimming, singles tennis, football, basketball, or skiing? 0  filed at 03/26/2025 1831 0  filed at 02/04/2025 0956   DASI SCORE 13.45  filed at 03/26/2025 1831 17.95  filed at 02/04/2025 0956   METS Score (Will be calculated only when all the questions are answered) 4.4  filed at 03/26/2025 1831 4.9  filed at 02/04/2025 0956     Caprini DVT Assessment      Flowsheet Row Pre-Admission Testing from 2/10/2025 in [facility] ED to Hosp-Admission (Discharged) from 12/27/2023 in [facility] Bldg A 7 with Chon Lind MD and Ale Batista MD   DVT Score (IF A SCORE IS NOT CALCULATING, MUST SELECT A BMI TO COMPLETE) 15 filed at 02/10/2025 1458 5 filed at 12/27/2023 1909   Medical Factors Family history of DVT/PE, History of DVT/PE, COPD, Swollen legs filed at 02/10/2025 1458 --   Surgical Factors Minor surgery planned filed at 02/10/2025 1458 --   BMI (BMI MUST BE  CHOSEN) 41-50 (Morbid obesity) filed at 02/10/2025 1458 --   RETIRED: Current Status -- Medical patient at bedrest filed at 12/27/2023 1909   RETIRED: History -- Pneumonia filed at 12/27/2023 1909   RETIRED: Age -- Over 75 years filed at 12/27/2023 1909     Modified Frailty Index      Flowsheet Row Pre-Admission Testing from 2/10/2025 in [facility]   Non-independent functional status (problems with dressing, bathing, personal grooming, or cooking) 0 filed at 02/10/2025 1459   History of diabetes mellitus  0 filed at 02/10/2025 1459   History of COPD 0.0909 filed at 02/10/2025 1459   History of CHF No filed at 02/10/2025 1459   History of MI 0 filed at 02/10/2025 1459   History of Percutaneous Coronary Intervention, Cardiac Surgery, or Angina No filed at 02/10/2025 1459   Hypertension requiring the use of medication  0.0909 filed at 02/10/2025 1459   Peripheral vascular disease 0.0909 filed at 02/10/2025 1459   Impaired sensorium (cognitive impairement or loss, clouding, or delirium) 0 filed at 02/10/2025 1459   TIA or CVA withouy residual deficit 0 filed at 02/10/2025 1459   Cerebrovascular accident with deficit 0 filed at 02/10/2025 1459   Modified Frailty Index Calculator .2727 filed at 02/10/2025 1459     [id number] Stroke Risk Points  Current as of just now        6 0 to 9 Points:      Last Change:           The [id number] risk score (Lip ALISIA, et al. 2009. © 2010 [employer]) quantifies the risk of stroke for a patient with atrial fibrillation. For patients without atrial fibrillation or under the age of 18 this score appears as N/A. Higher score values generally indicate higher risk of stroke.          Points Metrics   1 Has Congestive Heart Failure:  Yes     Patients with congestive heart failure get 1 point.    Current as of just now   1 Has Hypertension:  Yes     Patients with hypertension get 1 point.    Current as of just now   2 Age:  80     Patients 65 to 74  years old get 1 point, or patients 75 years and older get 2 points.    Current as of just now   0 Has Diabetes:  No     Patients with diabetes get 1 point.    Current as of just now   0 Had Stroke:  No  Had TIA:  No  Had Thromboembolism:  No     Patients who have had a stroke, TIA, or thromboembolism get 2 points.    Current as of just now   1 Has Vascular Disease:  Yes     Patients with vascular disease get 1 point.    Current as of just now   1 Clinically Relevant Sex:  Female     Patients with a clinically relevant sex of Female get 1 point.    Current as of just now             Revised Cardiac Risk Index      Flowsheet Row Pre-Admission Testing from 2/10/2025 in [facility] Pre-Admission Testing from 6/20/2024 in [facility]   High-Risk Surgery (Intraperitoneal, Intrathoracic,Suprainguinal vascular) 0 filed at 02/10/2025 1459 0 filed at 06/20/2024 1640   History of ischemic heart disease (History of MI, History of positive exercuse test, Current chest paint considered due to myocardial ischemia, Use of nitrate therapy, ECG with pathological Q Waves) 0 filed at 02/10/2025 1459 0 filed at 06/20/2024 1640   History of congestive heart failure (pulmonary edemia, bilateral rales or S3 gallop, Paroxysmal nocturnal dyspnea, CXR showing pulmonary vascular redistribution) 0 filed at 02/10/2025 1459 0 filed at 06/20/2024 1640   History of cerebrovascular disease (Prior TIA or stroke) 0 filed at 02/10/2025 1459 0 filed at 06/20/2024 1640   Pre-operative insulin treatment 0 filed at 02/10/2025 1459 0 filed at 06/20/2024 1640   Pre-operative creatinine>2 mg/dl 0 filed at 02/10/2025 1459 0 filed at 06/20/2024 1640   Revised Cardiac Risk Calculator 0 filed at 02/10/2025 1459 0 filed at 06/20/2024 1640     Apfel Simplified Score      Flowsheet Row Pre-Admission Testing from 2/10/2025 in [facility]   Smoking status 1 filed at 02/10/2025 1459   History of motion sickness or PONV  0  filed at 02/10/2025 1459   Use of postoperative opioids 0 filed at 02/10/2025 1459   Gender - Female 1=Yes filed at 02/10/2025 1459   Apfel Simplified Score Calculator 2 filed at 02/10/2025 1459     Risk Analysis Index Results This Encounter    No data found in the last 10 encounters.       Stop Bang Score      Flowsheet Row Questionnaire Series Submission from 3/26/2025 in [facility] with Generic Provider Myah Admission (Discharged) from 2/18/2025 in [facility] OR with Faith Reese MD   Do you snore loudly? 0  filed at 03/26/2025 1831 0 filed at 02/18/2025 1104   Do you often feel tired or fatigued after your sleep? 0  filed at 03/26/2025 1831 0 filed at 02/18/2025 1104   Has anyone ever observed you stop breathing in your sleep? 0  filed at 03/26/2025 1831 0 filed at 02/18/2025 1104   Do you have or are you being treated for high blood pressure? 1  filed at 03/26/2025 1831 1 filed at 02/18/2025 1104   Recent BMI (Calculated) 41.1  filed at 03/26/2025 1831 41.1 filed at 02/18/2025 1104   Is BMI greater than 35 kg/m2? 1=Yes  filed at 03/26/2025 1831 1=Yes filed at 02/18/2025 1104   Age older than 50 years old? 1=Yes  filed at 03/26/2025 1831 1=Yes filed at 02/18/2025 1104   Is your neck circumference greater than 17 inches (Male) or 16 inches (Female)? -- 0 filed at 02/18/2025 1104   Gender - Male 0=No  filed at 03/26/2025 1831 0=No filed at 02/18/2025 1104   STOP-BANG Total Score -- 3 filed at 02/18/2025 1104     Prodigy: High Risk  Total Score: 19              Prodigy Age Score      Prodigy CHF score          ARISCAT Score for Postoperative Pulmonary Complications      Flowsheet Row Pre-Admission Testing from 2/10/2025 in [facility]   Age Calculated Score 3 filed at 02/10/2025 1459   Preoperative SpO2 8 filed at 02/10/2025 1459   Respiratory infection in the last month Either upper or lower (i.e., URI, bronchitis, pneumonia), with fever and antibiotic treatment 0 filed at  02/10/2025 1459   Preoperative anemia (Hgb less than 10 g/dl) 0 filed at 02/10/2025 1459   Surgical incision  0 filed at 02/10/2025 1459   Duration of surgery  0 filed at 02/10/2025 1459   Emergency Procedure  0 filed at 02/10/2025 1459   ARISCAT Total Score  11 filed at 02/10/2025 1459     Jesse Perioperative Risk for Myocardial Infarction or Cardiac Arrest (XOCHITL)      Flowsheet Row Pre-Admission Testing from 2/10/2025 in [facility]   Calculated Age Score 1.54 filed at 02/10/2025 1500   Functional Status  0 filed at 02/10/2025 1500   ASA Class  -3.29 filed at 02/10/2025 1500   Creatinine 0 filed at 02/10/2025 1500   Type of Procedure  0.21 filed at 02/10/2025 1500   XOCHITL Total Score  -6.79 filed at 02/10/2025 1500   XOCHITL % 0.11 filed at 02/10/2025 1500       Nurse Plan of Action: After Visit Summary (AVS) reviewed and patient verbalized good understanding of medications and NPO instructions.  Pre-op infection prevention measures:  CHG showers and mouthwash reviewed, understanding voiced.  CHG soap given and patient verbalized need to pick CHG mouthwash at their preferred local pharmacy.   After Visit Summary (AVS) reviewed and patient verbalized good understanding of medications and NPO instructions.                   [1]   Past Medical History:  Diagnosis Date    Abnormal echocardiogram     Anemia     Ankle fracture     left ankle fracture    Arthritis     Asthma     Autoimmune disorder (Multi)     Cardiology follow-up encounter     Hernan Fonseca MD  LOV 4/16/25    CHF (congestive heart failure) 02/18/2025    Chronic kidney disease     Chronic venous insufficiency     CKD (chronic kidney disease), stage III (Multi)     7/1/25 Cr 1.08 GFR 53    COPD (chronic obstructive pulmonary disease) (Multi)     Dysphagia     Essential (primary) hypertension 01/04/2023    Hypertension    Fall 12/29/2023    H/O deep venous thrombosis     H/O echocardiogram 03/24/2025    CONCLUSIONS:  1. The left  ventricular systolic function is normal, with a Arroyo's biplane calculated ejection fraction of 63%.  2. Left ventricular diastolic filling is indeterminate.  3. There is moderately increased septal thickness.  4. There is severely increased eccentric left ventricular hypertrophy.  5. Normal right ventricular global systolic function.  6. Left atrium is mildly dilated.    Heart murmur     Hernia, internal 2022    History of blood transfusion     Hypothyroidism     Idiopathic peripheral neuropathy     s/p Spinal Cord Stimulator Implant    Incisional hernia without obstruction or gangrene     Plan: Robot Assisted Ventral Hernia Repair; Mesh Placement 7/24/25    Lymphedema     Osteoarthritis     Osteoporosis     Peripheral neuropathy     Plantar fasciitis     Rotator cuff syndrome     Venous ulcer of leg (Multi)     Vitamin D deficiency, unspecified 10/20/2016    Vitamin D deficiency   [2]   Past Surgical History:  Procedure Laterality Date    BACK SURGERY  06/27/2024    Back Surgery L4-L5 fusion    CARDIAC ELECTROPHYSIOLOGY STUDY AND ABLATION      CATARACT EXTRACTION      CHOLECYSTECTOMY  04/15/2016    Cholecystectomy    EYE SURGERY  2018    HERNIA REPAIR  2020    HIP ARTHROPLASTY      JOINT REPLACEMENT      2007, 2017, 2022    KNEE ARTHROPLASTY      SPINAL CORD STIMULATOR IMPLANT  02/18/2025    SPINE SURGERY  2001    THYROIDECTOMY, PARTIAL      d/t goiter    TOTAL HIP ARTHROPLASTY Right     TOTAL KNEE ARTHROPLASTY Bilateral 12/01/2022    Knee Replacement    VERTEBROPLASTY     [3]   Family History  Problem Relation Name Age of Onset    Diabetes Mother mom         type 2    Heart disease Father dakota     Hypertension Father dakota     Arthritis Sister juan mullenley sister     Clotting disorder Sister juan hector sister     Cancer Sister juan hector sister     Arthritis Sister laya penn     Cancer Daughter dayne michel     Multiple sclerosis Daughter dayne michel     Cancer Other Perla     Arthritis Sister juan  pedley     Arthritis Sister laya penn     Clotting disorder Sister juan     Cancer Daughter dayne pearson     Multiple sclerosis Daughter dayne pearson     Arthritis Sister juan hector     Arthritis Sister laya penn     Cancer Sister juan     Cancer Daughter dayne pearson     Cancer Sister juan     Cancer Sister juan     Arthritis Sister juan pedalisha     Arthritis Sister laya penn     Clotting disorder Sister juan     Cancer Sister juan     Cancer Daughter dayne pearson     Multiple sclerosis Daughter dayne     Multiple sclerosis Daughter dayne fierrorebaandrés     Multiple sclerosis Daughter dayne fierrorebaandrés    [4]   Allergies  Allergen Reactions    Cefprozil Hives    Sulfa (Sulfonamide Antibiotics) Hives    Sulfur Hives

## 2025-07-17 NOTE — PREPROCEDURE INSTRUCTIONS
Medication List            Accurate as of July 17, 2025  2:15 PM. Always use your most recent med list.                acetaminophen 325 mg tablet  Commonly known as: Tylenol  Take 2 tablets (650 mg) by mouth every 4 hours if needed for mild pain (1 - 3) or moderate pain (4 - 6).  Medication Adjustments for Surgery: Take/Use as prescribed     * albuterol 2.5 mg /3 mL (0.083 %) nebulizer solution  Take 3 mL (2.5 mg) by nebulization every 2 hours if needed for wheezing.  Medication Adjustments for Surgery: Take/Use as prescribed     * albuterol 90 mcg/actuation inhaler  Commonly known as: ProAir HFA  Inhale 2 puffs every 6 hours if needed for wheezing.  Medication Adjustments for Surgery: Take/Use as prescribed     apixaban 2.5 mg tablet  Commonly known as: Eliquis  Take 1 tablet (2.5 mg) by mouth 2 times a day.  Medication Adjustments for Surgery: Take last dose 3 days before surgery  Notes to patient: Last dose 7/20/25     calcium carbonate 600 mg calcium (1,500 mg) tablet  Medication Adjustments for Surgery: Do Not take on the morning of surgery     chlorhexidine 0.12 % solution  Commonly known as: Peridex  Swish for 30 seconds and spit 15mL of solution the night before and morning of surgery  Medication Adjustments for Surgery: Take/Use as prescribed     cholecalciferol 50 mcg (2,000 units) tablet  Commonly known as: Vitamin D-3  Medication Adjustments for Surgery: Do Not take on the morning of surgery     fluticasone propion-salmeteroL 250-50 mcg/dose diskus inhaler  Commonly known as: Advair Diskus  Medication Adjustments for Surgery: Take/Use as prescribed     folic acid 1 mg tablet  Commonly known as: Folvite  Take 1 tablet (1 mg) by mouth once daily.  Medication Adjustments for Surgery: Do Not take on the morning of surgery     gabapentin 400 mg capsule  Commonly known as: Neurontin  Take 1 capsule (400 mg) by mouth 3 times a day.  Medication Adjustments for Surgery: Take/Use as prescribed      hydrocortisone 2.5 % cream  Apply topically if needed for irritation or rash. Use maximum 2-3 times a week.  Medication Adjustments for Surgery: Do Not take on the morning of surgery     hydroxychloroquine 200 mg tablet  Commonly known as: Plaquenil  Medication Adjustments for Surgery: Take/Use as prescribed     levothyroxine 25 mcg tablet  Commonly known as: Synthroid  Take 1 tablet (25 mcg) by mouth once daily in the morning. Take before meals.  Medication Adjustments for Surgery: Take on the morning of surgery     losartan 100 mg tablet  Commonly known as: Cozaar  Medication Adjustments for Surgery: Take last dose 1 day (24 hours) before surgery  Notes to patient: HOLD evening prior to surgery and morning of surgery        methotrexate 2.5 mg tablet  Commonly known as: Trexall  Medication Adjustments for Surgery: Take/Use as prescribed     multivitamin capsule  Additional Medication Adjustments for Surgery: Take last dose 7 days before surgery  Notes to patient: Stop now     PROBIOTIC (B. COAGULANS) ORAL     Prolia 60 mg/mL syringe  Generic drug: denosumab  Provider to inject 1 mL (60 mg total) under the skin every 6 months.  For  ONLY.  Medication Adjustments for Surgery: Do Not take on the morning of surgery     Slow Fe 137 mg (45 mg iron) tablet extended release  Generic drug: ferrous sulfate  Take 1 tablet (137 mg) by mouth once daily.  Medication Adjustments for Surgery: Do Not take on the morning of surgery     spironolactone 25 mg tablet  Commonly known as: Aldactone  Medication Adjustments for Surgery: Take on the morning of surgery     torsemide 20 mg tablet  Commonly known as: Demadex  Medication Adjustments for Surgery: Take on the morning of surgery           * This list has 2 medication(s) that are the same as other medications prescribed for you. Read the directions carefully, and ask your doctor or other care provider to review them with you.                                 **Concerning above medication instructions, if medication is normally taken at night, continue normal schedule.**  **DO NOT TAKE NIGHT PRIOR AND MORNING OF SURGERY**    CONTACT SURGEON'S OFFICE IF YOU DEVELOP:  * Fever = 100.4 F   * New respiratory symptoms (e.g. cough, shortness of breath, respiratory distress, sore throat)  * Recent loss of taste or smell  *Flu like symptoms such as headache, fatigue or gastrointestinal symptoms  * You develop any open sores, shingles, burning or painful urination   AND/OR:  * You no longer wish to have the surgery.  * Any other personal circumstances change that may lead to the need to cancel or defer this surgery.  *You were admitted to any hospital within one week of your planned procedure.    SMOKING:  *Quitting smoking can make a huge difference to your health and recovery from surgery.    *If you need help with quitting, call 2-732-QUIT-NOW.    THE DAY OF SURGERY:  *Do not eat any food after midnight the night before surgery.   *You must drink 13.5 ounces of clear liquids (i.e. water, black coffee (no milk or cream), tea, apple juice or electrolyte drinks (gatorade)) 2 hours before your arrival time.  *You may chew gum until 2 hours before your surgery    SURGICAL TIME  *You will be contacted between 2 p.m. and 6 p.m. the business day before your surgery with your arrival time.  *If you haven't received a call by 6pm, call [phone].  *Scheduled surgery times may change and you will be notified if this occurs-check your personal voicemail for any updates.    ON THE MORNING OF SURGERY:  *Wear comfortable, loose fitting clothing.   *Do not use moisturizers, creams, lotions or perfume.  *All jewelry and valuables should be left at home.  *Prosthetic devices such as contact lenses, hearing aids, dentures, eyelash extensions, hairpins and body piercing must be removed before surgery.    BRING WITH YOU:  *Photo ID and insurance card  *Current list of medicines and  allergies  *Pacemaker/Defibrillator/Heart stent cards  *CPAP machine and mask  *Slings/splints/crutches  *Copy of your complete Advanced Directive/DHPOA-if applicable  *Neurostimulator implant remote    PARKING AND ARRIVAL:  *Check in at the Main Entrance desk and let them know you are here for surgery.  *You will be directed to the 2nd floor surgical waiting area.    AFTER OUTPATIENT SURGERY:  *A responsible adult MUST accompany you at the time of discharge and stay with you for 24 hours after your surgery.  *You may NOT drive yourself home after surgery.  *You may use a taxi or ride sharing service ([employer], [employer]) to return home ONLY if you are accompanied by a friend or family member.  *Instructions for resuming your medications will be provided by your surgeon.       Home Preoperative Antibacterial Shower     What is a home preoperative antibacterial shower?  This shower is a way of cleaning the skin with a germ killing soap before surgery.  The soap contains chlorhexidine, commonly known as CHG.  CHG is a soap for your skin with germ killing ability.  Let your doctor know if you are allergic to chlorhexidine.    Why do I need to take a preoperative antibacterial shower?  Skin is not sterile.  It is best to try to make your skin as free of germs as possible before surgery.  Proper cleansing with a germ killing soap before surgery can lower the number of germs on your skin.  This helps to reduce the risk of infection at the surgical site.  Following the instructions listed below will help you prepare your skin for surgery.      How do I use the CHG skin cleanser?  Steps:  Begin using your CHG soap five days before your scheduled surgery on ________________________.    Days 1-4 Shower before bed:  Wash your face and genitals with your normal soap and rinse.           2.    Apply the CHG soap to a clean wet washcloth.  Turn the water off or move away                From the water spray to avoid premature rinsing of  the CHG soap as you are applying.     3.   Lather your entire body from the neck down.  Do not use on your face or genitals.  4. Pay special attention to the area(s) where your incision(s) will be located unless they are on your face.  Avoid scrubbing your skin too hard.  The important point is to have the CHG soap sit on your skin for 3 minutes.    When the 3 minutes are up, turn on the water and rinse the CHG soap off your body completely.   Pat yourself dry with a clean, freshly-laundered towel.  Dress in clean, freshly laundered night clothes.    Be sure to change bed sheets and blankets at least on the first night of CHG body wash use. May change linens every night of the above protocol for maximum benefit.   Day 5:  Last shower is the morning of surgery: Follow above Instructions.    NOTE:        *Keep CHG soap out of eyes and ear canals   *DO NOT wash with regular soap on your body after you have used the CHG soap solution  *DO NOT apply powders, lotions, or perfume.  *Deodorant may be used days 1-4, BUT NOT the day of surgery    Who should I contact if I have any questions regarding the use of CHG soap?  Call the [facility], Preadmission Testing at [phone] if you have any questions.              Patient Information: Pre-Operative Infection Prevention Measures     Why did I have my nose, under my arms and groin swabbed?  The purpose of the swab is to identify Staphylococcus aureus inside your nose or on your skin.  The swab was sent to the laboratory for culture.  A positive swab/culture for Staphylococcus aureus is called colonization or carriage.      What is Staphylococcus aureus?  Staphylococcus aureus, also known as “staph”, is a germ found on the skin or in the nose of healthy people.  Sometimes Staphylococcus aureus can get into the body and cause an infection.  This can be minor (such as pimples, boils or other skin problems).  It might also be serious (such as  blood infection, pneumonia or a surgical site infection).    What is Staphylococcus aureus colonization or carriage?  Colonization or carriage means that a person has the germ but is not sick from it.  These bacteria can be spread on the hands or when breathing or sneezing.    How is Staphylococcus aureus spread?  It is most often spread by close contact with a person or item that carries it.    What happens if my culture is positive for Staphylococcus aureus?  Your doctor/medical team will use this information to guide any antibiotic treatment which may be necessary.  Regardless of the culture results, we will clean the inside of your nose with a betadine swab just before you have your surgery.      Will I get an infection if I have Staphylococcus aureus in my nose or on my skin?  Anyone can get an infection with Staphylococcus aureus.  However, the best way to reduce your risk of infection is to follow the instructions provided to you for the use of your CHG soap and dental rinse.        Who should I contact if I have any questions?  Call the [facility], Preadmission Testing at [phone] if you have any questions.          Patient Information: Oral/Dental Rinse  **This is a prescription; pick it up at your preferred local pharmacy **  What is oral/dental rinse?   It is a mouthwash. It is a way of cleaning the mouth with a germ killing solution before your surgery.  The solution contains chlorhexidine, commonly known as CHG.   It is used inside the mouth to kill a bacteria known as Staphylococcus aureus.  Let your doctor know if you are allergic to Chlorhexidine.    Why do I need to use CHG oral/dental rinse?  The CHG oral/dental rinse helps to kill a bacteria in your mouth known a Staphylococcus aureus.     This reduces the risk of infection at the surgical site.      Using your CHG oral/dental rinse  STEPS:  Use your CHG oral/dental rinse after you brush your teeth the night  before (at bedtime) and the morning of your surgery.  Follow all directions on your prescription label.    Use the cap on the container to measure 15ml (fill cap to fill line)  Swish (gargle if you can) the mouthwash in your mouth for at least 30 seconds, (do not to swallow) spit out  After you use your CHG rinse, do not rinse your mouth with water, drink or eat.  Please refer to prescription label for the appropriate time to resume oral intake  Dental rinse comes in one size bottle: 473ml ~16oz.  You will have leftover    rinse, discard after this use.    What side effects might I have using the CHG oral/dental rinse?  CHG rinse will stick to plaque on the teeth.  Brush and floss just before use.  Teeth brushing will help avoid staining of plaque during use.    Who should I contact if I have questions about the CHG oral/dental rinse?  Please call [facility], Preadmission Testing at [phone] if you have any questions          Incentive Spirometer   You were provided with an incentive spirometer in [facility], please follow the below instructions.    What is an incentive spirometer?  An incentive spirometer is a device used before and after surgery to “exercise” your lungs.  It helps you to take deeper breaths to expand your lungs.  Below is an example of a basic incentive spirometer.  The device you receive may differ slightly but they all function the same.    Why do I need to use an incentive spirometer?  Using your incentive spirometer prepares your lungs for surgery and helps prevent lung problems after surgery.  How do I use my incentive spirometer?  When you're using your incentive spirometer, make sure to breathe through your mouth. If you breathe through your nose, the incentive spirometer won't work properly. You can hold your nose if you have trouble.  If you feel dizzy at any time, stop and rest. Try again at a later time.  Follow the steps below:  Set up your incentive  spirometer, expand the flexible tubing and connect to the outlet.  Sit upright in a chair or bed. Hold the incentive spirometer at eye level.   Put the mouthpiece in your mouth and close your lips tightly around it. Slowly breathe out (exhale) completely.  Breathe in (inhale) slowly through your mouth as deeply as you can. As you take a breath, you will see the piston rise inside the large column. While the piston rises, the indicator should move upwards. It should stay in between the 2 arrows (see Figure).  Try to get the piston as high as you can, while keeping the indicator between the arrows.   If the indicator doesn't stay between the arrows, you're breathing either too fast or too slow.  When you get it as high as you can, hold your breath for 10 seconds, or as long as possible. While you're holding your breath, the piston will slowly fall to the base of the spirometer.  Once the piston reaches the bottom of the spirometer, breathe out slowly through your mouth. Rest for a few seconds.  Repeat 10 times. Try to get the piston to the same level with each breath.  Repeat every hour while awake  You can carefully clean the outside of the mouthpiece with an alcohol wipe or soap and water.      Preoperative Deep Breathing Exercises  Why it is important to do deep breathing exercises before my surgery?  Deep breathing exercises strengthen your breathing muscles.  This helps you to recover after your surgery and decreases the chance of breathing complications.  How are the deep breathing exercises done?  Sit straight with your back supported.  Breathe in deeply and slowly through your nose. Your lower rib cage should expand and your abdomen may move forward.  Hold that breath for 3 to 5 seconds.  Breathe out through pursed lips, slowly and completely.  Rest and repeat 10 times every hour while awake.  Rest longer if you become dizzy or lightheaded.      Preoperative Brain Exercises    What are brain exercises?  A  brain exercise is any activity that engages your thinking (cognitive) skills.    What types of activities are considered brain exercises?  Jigsaw puzzles, crossword puzzles, word jumble, memory games, word search, and many more.  Many can be found free online or on your phone via a mobile emelyn.    Why should I do brain exercises before my surgery?  More recent research has shown brain exercise before surgery can lower the risk of postoperative delirium (confusion) which can be especially important for older adults.  Patients who did brain exercises for 5 to 10 hours (total) for the 7-10 days before surgery, cut their risk of postoperative delirium in half up to 1 week after surgery.

## 2025-07-17 NOTE — CPM/PAT H&P
[facility] Evaluation       Name: Genesis Garcia (Genesis Garcia)  /Age: 1945/80 y.o.       Date of Consult: 25    Referring Provider: Dr. Russo    Surgery, Date, and Length: Robot Assisted Ventral Hernia Repair; Mesh Placement , 25, 140MIN    Genesis Garcia is a 80 y.o. year-old female who presents to the [facility] for perioperative risk assessment prior to surgery.    Patient presents with a primary diagnosis of ventral hernia. Pt refers to protrusion for about the past 2 years. She is known to Dr. Russo having taken to surgery for incisional hernia repair with mesh. This procedure was performed on 20 for an indication of incarcerated incisional hernia associated with small bowel obstruction. She denies any f/c/n/v.  She denies any pain in affected area.  Hernia is not reducible.  She denies any changes to bowel habits.  No constipation.  She wishes to proceed with surgery as planned.      This note was created in part upon personal review of patient's medical records.      Patient is scheduled to have Robot Assisted Ventral Hernia Repair; Mesh Placement       Pt denies any past history of anesthetic complications such as PONV, awareness, prolonged sedation, dental damage, aspiration, cardiac arrest, difficult intubation, difficult I.V. access or unexpected hospital admissions.  NO malignant hyperthermia and or pseudocholinesterase deficiency.  +history of blood transfusions -  at time of back surgery    The patient is not a Jew and will accept blood and blood products if medically indicated.   Type and screen NOT sent.     Past Medical History:   Diagnosis Date    Abnormal echocardiogram     Anemia     Ankle fracture     left ankle fracture    Arthritis     Asthma     Autoimmune disorder (Multi)     Cardiology follow-up encounter     Hernan Fonseca MD  LOV 25    CHF (congestive heart failure) 2025    Chronic kidney disease     Chronic venous  insufficiency     CKD (chronic kidney disease), stage III (Multi)     7/1/25 Cr 1.08 GFR 53    COPD (chronic obstructive pulmonary disease) (Multi)     Dysphagia     Essential (primary) hypertension 01/04/2023    Hypertension    Fall 12/29/2023    H/O deep venous thrombosis     H/O echocardiogram 03/24/2025    CONCLUSIONS:  1. The left ventricular systolic function is normal, with a Arroyo's biplane calculated ejection fraction of 63%.  2. Left ventricular diastolic filling is indeterminate.  3. There is moderately increased septal thickness.  4. There is severely increased eccentric left ventricular hypertrophy.  5. Normal right ventricular global systolic function.  6. Left atrium is mildly dilated.    Heart murmur     Hernia, internal 2022    History of blood transfusion     Hypothyroidism     Idiopathic peripheral neuropathy     s/p Spinal Cord Stimulator Implant    Incisional hernia without obstruction or gangrene     Plan: Robot Assisted Ventral Hernia Repair; Mesh Placement 7/24/25    Lymphedema     Osteoarthritis     Osteoporosis     Peripheral neuropathy     Plantar fasciitis     Rotator cuff syndrome     Venous ulcer of leg (Multi)     Vitamin D deficiency, unspecified 10/20/2016    Vitamin D deficiency       Past Surgical History:   Procedure Laterality Date    BACK SURGERY  06/27/2024    Back Surgery L4-L5 fusion    CARDIAC ELECTROPHYSIOLOGY STUDY AND ABLATION      CATARACT EXTRACTION      CHOLECYSTECTOMY  04/15/2016    Cholecystectomy    EYE SURGERY  2018    HERNIA REPAIR  2020    HIP ARTHROPLASTY      JOINT REPLACEMENT      2007, 2017, 2022    KNEE ARTHROPLASTY      SPINAL CORD STIMULATOR IMPLANT  02/18/2025    SPINE SURGERY  2001    THYROIDECTOMY, PARTIAL      d/t goiter    TOTAL HIP ARTHROPLASTY Right     TOTAL KNEE ARTHROPLASTY Bilateral 12/01/2022    Knee Replacement    VERTEBROPLASTY         Patient  reports that she is not currently sexually active.    Family History   Problem Relation Name Age  of Onset    Diabetes Mother mom         type 2    Heart disease Father dakota     Hypertension Father dakota     Arthritis Sister juan pedley sister     Clotting disorder Sister juan pedley sister     Cancer Sister juan pedley sister     Arthritis Sister laya penn     Cancer Daughter dayne pearson     Multiple sclerosis Daughter dayne pearson     Cancer Other Perla     Arthritis Sister juan pedley     Arthritis Sister laya penn     Clotting disorder Sister juan     Cancer Daughter dayne pearson     Multiple sclerosis Daughter dayne paerson     Arthritis Sister juan pedley     Arthritis Sister laya penn     Cancer Sister juan     Cancer Daughter dayne pearson     Cancer Sister juan     Cancer Sister juan     Arthritis Sister juan pedley     Arthritis Sister laya penn     Clotting disorder Sister juan     Cancer Sister juan     Cancer Daughter dayne pearson     Multiple sclerosis Daughter dayne     Multiple sclerosis Daughter dayne pearson     Multiple sclerosis Daughter dayne pearson        Social History     Socioeconomic History    Marital status:      Spouse name: Not on file    Number of children: Not on file    Years of education: Not on file    Highest education level: Not on file   Occupational History    Not on file   Tobacco Use    Smoking status: Former     Current packs/day: 0.00     Average packs/day: 0.2 packs/day for 49.2 years (9.9 ttl pk-yrs)     Types: Cigarettes     Start date:      Quit date:      Years since quittin.5    Smokeless tobacco: Never   Vaping Use    Vaping status: Never Used   Substance and Sexual Activity    Alcohol use: Not Currently    Drug use: Not Currently    Sexual activity: Not Currently   Other Topics Concern    Not on file   Social History Narrative    Not on file     Social Drivers of Health     Financial Resource Strain: Low Risk  (2023)    Overall Financial Resource Strain (CARDIA)     Difficulty of Paying Living  Expenses: Not hard at all   Food Insecurity: No Food Insecurity (3/18/2021)    Received from [facility]    Hunger Vital Sign     Within the past 12 months, you worried that your food would run out before you got the money to buy more.: Never true     Within the past 12 months, the food you bought just didn't last and you didn't have money to get more.: Never true   Transportation Needs: No Transportation Needs (12/27/2023)    PRAPARE - Transportation     Lack of Transportation (Medical): No     Lack of Transportation (Non-Medical): No   Physical Activity: Insufficiently Active (3/18/2021)    Received from [facility]    Exercise Vital Sign     On average, how many days per week do you engage in moderate to strenuous exercise (like a brisk walk)?: 3 days     On average, how many minutes do you engage in exercise at this level?: 20 min   Stress: No Stress Concern Present (3/18/2021)    Received from [facility]    [employer] - Occupational Stress Questionnaire     Feeling of Stress : Not at all   Social Connections: Moderately Isolated (3/18/2021)    Received from [facility]    Social Connection and Isolation Panel     In a typical week, how many times do you talk on the phone with family, friends, or neighbors?: More than three times a week     How often do you get together with friends or relatives?: Never     How often do you attend Amish or Restoration services?: 1 to 4 times per year     Do you belong to any clubs or organizations such as Amish groups, unions, fraternal or athletic groups, or school groups?: No     Attends Club or Organization Meetings: Not on file     Are you , , , , never , or living with a partner?:    Intimate Partner Violence: Not on file   Housing Stability: Low Risk  (12/27/2023)    Housing Stability Vital Sign     Unable to Pay for Housing in the Last Year: No     Number of Places Lived in  the Last Year: 1     Unstable Housing in the Last Year: No        Allergies   Allergen Reactions    Cefprozil Hives    Sulfa (Sulfonamide Antibiotics) Hives    Sulfur Hives       Current Outpatient Medications   Medication Instructions    acetaminophen (TYLENOL) 650 mg, oral, Every 4 hours PRN    albuterol (ProAir HFA) 90 mcg/actuation inhaler 2 puffs, inhalation, Every 6 hours PRN    albuterol 2.5 mg, nebulization, Every 2 hour PRN    apixaban (ELIQUIS) 2.5 mg, oral, 2 times daily    Bacillus coagulans (PROBIOTIC, B. COAGULANS, ORAL) 1 tablet, Daily    calcium carbonate 600 mg calcium (1,500 mg) tablet 1 tablet, Daily    chlorhexidine (Peridex) 0.12 % solution Swish for 30 seconds and spit 15mL of solution the night before and morning of surgery    cholecalciferol (Vitamin D-3) 50 MCG (2000 UT) tablet Take by mouth.    denosumab (Prolia) 60 mg/mL syringe Provider to inject 1 mL (60 mg total) under the skin every 6 months.  For  ONLY.    fluticasone propion-salmeteroL (Advair Diskus) 250-50 mcg/dose diskus inhaler 1 puff, 2 times daily RT    folic acid (FOLVITE) 1 mg, oral, Daily    gabapentin (NEURONTIN) 400 mg, oral, 3 times daily    hydrocortisone 2.5 % cream Topical, As needed, Use maximum 2-3 times a week.    hydroxychloroquine (Plaquenil) 200 mg tablet 1 tablet, Every 12 hours scheduled (0630,1830)    levothyroxine (SYNTHROID) 25 mcg, oral, Daily before breakfast    losartan (Cozaar) 100 mg tablet 1 tablet, Daily    methotrexate (Trexall) 2.5 mg tablet 4 tablets, Weekly    multivitamin capsule Take by mouth.    Slow Fe 137 mg, oral, Daily    spironolactone (Aldactone) 25 mg tablet 1 tablet, Daily    torsemide (Demadex) 20 mg tablet 1 tablet, Daily (0630)           PAT ROS:   Constitutional:    no fever   no chills   no unexpected weight change  Neuro/Psych:    no numbness   no weakness   no light-headedness   no confusion  Eyes:    no discharge   no pain   no vision loss   no diplopia   no  visual disturbance  Ears:    no ear pain   no hearing loss   no tinnitus  Nose:    no nasal discharge   no sinus congestion   no epistaxis  Mouth:    no dental issues   no mouth pain   no oral bleeding   no mouth lesions  Throat:    no throat pain   no dysphagia  Neck:    no neck pain   no neck stiffness  Cardio:    Functional 4 Mets. Patient denies SOB walking in from parking lot today (no steps in home)   Walking ; weights ; shopping/ cooking / cleaning   no chest pain   no palpitations   no peripheral edema   no dyspnea   no GEIGER  Respiratory:    no cough   no wheezing   no hemoptysis   no shortness of breath  Endocrine:    no cold intolerance   no heat intolerance  GI:    no abdominal distention   no abdominal pain   no constipation   no diarrhea   no nausea   no vomiting   no blood in stool  :    no difficulty urinating   no dysuria   no oliguria  Musculoskeletal:    arthralgias (right foot; in surgical shoe)   no myalgias   decreased ROM (right foot)  Hematologic:    bruises/bleeds easily (eliquis)   no excessive bleeding   history of blood transfusion (2000)   blood clots (1/2023)  Skin:   no skin changes   no sores/wound   no rash      Physical Exam  Constitutional:       General: She is not in acute distress.     Appearance: Normal appearance. She is not ill-appearing, toxic-appearing or diaphoretic.   HENT:      Head: Normocephalic and atraumatic.      Nose: Nose normal. No congestion or rhinorrhea.      Mouth/Throat:      Mouth: Mucous membranes are moist.      Pharynx: No posterior oropharyngeal erythema.     Eyes:      Extraocular Movements: Extraocular movements intact.      Conjunctiva/sclera: Conjunctivae normal.       Cardiovascular:      Rate and Rhythm: Normal rate and regular rhythm.      Pulses: Normal pulses.      Heart sounds: Murmur (2/6 systolic murmur heard in the right sternal border) heard.      No friction rub. No gallop.   Pulmonary:      Effort: Pulmonary effort is normal. No  respiratory distress.      Breath sounds: Normal breath sounds. No stridor. No wheezing, rhonchi or rales.   Abdominal:      General: Bowel sounds are normal. There is no distension.      Palpations: Abdomen is soft. There is no mass.      Tenderness: There is no abdominal tenderness. There is no guarding or rebound.      Hernia: A hernia (ventral; moderate sized) is present.     Musculoskeletal:         General: Swelling (BLE; compression / wraps in place) present. No tenderness, deformity or signs of injury.      Cervical back: Normal range of motion and neck supple. No rigidity or tenderness.      Comments: Right foot in surgical shoe (fracture sustained 6/4/25; three toes fractured)     Skin:     General: Skin is warm and dry.      Coloration: Skin is not jaundiced or pale.      Findings: No bruising, erythema or rash.     Neurological:      General: No focal deficit present.      Mental Status: She is alert and oriented to person, place, and time.      Cranial Nerves: No cranial nerve deficit.      Sensory: No sensory deficit.      Motor: No weakness.      Coordination: Coordination normal.     Psychiatric:         Mood and Affect: Mood normal.         Behavior: Behavior normal.          PAT AIRWAY:   Airway:     Mallampati::  III    Neck ROM::  Full   Full upper and lower dentures          Visit Vitals  /73   Pulse 73   Temp 36.9 °C (98.5 °F) (Temporal)   Resp 18   Ht (!) 1.524 m (5')   Wt 102 kg (224 lb 13.9 oz)   SpO2 96%   BMI 43.92 kg/m²   OB Status Postmenopausal   Smoking Status Former   BSA 2.08 m²     Labs:  Lab Results   Component Value Date    WBC 5.2 07/01/2025    HGB 11.6 (L) 07/01/2025    HCT 37.8 07/01/2025    MCV 97 07/01/2025     07/01/2025     Lab Results   Component Value Date    GLUCOSE 87 07/01/2025    CALCIUM 8.8 07/01/2025     07/01/2025    K 4.4 07/01/2025    CO2 29 07/01/2025     07/01/2025    BUN 23 07/01/2025    CREATININE 1.08 (H) 07/01/2025      Lab Results    Component Value Date    ALT 13 07/01/2025    AST 20 07/01/2025    ALKPHOS 79 07/01/2025    BILITOT 0.5 07/01/2025      Lab Results   Component Value Date    HGBA1C 5.4 03/11/2025      EKG 4/16/25  Sinus rhythm with 1st degree AV block  Low voltage QRS  Borderline ECG      ECHO 3/24/25  CONCLUSIONS:   1. The left ventricular systolic function is normal, with a Arroyo's biplane calculated ejection fraction of 63%.   2. Left ventricular diastolic filling is indeterminate.   3. There is moderately increased septal thickness.   4. There is severely increased eccentric left ventricular hypertrophy.   5. There is normal right ventricular global systolic function.   6. The left atrium is mildly dilated.    Duplex US 8/4/23  CONCLUSIONS:  Right Lower Venous: The remainder of the right leg is negative for deep vein thrombosis. Additional Findings; Lymph nodes measuring approxiamtely 0.6 cm x 1.5 cm.  Left Lower Venous: There is age indeterminate deep vein thrombosis visualized in the proximal femoral, mid femoral, distal femoral and common femoral veins. The remainder of the left leg is negative for deep vein thrombosis.    Duplex US 1/18/23    **CRITICAL RESULT**  Critical Result: There is acute occlusive deep vein thrombosis visualized in the right mid femoral, distal femoral, popliteal and posterior tibial veins.  There is acute occlusive deep vein thrombosis visualized in the left distal external iliac, common femoral, proximal femoral, mid femoral, distal femoral and popliteal veins.  Notification called to Diogo Thompson M.D. with results. on 1/18/2023 at 10:31:13 AM.     CONCLUSIONS:  Right Lower Venous: There is acute occlusive deep vein thrombosis visualized in the mid femoral, distal femoral, popliteal and posterior tibial veins. Right calf veins visaulized in segments due to severe swelling/edema.  Left Lower Venous: There is acute occlusive deep vein thrombosis visualized in the distal external iliac,  common femoral, proximal femoral, mid femoral, distal femoral and popliteal veins. Cannot rule out thrombus in non-visualized peroneal and posterior tibial veins due to dressings and wounds. Technically difficult study due to severe swelling/edema and limited positioning.    Assessment and Plan:     80 y.o.  female  scheduled for Robot Assisted Ventral Hernia Repair; Mesh Placement  on 7/24/25 with Dr. Russo for  ventral hernia.   Presents to [facility] today for perioperative risk stratification and optimization      Cardiovascular:  Patient has no active cardiac symptoms.   Patient denies any chest pain, tightness, heaviness, pressure, radiating pain, palpitations, irregular heartbeats, lightheadedness, cough, congestion, shortness of breath, GEIGER, PND, near syncope, weight loss or gain.    METS: 4.4  RCRI: 0 points; 0.5% risk for postoperative MACE     A fib - [id number] - hold eliquis 3 days prior to surgery ; s/p ablation in 1980's     Murmur - 2/6 systolic heard at RSB    CHF - 63% per ECHO 3/2025; cont torsemide and spironolactone on dos     HTN - losartan HOLD evening prior to surgery and morning of surgery     Encouraged lifestyle modifications, low-sodium diet, and increase activity as tolerated.  Monitor BP and follow up with managing physician for readings sustaining >140/90.     Pulmonary:  No pulmonary diagnosis, however patient is at increased risk of perioperative complications secondary to  age > 60, COPD, obesity, duration of surgery > 2 hours  Stop Bang score is 3 placing patient at intermediate risk for SHAYY  ARISCAT: 26-44 points, 13.3% risk of in-hospital postoperative pulmonary complication  PRODIGY: High risk for opioid induced respiratory depression    **Pt provided with deep breathing exercises, incentive spirometer and instructions for use during PAT visit today**    COPD - cont inhalers as prescribed    GI:  H/o C diff - last bout was 12/2023 - caution with antibiotics    Renal:  CKD3A  7/1/25  "crt 1.08; GFR 53    Recommendations to avoid nephrotoxic drugs and carefully monitor fluid status to maintain euvolemia. Use dose adjusted medications as needed for the underlying level of renal function.    Rheum:  RA / connective tissue d/o - cont methotrexate as prescribed and plaquenil as prescribed     Spine:  Chronic pain - SCS in place; pt aware to bring remote on dos    Vascular:  DVT - 1/18/23 - per duplex US    --pt follows with Dr. Atkinson with recs which include:  \"Reports having hernia surgery July 24th.  Remains on eiquis 2.5 mg q 12 hours for extended phase DVT management. Last available Cr 1.2 - discussed holding the eliquis  x 3 days pre-op and resuming when OK with gen surgery. If admitted rec SCDs and continue either eliquis or lovenox 40 mg daily during the admission.     \"    Hematology:  Patient instructed to ambulate as soon as possible postoperatively to decrease thromboembolic risk.   Initiate mechanical DVT prophylaxis as soon as possible and initiate chemical prophylaxis when deemed safe from a bleeding standpoint post surgery.     LABS: CBC and CMP reviewed from 7/1/25; no need to repeat.  MRSA ordered    Followup: MRSA pending    Caprini: 9    Risk assessment complete.  Patient is scheduled for a intermediate surgical risk procedure.        Preoperative medication instructions were provided and reviewed with the patient.  Any additional testing or evaluation was explained to the patient.  Nothing by mouth instructions were discussed and patient's questions were answered prior to conclusion to this encounter.  Patient verbalized understanding of preoperative instructions given in preadmission testing; discharge instructions available in EMR.    This note was dictated by a speech recognition.  Minor errors may have been detected in a speech recognition.         "

## 2025-07-19 LAB — STAPHYLOCOCCUS SPEC CULT: ABNORMAL

## 2025-07-21 ENCOUNTER — APPOINTMENT (OUTPATIENT)
Dept: RHEUMATOLOGY | Facility: CLINIC | Age: 80
End: 2025-07-21
Payer: MEDICARE

## 2025-07-21 VITALS
BODY MASS INDEX: 45.59 KG/M2 | SYSTOLIC BLOOD PRESSURE: 166 MMHG | HEIGHT: 60 IN | TEMPERATURE: 98.9 F | OXYGEN SATURATION: 94 % | DIASTOLIC BLOOD PRESSURE: 78 MMHG | WEIGHT: 232.2 LBS | HEART RATE: 72 BPM

## 2025-07-21 DIAGNOSIS — M35.9 CONNECTIVE TISSUE DISEASE, UNDIFFERENTIATED (MULTI): ICD-10-CM

## 2025-07-21 PROCEDURE — 99204 OFFICE O/P NEW MOD 45 MIN: CPT | Performed by: INTERNAL MEDICINE

## 2025-07-21 PROCEDURE — 3078F DIAST BP <80 MM HG: CPT | Performed by: INTERNAL MEDICINE

## 2025-07-21 PROCEDURE — 3077F SYST BP >= 140 MM HG: CPT | Performed by: INTERNAL MEDICINE

## 2025-07-21 PROCEDURE — 1126F AMNT PAIN NOTED NONE PRSNT: CPT | Performed by: INTERNAL MEDICINE

## 2025-07-21 PROCEDURE — 1160F RVW MEDS BY RX/DR IN RCRD: CPT | Performed by: INTERNAL MEDICINE

## 2025-07-21 PROCEDURE — 1159F MED LIST DOCD IN RCRD: CPT | Performed by: INTERNAL MEDICINE

## 2025-07-21 RX ORDER — FOLIC ACID 1 MG/1
1 TABLET ORAL DAILY
Qty: 90 TABLET | Refills: 3 | Status: SHIPPED | OUTPATIENT
Start: 2025-07-21 | End: 2026-07-16

## 2025-07-21 RX ORDER — METHOTREXATE 2.5 MG/1
12.5 TABLET ORAL WEEKLY
Qty: 60 TABLET | Refills: 2 | Status: SHIPPED | OUTPATIENT
Start: 2025-07-21 | End: 2026-07-21

## 2025-07-21 ASSESSMENT — PAIN SCALES - GENERAL: PAINLEVEL_OUTOF10: 0-NO PAIN

## 2025-07-21 NOTE — PROGRESS NOTES
Informants: Patient and EMR.  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  CC: Pain in right shoulder, both hands with numbness in the fingertips.  [place]: She has a remote history of right biceps tendon tear with limited range of motion of the right shoulder.  She has been having pain and swelling in her hands and wrists.  She was treated with hydroxychloroquine for the past 5 years and has been taking methotrexate for the past 9 months.  She notes that the pain and swelling in the right hand has been getting worse.  She has limited extension of the second digit of the right hand.  She also notes lower back pain with history of remote lumbar fusion and history of vertebroplasty for T12 and L1 vertebral fractures (6/2024).  She was treated for lymphedema in both lower extremities with draining ulcers on the lower legs.  She was hospitalized 12/2023 for sepsis related to pneumonia and leg ulcers.  PH: Allergies: sulfur, cefprozil ( both cause hives); illnesses: Right distal fibular fracture, asthma, renal insufficiency, congestive heart failure, venous insufficiency of the lower extremities, COPD, hypertension, hypothyroidism, diverticulosis, bilateral lower extremity deep vein thrombosis (3/10/2023 on Eliquis), C. 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  SH: She quit tobacco smoking in  and denies alcohol or illicit drug abuse.  She is retired  that did require heavy lifting.  FH: Her father had heart disease, hypertension and myocardial infarction.  Her mother had diabetes mellitus and stroke.  She has a brother who is healthy.  She has a sister who is  with arthritis, stomach cancer, and clotting disorder.  She has a sister alive with arthritis.  She has a son alive with joint pain.  She has a daughter who  with lung cancer and multiple sclerosis clotting disorder.  She has a daughter alive with arthritis.  She has a paternal grandmother with rheumatoid arthritis.  PX: She is a well-developed, well-nourished, white female.  The lungs are clear to auscultation.  The heart has a regular rate and rhythm.  The abdomen is benign.  The lower extremities are wrapped up.  The musculoskeletal examination shows mild chronic synovial thickening involving the PIP and MCP joints of the digits of both hands with very slight ulnar deviation of the digits of both hands.  There is mild soft tissue thickening at the wrists.  There is mild pain on passive range of motion of the shoulders.  There is moderately limited active abduction of both shoulders.  There is bony prominence of the knees and soft tissue thickening at the ankles.  DEXA bone density T-score (2024) (2020)  Left total femur........................... -2.4................. -1.2  Left femoral neck........................ -1.9................. -1.0  Left forearm 33%......................... -1.2  Right total femur................................................... -1.0  Right femoral neck................................................ -0.9  X-ray right foot (6/10/2025) cortical irregularity of the proximal metaphysis of the  third proximal phalanx, neck of the fifth metatarsal bone suggestive of potential nondisplaced fractures.  CT scan thorax/lumbar spine (6/1/2025) spinal stimulator in place with leads in posterior epidural space at T8 level.  Vertebroplasty changes at T12 and L1.  Status post L4 and L5 laminectomy with L4-S1 pedicle screw fusion.  Remote compression at T12 and L1.  Moderate anterior listhesis of L4 on L5 and retrolisthesis of L2 on L3.  Moderate to severe disc space narrowing and moderate marginal osteophytes L1/L2 and L2/L3 with marked disc space narrowing and small marginal osteophytes at L4/L5.  Moderate disc space narrowing and bulky bridging osteophytes throughout the thoracic spine.  Laboratory (7/1/2025) WBC 5.2, hemoglobin 11.6, hematocrit 37.8, MCV 97, MCHC 30.7, platelets 220, BUN 23, creatinine 1.08, glucose 87, calcium 8.8, albumin 4.0, vitamin B12 805, folate >24.0, ferritin 48, iron 42, TIBC 321, saturation 13%, (11/25/2024) ESR 14, RF 11, CCP <1, DNA 2.0, CRP 0.35, C3 113, C4 21, (7/25/2024) serum N-telopeptide 36.7, parathyroid hormone 42.4, T12/L1 vertebral bone biopsy: Normal trilineage.  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 difficile colitis, advanced arthritis involving the lumbar spine with bridging osteophytes, fairly symmetric peripheral polyarthritis on methotrexate 10 mg orally weekly and hydroxychloroquine 200 mg twice daily as well as Prolia injections every 6 months.  She does not appear to be a good  candidate for biologic therapy in view of history of sepsis with lower extremity ulcers and cellulitis and history of C. difficile colitis and pneumonia.  Plan: She is to increase methotrexate to 12.5 mg orally weekly and continue her other current medications (folic acid 1 mg daily, hydroxychloroquine 200 mg twice daily, Prolia subcutaneously every 3 6 months).  She is to have laboratory for follow-up of methotrexate therapy.  She is to return in 6 months for evaluation.

## 2025-07-22 ENCOUNTER — TELEPHONE (OUTPATIENT)
Dept: RHEUMATOLOGY | Facility: CLINIC | Age: 80
End: 2025-07-22
Payer: MEDICARE

## 2025-07-22 DIAGNOSIS — M80.08XS AGE-RELATED OSTEOPOROSIS WITH CURRENT PATHOLOGICAL FRACTURE OF VERTEBRA, SEQUELA: Primary | ICD-10-CM

## 2025-07-22 RX ORDER — DIPHENHYDRAMINE HYDROCHLORIDE 50 MG/ML
50 INJECTION, SOLUTION INTRAMUSCULAR; INTRAVENOUS AS NEEDED
OUTPATIENT
Start: 2025-09-03

## 2025-07-22 RX ORDER — EPINEPHRINE 0.3 MG/.3ML
0.3 INJECTION SUBCUTANEOUS EVERY 5 MIN PRN
OUTPATIENT
Start: 2025-09-03

## 2025-07-22 RX ORDER — FAMOTIDINE 10 MG/ML
20 INJECTION, SOLUTION INTRAVENOUS ONCE AS NEEDED
OUTPATIENT
Start: 2025-09-03

## 2025-07-22 RX ORDER — ALBUTEROL SULFATE 0.83 MG/ML
3 SOLUTION RESPIRATORY (INHALATION) AS NEEDED
OUTPATIENT
Start: 2025-09-03

## 2025-07-22 NOTE — TELEPHONE ENCOUNTER
Call placed to patient to inform of being authorized to have Prolia done at infusion center and for anticipated order to be sent to [facility] in [place] due to proximity of home. Patient was agreeable and this nurse communicated that the [facility]IC team would call to get scheduled. Patient was thankful for call placed. Nothing additional to address at this time.

## 2025-07-22 NOTE — PROGRESS NOTES
Per Dr. Knowles:   Continue Prolia for treatment of osteoporosis   She was previously treated with Prolia in Dr. Lauren's office in [facility]. Last injected in office visit 3/3/25 --> due 9/3/25     Closest infusion center is [facility] - sending orders there.

## 2025-07-22 NOTE — TELEPHONE ENCOUNTER
----- Message from Dionna Clifton sent at 7/22/2025  9:33 AM EDT -----  Regarding: RE: SAJI Najera Just did this on another task. Pt lives closer to [facility]? So I just sent orders there instead of [place]?  ----- Message -----  From: Jacoby Keenan RN  Sent: 7/21/2025   5:21 PM EDT  To: Dolores Villareal RN; [facility]; Rx#  Subject: PROLIA                                           Hi team,    Patient attached is due for Prolia in September. Last was 3/3/25. She is a former Dr. Sheridan patient. Can we please see where she is auth'd. If here at [facility] she would like, if at infusion center, she would like to go to [facility]. Thank you!

## 2025-07-23 ENCOUNTER — TELEPHONE (OUTPATIENT)
Dept: RHEUMATOLOGY | Facility: CLINIC | Age: 80
End: 2025-07-23

## 2025-07-23 ENCOUNTER — ANESTHESIA EVENT (OUTPATIENT)
Dept: OPERATING ROOM | Facility: HOSPITAL | Age: 80
End: 2025-07-23
Payer: MEDICARE

## 2025-07-23 ENCOUNTER — DOCUMENTATION (OUTPATIENT)
Dept: INFUSION THERAPY | Facility: CLINIC | Age: 80
End: 2025-07-23
Payer: MEDICARE

## 2025-07-23 ENCOUNTER — OFFICE VISIT (OUTPATIENT)
Dept: PAIN MEDICINE | Facility: CLINIC | Age: 80
End: 2025-07-23
Payer: MEDICARE

## 2025-07-23 VITALS — HEART RATE: 82 BPM | DIASTOLIC BLOOD PRESSURE: 60 MMHG | SYSTOLIC BLOOD PRESSURE: 114 MMHG | OXYGEN SATURATION: 98 %

## 2025-07-23 DIAGNOSIS — G60.9 IDIOPATHIC PERIPHERAL NEUROPATHY: ICD-10-CM

## 2025-07-23 DIAGNOSIS — Z79.01 CHRONIC ANTICOAGULATION: ICD-10-CM

## 2025-07-23 DIAGNOSIS — M54.50 CHRONIC MIDLINE LOW BACK PAIN, UNSPECIFIED WHETHER SCIATICA PRESENT: ICD-10-CM

## 2025-07-23 DIAGNOSIS — G89.29 CHRONIC MIDLINE LOW BACK PAIN, UNSPECIFIED WHETHER SCIATICA PRESENT: ICD-10-CM

## 2025-07-23 DIAGNOSIS — Z96.82 PRESENCE OF NEUROSTIMULATOR: Primary | ICD-10-CM

## 2025-07-23 PROCEDURE — 99213 OFFICE O/P EST LOW 20 MIN: CPT | Performed by: ANESTHESIOLOGY

## 2025-07-23 PROCEDURE — 1125F AMNT PAIN NOTED PAIN PRSNT: CPT | Performed by: ANESTHESIOLOGY

## 2025-07-23 PROCEDURE — 3078F DIAST BP <80 MM HG: CPT | Performed by: ANESTHESIOLOGY

## 2025-07-23 PROCEDURE — 1160F RVW MEDS BY RX/DR IN RCRD: CPT | Performed by: ANESTHESIOLOGY

## 2025-07-23 PROCEDURE — 95972 ALYS CPLX SP/PN NPGT W/PRGRM: CPT | Performed by: ANESTHESIOLOGY

## 2025-07-23 PROCEDURE — 3074F SYST BP LT 130 MM HG: CPT | Performed by: ANESTHESIOLOGY

## 2025-07-23 PROCEDURE — 1159F MED LIST DOCD IN RCRD: CPT | Performed by: ANESTHESIOLOGY

## 2025-07-23 PROCEDURE — 99213 OFFICE O/P EST LOW 20 MIN: CPT | Mod: 25 | Performed by: ANESTHESIOLOGY

## 2025-07-23 ASSESSMENT — ENCOUNTER SYMPTOMS
OCCASIONAL FEELINGS OF UNSTEADINESS: 0
RESPIRATORY NEGATIVE: 1
ENDOCRINE NEGATIVE: 1
DEPRESSION: 0
GASTROINTESTINAL NEGATIVE: 1
CARDIOVASCULAR NEGATIVE: 1
PSYCHIATRIC NEGATIVE: 1
HEMATOLOGIC/LYMPHATIC NEGATIVE: 1
WEAKNESS: 1
BACK PAIN: 1
CONSTITUTIONAL NEGATIVE: 1
LOSS OF SENSATION IN FEET: 0
EYES NEGATIVE: 1

## 2025-07-23 ASSESSMENT — PAIN - FUNCTIONAL ASSESSMENT: PAIN_FUNCTIONAL_ASSESSMENT: 0-10

## 2025-07-23 ASSESSMENT — PAIN SCALES - GENERAL
PAINLEVEL_OUTOF10: 7
PAINLEVEL_OUTOF10: 7

## 2025-07-23 ASSESSMENT — PAIN DESCRIPTION - DESCRIPTORS: DESCRIPTORS: ACHING

## 2025-07-23 NOTE — PROGRESS NOTES
PAIN MANAGEMENT FOLLOW-UP OFFICE NOTE    Date of Service: 7/23/2025    SUBJECTIVE    CHIEF COMPLAINT: BLE pain    HISTORY OF PRESENT ILLNESS    Genesis Garcia is a 80 y.o. female with PMH HTN, DVT on ELIQUIS, hypothyroidism, CKD, asthma, obesity, L4-S1 fusion, CREST syndrome, VCF s/p T12/L1 kypho who presents for F/U.    Since her last visit, pt has been unable to meet with reps for reprogramming as of yet. But hopes to get evaluated today. S/f robotic ventral hernia repair with Dr Russo 7/24, which is why she pushed caudal LIYA out to 8/28.     Pt denies new-onset numbness, weakness, bowel/bladder incontinence.  Pt denies recent infection, allergy to Latex/iodine/contrast. Patient is currently taking the following blood thinner(s): ELIQUIS    Procedure log:  -[employer] SCS implant 2/18/25  -[employer] SCS trial 1/9/25: >50% relief  -BL foot Qutenza #2 on 12/13/24  -BL foot Qutenza #1 on 8/23/24  -T12, L1 kypho 6/27/24: 75% relief      REVIEW OF SYSTEMS  Review of Systems   Constitutional: Negative.    HENT: Negative.     Eyes: Negative.    Respiratory: Negative.     Cardiovascular: Negative.    Gastrointestinal: Negative.    Endocrine: Negative.    Musculoskeletal:  Positive for back pain.   Skin: Negative.    Neurological:  Positive for weakness.   Hematological: Negative.    Psychiatric/Behavioral: Negative.         PAST MEDICAL HISTORY  Past Medical History:   Diagnosis Date    Abnormal echocardiogram     Anemia     Ankle fracture     left ankle fracture    Arthritis     Asthma     Autoimmune disorder (Multi)     Cardiology follow-up encounter     Hernan Fonseca MD  LOV 4/16/25    CHF (congestive heart failure) 02/18/2025    Chronic kidney disease     Chronic venous insufficiency     CKD (chronic kidney disease), stage III (Multi)     7/1/25 Cr 1.08 GFR 53    COPD (chronic obstructive pulmonary disease) (Multi)     Dysphagia     Essential (primary) hypertension 01/04/2023    Hypertension    Fall 12/29/2023     H/O deep venous thrombosis     H/O echocardiogram 03/24/2025    CONCLUSIONS:  1. The left ventricular systolic function is normal, with a Arroyo's biplane calculated ejection fraction of 63%.  2. Left ventricular diastolic filling is indeterminate.  3. There is moderately increased septal thickness.  4. There is severely increased eccentric left ventricular hypertrophy.  5. Normal right ventricular global systolic function.  6. Left atrium is mildly dilated.    Heart murmur     Hernia, internal 2022    History of blood transfusion     Hypothyroidism     Idiopathic peripheral neuropathy     s/p Spinal Cord Stimulator Implant    Incisional hernia without obstruction or gangrene     Plan: Robot Assisted Ventral Hernia Repair; Mesh Placement 7/24/25    Lymphedema     Osteoarthritis     Osteoporosis     Peripheral neuropathy     Plantar fasciitis     Rotator cuff syndrome     Venous ulcer of leg (Multi)     Vitamin D deficiency, unspecified 10/20/2016    Vitamin D deficiency     Past Surgical History:   Procedure Laterality Date    BACK SURGERY  06/27/2024    Back Surgery L4-L5 fusion    CARDIAC ELECTROPHYSIOLOGY STUDY AND ABLATION      CATARACT EXTRACTION      CHOLECYSTECTOMY  04/15/2016    Cholecystectomy    EYE SURGERY  2018    HERNIA REPAIR  2020    HIP ARTHROPLASTY      JOINT REPLACEMENT      2007, 2017, 2022    KNEE ARTHROPLASTY      SPINAL CORD STIMULATOR IMPLANT  02/18/2025    SPINE SURGERY  2001    THYROIDECTOMY, PARTIAL      d/t goiter    TOTAL HIP ARTHROPLASTY Right     TOTAL KNEE ARTHROPLASTY Bilateral 12/01/2022    Knee Replacement    VERTEBROPLASTY       Family History   Problem Relation Name Age of Onset    Diabetes Mother mom         type 2    Heart disease Father dakota     Hypertension Father dakota     Arthritis Sister juan hector sister     Clotting disorder Sister juan hector sister     Cancer Sister juan hector sister     Arthritis Sister laya penn     Cancer Daughter dayne pearson      Multiple sclerosis Daughter dayne pearson     Cancer Other Perla     Arthritis Sister juan hector     Arthritis Sister laya penn     Clotting disorder Sister juan     Cancer Daughter dayne pearson     Multiple sclerosis Daughter dayne pearson     Arthritis Sister juan hector     Arthritis Sister laya penn     Cancer Sister juan     Cancer Daughter dayne pearson     Cancer Sister juan     Cancer Sister juan     Arthritis Sister juan hector     Arthritis Sister laya penn     Clotting disorder Sister juan     Cancer Sister juan     Cancer Daughter dayne pearson     Multiple sclerosis Daughter dayne     Multiple sclerosis Daughter dayne pearson     Multiple sclerosis Daughter dayne pearson        CURRENT MEDICATIONS  Current Outpatient Medications   Medication Sig Dispense Refill    acetaminophen (Tylenol) 325 mg tablet Take 2 tablets (650 mg) by mouth every 4 hours if needed for mild pain (1 - 3) or moderate pain (4 - 6). 30 tablet 0    albuterol (ProAir HFA) 90 mcg/actuation inhaler Inhale 2 puffs every 6 hours if needed for wheezing. 18 g 3    albuterol 2.5 mg /3 mL (0.083 %) nebulizer solution Take 3 mL (2.5 mg) by nebulization every 2 hours if needed for wheezing. 75 mL 11    apixaban (Eliquis) 2.5 mg tablet Take 1 tablet (2.5 mg) by mouth 2 times a day. 180 tablet 3    Bacillus coagulans (PROBIOTIC, B. COAGULANS, ORAL) Take 1 tablet by mouth once daily.      calcium carbonate 600 mg calcium (1,500 mg) tablet Take 1 tablet (1,500 mg) by mouth once daily.      chlorhexidine (Peridex) 0.12 % solution Swish for 30 seconds and spit 15mL of solution the night before and morning of surgery 475 mL 0    cholecalciferol (Vitamin D-3) 50 MCG (2000 UT) tablet Take by mouth.      [START ON 9/3/2025] denosumab (Prolia) 60 mg/mL syringe Inject 1 mL (60 mg total) under the skin every 6 months. 1 mL 1    ferrous sulfate (Slow Fe) 137 mg (45 mg iron) tablet extended release Take 1 tablet (137 mg) by mouth  once daily. 30 tablet 6    fluticasone propion-salmeteroL (Advair Diskus) 250-50 mcg/dose diskus inhaler Inhale 1 puff 2 times a day. Rinse mouth with water after use to reduce aftertaste and incidence of candidiasis. Do not swallow.      folic acid (Folvite) 1 mg tablet Take 1 tablet (1 mg) by mouth once daily. 90 tablet 3    gabapentin (Neurontin) 400 mg capsule Take 1 capsule (400 mg) by mouth 3 times a day. 90 capsule 2    hydrocortisone 2.5 % cream Apply topically if needed for irritation or rash. Use maximum 2-3 times a week. 30 g 2    hydroxychloroquine (Plaquenil) 200 mg tablet Take 1 tablet (200 mg) by mouth every 12 hours.      levothyroxine (Synthroid) 25 mcg tablet Take 1 tablet (25 mcg) by mouth once daily in the morning. Take before meals. 90 tablet 3    losartan (Cozaar) 100 mg tablet Take 1 tablet (100 mg) by mouth once daily.      methotrexate (Trexall) 2.5 mg tablet Take 5 tablets (12.5 mg total) by mouth 1 (one) time per week.  Follow directions carefully, and ask to explain any part you do not understand. Take exactly as directed. On Thursday 60 tablet 2    multivitamin capsule Take by mouth.      spironolactone (Aldactone) 25 mg tablet Take 1 tablet (25 mg) by mouth once daily.      torsemide (Demadex) 20 mg tablet Take 1 tablet (20 mg) by mouth early in the morning..       No current facility-administered medications for this visit.       ALLERGIES AND DRUG REACTIONS  Allergies   Allergen Reactions    Cefprozil Hives    Sulfa (Sulfonamide Antibiotics) Hives    Sulfur Hives          OBJECTIVE  Visit Vitals  /60   Pulse 82   SpO2 98%   OB Status Postmenopausal   Smoking Status Former       Last Recorded Pain Score (if available):           Pain Score:   7       Physical Exam  General: Sitting in chair, NAD  Head: NCAT  Eyes: Sclera/conjunctiva clear, EOMI, PERRL  Nose/mouth: MMM  CV: Good distal pulses  Lungs: Good/equal chest excursion  Abdomen: Soft, ND  Ext: BLE edema, R ankle  orthotic  MSK: l-spine alignment: unremarkable    Neuro: AAOx3, CN grossly nl    Psych: affect nl  Skin: no rash/lesions.         REVIEW OF LABORATORY DATA  I have reviewed the following lab results:  WBC   Date Value Ref Range Status   07/01/2025 5.2 4.4 - 11.3 x10*3/uL Final     RBC   Date Value Ref Range Status   07/01/2025 3.91 (L) 4.00 - 5.20 x10*6/uL Final     Hemoglobin   Date Value Ref Range Status   07/01/2025 11.6 (L) 12.0 - 16.0 g/dL Final     Hematocrit   Date Value Ref Range Status   07/01/2025 37.8 36.0 - 46.0 % Final     MCV   Date Value Ref Range Status   07/01/2025 97 80 - 100 fL Final     MCH   Date Value Ref Range Status   07/01/2025 29.7 26.0 - 34.0 pg Final     MCHC   Date Value Ref Range Status   07/01/2025 30.7 (L) 32.0 - 36.0 g/dL Final     RDW   Date Value Ref Range Status   07/01/2025 16.1 (H) 11.5 - 14.5 % Final     Platelets   Date Value Ref Range Status   07/01/2025 220 150 - 450 x10*3/uL Final     Sodium   Date Value Ref Range Status   07/01/2025 140 136 - 145 mmol/L Final     Potassium   Date Value Ref Range Status   07/01/2025 4.4 3.5 - 5.3 mmol/L Final     Bicarbonate   Date Value Ref Range Status   07/01/2025 29 21 - 32 mmol/L Final     Urea Nitrogen   Date Value Ref Range Status   07/01/2025 23 6 - 23 mg/dL Final     CALCIUM   Date Value Ref Range Status   02/14/2025 8.9 8.6 - 10.4 mg/dL Final     Calcium   Date Value Ref Range Status   07/01/2025 8.8 8.6 - 10.6 mg/dL Final     Protime   Date Value Ref Range Status   01/18/2023 11.0 9.8 - 13.4 sec Final     INR   Date Value Ref Range Status   01/18/2023 1.0 0.9 - 1.1 Final         REVIEW OF RADIOLOGY   I have reviewed the following:  Radiology Studies           MRI L-spine w 5/8/24:  * Discitis/osteomyelitis at T12/L1 as described.  *No evidence of fluid collection to suggest epidural or paraspinal  abscess.        MRI L-spine wo 3/22/24:  There is increased signal within the disc space at T12-L1 with  increased anterior wedging at  L1 and new anterior wedging at T12.  There is approximately 75% loss of vertebral body height anteriorly  at L1 and approximately 25% loss of vertebral body height anteriorly  at T12. There is no significant osseous retropulsion into the spinal  canal. There is no significant paraspinal edema or visualized  epidural collection. These findings may represent acute compression  deformities or sequela of trauma, however by imaging and early  discitis osteomyelitis can have a similar appearance. Please  correlate clinically for signs and symptoms of infection.      There is edema within the sacrum on the left which may represent a  sacral insufficiency fracture.      Postsurgical changes of posterolateral fusion and decompression at L4  through S1. No spinal canal and mild-to-moderate neural foraminal  narrowing at these levels.      Additional multilevel degenerative disc disease and facet arthrosis  with mild spinal canal stenosis. Neural foraminal narrowing most  pronounced at T12-L1 with severe left neural foraminal stenosis.               ASSESSMENT & PLAN  Genesis Garcia is a 80 y.o. female with PMH HTN, DVT on ELIQUIS, hypothyroidism, CKD, asthma, obesity, L4-S1 fusion, VCF s/p T12/L1 kypho who presents for F/U .    1) Peripheral neuropathy  -BL foot neuropathy since ~2019, idiopathic  -Refractive to yrs of conservative tx including Tylenol, NSAIDs, gabapentin, Qutenza  -No known DM. Nl B12, folate  -s/p [employer] SCS trial 1/9/25: >50% relief. Improved stand/walking tolerance. Improved sleep.   -s/p [employer] SCS implant 2/18/25 w/o complication. Today's testing revealed adequate BL foot coverage. Provided with new complex programming, but recommending 1 week therapy vacation to re-establish baseline    2) LBP  -30 y LBP since traumatic incident getting out of bed s/p L4-S1 fusion 2000 on hx T12, L1 VCF 2024 s/p T12, L1 kypho. LBP radiates down RLE in radicular fashion wo focal deficit  -Refractive to Tylenol, m  relaxants, oxycodone, topical medication, R SIJ CSI  -MRI L-spine w/wo 5/8/24: stable L4-S1 fusion, multilevel spondylosis featuring tr L2-3 listhesis w disc complex contributing to mod-severe BL NFS  -CT T/L-spine 6/1/25: stable T12, L1 kypho, SCS intact up to T8, L4-S1 fusion  -s/f caudal LIYA 8/28 appropriately off Eliquis pending recovery from ventral hernia repair to target pain generator as seen on imaging and minimize risk/likelihood of chronic opioid use and/or surgery                Today's visit involved continuation of chronic pain care. In the context of the complexity of this patient's chronic pain diagnosis, long-term expectations and care planning discussed. Adequate time taken to ensure patient understanding and answer questions. Imaging studies ordered are placed do elucidate the patient's diagnosis, but also to evaluate the patient's candidacy for procedural and surgical interventions. The risks and benefits of these potential interventions are detailed as above.                     Faith Reese MD  Anesthesiologist & Interventional Pain Physician   [facility] [place]  O: [phone]  F: [phone]  10:13 AM  07/23/25

## 2025-07-23 NOTE — PROGRESS NOTES
CLINICAL CLEARANCE FOR OUTPATIENT INJECTION      Patient to be scheduled for Continuation of DENOSUMAB injections.    For Diagnosis: Osteoporosis    Labs required prior to treatment (Active ORDER IN CHART):    Calcium drawn on:   Lab Results   Component Value Date    CALCIUM 8.8 07/01/2025    PHOS 4.4 07/25/2024      Lab Results   Component Value Date    ALBUMIN 4.0 07/01/2025       Calcium >8.6? YES AT THAT TIME BUT WILL NEED UPDATED LABS PRIOR TO INJECTION     (Serum or Corrected Calcium must be >8.6mg/dl. OR Ionized Calcium must be >1.1 mmol/L or >4.7 mg/dL (depending on resulting agency).  If below WNL - Contact prescribing provider. Labs should be drawn within 28 days of first treatment.)    Lab Results   Component Value Date    EGFR 53 (L) 07/01/2025      (Patients with a eGFR <30 are at increased risk of hypocalcemia)       Calcium and Vitamin D supplement on medication list? Yes  Current Medications[1]     No obvious recent dental work per chart review. Nurse to confirm no dental within past/next 4 weeks at encounter.    Urine Hcg test ordered prior to first injection?Not applicable     Last injection received: 3.3.25 (if continuation)    Due: 9.3.25    Ok to schedule for prolia; please instruct pt to have labs drawn no more than 28 days prior to their scheduled appointment CMP    PA DENISA Schmitz-CNP    [facility])  [address] [place] [address] A, [place] [address]  Phone: [phone]        [1]   Current Outpatient Medications:     acetaminophen (Tylenol) 325 mg tablet, Take 2 tablets (650 mg) by mouth every 4 hours if needed for mild pain (1 - 3) or moderate pain (4 - 6)., Disp: 30 tablet, Rfl: 0    albuterol (ProAir HFA) 90 mcg/actuation inhaler, Inhale 2 puffs every 6 hours if needed for wheezing., Disp: 18 g, Rfl: 3    albuterol 2.5 mg /3 mL (0.083 %) nebulizer solution, Take 3 mL (2.5 mg) by nebulization every 2 hours  if needed for wheezing., Disp: 75 mL, Rfl: 11    apixaban (Eliquis) 2.5 mg tablet, Take 1 tablet (2.5 mg) by mouth 2 times a day., Disp: 180 tablet, Rfl: 3    Bacillus coagulans (PROBIOTIC, B. COAGULANS, ORAL), Take 1 tablet by mouth once daily., Disp: , Rfl:     calcium carbonate 600 mg calcium (1,500 mg) tablet, Take 1 tablet (1,500 mg) by mouth once daily., Disp: , Rfl:     chlorhexidine (Peridex) 0.12 % solution, Swish for 30 seconds and spit 15mL of solution the night before and morning of surgery, Disp: 475 mL, Rfl: 0    cholecalciferol (Vitamin D-3) 50 MCG (2000 UT) tablet, Take by mouth., Disp: , Rfl:     [START ON 9/3/2025] denosumab (Prolia) 60 mg/mL syringe, Inject 1 mL (60 mg total) under the skin every 6 months., Disp: 1 mL, Rfl: 1    ferrous sulfate (Slow Fe) 137 mg (45 mg iron) tablet extended release, Take 1 tablet (137 mg) by mouth once daily., Disp: 30 tablet, Rfl: 6    fluticasone propion-salmeteroL (Advair Diskus) 250-50 mcg/dose diskus inhaler, Inhale 1 puff 2 times a day. Rinse mouth with water after use to reduce aftertaste and incidence of candidiasis. Do not swallow., Disp: , Rfl:     folic acid (Folvite) 1 mg tablet, Take 1 tablet (1 mg) by mouth once daily., Disp: 90 tablet, Rfl: 3    gabapentin (Neurontin) 400 mg capsule, Take 1 capsule (400 mg) by mouth 3 times a day., Disp: 90 capsule, Rfl: 2    hydrocortisone 2.5 % cream, Apply topically if needed for irritation or rash. Use maximum 2-3 times a week., Disp: 30 g, Rfl: 2    hydroxychloroquine (Plaquenil) 200 mg tablet, Take 1 tablet (200 mg) by mouth every 12 hours., Disp: , Rfl:     levothyroxine (Synthroid) 25 mcg tablet, Take 1 tablet (25 mcg) by mouth once daily in the morning. Take before meals., Disp: 90 tablet, Rfl: 3    losartan (Cozaar) 100 mg tablet, Take 1 tablet (100 mg) by mouth once daily., Disp: , Rfl:     methotrexate (Trexall) 2.5 mg tablet, Take 5 tablets (12.5 mg total) by mouth 1 (one) time per week.  Follow  directions carefully, and ask to explain any part you do not understand. Take exactly as directed. On Thursday, Disp: 60 tablet, Rfl: 2    multivitamin capsule, Take by mouth., Disp: , Rfl:     spironolactone (Aldactone) 25 mg tablet, Take 1 tablet (25 mg) by mouth once daily., Disp: , Rfl:     torsemide (Demadex) 20 mg tablet, Take 1 tablet (20 mg) by mouth early in the morning.., Disp: , Rfl:

## 2025-07-23 NOTE — TELEPHONE ENCOUNTER
Return call placed to patient to confirm that the dose was changed (reflected in number of tablets to take weekly), but that tablet remained at 2.5 mg for ease of increasing and decreasing. This nurse explained dose that was sent to pharmacy on file of [facility], and time of receipt and patient to call pharmacy. Patient states she has 3 weeks left of current script and this nurse encouraged to notify office should any other concerns arise. Patient thankful for call placed.

## 2025-07-23 NOTE — TELEPHONE ENCOUNTER
Pt would like her medication, Methotrexate, increased from a 2.5mg to 5mg.    Pt can be reached at [phone].

## 2025-07-24 ENCOUNTER — HOSPITAL ENCOUNTER (OUTPATIENT)
Facility: HOSPITAL | Age: 80
Discharge: HOME | End: 2025-07-25
Attending: SURGERY | Admitting: SURGERY
Payer: MEDICARE

## 2025-07-24 ENCOUNTER — ANESTHESIA (OUTPATIENT)
Dept: OPERATING ROOM | Facility: HOSPITAL | Age: 80
End: 2025-07-24
Payer: MEDICARE

## 2025-07-24 ENCOUNTER — APPOINTMENT (OUTPATIENT)
Dept: RADIOLOGY | Facility: HOSPITAL | Age: 80
End: 2025-07-24
Payer: MEDICARE

## 2025-07-24 DIAGNOSIS — Z98.890 S/P HERNIA REPAIR: Primary | ICD-10-CM

## 2025-07-24 DIAGNOSIS — K43.2 INCISIONAL HERNIA, WITHOUT OBSTRUCTION OR GANGRENE: ICD-10-CM

## 2025-07-24 DIAGNOSIS — Z87.19 S/P HERNIA REPAIR: Primary | ICD-10-CM

## 2025-07-24 PROCEDURE — 2500000005 HC RX 250 GENERAL PHARMACY W/O HCPCS: Performed by: STUDENT IN AN ORGANIZED HEALTH CARE EDUCATION/TRAINING PROGRAM

## 2025-07-24 PROCEDURE — 2500000004 HC RX 250 GENERAL PHARMACY W/ HCPCS (ALT 636 FOR OP/ED): Performed by: SURGERY

## 2025-07-24 PROCEDURE — 2500000005 HC RX 250 GENERAL PHARMACY W/O HCPCS: Performed by: ANESTHESIOLOGIST ASSISTANT

## 2025-07-24 PROCEDURE — 7100000011 HC EXTENDED STAY RECOVERY HOURLY - NURSING UNIT

## 2025-07-24 PROCEDURE — 3700000001 HC GENERAL ANESTHESIA TIME - INITIAL BASE CHARGE: Performed by: SURGERY

## 2025-07-24 PROCEDURE — 2500000001 HC RX 250 WO HCPCS SELF ADMINISTERED DRUGS (ALT 637 FOR MEDICARE OP): Performed by: STUDENT IN AN ORGANIZED HEALTH CARE EDUCATION/TRAINING PROGRAM

## 2025-07-24 PROCEDURE — 7100000002 HC RECOVERY ROOM TIME - EACH INCREMENTAL 1 MINUTE: Performed by: SURGERY

## 2025-07-24 PROCEDURE — 3700000002 HC GENERAL ANESTHESIA TIME - EACH INCREMENTAL 1 MINUTE: Performed by: SURGERY

## 2025-07-24 PROCEDURE — 2500000005 HC RX 250 GENERAL PHARMACY W/O HCPCS: Performed by: ANESTHESIOLOGY

## 2025-07-24 PROCEDURE — 2780000003 HC OR 278 NO HCPCS: Performed by: SURGERY

## 2025-07-24 PROCEDURE — 3600000017 HC OR TIME - EACH INCREMENTAL 1 MINUTE - PROCEDURE LEVEL SIX: Performed by: SURGERY

## 2025-07-24 PROCEDURE — 2500000004 HC RX 250 GENERAL PHARMACY W/ HCPCS (ALT 636 FOR OP/ED): Mod: JW | Performed by: ANESTHESIOLOGY

## 2025-07-24 PROCEDURE — C1781 MESH (IMPLANTABLE): HCPCS | Performed by: SURGERY

## 2025-07-24 PROCEDURE — 2720000007 HC OR 272 NO HCPCS: Performed by: SURGERY

## 2025-07-24 PROCEDURE — 2500000005 HC RX 250 GENERAL PHARMACY W/O HCPCS: Performed by: SURGERY

## 2025-07-24 PROCEDURE — 99100 ANES PT EXTEME AGE<1 YR&>70: CPT | Performed by: ANESTHESIOLOGY

## 2025-07-24 PROCEDURE — 2500000004 HC RX 250 GENERAL PHARMACY W/ HCPCS (ALT 636 FOR OP/ED): Performed by: STUDENT IN AN ORGANIZED HEALTH CARE EDUCATION/TRAINING PROGRAM

## 2025-07-24 PROCEDURE — 94660 CPAP INITIATION&MGMT: CPT

## 2025-07-24 PROCEDURE — 7100000001 HC RECOVERY ROOM TIME - INITIAL BASE CHARGE: Performed by: SURGERY

## 2025-07-24 PROCEDURE — 96372 THER/PROPH/DIAG INJ SC/IM: CPT | Mod: 59 | Performed by: STUDENT IN AN ORGANIZED HEALTH CARE EDUCATION/TRAINING PROGRAM

## 2025-07-24 PROCEDURE — 2500000002 HC RX 250 W HCPCS SELF ADMINISTERED DRUGS (ALT 637 FOR MEDICARE OP, ALT 636 FOR OP/ED)

## 2025-07-24 PROCEDURE — 2500000002 HC RX 250 W HCPCS SELF ADMINISTERED DRUGS (ALT 637 FOR MEDICARE OP, ALT 636 FOR OP/ED): Performed by: ANESTHESIOLOGY

## 2025-07-24 PROCEDURE — 0751T DGTZ GLS MCRSCP SLD LEVEL II: CPT | Mod: TC,AHULAB | Performed by: SURGERY

## 2025-07-24 PROCEDURE — 3600000018 HC OR TIME - INITIAL BASE CHARGE - PROCEDURE LEVEL SIX: Performed by: SURGERY

## 2025-07-24 PROCEDURE — A49595 PR RPR AA HERNIA 1ST > 10 CM REDUCIBLE: Performed by: ANESTHESIOLOGY

## 2025-07-24 PROCEDURE — A49595 PR RPR AA HERNIA 1ST > 10 CM REDUCIBLE: Performed by: ANESTHESIOLOGIST ASSISTANT

## 2025-07-24 PROCEDURE — 2500000004 HC RX 250 GENERAL PHARMACY W/ HCPCS (ALT 636 FOR OP/ED): Performed by: ANESTHESIOLOGIST ASSISTANT

## 2025-07-24 PROCEDURE — 94664 DEMO&/EVAL PT USE INHALER: CPT | Mod: 59

## 2025-07-24 PROCEDURE — 71045 X-RAY EXAM CHEST 1 VIEW: CPT | Performed by: RADIOLOGY

## 2025-07-24 PROCEDURE — 49593 RPR AA HRN 1ST 3-10 RDC: CPT | Performed by: SURGERY

## 2025-07-24 PROCEDURE — 94640 AIRWAY INHALATION TREATMENT: CPT | Mod: 59,MUEWO

## 2025-07-24 PROCEDURE — 71045 X-RAY EXAM CHEST 1 VIEW: CPT

## 2025-07-24 DEVICE — VENTRALIGHT ST MESH WITH ECHO 2 POSITIONING SYSTEM, 4" X 6" (10 X 15 CM), ELLIPSE
Type: IMPLANTABLE DEVICE | Site: ABDOMEN | Status: FUNCTIONAL
Brand: VENTRALIGHT

## 2025-07-24 RX ORDER — LOSARTAN POTASSIUM 50 MG/1
100 TABLET ORAL DAILY
Status: DISCONTINUED | OUTPATIENT
Start: 2025-07-24 | End: 2025-07-25 | Stop reason: HOSPADM

## 2025-07-24 RX ORDER — TORSEMIDE 20 MG/1
20 TABLET ORAL
Status: DISCONTINUED | OUTPATIENT
Start: 2025-07-24 | End: 2025-07-25 | Stop reason: HOSPADM

## 2025-07-24 RX ORDER — ALBUTEROL SULFATE 0.83 MG/ML
3 SOLUTION RESPIRATORY (INHALATION) EVERY 2 HOUR PRN
Status: DISCONTINUED | OUTPATIENT
Start: 2025-07-24 | End: 2025-07-25 | Stop reason: HOSPADM

## 2025-07-24 RX ORDER — PROPOFOL 10 MG/ML
INJECTION, EMULSION INTRAVENOUS AS NEEDED
Status: DISCONTINUED | OUTPATIENT
Start: 2025-07-24 | End: 2025-07-24

## 2025-07-24 RX ORDER — POLYETHYLENE GLYCOL 3350 17 G/17G
17 POWDER, FOR SOLUTION ORAL DAILY
Status: DISCONTINUED | OUTPATIENT
Start: 2025-07-24 | End: 2025-07-25 | Stop reason: HOSPADM

## 2025-07-24 RX ORDER — LIDOCAINE HYDROCHLORIDE 20 MG/ML
INJECTION, SOLUTION INFILTRATION; PERINEURAL AS NEEDED
Status: DISCONTINUED | OUTPATIENT
Start: 2025-07-24 | End: 2025-07-24

## 2025-07-24 RX ORDER — KETOROLAC TROMETHAMINE 30 MG/ML
15 INJECTION, SOLUTION INTRAMUSCULAR; INTRAVENOUS ONCE
Status: COMPLETED | OUTPATIENT
Start: 2025-07-24 | End: 2025-07-24

## 2025-07-24 RX ORDER — ONDANSETRON HYDROCHLORIDE 2 MG/ML
INJECTION, SOLUTION INTRAVENOUS AS NEEDED
Status: DISCONTINUED | OUTPATIENT
Start: 2025-07-24 | End: 2025-07-24

## 2025-07-24 RX ORDER — SPIRONOLACTONE 25 MG/1
25 TABLET ORAL DAILY
Status: DISCONTINUED | OUTPATIENT
Start: 2025-07-24 | End: 2025-07-25 | Stop reason: HOSPADM

## 2025-07-24 RX ORDER — ENOXAPARIN SODIUM 100 MG/ML
40 INJECTION SUBCUTANEOUS EVERY 12 HOURS SCHEDULED
Status: DISCONTINUED | OUTPATIENT
Start: 2025-07-24 | End: 2025-07-25 | Stop reason: HOSPADM

## 2025-07-24 RX ORDER — OXYCODONE HYDROCHLORIDE 5 MG/1
5 TABLET ORAL EVERY 4 HOURS PRN
Status: DISCONTINUED | OUTPATIENT
Start: 2025-07-24 | End: 2025-07-24 | Stop reason: HOSPADM

## 2025-07-24 RX ORDER — NALOXONE HYDROCHLORIDE 0.4 MG/ML
0.2 INJECTION, SOLUTION INTRAMUSCULAR; INTRAVENOUS; SUBCUTANEOUS EVERY 5 MIN PRN
Status: DISCONTINUED | OUTPATIENT
Start: 2025-07-24 | End: 2025-07-25 | Stop reason: HOSPADM

## 2025-07-24 RX ORDER — PHENYLEPHRINE HCL IN 0.9% NACL 1 MG/10 ML
SYRINGE (ML) INTRAVENOUS AS NEEDED
Status: DISCONTINUED | OUTPATIENT
Start: 2025-07-24 | End: 2025-07-24

## 2025-07-24 RX ORDER — LEVOTHYROXINE SODIUM 25 UG/1
25 TABLET ORAL
Status: DISCONTINUED | OUTPATIENT
Start: 2025-07-25 | End: 2025-07-25 | Stop reason: HOSPADM

## 2025-07-24 RX ORDER — HYDROMORPHONE HYDROCHLORIDE 1 MG/ML
1 INJECTION, SOLUTION INTRAMUSCULAR; INTRAVENOUS; SUBCUTANEOUS EVERY 4 HOURS PRN
Status: DISCONTINUED | OUTPATIENT
Start: 2025-07-24 | End: 2025-07-25 | Stop reason: HOSPADM

## 2025-07-24 RX ORDER — SODIUM CHLORIDE, SODIUM LACTATE, POTASSIUM CHLORIDE, CALCIUM CHLORIDE 600; 310; 30; 20 MG/100ML; MG/100ML; MG/100ML; MG/100ML
100 INJECTION, SOLUTION INTRAVENOUS CONTINUOUS
Status: ACTIVE | OUTPATIENT
Start: 2025-07-24 | End: 2025-07-24

## 2025-07-24 RX ORDER — MIDAZOLAM HYDROCHLORIDE 2 MG/2ML
INJECTION, SOLUTION INTRAMUSCULAR; INTRAVENOUS AS NEEDED
Status: DISCONTINUED | OUTPATIENT
Start: 2025-07-24 | End: 2025-07-24

## 2025-07-24 RX ORDER — GABAPENTIN 400 MG/1
400 CAPSULE ORAL 3 TIMES DAILY
Status: DISCONTINUED | OUTPATIENT
Start: 2025-07-24 | End: 2025-07-25 | Stop reason: HOSPADM

## 2025-07-24 RX ORDER — FENTANYL CITRATE 50 UG/ML
INJECTION, SOLUTION INTRAMUSCULAR; INTRAVENOUS AS NEEDED
Status: DISCONTINUED | OUTPATIENT
Start: 2025-07-24 | End: 2025-07-24

## 2025-07-24 RX ORDER — NORETHINDRONE AND ETHINYL ESTRADIOL 0.5-0.035
KIT ORAL AS NEEDED
Status: DISCONTINUED | OUTPATIENT
Start: 2025-07-24 | End: 2025-07-24

## 2025-07-24 RX ORDER — ALBUTEROL SULFATE 0.83 MG/ML
2.5 SOLUTION RESPIRATORY (INHALATION) ONCE
Status: COMPLETED | OUTPATIENT
Start: 2025-07-24 | End: 2025-07-24

## 2025-07-24 RX ORDER — FLUTICASONE PROPIONATE AND SALMETEROL 250; 50 UG/1; UG/1
1 POWDER RESPIRATORY (INHALATION)
Status: DISCONTINUED | OUTPATIENT
Start: 2025-07-24 | End: 2025-07-24 | Stop reason: CLARIF

## 2025-07-24 RX ORDER — HYDROXYCHLOROQUINE SULFATE 200 MG/1
200 TABLET, FILM COATED ORAL 2 TIMES DAILY
Status: DISCONTINUED | OUTPATIENT
Start: 2025-07-25 | End: 2025-07-25 | Stop reason: HOSPADM

## 2025-07-24 RX ORDER — FORMOTEROL FUMARATE 20 UG/2ML
20 SOLUTION RESPIRATORY (INHALATION)
Status: DISCONTINUED | OUTPATIENT
Start: 2025-07-24 | End: 2025-07-25 | Stop reason: HOSPADM

## 2025-07-24 RX ORDER — ONDANSETRON HYDROCHLORIDE 2 MG/ML
4 INJECTION, SOLUTION INTRAVENOUS EVERY 8 HOURS PRN
Status: DISCONTINUED | OUTPATIENT
Start: 2025-07-24 | End: 2025-07-25 | Stop reason: HOSPADM

## 2025-07-24 RX ORDER — LIDOCAINE HYDROCHLORIDE 10 MG/ML
0.1 INJECTION, SOLUTION EPIDURAL; INFILTRATION; INTRACAUDAL; PERINEURAL ONCE
Status: DISCONTINUED | OUTPATIENT
Start: 2025-07-24 | End: 2025-07-24 | Stop reason: HOSPADM

## 2025-07-24 RX ORDER — SODIUM CHLORIDE, SODIUM LACTATE, POTASSIUM CHLORIDE, CALCIUM CHLORIDE 600; 310; 30; 20 MG/100ML; MG/100ML; MG/100ML; MG/100ML
75 INJECTION, SOLUTION INTRAVENOUS CONTINUOUS
Status: DISCONTINUED | OUTPATIENT
Start: 2025-07-24 | End: 2025-07-25 | Stop reason: HOSPADM

## 2025-07-24 RX ORDER — OXYCODONE HYDROCHLORIDE 5 MG/1
5 TABLET ORAL EVERY 6 HOURS PRN
Status: DISCONTINUED | OUTPATIENT
Start: 2025-07-24 | End: 2025-07-25 | Stop reason: HOSPADM

## 2025-07-24 RX ORDER — ROCURONIUM BROMIDE 10 MG/ML
INJECTION, SOLUTION INTRAVENOUS AS NEEDED
Status: DISCONTINUED | OUTPATIENT
Start: 2025-07-24 | End: 2025-07-24

## 2025-07-24 RX ORDER — ONDANSETRON 4 MG/1
4 TABLET, ORALLY DISINTEGRATING ORAL EVERY 8 HOURS PRN
Status: DISCONTINUED | OUTPATIENT
Start: 2025-07-24 | End: 2025-07-25 | Stop reason: HOSPADM

## 2025-07-24 RX ORDER — HYDRALAZINE HYDROCHLORIDE 20 MG/ML
5 INJECTION INTRAMUSCULAR; INTRAVENOUS ONCE
Status: COMPLETED | OUTPATIENT
Start: 2025-07-24 | End: 2025-07-24

## 2025-07-24 RX ORDER — WATER 1 ML/ML
INJECTION IRRIGATION AS NEEDED
Status: DISCONTINUED | OUTPATIENT
Start: 2025-07-24 | End: 2025-07-24 | Stop reason: HOSPADM

## 2025-07-24 RX ORDER — ACETAMINOPHEN 325 MG/1
650 TABLET ORAL EVERY 4 HOURS PRN
Status: DISCONTINUED | OUTPATIENT
Start: 2025-07-24 | End: 2025-07-24 | Stop reason: HOSPADM

## 2025-07-24 RX ORDER — CEFAZOLIN 1 G/1
INJECTION, POWDER, FOR SOLUTION INTRAVENOUS AS NEEDED
Status: DISCONTINUED | OUTPATIENT
Start: 2025-07-24 | End: 2025-07-24

## 2025-07-24 RX ORDER — ALBUTEROL SULFATE 0.83 MG/ML
3 SOLUTION RESPIRATORY (INHALATION) EVERY 6 HOURS PRN
Status: DISCONTINUED | OUTPATIENT
Start: 2025-07-24 | End: 2025-07-24

## 2025-07-24 RX ORDER — ACETAMINOPHEN 325 MG/1
650 TABLET ORAL EVERY 4 HOURS
Status: DISCONTINUED | OUTPATIENT
Start: 2025-07-24 | End: 2025-07-25 | Stop reason: HOSPADM

## 2025-07-24 RX ORDER — BUDESONIDE 0.5 MG/2ML
0.5 INHALANT ORAL
Status: DISCONTINUED | OUTPATIENT
Start: 2025-07-24 | End: 2025-07-25 | Stop reason: HOSPADM

## 2025-07-24 RX ADMIN — Medication: at 12:47

## 2025-07-24 RX ADMIN — ACETAMINOPHEN 650 MG: 325 TABLET ORAL at 21:58

## 2025-07-24 RX ADMIN — ROCURONIUM BROMIDE 10 MG: 10 INJECTION, SOLUTION INTRAVENOUS at 12:04

## 2025-07-24 RX ADMIN — Medication 1 DOSE: at 20:02

## 2025-07-24 RX ADMIN — KETOROLAC TROMETHAMINE 15 MG: 30 INJECTION, SOLUTION INTRAMUSCULAR at 13:17

## 2025-07-24 RX ADMIN — PROPOFOL 50 MG: 10 INJECTION, EMULSION INTRAVENOUS at 11:33

## 2025-07-24 RX ADMIN — GABAPENTIN 400 MG: 400 CAPSULE ORAL at 21:58

## 2025-07-24 RX ADMIN — SODIUM CHLORIDE, POTASSIUM CHLORIDE, SODIUM LACTATE AND CALCIUM CHLORIDE: 600; 310; 30; 20 INJECTION, SOLUTION INTRAVENOUS at 09:53

## 2025-07-24 RX ADMIN — Medication 1 DOSE: at 14:00

## 2025-07-24 RX ADMIN — LIDOCAINE HYDROCHLORIDE 100 MG: 20 INJECTION, SOLUTION INFILTRATION; PERINEURAL at 10:10

## 2025-07-24 RX ADMIN — ROCURONIUM BROMIDE 70 MG: 10 INJECTION, SOLUTION INTRAVENOUS at 10:12

## 2025-07-24 RX ADMIN — EPHEDRINE SULFATE 5 MG: 50 INJECTION, SOLUTION INTRAVENOUS at 10:55

## 2025-07-24 RX ADMIN — Medication 100 MCG: at 10:27

## 2025-07-24 RX ADMIN — HYDRALAZINE HYDROCHLORIDE 5 MG: 20 INJECTION INTRAMUSCULAR; INTRAVENOUS at 14:56

## 2025-07-24 RX ADMIN — MIDAZOLAM HYDROCHLORIDE 2 MG: 1 INJECTION, SOLUTION INTRAMUSCULAR; INTRAVENOUS at 09:56

## 2025-07-24 RX ADMIN — Medication 100 MCG: at 10:51

## 2025-07-24 RX ADMIN — ENOXAPARIN SODIUM 40 MG: 100 INJECTION SUBCUTANEOUS at 21:58

## 2025-07-24 RX ADMIN — LOSARTAN POTASSIUM 100 MG: 50 TABLET, FILM COATED ORAL at 18:56

## 2025-07-24 RX ADMIN — FORMOTEROL FUMARATE DIHYDRATE 20 MCG: 20 SOLUTION RESPIRATORY (INHALATION) at 19:57

## 2025-07-24 RX ADMIN — Medication 100 MCG: at 10:31

## 2025-07-24 RX ADMIN — SODIUM CHLORIDE, SODIUM LACTATE, POTASSIUM CHLORIDE, AND CALCIUM CHLORIDE 75 ML/HR: .6; .31; .03; .02 INJECTION, SOLUTION INTRAVENOUS at 19:01

## 2025-07-24 RX ADMIN — ACETAMINOPHEN 650 MG: 325 TABLET ORAL at 18:56

## 2025-07-24 RX ADMIN — EPHEDRINE SULFATE 5 MG: 50 INJECTION, SOLUTION INTRAVENOUS at 11:14

## 2025-07-24 RX ADMIN — ONDANSETRON 4 MG: 2 INJECTION, SOLUTION INTRAMUSCULAR; INTRAVENOUS at 12:15

## 2025-07-24 RX ADMIN — GABAPENTIN 400 MG: 400 CAPSULE ORAL at 18:56

## 2025-07-24 RX ADMIN — ROCURONIUM BROMIDE 30 MG: 10 INJECTION, SOLUTION INTRAVENOUS at 10:57

## 2025-07-24 RX ADMIN — FENTANYL CITRATE 50 MCG: 50 INJECTION, SOLUTION INTRAMUSCULAR; INTRAVENOUS at 11:21

## 2025-07-24 RX ADMIN — ALBUTEROL SULFATE 2.5 MG: 2.5 SOLUTION RESPIRATORY (INHALATION) at 12:50

## 2025-07-24 RX ADMIN — SUGAMMADEX 200 MG: 100 INJECTION, SOLUTION INTRAVENOUS at 12:29

## 2025-07-24 RX ADMIN — Medication: at 13:35

## 2025-07-24 RX ADMIN — DEXAMETHASONE SODIUM PHOSPHATE 10 MG: 4 INJECTION, SOLUTION INTRAMUSCULAR; INTRAVENOUS at 10:14

## 2025-07-24 RX ADMIN — SUGAMMADEX 200 MG: 100 INJECTION, SOLUTION INTRAVENOUS at 12:24

## 2025-07-24 RX ADMIN — ROCURONIUM BROMIDE 20 MG: 10 INJECTION, SOLUTION INTRAVENOUS at 11:40

## 2025-07-24 RX ADMIN — FENTANYL CITRATE 50 MCG: 50 INJECTION, SOLUTION INTRAMUSCULAR; INTRAVENOUS at 10:10

## 2025-07-24 RX ADMIN — PROPOFOL 150 MG: 10 INJECTION, EMULSION INTRAVENOUS at 10:10

## 2025-07-24 RX ADMIN — Medication: at 13:37

## 2025-07-24 RX ADMIN — BUDESONIDE 0.5 MG: 0.5 INHALANT RESPIRATORY (INHALATION) at 19:57

## 2025-07-24 RX ADMIN — HYDRALAZINE HYDROCHLORIDE 5 MG: 20 INJECTION INTRAMUSCULAR; INTRAVENOUS at 15:21

## 2025-07-24 RX ADMIN — Medication: at 12:50

## 2025-07-24 RX ADMIN — CEFAZOLIN 2 G: 1 INJECTION, POWDER, FOR SOLUTION INTRAMUSCULAR; INTRAVENOUS at 10:22

## 2025-07-24 SDOH — ECONOMIC STABILITY: FOOD INSECURITY: WITHIN THE PAST 12 MONTHS, YOU WORRIED THAT YOUR FOOD WOULD RUN OUT BEFORE YOU GOT THE MONEY TO BUY MORE.: NEVER TRUE

## 2025-07-24 SDOH — SOCIAL STABILITY: SOCIAL INSECURITY: WITHIN THE LAST YEAR, HAVE YOU BEEN AFRAID OF YOUR PARTNER OR EX-PARTNER?: NO

## 2025-07-24 SDOH — SOCIAL STABILITY: SOCIAL INSECURITY: WITHIN THE LAST YEAR, HAVE YOU BEEN HUMILIATED OR EMOTIONALLY ABUSED IN OTHER WAYS BY YOUR PARTNER OR EX-PARTNER?: NO

## 2025-07-24 SDOH — SOCIAL STABILITY: SOCIAL INSECURITY
WITHIN THE LAST YEAR, HAVE YOU BEEN RAPED OR FORCED TO HAVE ANY KIND OF SEXUAL ACTIVITY BY YOUR PARTNER OR EX-PARTNER?: NO

## 2025-07-24 SDOH — SOCIAL STABILITY: SOCIAL INSECURITY: ARE YOU OR HAVE YOU BEEN THREATENED OR ABUSED PHYSICALLY, EMOTIONALLY, OR SEXUALLY BY ANYONE?: NO

## 2025-07-24 SDOH — ECONOMIC STABILITY: INCOME INSECURITY: IN THE PAST 12 MONTHS HAS THE ELECTRIC, GAS, OIL, OR WATER COMPANY THREATENED TO SHUT OFF SERVICES IN YOUR HOME?: NO

## 2025-07-24 SDOH — SOCIAL STABILITY: SOCIAL INSECURITY: HAVE YOU HAD THOUGHTS OF HARMING ANYONE ELSE?: NO

## 2025-07-24 SDOH — SOCIAL STABILITY: SOCIAL INSECURITY
WITHIN THE LAST YEAR, HAVE YOU BEEN KICKED, HIT, SLAPPED, OR OTHERWISE PHYSICALLY HURT BY YOUR PARTNER OR EX-PARTNER?: NO

## 2025-07-24 SDOH — SOCIAL STABILITY: SOCIAL INSECURITY: DO YOU FEEL UNSAFE GOING BACK TO THE PLACE WHERE YOU ARE LIVING?: NO

## 2025-07-24 SDOH — SOCIAL STABILITY: SOCIAL INSECURITY: DOES ANYONE TRY TO KEEP YOU FROM HAVING/CONTACTING OTHER FRIENDS OR DOING THINGS OUTSIDE YOUR HOME?: NO

## 2025-07-24 SDOH — ECONOMIC STABILITY: FOOD INSECURITY: WITHIN THE PAST 12 MONTHS, THE FOOD YOU BOUGHT JUST DIDN'T LAST AND YOU DIDN'T HAVE MONEY TO GET MORE.: NEVER TRUE

## 2025-07-24 SDOH — SOCIAL STABILITY: SOCIAL INSECURITY: DO YOU FEEL ANYONE HAS EXPLOITED OR TAKEN ADVANTAGE OF YOU FINANCIALLY OR OF YOUR PERSONAL PROPERTY?: NO

## 2025-07-24 SDOH — HEALTH STABILITY: MENTAL HEALTH: CURRENT SMOKER: 0

## 2025-07-24 SDOH — SOCIAL STABILITY: SOCIAL INSECURITY: HAS ANYONE EVER THREATENED TO HURT YOUR FAMILY OR YOUR PETS?: NO

## 2025-07-24 SDOH — SOCIAL STABILITY: SOCIAL INSECURITY: ABUSE: ADULT

## 2025-07-24 SDOH — SOCIAL STABILITY: SOCIAL INSECURITY: WERE YOU ABLE TO COMPLETE ALL THE BEHAVIORAL HEALTH SCREENINGS?: YES

## 2025-07-24 SDOH — SOCIAL STABILITY: SOCIAL INSECURITY: ARE THERE ANY APPARENT SIGNS OF INJURIES/BEHAVIORS THAT COULD BE RELATED TO ABUSE/NEGLECT?: NO

## 2025-07-24 ASSESSMENT — PAIN - FUNCTIONAL ASSESSMENT

## 2025-07-24 ASSESSMENT — PAIN SCALES - GENERAL
PAINLEVEL_OUTOF10: 0 - NO PAIN
PAINLEVEL_OUTOF10: 7
PAINLEVEL_OUTOF10: 5 - MODERATE PAIN
PAINLEVEL_OUTOF10: 4
PAINLEVEL_OUTOF10: 5 - MODERATE PAIN
PAINLEVEL_OUTOF10: 4
PAINLEVEL_OUTOF10: 7
PAINLEVEL_OUTOF10: 4
PAINLEVEL_OUTOF10: 5 - MODERATE PAIN
PAINLEVEL_OUTOF10: 4
PAINLEVEL_OUTOF10: 3
PAINLEVEL_OUTOF10: 4
PAINLEVEL_OUTOF10: 5 - MODERATE PAIN
PAINLEVEL_OUTOF10: 4
PAINLEVEL_OUTOF10: 4
PAINLEVEL_OUTOF10: 0 - NO PAIN
PAINLEVEL_OUTOF10: 0 - NO PAIN
PAINLEVEL_OUTOF10: 4
PAINLEVEL_OUTOF10: 7

## 2025-07-24 ASSESSMENT — ACTIVITIES OF DAILY LIVING (ADL)
GROOMING: INDEPENDENT
BATHING: INDEPENDENT
LACK_OF_TRANSPORTATION: NO
FEEDING YOURSELF: INDEPENDENT
HEARING - RIGHT EAR: FUNCTIONAL
DRESSING YOURSELF: INDEPENDENT
ASSISTIVE_DEVICE: DENTURES UPPER;DENTURES LOWER
ADEQUATE_TO_COMPLETE_ADL: YES
TOILETING: INDEPENDENT
WALKS IN HOME: INDEPENDENT
HEARING - LEFT EAR: FUNCTIONAL
JUDGMENT_ADEQUATE_SAFELY_COMPLETE_DAILY_ACTIVITIES: YES
PATIENT'S MEMORY ADEQUATE TO SAFELY COMPLETE DAILY ACTIVITIES?: YES

## 2025-07-24 ASSESSMENT — COGNITIVE AND FUNCTIONAL STATUS - GENERAL
MOVING FROM LYING ON BACK TO SITTING ON SIDE OF FLAT BED WITH BEDRAILS: A LITTLE
DAILY ACTIVITIY SCORE: 23
CLIMB 3 TO 5 STEPS WITH RAILING: A LITTLE
TURNING FROM BACK TO SIDE WHILE IN FLAT BAD: A LITTLE
DRESSING REGULAR LOWER BODY CLOTHING: A LITTLE
PATIENT BASELINE BEDBOUND: NO
WALKING IN HOSPITAL ROOM: A LITTLE
STANDING UP FROM CHAIR USING ARMS: A LITTLE
MOBILITY SCORE: 18
MOVING TO AND FROM BED TO CHAIR: A LITTLE

## 2025-07-24 ASSESSMENT — LIFESTYLE VARIABLES
AUDIT-C TOTAL SCORE: 0
HOW OFTEN DO YOU HAVE A DRINK CONTAINING ALCOHOL: NEVER
HOW MANY STANDARD DRINKS CONTAINING ALCOHOL DO YOU HAVE ON A TYPICAL DAY: PATIENT DOES NOT DRINK
AUDIT-C TOTAL SCORE: 0
HOW OFTEN DO YOU HAVE 6 OR MORE DRINKS ON ONE OCCASION: NEVER
SKIP TO QUESTIONS 9-10: 1

## 2025-07-24 ASSESSMENT — PATIENT HEALTH QUESTIONNAIRE - PHQ9
SUM OF ALL RESPONSES TO PHQ9 QUESTIONS 1 & 2: 0
2. FEELING DOWN, DEPRESSED OR HOPELESS: NOT AT ALL
1. LITTLE INTEREST OR PLEASURE IN DOING THINGS: NOT AT ALL

## 2025-07-24 NOTE — BRIEF OP NOTE
Date: 2025  OR Location: [facility] OR    Name: Genesis Garcia, : 1945, Age: 80 y.o., MRN: [id number], Sex: female    Diagnosis  Pre-op Diagnosis      * Incisional hernia, without obstruction or gangrene [K43.2] Post-op Diagnosis     * Incisional hernia, without obstruction or gangrene [K43.2]     Procedures  Robot Assisted Ventral Hernia Repair; Mesh Placement  [address] - [place] RPR AA HERNIA 1ST 3-10 CM REDUCIBLE      Surgeons      * Kalen Russo - Primary    Resident/Fellow/Other Assistant:  Surgeons and Role:  * No surgeons found with a matching role *    Staff:   Circulator: Ginny Chiang Person: Laurence Reynolds Circulator: Michelle Reynolds Scrub: Cami    Anesthesia Staff: Anesthesiologist: Aj Anna MD  C-AA: KAVYA East; KAVYA Tena    Procedure Summary  Anesthesia: General  ASA: III  Estimated Blood Loss: 10mL  Intra-op Medications:   Administrations occurring from 0935 to 1235 on 25:   Medication Name Total Dose   sterile water irrigation solution 500 mL   BUPivacaine HCl (Marcaine) 0.5 % (5 mg/mL) 30 mL, lidocaine (Xylocaine) 30 mL syringe 45 mL   ceFAZolin (Ancef) vial 1 g 2 g   dexAMETHasone (Decadron) 4 mg/mL IV Syringe 2 mL 10 mg   ePHEDrine injection 10 mg   fentaNYL (Sublimaze) injection 50 mcg/mL 100 mcg   LR bolus Cannot be calculated   lidocaine (Xylocaine) injection 2 % 100 mg   midazolam PF (Versed) injection 1 mg/mL 2 mg   ondansetron (Zofran) 2 mg/mL injection 4 mg   phenylephrine 100 mcg/mL syringe 10 mL (prefilled) 300 mcg   propofol (Diprivan) injection 10 mg/mL 200 mg   rocuronium (ZeMuron) 50 mg/5 mL injection 130 mg   sugammadex (Bridion) 200 mg/2 mL injection 400 mg              Anesthesia Record               Intraprocedure I/O Totals       None           Specimen:   ID Type Source Tests Collected by Time   1 : OLD MESH Tissue MESH SURGICAL PATHOLOGY EXAM Kalen Russo MD 2025 1140                  Findings: previous ventral hernia mesh explanted; new  mesh secured after reduction of bowel contents from hernia sac     Complications:  None; patient tolerated the procedure well.     Disposition: PACU - hemodynamically stable.  Condition: stable  Specimens Collected:   ID Type Source Tests Collected by Time   1 : OLD MESH Tissue MESH SURGICAL PATHOLOGY EXAM Kalen Russo MD 7/24/2025 2596     Attending Attestation:     Kalen Russo  Phone Number: [phone]

## 2025-07-24 NOTE — ANESTHESIA PROCEDURE NOTES
Airway  Date/Time: 7/24/2025 10:14 AM  Reason: elective    Airway not difficult    Staffing  Performed: KAVYA   Authorized by: Aj Anna MD    Performed by: KAVYA East  Patient location during procedure: OR    Patient Condition  Indications for airway management: anesthesia  Patient position: sniffing  Sedation level: deep     Final Airway Details   Preoxygenated: yes  Final airway type: endotracheal airway  Successful airway: ETT  Cuffed: yes   Successful intubation technique: direct laryngoscopy  Adjuncts used in placement: intubating stylet  Endotracheal tube insertion site: oral  Blade: Justin  Blade size: #3  ETT size (mm): 7.0  Cormack-Lehane Classification: grade IIa - partial view of glottis  Placement verified by: chest auscultation and capnometry   Measured from: gums  ETT to gums (cm): 21  Number of attempts at approach: 1

## 2025-07-24 NOTE — PERIOPERATIVE NURSING NOTE
1247 Patient arrived to [facility] after procedure with Anesthesia and procedure RN, procedure discussed, plan reviewed, VSS, respiratory at bedside placing patient on CPAP    1300 X ray at bedside for CXR    1307 Toradol 15 mg ordered by      1313 Message sent to family via text at this time.     1317 Patient medicated for pain per orders at this time     1332 Respiratory contacted for interventions due to SPO2 88-90 on CPAP, location of pulse ox switched to right ear lobe    1337 Patient placed on 50% FiO2 by respiratory     1341 Message sent to family via text at this time.     1358 Respiratory at bedside CPAP discontinue at this time, patient placed on 3L NC at this time     1405 Patient tolerating PO fluids at this time    1410 Friend updated on plan of care via phone call     1422 resident contacted for admit orders    1435 Patient tolerating crackers at this time.     1500 patient tolerating lunch tray    1529 Friend updated on plan of care via phone call     Patient name & assigned room # Genesisalvaro Garcia  Procedure: Robot Assisted Ventral Hernia Repair; mesh placement   Anesthesia type (i.e. general, regional, MAC): general   Estimated blood loss (EBL) in OR: 10   Relevant PMH: COPD, HTN, CHF, hypothyroidism, asthma, DVT (takes eliquis at home), CKD, dysphagia, neuropathy, OA, osteoporosis, rotator cuff syndrom (right), venous ulcer of the leg  Orientation/mental status: x4   Incision site(s)/location, surgical dressing(s), and drain type/location: 4 incisions dressed with dermabond  Fluids given in OR/PACU, IV site(s), and drips/fluids currently infusin R FA   PO status (last oral intake): tolerating water   Last set of vital signs & O2 requirements: 145/66, HR 96, RR 14, O2 95% 3L O2  Current pain level & last pain/nausea medication dose/time given: 10 toradol 15 mg given at 1317  Next antibiotic due @   Last tranexamic acid dose given @  Last void/Patiño in place: voided in PACU on bedpan   SCDs  on (yes/no): Yes  Mode of transport (cart or bed): cart  Belongings sent with patient: dentures upper and lower, clothing   Emergency contact (name, relationship, phone number): Cheryl (friend) [phone]

## 2025-07-24 NOTE — OP NOTE
Robot Assisted Ventral Hernia Repair; Mesh Placement Operative Note     Date: 2025  OR Location: [facility] OR    Name: Genesis Garcia, : 1945, Age: 80 y.o., MRN: [id number], Sex: female    Diagnosis  Pre-op Diagnosis      * Incisional hernia, without obstruction or gangrene [K43.2] Post-op Diagnosis     * Incisional hernia, without obstruction or gangrene [K43.2]     Procedures  Robot Assisted Ventral Hernia Repair; Mesh Placement  02571 - [place] RPR AA HERNIA 1ST 3-10 CM REDUCIBLE      Surgeons      * Kalen Russo - Primary    Resident/Fellow/Other Assistant:  Surgeons and Role:  * No surgeons found with a matching role *    Staff:   Circulator: Ginny Chiang Person: Laurence Reynolds Circulator: Michelle Reynolds Scrub: Cami    Anesthesia Staff: Anesthesiologist: Aj Anna MD  C-AA: KAVYA East; KAVYA Tena    Procedure Summary  Anesthesia: General  ASA: III  Estimated Blood Loss: 12 mL  Intra-op Medications:   Administrations occurring from 0935 to 1235 on 25:   Medication Name Total Dose   sterile water irrigation solution 500 mL   BUPivacaine HCl (Marcaine) 0.5 % (5 mg/mL) 30 mL, lidocaine (Xylocaine) 30 mL syringe 45 mL   ceFAZolin (Ancef) vial 1 g 2 g   dexAMETHasone (Decadron) 4 mg/mL IV Syringe 2 mL 10 mg   ePHEDrine injection 10 mg   fentaNYL (Sublimaze) injection 50 mcg/mL 100 mcg   LR bolus Cannot be calculated   lidocaine (Xylocaine) injection 2 % 100 mg   midazolam PF (Versed) injection 1 mg/mL 2 mg   ondansetron (Zofran) 2 mg/mL injection 4 mg   phenylephrine 100 mcg/mL syringe 10 mL (prefilled) 300 mcg   propofol (Diprivan) injection 10 mg/mL 200 mg   rocuronium (ZeMuron) 50 mg/5 mL injection 130 mg   sugammadex (Bridion) 200 mg/2 mL injection 400 mg              Anesthesia Record               Intraprocedure I/O Totals          Intake    LR bolus 1000.00 mL    Total Intake 1000 mL          Specimen:   ID Type Source Tests Collected by Time   1 : OLD MESH Tissue MESH  SURGICAL PATHOLOGY EXAM Kalen Russo MD 7/24/2025 1140                    Indications: Genesis Garcia is an 80 y.o. female who is having surgery for Incisional hernia, without obstruction or gangrene [K43.2].     The patient was seen in the preoperative area. The risks, benefits, complications, treatment options, non-operative alternatives, expected recovery and outcomes were discussed with the patient. The possibilities of reaction to medication, pulmonary aspiration, injury to surrounding structures, bleeding, recurrent infection, the need for additional procedures, failure to diagnose a condition, and creating a complication requiring transfusion or operation were discussed with the patient. The patient concurred with the proposed plan, giving informed consent.  The site of surgery was properly noted/marked if necessary per policy. The patient has been actively warmed in preoperative area. Preoperative antibiotics have been ordered and given within 1 hours of incision. Venous thrombosis prophylaxis have been ordered including bilateral sequential compression devices     Procedure Details:      Ms. Garcia who comes in for elective repair of a moderate-sized incisional hernia just above the umbilicus.  Risk benefits and alternatives were discussed preoperatively and he agrees to the operation.        Description of procedure:  Patient taken operating room placed supine on the operating table. timeout was established general anesthesia was induced he received antibiotics.    We placed a bump under the left flank and then we tucked the left arm.  The right arm was extended on  an armboard.  The abdomen was sterilely prepared. We made a subxiphoid incision and placed a 12 mm Cuevas trocar. We established insufflation.  We then placed a 8 mm trocar in the left lateral flank.  8 mm trocars were placed at the left subcostal margin  and the left lower abdominal wall.     We then docked the robot to our ports.   The operation performed from a console in the corner of the room.   Inspection of her anterior abdominal wall revealed several hernia defects in the midline.  There was a 4 cm hernia defect just  above the umbilicus.  There was some old mesh that we removed and handed off the field as a specimen.  There was also a small hernia defect beneath the umbilicus.     Between more inferior most and superiormost hernia this distance measured 8 cm.    We selected a 10 x 15 cm oval ventral light mesh with the echo positioning system.  This was dropped into the abdominal cavity.       We plicated the abdominal fascia in the midline thus closing these fascial defects using a #1 nonabsorbable V-loc suture in a running fashion.   We then used a MikeyPhotoSynesi needle passer to pass the tethering string of the  echo positioning system  through the  anterior abdominal wall in the midportion of the hernia defects.  The mesh was brought the mesh to lie nicely as an underlay with good overlap to cover all of the hernia defects. The mesh was secured to the anterior abdominal wall using a  by running an 0 absorbable V lock suture around the periphery of the mesh securing it to her anterior abdominal wall.  We infiltrated the abdominal wall with solution 1% lidocaine quarter percent Marcaine.     We removed the   scaffolding system in its entirety. We closed our subxiphoid port using 0 Vicryl suture in interrupted figure-of-eight stitch pattern. Skin was reapproximated using 3 and 4-0 subcuticular Vicryl stitches     Patient tolerated the procedure without difficulty and was returned to the recovery room in stable condition.       Complications:  None; patient tolerated the procedure well.    Disposition: PACU - hemodynamically stable.  Condition: stable           Attending Attestation: I was present and scrubbed for the entire procedure.    Kalen Russo  Phone Number: [phone]

## 2025-07-24 NOTE — ANESTHESIA POSTPROCEDURE EVALUATION
Patient: Genesis Garcia    Procedure Summary       Date: 07/24/25 Room / Location: [facility] OR 08 / [facility] OR    Anesthesia Start: 0953 Anesthesia Stop: 1258    Procedure: Robot Assisted Ventral Hernia Repair; Mesh Placement (Abdomen) Diagnosis:       Incisional hernia, without obstruction or gangrene      (Incisional hernia, without obstruction or gangrene [K43.2])    Surgeons: Kalen Russo MD Responsible Provider: Aj Anna MD    Anesthesia Type: general ASA Status: 3            Anesthesia Type: general    Vitals Value Taken Time   /90 07/24/25 14:17   Temp 36 °C (96.8 °F) 07/24/25 13:45   Pulse 82 07/24/25 14:18   Resp 16 07/24/25 14:00   SpO2 94 % 07/24/25 14:18   Vitals shown include unfiled device data.    Anesthesia Post Evaluation    Patient location during evaluation: PACU  Patient participation: complete - patient participated  Level of consciousness: awake  Pain management: adequate  Airway patency: stridor  Cardiovascular status: acceptable  Respiratory status: CPAP and BIPAP  Hydration status: acceptable  Postoperative Nausea and Vomiting: none  Comments: Patient regurgitated small volume of tan fluid at the time of anesthesia induction. ETT removed, oropharynx suctioned, re-intubated and suctioned through ETT. On emergence patient with labored breathing and high oxygen requirement. Transferred from OR to PACU and CXR obtained with haziness on left side and right side was more clear. Pt with known COPD. BIPAP at 50% currently. Awaiting CXR final read.        No notable events documented.

## 2025-07-24 NOTE — ANESTHESIA PREPROCEDURE EVALUATION
Patient: Genesis Garcia    Procedure Information       Date/Time: 07/24/25 5035    Procedure: Robot Assisted Ventral Hernia Repair; Mesh Placement (Abdomen)    Location: [facility] OR 08 / [facility] OR    Surgeons: Kalen Russo MD            Relevant Problems   Cardiac   (+) CHF (congestive heart failure)   (+) Essential (primary) hypertension   (+) Essential hypertension   (+) Heart murmur   (+) Hypertension   (+) Paroxysmal atrial fibrillation (Multi)   (+) Peripheral vascular disease      Pulmonary   (+) Asthma   (+) Chronic bronchitis (Multi)   (+) Chronic obstructive pulmonary disease (Multi)   (+) HCAP (healthcare-associated pneumonia)   (+) Mild intermittent asthma      Neuro   (+) Carpal tunnel syndrome   (+) Idiopathic peripheral neuropathy   (+) Lumbar radiculitis   (+) Peripheral neuropathy, idiopathic      GI   (+) Dysphagia   (+) Gastroesophageal reflux disease with esophagitis      /Renal   (+) Chronic renal impairment, stage 3 (moderate) (Multi)      Endocrine   (+) Hypothyroidism (acquired)   (+) Morbid obesity (Multi)   (+) Morbid obesity due to excess calories (Multi)   (+) Obesity, unspecified      Hematology   (+) Anemia   (+) Chronic anticoagulation   (+) Chronic deep vein thrombosis (DVT) of proximal vein of lower extremity   (+) Iron deficiency anemia      Musculoskeletal   (+) Carpal tunnel syndrome   (+) Chronic low back pain   (+) Chronic pain syndrome   (+) Osteoarthritis of hip   (+) Osteoarthritis of knee   (+) Primary osteoarthritis of right knee   (+) Rheumatoid arthritis   (+) Unilateral primary osteoarthritis, right hip   (+) Unspecified osteoarthritis, unspecified site      ID   (+) Candidiasis of mouth   (+) Colitis due to Clostridioides difficile   (+) HCAP (healthcare-associated pneumonia)       Clinical information reviewed:   Tobacco  Allergies  Meds   Med Hx  Surg Hx   Fam Hx  Soc Hx         Medical History[1]   Surgical History[2]  Social History[3]   Current  Outpatient Medications   Medication Instructions    acetaminophen (TYLENOL) 650 mg, oral, Every 4 hours PRN    albuterol (ProAir HFA) 90 mcg/actuation inhaler 2 puffs, inhalation, Every 6 hours PRN    albuterol 2.5 mg, nebulization, Every 2 hour PRN    apixaban (ELIQUIS) 2.5 mg, oral, 2 times daily    Bacillus coagulans (PROBIOTIC, B. COAGULANS, ORAL) 1 tablet, Daily    calcium carbonate 600 mg calcium (1,500 mg) tablet 1 tablet, Daily    chlorhexidine (Peridex) 0.12 % solution Swish for 30 seconds and spit 15mL of solution the night before and morning of surgery    cholecalciferol (Vitamin D-3) 50 MCG (2000 UT) tablet Take by mouth.    [START ON 9/3/2025] denosumab (PROLIA) 60 mg, subcutaneous, Every 6 months    fluticasone propion-salmeteroL (Advair Diskus) 250-50 mcg/dose diskus inhaler 1 puff, 2 times daily RT    folic acid (FOLVITE) 1 mg, oral, Daily    gabapentin (NEURONTIN) 400 mg, oral, 3 times daily    hydrocortisone 2.5 % cream Topical, As needed, Use maximum 2-3 times a week.    hydroxychloroquine (Plaquenil) 200 mg tablet 1 tablet, Every 12 hours scheduled (0630,1830)    levothyroxine (SYNTHROID) 25 mcg, oral, Daily before breakfast    losartan (Cozaar) 100 mg tablet 1 tablet, Daily    methotrexate (TREXALL) 12.5 mg, oral, Weekly, Follow directions carefully, and ask to explain any part you do not understand. Take exactly as directed. On Thursday    multivitamin capsule Take by mouth.    Slow Fe 137 mg, oral, Daily    spironolactone (Aldactone) 25 mg tablet 1 tablet, Daily    torsemide (Demadex) 20 mg tablet 1 tablet, Daily (0630)      RX Allergies[4]     Chemistry    Lab Results   Component Value Date/Time     07/01/2025 0800    K 4.4 07/01/2025 0800     07/01/2025 0800    CO2 29 07/01/2025 0800    BUN 23 07/01/2025 0800    CREATININE 1.08 (H) 07/01/2025 0800    Lab Results   Component Value Date/Time    CALCIUM 8.8 07/01/2025 0800    CALCIUM 8.9 02/14/2025 0823    ALKPHOS 79 07/01/2025 0800     AST 20 07/01/2025 0800    ALT 13 07/01/2025 0800    BILITOT 0.5 07/01/2025 0800          Lab Results   Component Value Date    HGBA1C 5.4 03/11/2025     Lab Results   Component Value Date/Time    WBC 5.2 07/01/2025 0800    HGB 11.6 (L) 07/01/2025 0800    HCT 37.8 07/01/2025 0800     07/01/2025 0800    ABO O 09/07/2023 0702    LABRH POS 09/07/2023 0702     Lab Results   Component Value Date/Time    PROTIME 11.0 01/18/2023 1211    INR 1.0 01/18/2023 1211     Encounter Date: 04/16/25   ECG 12 lead   Result Value    Ventricular Rate 75    Atrial Rate 75    VT Interval 220    QRS Duration 94    QT Interval 414    QTC Calculation(Bazett) 462    P Axis 68    R Axis 7    T Axis 62    QRS Count 13    Q Onset 213    P Onset 103    P Offset 159    T Offset 420    QTC Fredericia 445    Narrative    Sinus rhythm with 1st degree AV block  Low voltage QRS  Borderline ECG  Confirmed by Hernan Fonseca ([id number]) on 4/16/2025 11:36:17 AM        Transthoracic Echo (TTE) Complete 03/24/2025    Results for orders placed during the hospital encounter of 03/24/25    Transthoracic Echo (TTE) Complete    [place] [id number]  Tel [phone] and Fax [phone]    TRANSTHORACIC ECHOCARDIOGRAM REPORT      Patient Name:       VIOLETA VALDOVINOS Reading Physician:    [id number] Yaw Arevalo MD  Study Date:         3/24/2025           Ordering Provider:    [id number] LUNA  MRN/PID:            [id number]            Fellow:  Accession#:         [id number]        Nurse:                Dolores Weaver RN  Date of Birth/Age:  1945 / 79      Sonographer:          Addy watkins RDCS  Gender assigned at  F                   Additional Staff:  Birth:  Height:             157.48 cm           Admit Date:  Weight:             102.06 kg           Admission Status:     Outpatient  BSA / BMI:          2.01 m2 / 41.15     Encounter#:            [id number]  kg/m2  Blood Pressure:     169/84 mmHg         Department Location:  [facility]    Study Type:    TRANSTHORACIC ECHO (TTE) COMPLETE  Diagnosis/ICD: Cardiac murmur, unspecified-R01.1  Indication:    Heart Murmur  CPT Code:      Echo Complete w Full Doppler-14088    Patient History:  BMI:               Obese >30  Pertinent History: Previous DVT, HTN, CHF, PVD and LE Edema. Anemia, Asthma,  Left Breast Mass.    Study Detail: The following Echo studies were performed: color flow, Doppler,  M-Mode and 2D. Technically challenging study due to body habitus  and prominent lung artifact. Definity used as a contrast agent for  endocardial border definition. Total contrast used for this  procedure was 2.0 mL via IV push.      PHYSICIAN INTERPRETATION:  Left Ventricle: The left ventricular systolic function is normal, with a Arroyo's biplane calculated ejection fraction of 63%. There is severely increased eccentric left ventricular hypertrophy. There are no regional left ventricular wall motion abnormalities. The left ventricular cavity size is normal. There is moderately increased septal and mildly increased posterior left ventricular wall thickness. Left ventricular diastolic filling is indeterminate.  Left Atrium: The left atrium is mildly dilated.  Right Ventricle: The right ventricle is normal in size. There is normal right ventricular global systolic function.  Right Atrium: The right atrium is normal in size.  Aortic Valve: The aortic valve is trileaflet. There is minimal aortic valve cusp calcification. There is trace aortic valve regurgitation. The peak instantaneous gradient of the aortic valve is 6 mmHg.  Mitral Valve: The mitral valve is normal in structure. There is trace mitral valve regurgitation.  Tricuspid Valve: The tricuspid valve is structurally normal. There is trace tricuspid regurgitation. The right ventricular systolic pressure is unable to be estimated.  Pulmonic Valve: The  pulmonic valve is structurally normal. There is physiologic pulmonic valve regurgitation.  Pericardium: There is no pericardial effusion noted.  Aorta: The aortic root is abnormal. There is mild dilatation of the ascending aorta. There is mild dilatation of the aortic root.  Systemic Veins: The inferior vena cava appears normal in size.  In comparison to the previous echocardiogram(s): Compared with study dated 12/21/2023, no significant change.      CONCLUSIONS:  1. The left ventricular systolic function is normal, with a Arroyo's biplane calculated ejection fraction of 63%.  2. Left ventricular diastolic filling is indeterminate.  3. There is moderately increased septal thickness.  4. There is severely increased eccentric left ventricular hypertrophy.  5. There is normal right ventricular global systolic function.  6. The left atrium is mildly dilated.    QUANTITATIVE DATA SUMMARY:    2D MEASUREMENTS:          Normal Ranges:  Ao Root d:       3.70 cm  (2.0-3.7cm)  LAs:             3.98 cm  (2.7-4.0cm)  IVSd:            1.43 cm  (0.6-1.1cm)  LVPWd:           0.98 cm  (0.6-1.1cm)  LVIDd:           5.15 cm  (3.9-5.9cm)  LVIDs:           2.85 cm  LV Mass Index:   123 g/m2  LVEDV Index:     64 ml/m2  LV % FS          44.6 %      LEFT ATRIUM:                  Normal Ranges:  LA Vol A4C:        69.4 ml    (22+/-6mL/m2)  LA Vol A2C:        69.4 ml  LA Vol BP:         70.7 ml  LA Vol Index A4C:  34.5 ml/m2  LA Vol Index A2C:  34.5 ml/m2  LA Vol Index BP:   35.2 ml/m2  LA Area A4C:       20.8 cm2  LA Area A2C:       20.4 cm2  LA Major Axis A4C: 5.3 cm  LA Major Axis A2C: 5.1 cm  LA Volume Index:   34.5 ml/m2  LA Vol A4C:        61.1 ml  LA Vol A2C:        66.2 ml  LA Vol Index BSA:  31.7 ml/m2      AORTA MEASUREMENTS:         Normal Ranges:  Asc Ao, d:          3.80 cm (2.1-3.4cm)      LV SYSTOLIC FUNCTION:  Normal Ranges:  EF-A4C View:    65 % (>=55%)  EF-A2C View:    63 %  EF-Biplane:     63 %  LV EF Reported: 63  %      LV DIASTOLIC FUNCTION:             Normal Ranges:  MV Peak E:             0.61 m/s    (0.7-1.2 m/s)  MV Peak A:             0.96 m/s    (0.42-0.7 m/s)  E/A Ratio:             0.63        (1.0-2.2)  MV e'                  0.073 m/s   (>8.0)  MV lateral e'          0.07 m/s  MV medial e'           0.08 m/s  MV A Dur:              105.61 msec  E/e' Ratio:            8.30        (<8.0)  MV DT:                 126 msec    (150-240 msec)  PulmV Sys Dipak:         57.15 cm/s  PulmV Wilde Dipak:        34.10 cm/s  PulmV S/D Dipak:         1.68  PulmV A Revs Dipak:      33.79 cm/s      MITRAL VALVE:          Normal Ranges:  MV DT:        126 msec (150-240msec)      AORTIC VALVE:           Normal Ranges:  AoV Vmax:      1.27 m/s (<=1.7m/s)  AoV Peak P.5 mmHg (<20mmHg)  LVOT Max Dipak:  1.00 m/s (<=1.1m/s)  LVOT VTI:      20.23 cm  LVOT Diameter: 2.15 cm  (1.8-2.4cm)  AoV Area,Vmax: 2.85 cm2 (2.5-4.5cm2)      RIGHT VENTRICLE:  RV s' 0.18 m/s      PULMONIC VALVE:          Normal Ranges:  PV Accel Time:  91 msec  (>120ms)  PV Max Dipak:     1.2 m/s  (0.6-0.9m/s)  PV Max P.9 mmHg      PULMONARY VEINS:  PulmV A Revs Dipak: 33.79 cm/s  PulmV Wilde Dipak:   34.10 cm/s  PulmV S/D Dipak:    1.68  PulmV Sys Dipak:    57.15 cm/s      AORTA:  Asc Ao Diam 3.84 cm      [id number] Yaw Arevalo MD  Electronically signed on 3/24/2025 at 12:40:24 PM        ** Final **      Visit Vitals  /71 (BP Location: Right arm)   Pulse 75   Temp 36.2 °C (97.2 °F) (Temporal)   Resp 16   Ht (!) 1.524 m (5')   Wt 104 kg (230 lb)   SpO2 97%   BMI 44.92 kg/m²   OB Status Postmenopausal   Smoking Status Former   BSA 2.1 m²     NPO/Void Status  Carbohydrate Drink Given Prior to Surgery? : N  Date of Last Liquid: 25  Time of Last Liquid: 06  Date of Last Solid: 25  Time of Last Solid:   Last Intake Type: Clear fluids  Time of Last Void: 0800        Physical Exam    Airway  Mallampati: II  TM distance: >3 FB  Neck ROM: full  Mouth opening: 3 or  more finger widths     Cardiovascular - normal exam   Dental - normal exam     Pulmonary - normal exam   Abdominal - normal exam(+) obese             Anesthesia Plan    History of general anesthesia?: yes  History of complications of general anesthesia?: no    ASA 3     general     The patient is not a current smoker.    intravenous induction   Postoperative pain plan includes opioids.  Anesthetic plan and risks discussed with patient.  Use of blood products discussed with patient who.    Plan discussed with CAA and attending.             [1]   Past Medical History:  Diagnosis Date    Abnormal echocardiogram     Anemia     Ankle fracture     left ankle fracture    Arthritis     Asthma     Autoimmune disorder (Multi)     Cardiology follow-up encounter     Hernan Fonseca MD  LOV 4/16/25    CHF (congestive heart failure) 02/18/2025    Chronic kidney disease     Chronic venous insufficiency     CKD (chronic kidney disease), stage III (Multi)     7/1/25 Cr 1.08 GFR 53    COPD (chronic obstructive pulmonary disease) (Multi)     Dysphagia     Essential (primary) hypertension 01/04/2023    Hypertension    Fall 12/29/2023    H/O deep venous thrombosis     H/O echocardiogram 03/24/2025    CONCLUSIONS:  1. The left ventricular systolic function is normal, with a Arroyo's biplane calculated ejection fraction of 63%.  2. Left ventricular diastolic filling is indeterminate.  3. There is moderately increased septal thickness.  4. There is severely increased eccentric left ventricular hypertrophy.  5. Normal right ventricular global systolic function.  6. Left atrium is mildly dilated.    Heart murmur     Hernia, internal 2022    History of blood transfusion     Hypothyroidism     Idiopathic peripheral neuropathy     s/p Spinal Cord Stimulator Implant    Incisional hernia without obstruction or gangrene     Plan: Robot Assisted Ventral Hernia Repair; Mesh Placement 7/24/25    Lymphedema     Osteoarthritis     Osteoporosis      Peripheral neuropathy     Plantar fasciitis     Rotator cuff syndrome     Venous ulcer of leg (Multi)     Vitamin D deficiency, unspecified 10/20/2016    Vitamin D deficiency   [2]   Past Surgical History:  Procedure Laterality Date    BACK SURGERY  2024    Back Surgery L4-L5 fusion    CARDIAC ELECTROPHYSIOLOGY STUDY AND ABLATION      CATARACT EXTRACTION      CHOLECYSTECTOMY  04/15/2016    Cholecystectomy    EYE SURGERY  2018    HERNIA REPAIR      HIP ARTHROPLASTY      JOINT REPLACEMENT      , ,     KNEE ARTHROPLASTY      SPINAL CORD STIMULATOR IMPLANT  2025    SPINE SURGERY      THYROIDECTOMY, PARTIAL      d/t goiter    TOTAL HIP ARTHROPLASTY Right     TOTAL KNEE ARTHROPLASTY Bilateral 2022    Knee Replacement    VERTEBROPLASTY     [3]   Social History  Tobacco Use    Smoking status: Former     Current packs/day: 0.00     Average packs/day: 0.2 packs/day for 49.2 years (9.9 ttl pk-yrs)     Types: Cigarettes     Start date:      Quit date:      Years since quittin.5    Smokeless tobacco: Never   Vaping Use    Vaping status: Never Used   Substance Use Topics    Alcohol use: Not Currently    Drug use: Not Currently   [4]   Allergies  Allergen Reactions    Cefprozil Hives    Sulfa (Sulfonamide Antibiotics) Hives    Sulfur Hives

## 2025-07-25 ENCOUNTER — PHARMACY VISIT (OUTPATIENT)
Dept: PHARMACY | Facility: CLINIC | Age: 80
End: 2025-07-25
Payer: COMMERCIAL

## 2025-07-25 VITALS
HEART RATE: 78 BPM | DIASTOLIC BLOOD PRESSURE: 77 MMHG | HEIGHT: 60 IN | BODY MASS INDEX: 45.16 KG/M2 | RESPIRATION RATE: 18 BRPM | TEMPERATURE: 98.6 F | WEIGHT: 230 LBS | OXYGEN SATURATION: 93 % | SYSTOLIC BLOOD PRESSURE: 134 MMHG

## 2025-07-25 LAB
ANION GAP SERPL CALC-SCNC: 14 MMOL/L (ref 10–20)
BUN SERPL-MCNC: 36 MG/DL (ref 6–23)
CALCIUM SERPL-MCNC: 8.1 MG/DL (ref 8.6–10.3)
CHLORIDE SERPL-SCNC: 109 MMOL/L (ref 98–107)
CO2 SERPL-SCNC: 21 MMOL/L (ref 21–32)
CREAT SERPL-MCNC: 1.17 MG/DL (ref 0.5–1.05)
EGFRCR SERPLBLD CKD-EPI 2021: 48 ML/MIN/1.73M*2
ERYTHROCYTE [DISTWIDTH] IN BLOOD BY AUTOMATED COUNT: 16.1 % (ref 11.5–14.5)
GLUCOSE SERPL-MCNC: 93 MG/DL (ref 74–99)
HCT VFR BLD AUTO: 33.9 % (ref 36–46)
HGB BLD-MCNC: 10.9 G/DL (ref 12–16)
MCH RBC QN AUTO: 29.4 PG (ref 26–34)
MCHC RBC AUTO-ENTMCNC: 32.2 G/DL (ref 32–36)
MCV RBC AUTO: 91 FL (ref 80–100)
NRBC BLD-RTO: 0 /100 WBCS (ref 0–0)
PLATELET # BLD AUTO: 219 X10*3/UL (ref 150–450)
POTASSIUM SERPL-SCNC: 4.3 MMOL/L (ref 3.5–5.3)
RBC # BLD AUTO: 3.71 X10*6/UL (ref 4–5.2)
SODIUM SERPL-SCNC: 140 MMOL/L (ref 136–145)
WBC # BLD AUTO: 9.3 X10*3/UL (ref 4.4–11.3)

## 2025-07-25 PROCEDURE — 80048 BASIC METABOLIC PNL TOTAL CA: CPT

## 2025-07-25 PROCEDURE — 2500000002 HC RX 250 W HCPCS SELF ADMINISTERED DRUGS (ALT 637 FOR MEDICARE OP, ALT 636 FOR OP/ED)

## 2025-07-25 PROCEDURE — RXMED WILLOW AMBULATORY MEDICATION CHARGE

## 2025-07-25 PROCEDURE — 2500000002 HC RX 250 W HCPCS SELF ADMINISTERED DRUGS (ALT 637 FOR MEDICARE OP, ALT 636 FOR OP/ED): Performed by: STUDENT IN AN ORGANIZED HEALTH CARE EDUCATION/TRAINING PROGRAM

## 2025-07-25 PROCEDURE — 85027 COMPLETE CBC AUTOMATED: CPT

## 2025-07-25 PROCEDURE — 2500000001 HC RX 250 WO HCPCS SELF ADMINISTERED DRUGS (ALT 637 FOR MEDICARE OP): Performed by: STUDENT IN AN ORGANIZED HEALTH CARE EDUCATION/TRAINING PROGRAM

## 2025-07-25 PROCEDURE — 7100000011 HC EXTENDED STAY RECOVERY HOURLY - NURSING UNIT

## 2025-07-25 PROCEDURE — 2500000004 HC RX 250 GENERAL PHARMACY W/ HCPCS (ALT 636 FOR OP/ED): Performed by: STUDENT IN AN ORGANIZED HEALTH CARE EDUCATION/TRAINING PROGRAM

## 2025-07-25 PROCEDURE — 94640 AIRWAY INHALATION TREATMENT: CPT

## 2025-07-25 PROCEDURE — 96372 THER/PROPH/DIAG INJ SC/IM: CPT | Performed by: STUDENT IN AN ORGANIZED HEALTH CARE EDUCATION/TRAINING PROGRAM

## 2025-07-25 PROCEDURE — 36415 COLL VENOUS BLD VENIPUNCTURE: CPT

## 2025-07-25 RX ORDER — POLYETHYLENE GLYCOL 3350 17 G/17G
17 POWDER, FOR SOLUTION ORAL DAILY
Qty: 238 G | Refills: 0 | Status: SHIPPED | OUTPATIENT
Start: 2025-07-26

## 2025-07-25 RX ORDER — OXYCODONE HYDROCHLORIDE 5 MG/1
5 TABLET ORAL EVERY 6 HOURS PRN
Qty: 15 TABLET | Refills: 0 | Status: SHIPPED | OUTPATIENT
Start: 2025-07-25 | End: 2025-07-29

## 2025-07-25 RX ADMIN — ACETAMINOPHEN 650 MG: 325 TABLET ORAL at 14:20

## 2025-07-25 RX ADMIN — GABAPENTIN 400 MG: 400 CAPSULE ORAL at 14:20

## 2025-07-25 RX ADMIN — POLYETHYLENE GLYCOL 3350 17 G: 17 POWDER, FOR SOLUTION ORAL at 09:02

## 2025-07-25 RX ADMIN — FORMOTEROL FUMARATE DIHYDRATE 20 MCG: 20 SOLUTION RESPIRATORY (INHALATION) at 11:03

## 2025-07-25 RX ADMIN — TORSEMIDE 20 MG: 20 TABLET ORAL at 05:25

## 2025-07-25 RX ADMIN — HYDROXYCHLOROQUINE SULFATE 200 MG: 200 TABLET ORAL at 09:02

## 2025-07-25 RX ADMIN — ACETAMINOPHEN 650 MG: 325 TABLET ORAL at 05:25

## 2025-07-25 RX ADMIN — LEVOTHYROXINE SODIUM 25 MCG: 0.03 TABLET ORAL at 06:25

## 2025-07-25 RX ADMIN — BUDESONIDE 0.5 MG: 0.5 INHALANT RESPIRATORY (INHALATION) at 11:03

## 2025-07-25 RX ADMIN — ENOXAPARIN SODIUM 40 MG: 100 INJECTION SUBCUTANEOUS at 09:02

## 2025-07-25 RX ADMIN — SPIRONOLACTONE 25 MG: 25 TABLET ORAL at 09:02

## 2025-07-25 RX ADMIN — LOSARTAN POTASSIUM 100 MG: 50 TABLET, FILM COATED ORAL at 09:02

## 2025-07-25 RX ADMIN — ACETAMINOPHEN 650 MG: 325 TABLET ORAL at 09:02

## 2025-07-25 RX ADMIN — GABAPENTIN 400 MG: 400 CAPSULE ORAL at 09:02

## 2025-07-25 RX ADMIN — OXYCODONE HYDROCHLORIDE 5 MG: 5 TABLET ORAL at 05:25

## 2025-07-25 ASSESSMENT — PAIN SCALES - GENERAL
PAINLEVEL_OUTOF10: 0 - NO PAIN
PAINLEVEL_OUTOF10: 6
PAINLEVEL_OUTOF10: 0 - NO PAIN

## 2025-07-25 ASSESSMENT — PAIN - FUNCTIONAL ASSESSMENT
PAIN_FUNCTIONAL_ASSESSMENT: 0-10

## 2025-07-25 ASSESSMENT — PAIN DESCRIPTION - LOCATION: LOCATION: ABDOMEN

## 2025-07-25 NOTE — DISCHARGE INSTRUCTIONS
Hernia Surgery    Pain  Pain will be different for every person.  For moderate pain a prescription medicine will be given and should be taken as directed.  For milder pain, an over the counter medicine such as Tylenol, Extra Strength Tylenol, or Advil may be taken.  Aspirin or aspirin containing products should not be used for two weeks before surgery and one week after surgery.     Activity  Walking may and should be done daily after surgery.  Stairs may be climbed, but you may need help for the first few days.  You should not do any strenuous or heavy exercise such as bicycling, jogging, or sports should be for at least four weeks after surgery.    Driving  You may resume driving when you no longer have discomfort getting in or out of the vehicle and you can perform braking without difficulty or pain.  This is generally one week after surgery.    Dressings  If you have a gauze dressing over your surgery site, you may remove it after 24 hours.  You may shower after 48 hours.   If you do not have gauze on the site, a clear plastic coating will be over the incision. This will come off on its own after about a week.  You may shower 24 hours after surgery.  It is normal to see bruising (black and blue color) around the groin, penis, scrotum upper thigh or vulva.  If you have severe pain, redness, or swelling this may mean you have an infection and you should call your doctor right away.    Bowel Movements  Constipation is common after hernia surgery. Mineral oil, which you can buy without a prescription, can be taken for 4-5 days after surgery.  Take 2 Tablespoons each evening with supper to avoid constipation.    Follow-up Appointments  Please call my office so that you can be seen 10-14 days after surgery.  Also, please do not hesitate to call my office if you have any questions.  Call doctor [phone] for:             severe unrelieved pain             fevers > 101F             Nausea/vomiting             wound  issues             insurance/return to work forms             shortness of breath             chest pains

## 2025-07-25 NOTE — POST-PROCEDURE NOTE
Patient is POD 0 Robot Assisted Ventral Hernia Repair; Mesh Placement with Dr. Li    Doing well this evening, tolerating diet, pain controlled. Yet to void.    PE:  Constitutional: A&Ox3, calm and cooperative  Head/Neck: Neck supple, no JVD  Cardiovascular: RRR  Respiratory/Thorax: Non labored breathing on RA  Gastrointestinal: Abdomen nondistended, soft, nontender. Lap sites C/D/I, edges well approximated, covered in Dermabond  Genitourinary: Yet to void  Musculoskeletal: ROM intact, no joint swelling, normal strength  Extremities: PERDUE, No peripheral edema  Neurological: No focal deficits   Psychological: Appropriate mood and behavior  Skin: Warm and dry.     Radiology and labs reviewed. VSS, afebrile.    Plan:   - Diet: Regular  - Continue current pain regimen   - PRN antiemetic   - DVT Proph: SCDs/ ambulate/Lovenox  - Bowel regimen: Miralax  - Monitor VS every 4 hours   - Labs ordered for AM   - IS every hour while awake   - Encourage ambulation / OOB as tolerated   - Instructed on post operative care, s/s of infection  - Monitor lap sites for drainage, erythema, excessive bruising   - Continue supportive care    Dispo: Doing well post-operatively.     Total time spent 25 minutes, and greater than 50% of time was spent in counseling/coordination of care

## 2025-07-25 NOTE — PROGRESS NOTES
07/25/25 1329   Discharge Planning   Living Arrangements Alone   Support Systems Friends/neighbors     Attempted to call patient with no answer. Patient in extended recovery after a hernia repair. Anticipate returning home when discharged.

## 2025-07-25 NOTE — DISCHARGE SUMMARY
Discharge Diagnosis  Incisional hernia    Issues Requiring Follow-Up  Post op follow up     Test Results Pending At Discharge  Pending Labs       Order Current Status    Surgical Pathology Exam In process            Hospital Course  80 yr old female with history of ventral hernia s/p robor assisted ventral hernia repair with mesh placement on 7/24/25 with Dr. Russo. Please see operative report for full details. Patient tolerated the procedure well and recovered briefly in PACU before being transitioned to regular nursing floor. Diet was advanced as tolerated. IV medication transitioned to oral as diet advanced. She was weaned off oxygen POD 1 sating 91% on RA. On the day of discharge, the pt was tolerating a diet, pain was controlled on PO pain medication, and she was ambulating, voiding spontaneously, and reporting flatus. Pt discharged home on 7/25/25 in stable condition with instructions to follow up as outpatient.       Visit Vitals  /67 (BP Location: Right arm, Patient Position: Sitting)   Pulse 78   Temp 36.6 °C (97.8 °F)   Resp 16     Vitals:    07/24/25 0900   Weight: 104 kg (230 lb)       Immunization History   Administered Date(s) Administered    COVID-19, mRNA, LNP-S, PF, 30 mcg/0.3 mL dose 03/01/2021, 03/23/2021, 09/24/2021    Flu vaccine (IIV4), preservative free *Check age/dose* 01/08/2024    Flu vaccine, quadrivalent, high-dose, preservative free, age 65y+ (FLUZONE) 10/07/2020, 09/24/2021, 09/09/2022    Flu vaccine, trivalent, preservative free, HIGH-DOSE, age 65y+ (Fluzone) 08/01/2016, 10/20/2016, 09/17/2017, 09/10/2018, 09/24/2019, 01/08/2024, 11/04/2024    Flu vaccine, trivalent, preservative free, age 6 months and greater (Fluarix/Fluzone/Flulaval) 10/11/2014    Influenza Whole 09/19/2015    Influenza, Unspecified 10/24/2008, 10/16/2010, 11/05/2011    Influenza, injectable, quadrivalent 09/17/2017    Influenza, seasonal, injectable 10/24/2008, 10/16/2010, 11/05/2011, 10/06/2012, 10/01/2013,  10/20/2016, 09/09/2022    Moderna COVID-19 vaccine, 12 years and older (50mcg/0.5mL)(Spikevax) 11/04/2024    Moderna SARS-CoV-2 Vaccination 02/15/2023    PPD Test 01/23/2023, 02/05/2023, 12/30/2023, 01/09/2024    Pfizer COVID-19 vaccine, bivalent, age 12 years and older (30 mcg/0.3 mL) 02/15/2023    Pfizer Gray Cap SARS-CoV-2 04/10/2022    Pneumococcal conjugate vaccine, 13-valent (PREVNAR 13) 05/08/2015, 05/18/2015    Pneumococcal polysaccharide vaccine, 23-valent, age 2 years and older (PNEUMOVAX 23) 01/01/2004, 05/29/2012    RSV, 60 Years And Older (AREXVY) 12/13/2024    SARS-CoV-2, Unspecified 01/02/2024    Tdap vaccine, age 7 year and older (BOOSTRIX, ADACEL) 12/02/2015, 12/21/2015    Zoster vaccine, recombinant, adult (SHINGRIX) 11/29/2020, 01/29/2021    Zoster, live 02/24/2012       Results    XR chest 1 view    (Results Pending)     Results for orders placed or performed during the hospital encounter of 07/24/25 (from the past 24 hours)   Basic metabolic panel   Result Value Ref Range    Glucose 93 74 - 99 mg/dL    Sodium 140 136 - 145 mmol/L    Potassium 4.3 3.5 - 5.3 mmol/L    Chloride 109 (H) 98 - 107 mmol/L    Bicarbonate 21 21 - 32 mmol/L    Anion Gap 14 10 - 20 mmol/L    Urea Nitrogen 36 (H) 6 - 23 mg/dL    Creatinine 1.17 (H) 0.50 - 1.05 mg/dL    eGFR 48 (L) >60 mL/min/1.73m*2    Calcium 8.1 (L) 8.6 - 10.3 mg/dL   CBC   Result Value Ref Range    WBC 9.3 4.4 - 11.3 x10*3/uL    nRBC 0.0 0.0 - 0.0 /100 WBCs    RBC 3.71 (L) 4.00 - 5.20 x10*6/uL    Hemoglobin 10.9 (L) 12.0 - 16.0 g/dL    Hematocrit 33.9 (L) 36.0 - 46.0 %    MCV 91 80 - 100 fL    MCH 29.4 26.0 - 34.0 pg    MCHC 32.2 32.0 - 36.0 g/dL    RDW 16.1 (H) 11.5 - 14.5 %    Platelets 219 150 - 450 x10*3/uL     *Note: Due to a large number of results and/or encounters for the requested time period, some results have not been displayed. A complete set of results can be found in Results Review.         Pertinent Physical Exam At Time of  Discharge  PE:  Constitutional: calm and cooperative, NAD  Eyes: PERRL, clear sclera   ENMT: Moist mucous membranes  Cardiovascular: RRR. 2+ equal pulses of the distal extremities.  Respiratory/Thorax: Good symmetric chest expansion. On RA  Gastrointestinal: Abdomen slightly distended, soft, appropriately tender, no peritoneal signs. Laparotomy sites c/d/i, well approximate with Dermabond, no erythema or drainage.   Genitourinary: Voiding independently   Musculoskeletal: ROM intact, no joint swelling, normal strength  Neurological: A&Ox3, No focal deficits   Psychological: Appropriate mood and behavior  Skin: Warm and dry      Home Medications     Medication List      START taking these medications     oxyCODONE 5 mg immediate release tablet; Commonly known as: Roxicodone;   Take 1 tablet (5 mg) by mouth every 6 hours if needed for severe pain (7 -   10) for up to 4 days.   polyethylene glycol 17 gram/dose powder; Commonly known as: Glycolax,   Miralax; Mix 17 g of powder and drink once daily. Do not start before July 26, 2025.; Start taking on: July 26, 2025     CONTINUE taking these medications     acetaminophen 325 mg tablet; Commonly known as: Tylenol; Take 2 tablets   (650 mg) by mouth every 4 hours if needed for mild pain (1 - 3) or   moderate pain (4 - 6).   * albuterol 2.5 mg /3 mL (0.083 %) nebulizer solution; Take 3 mL (2.5   mg) by nebulization every 2 hours if needed for wheezing.   * albuterol 90 mcg/actuation inhaler; Commonly known as: ProAir HFA;   Inhale 2 puffs every 6 hours if needed for wheezing.   apixaban 2.5 mg tablet; Commonly known as: Eliquis; Take 1 tablet (2.5   mg) by mouth 2 times a day.   calcium carbonate 600 mg calcium (1,500 mg) tablet   cholecalciferol 50 mcg (2,000 units) tablet; Commonly known as: Vitamin   D-3   denosumab 60 mg/mL syringe; Commonly known as: Prolia; Inject 1 mL (60   mg total) under the skin every 6 months.; Start taking on: September 3,   2025   fluticasone  propion-salmeteroL 250-50 mcg/dose diskus inhaler; Commonly   known as: Advair Diskus   folic acid 1 mg tablet; Commonly known as: Folvite; Take 1 tablet (1 mg)   by mouth once daily.   gabapentin 400 mg capsule; Commonly known as: Neurontin; Take 1 capsule   (400 mg) by mouth 3 times a day.   hydrocortisone 2.5 % cream; Apply topically if needed for irritation or   rash. Use maximum 2-3 times a week.   hydroxychloroquine 200 mg tablet; Commonly known as: Plaquenil   levothyroxine 25 mcg tablet; Commonly known as: Synthroid; Take 1 tablet   (25 mcg) by mouth once daily in the morning. Take before meals.   losartan 100 mg tablet; Commonly known as: Cozaar   methotrexate 2.5 mg tablet; Commonly known as: Trexall; Take 5 tablets   (12.5 mg total) by mouth 1 (one) time per week.  Follow directions   carefully, and ask to explain any part you do not understand. Take exactly   as directed. On Thursday   multivitamin capsule   PROBIOTIC (B. COAGULANS) ORAL   Slow Fe 137 mg (45 mg iron) tablet extended release; Generic drug:   ferrous sulfate; Take 1 tablet (137 mg) by mouth once daily.   spironolactone 25 mg tablet; Commonly known as: Aldactone   torsemide 20 mg tablet; Commonly known as: Demadex  * This list has 2 medication(s) that are the same as other medications   prescribed for you. Read the directions carefully, and ask your doctor or   other care provider to review them with you.     STOP taking these medications     chlorhexidine 0.12 % solution; Commonly known as: Peridex       Outpatient Follow-Up  Future Appointments   Date Time Provider Department [place]   8/6/2025  9:00 AM Hernan Fonseca MD [id number] [facility]   8/11/2025 10:45 AM Kalen Russo MD [id number] [facility]   8/18/2025  9:40 AM Rosy Atkinson MD [id number] [place]   8/28/2025 10:30 AM Faith Reese MD [id number] [facility]   9/9/2025  9:00 AM INF 01 FRANCES Medrano [facility]   9/11/2025 10:30 AM Karena Mayfield PA-C [id number] [facility]   9/30/2025 10:00 AM  [facility] [id number] [facility]   11/21/2025  9:00 AM Precious Rodriguez MD [id number] [place]   11/21/2025  1:00 PM [facility] [id number] [place]   3/2/2026  9:20 AM Rafael Knowles MD [id number] [facility]       Poornima Olivarez APRN-CNP

## 2025-07-25 NOTE — CARE PLAN
The patient's goals for the shift include pain control    The clinical goals for the shift include pain control and safety

## 2025-07-29 DIAGNOSIS — R93.89 ABNORMAL CHEST X-RAY: Primary | ICD-10-CM

## 2025-08-06 ENCOUNTER — OFFICE VISIT (OUTPATIENT)
Dept: CARDIOLOGY | Facility: HOSPITAL | Age: 80
End: 2025-08-06
Payer: MEDICARE

## 2025-08-06 VITALS
WEIGHT: 229 LBS | HEIGHT: 60 IN | DIASTOLIC BLOOD PRESSURE: 70 MMHG | SYSTOLIC BLOOD PRESSURE: 125 MMHG | BODY MASS INDEX: 44.96 KG/M2 | HEART RATE: 76 BPM

## 2025-08-06 DIAGNOSIS — I10 ESSENTIAL (PRIMARY) HYPERTENSION: Primary | ICD-10-CM

## 2025-08-06 PROCEDURE — 99212 OFFICE O/P EST SF 10 MIN: CPT

## 2025-08-06 PROCEDURE — 1160F RVW MEDS BY RX/DR IN RCRD: CPT | Performed by: INTERNAL MEDICINE

## 2025-08-06 PROCEDURE — 1159F MED LIST DOCD IN RCRD: CPT | Performed by: INTERNAL MEDICINE

## 2025-08-06 PROCEDURE — 3074F SYST BP LT 130 MM HG: CPT | Performed by: INTERNAL MEDICINE

## 2025-08-06 PROCEDURE — 3078F DIAST BP <80 MM HG: CPT | Performed by: INTERNAL MEDICINE

## 2025-08-06 PROCEDURE — 99214 OFFICE O/P EST MOD 30 MIN: CPT | Performed by: INTERNAL MEDICINE

## 2025-08-06 NOTE — PROGRESS NOTES
SUBJECTIVE:  Patient returns for a routine 4-month follow-up.  I was pleased to see that she got through her umbilical hernia surgery uneventfully.  She is recuperating well and is back on her Eliquis.    She denies any chest discomfort or problematic dyspnea at a somewhat limited activity level.  She continues to use her walker to get around given her arthritis.    She denies any palpitations presyncope or syncope.  She denies any stroke or TIA symptomatology.  She denies any bleeding problems despite good compliance with her Eliquis.    OBJECTIVE:    GENERAL:   Alert, usual pleasant self  HEENT: Unchanged  LUNGS: Generally clear  CARDIAC: Unchanged 1-2/6 systolic murmur  ABDOMEN: Nontender  EXTREMITIES: Unchanged edema  SKIN: No acute rash  NEURO: No gross changes    IMPRESSION/PLAN:  Genesis is doing reasonably well at this time.  I was pleased to see that her blood pressure remains under good control, so we will continue her losartan and spironolactone unchanged.    I was pleased to see she remains on appropriate anticoagulant therapy for her DVTs and PAF.  She has not had any breakthrough palpitations to suggest recurrent atrial fibrillation.    Her LDL is only 97, so we will defer on initiating any statin therapy.    I was pleased to see that her echo did not show any significant valve disease despite her heart murmur.  I do think her murmur is probably due to her LVH or perhaps some aortic valve sclerosis..    I will see her back in follow-up in 6 months but she knows to call for any intercurrent concerns.    PATIENT INSTRUCTIONS:    Continue current medications unchanged.    Return to clinic in 6 months.

## 2025-08-11 ENCOUNTER — APPOINTMENT (OUTPATIENT)
Dept: SURGERY | Facility: CLINIC | Age: 80
End: 2025-08-11
Payer: MEDICARE

## 2025-08-11 ENCOUNTER — HOSPITAL ENCOUNTER (OUTPATIENT)
Dept: RADIOLOGY | Facility: HOSPITAL | Age: 80
Discharge: HOME | End: 2025-08-11
Payer: MEDICARE

## 2025-08-11 ENCOUNTER — OFFICE VISIT (OUTPATIENT)
Dept: SURGERY | Facility: CLINIC | Age: 80
End: 2025-08-11
Payer: MEDICARE

## 2025-08-11 VITALS
SYSTOLIC BLOOD PRESSURE: 151 MMHG | DIASTOLIC BLOOD PRESSURE: 75 MMHG | BODY MASS INDEX: 43.94 KG/M2 | WEIGHT: 225 LBS | HEART RATE: 81 BPM

## 2025-08-11 DIAGNOSIS — R93.89 ABNORMAL CHEST X-RAY: Primary | ICD-10-CM

## 2025-08-11 DIAGNOSIS — R93.89 ABNORMAL CHEST X-RAY: ICD-10-CM

## 2025-08-11 PROCEDURE — 99211 OFF/OP EST MAY X REQ PHY/QHP: CPT | Mod: 25 | Performed by: SURGERY

## 2025-08-11 PROCEDURE — 71046 X-RAY EXAM CHEST 2 VIEWS: CPT | Performed by: RADIOLOGY

## 2025-08-11 PROCEDURE — 1159F MED LIST DOCD IN RCRD: CPT | Performed by: SURGERY

## 2025-08-11 PROCEDURE — 3078F DIAST BP <80 MM HG: CPT | Performed by: SURGERY

## 2025-08-11 PROCEDURE — 1125F AMNT PAIN NOTED PAIN PRSNT: CPT | Performed by: SURGERY

## 2025-08-11 PROCEDURE — 71046 X-RAY EXAM CHEST 2 VIEWS: CPT

## 2025-08-11 PROCEDURE — 3077F SYST BP >= 140 MM HG: CPT | Performed by: SURGERY

## 2025-08-11 ASSESSMENT — ENCOUNTER SYMPTOMS
OCCASIONAL FEELINGS OF UNSTEADINESS: 0
LOSS OF SENSATION IN FEET: 0
DEPRESSION: 0

## 2025-08-11 ASSESSMENT — PAIN SCALES - GENERAL: PAINLEVEL_OUTOF10: 4

## 2025-08-12 ENCOUNTER — APPOINTMENT (OUTPATIENT)
Dept: PRIMARY CARE | Facility: CLINIC | Age: 80
End: 2025-08-12
Payer: MEDICARE

## 2025-08-12 VITALS
BODY MASS INDEX: 44.37 KG/M2 | DIASTOLIC BLOOD PRESSURE: 80 MMHG | WEIGHT: 226 LBS | SYSTOLIC BLOOD PRESSURE: 118 MMHG | HEIGHT: 60 IN

## 2025-08-12 DIAGNOSIS — J44.9 CHRONIC OBSTRUCTIVE PULMONARY DISEASE, UNSPECIFIED COPD TYPE (MULTI): ICD-10-CM

## 2025-08-12 DIAGNOSIS — M35.9 CONNECTIVE TISSUE DISEASE, UNDIFFERENTIATED (MULTI): ICD-10-CM

## 2025-08-12 DIAGNOSIS — R60.0 EDEMA OF LOWER EXTREMITY: ICD-10-CM

## 2025-08-12 DIAGNOSIS — Z98.890 STATUS POST HERNIA REPAIR: ICD-10-CM

## 2025-08-12 DIAGNOSIS — I82.5Y9 CHRONIC DEEP VEIN THROMBOSIS (DVT) OF PROXIMAL VEIN OF LOWER EXTREMITY, UNSPECIFIED LATERALITY: ICD-10-CM

## 2025-08-12 DIAGNOSIS — I48.0 PAROXYSMAL ATRIAL FIBRILLATION (MULTI): ICD-10-CM

## 2025-08-12 DIAGNOSIS — Z87.19 STATUS POST HERNIA REPAIR: ICD-10-CM

## 2025-08-12 DIAGNOSIS — G89.29 CHRONIC MIDLINE LOW BACK PAIN, UNSPECIFIED WHETHER SCIATICA PRESENT: ICD-10-CM

## 2025-08-12 DIAGNOSIS — T79.7XXD SUBCUTANEOUS EMPHYSEMA, SUBSEQUENT ENCOUNTER: Primary | ICD-10-CM

## 2025-08-12 DIAGNOSIS — E03.9 HYPOTHYROIDISM (ACQUIRED): ICD-10-CM

## 2025-08-12 DIAGNOSIS — N18.32 STAGE 3B CHRONIC KIDNEY DISEASE (MULTI): ICD-10-CM

## 2025-08-12 DIAGNOSIS — M54.50 CHRONIC MIDLINE LOW BACK PAIN, UNSPECIFIED WHETHER SCIATICA PRESENT: ICD-10-CM

## 2025-08-12 DIAGNOSIS — I10 ESSENTIAL (PRIMARY) HYPERTENSION: ICD-10-CM

## 2025-08-12 PROCEDURE — 3079F DIAST BP 80-89 MM HG: CPT | Performed by: INTERNAL MEDICINE

## 2025-08-12 PROCEDURE — 1159F MED LIST DOCD IN RCRD: CPT | Performed by: INTERNAL MEDICINE

## 2025-08-12 PROCEDURE — G2211 COMPLEX E/M VISIT ADD ON: HCPCS | Performed by: INTERNAL MEDICINE

## 2025-08-12 PROCEDURE — 1160F RVW MEDS BY RX/DR IN RCRD: CPT | Performed by: INTERNAL MEDICINE

## 2025-08-12 PROCEDURE — 3074F SYST BP LT 130 MM HG: CPT | Performed by: INTERNAL MEDICINE

## 2025-08-12 PROCEDURE — 99215 OFFICE O/P EST HI 40 MIN: CPT | Performed by: INTERNAL MEDICINE

## 2025-08-15 DIAGNOSIS — M35.9 CONNECTIVE TISSUE DISEASE, UNDIFFERENTIATED (MULTI): Primary | ICD-10-CM

## 2025-08-17 RX ORDER — HYDROXYCHLOROQUINE SULFATE 200 MG/1
200 TABLET, FILM COATED ORAL
Qty: 180 TABLET | Refills: 1 | Status: SHIPPED | OUTPATIENT
Start: 2025-08-17

## 2025-08-18 ENCOUNTER — OFFICE VISIT (OUTPATIENT)
Dept: WOUND CARE | Facility: CLINIC | Age: 80
End: 2025-08-18
Payer: MEDICARE

## 2025-08-18 VITALS
RESPIRATION RATE: 16 BRPM | DIASTOLIC BLOOD PRESSURE: 84 MMHG | BODY MASS INDEX: 44.76 KG/M2 | HEART RATE: 79 BPM | SYSTOLIC BLOOD PRESSURE: 143 MMHG | WEIGHT: 228 LBS | HEIGHT: 60 IN

## 2025-08-18 DIAGNOSIS — M19.90 ARTHRITIS: ICD-10-CM

## 2025-08-18 DIAGNOSIS — I82.5Y2 CHRONIC DEEP VEIN THROMBOSIS (DVT) OF PROXIMAL VEIN OF LEFT LOWER EXTREMITY: ICD-10-CM

## 2025-08-18 DIAGNOSIS — Z79.01 CHRONIC ANTICOAGULATION: ICD-10-CM

## 2025-08-18 DIAGNOSIS — I87.2 CHRONIC VENOUS INSUFFICIENCY: Primary | ICD-10-CM

## 2025-08-18 DIAGNOSIS — R26.9 GAIT DIFFICULTY: ICD-10-CM

## 2025-08-18 PROCEDURE — 1159F MED LIST DOCD IN RCRD: CPT | Performed by: INTERNAL MEDICINE

## 2025-08-18 PROCEDURE — 3077F SYST BP >= 140 MM HG: CPT | Performed by: INTERNAL MEDICINE

## 2025-08-18 PROCEDURE — 99214 OFFICE O/P EST MOD 30 MIN: CPT | Performed by: INTERNAL MEDICINE

## 2025-08-18 PROCEDURE — G2211 COMPLEX E/M VISIT ADD ON: HCPCS | Performed by: INTERNAL MEDICINE

## 2025-08-18 PROCEDURE — 1160F RVW MEDS BY RX/DR IN RCRD: CPT | Performed by: INTERNAL MEDICINE

## 2025-08-18 PROCEDURE — 3079F DIAST BP 80-89 MM HG: CPT | Performed by: INTERNAL MEDICINE

## 2025-08-18 PROCEDURE — 1126F AMNT PAIN NOTED NONE PRSNT: CPT | Performed by: INTERNAL MEDICINE

## 2025-08-18 ASSESSMENT — ENCOUNTER SYMPTOMS
LOSS OF SENSATION IN FEET: 1
OCCASIONAL FEELINGS OF UNSTEADINESS: 1
DEPRESSION: 0

## 2025-08-18 ASSESSMENT — PAIN SCALES - GENERAL: PAINLEVEL_OUTOF10: 0-NO PAIN

## 2025-08-21 ENCOUNTER — APPOINTMENT (OUTPATIENT)
Dept: GASTROENTEROLOGY | Facility: HOSPITAL | Age: 80
End: 2025-08-21
Payer: MEDICARE

## 2025-08-28 ENCOUNTER — HOSPITAL ENCOUNTER (OUTPATIENT)
Dept: GASTROENTEROLOGY | Facility: HOSPITAL | Age: 80
Discharge: HOME | End: 2025-08-28
Payer: MEDICARE

## 2025-08-28 VITALS
BODY MASS INDEX: 44.17 KG/M2 | OXYGEN SATURATION: 100 % | HEART RATE: 73 BPM | RESPIRATION RATE: 17 BRPM | WEIGHT: 225 LBS | HEIGHT: 60 IN | TEMPERATURE: 96.8 F | SYSTOLIC BLOOD PRESSURE: 139 MMHG | DIASTOLIC BLOOD PRESSURE: 96 MMHG

## 2025-08-28 DIAGNOSIS — M96.1 LUMBAR POSTLAMINECTOMY SYNDROME: ICD-10-CM

## 2025-08-28 PROCEDURE — 7100000009 HC PHASE TWO TIME - INITIAL BASE CHARGE

## 2025-08-28 PROCEDURE — 2500000005 HC RX 250 GENERAL PHARMACY W/O HCPCS: Performed by: ANESTHESIOLOGY

## 2025-08-28 PROCEDURE — 7100000010 HC PHASE TWO TIME - EACH INCREMENTAL 1 MINUTE

## 2025-08-28 PROCEDURE — 2500000004 HC RX 250 GENERAL PHARMACY W/ HCPCS (ALT 636 FOR OP/ED): Performed by: ANESTHESIOLOGY

## 2025-08-28 PROCEDURE — 62323 NJX INTERLAMINAR LMBR/SAC: CPT | Performed by: ANESTHESIOLOGY

## 2025-08-28 PROCEDURE — 2550000001 HC RX 255 CONTRASTS: Performed by: ANESTHESIOLOGY

## 2025-08-28 RX ORDER — TRIAMCINOLONE ACETONIDE 40 MG/ML
INJECTION, SUSPENSION INTRA-ARTICULAR; INTRAMUSCULAR AS NEEDED
Status: COMPLETED | OUTPATIENT
Start: 2025-08-28 | End: 2025-08-28

## 2025-08-28 RX ORDER — LIDOCAINE HYDROCHLORIDE 10 MG/ML
INJECTION, SOLUTION EPIDURAL; INFILTRATION; INTRACAUDAL; PERINEURAL AS NEEDED
Status: COMPLETED | OUTPATIENT
Start: 2025-08-28 | End: 2025-08-28

## 2025-08-28 RX ORDER — SODIUM CHLORIDE 9 MG/ML
INJECTION, SOLUTION INTRAMUSCULAR; INTRAVENOUS; SUBCUTANEOUS AS NEEDED
Status: COMPLETED | OUTPATIENT
Start: 2025-08-28 | End: 2025-08-28

## 2025-08-28 RX ADMIN — IOHEXOL 3 ML: 240 INJECTION, SOLUTION INTRATHECAL; INTRAVASCULAR; INTRAVENOUS; ORAL at 10:38

## 2025-08-28 RX ADMIN — TRIAMCINOLONE ACETONIDE 80 MG: 40 INJECTION, SUSPENSION INTRA-ARTICULAR; INTRAMUSCULAR at 10:38

## 2025-08-28 RX ADMIN — LIDOCAINE HYDROCHLORIDE 3 ML: 10 INJECTION, SOLUTION EPIDURAL; INFILTRATION; INTRACAUDAL; PERINEURAL at 10:38

## 2025-08-28 RX ADMIN — SODIUM CHLORIDE 4 ML: 9 INJECTION INTRAMUSCULAR; INTRAVENOUS; SUBCUTANEOUS at 10:38

## 2025-08-28 ASSESSMENT — ENCOUNTER SYMPTOMS
GASTROINTESTINAL NEGATIVE: 1
EYES NEGATIVE: 1
HEMATOLOGIC/LYMPHATIC NEGATIVE: 1
BACK PAIN: 1
PSYCHIATRIC NEGATIVE: 1
CONSTITUTIONAL NEGATIVE: 1
WEAKNESS: 1
ENDOCRINE NEGATIVE: 1
CARDIOVASCULAR NEGATIVE: 1
RESPIRATORY NEGATIVE: 1

## 2025-08-28 ASSESSMENT — PAIN - FUNCTIONAL ASSESSMENT
PAIN_FUNCTIONAL_ASSESSMENT: 0-10
PAIN_FUNCTIONAL_ASSESSMENT: 0-10

## 2025-08-28 ASSESSMENT — PAIN SCALES - GENERAL
PAINLEVEL_OUTOF10: 2
PAINLEVEL_OUTOF10: 4

## 2025-08-28 ASSESSMENT — PAIN DESCRIPTION - DESCRIPTORS
DESCRIPTORS: ACHING
DESCRIPTORS: DISCOMFORT;ACHING

## 2025-09-05 ENCOUNTER — APPOINTMENT (OUTPATIENT)
Dept: PAIN MEDICINE | Facility: CLINIC | Age: 80
End: 2025-09-05
Payer: MEDICARE

## 2025-09-08 ENCOUNTER — APPOINTMENT (OUTPATIENT)
Dept: RHEUMATOLOGY | Facility: CLINIC | Age: 80
End: 2025-09-08
Payer: MEDICARE

## 2025-09-09 ENCOUNTER — APPOINTMENT (OUTPATIENT)
Dept: INFUSION THERAPY | Facility: CLINIC | Age: 80
End: 2025-09-09
Payer: MEDICARE

## 2025-09-18 ENCOUNTER — APPOINTMENT (OUTPATIENT)
Facility: CLINIC | Age: 80
End: 2025-09-18
Payer: MEDICARE

## 2025-10-16 ENCOUNTER — APPOINTMENT (OUTPATIENT)
Facility: CLINIC | Age: 80
End: 2025-10-16
Payer: MEDICARE

## 2025-10-24 ENCOUNTER — APPOINTMENT (OUTPATIENT)
Facility: CLINIC | Age: 80
End: 2025-10-24
Payer: MEDICARE

## 2025-11-11 ENCOUNTER — APPOINTMENT (OUTPATIENT)
Dept: PRIMARY CARE | Facility: CLINIC | Age: 80
End: 2025-11-11
Payer: MEDICARE

## 2025-11-21 ENCOUNTER — APPOINTMENT (OUTPATIENT)
Facility: CLINIC | Age: 80
End: 2025-11-21
Payer: MEDICARE

## 2026-03-02 ENCOUNTER — APPOINTMENT (OUTPATIENT)
Dept: RHEUMATOLOGY | Facility: CLINIC | Age: 81
End: 2026-03-02
Payer: MEDICARE

## (undated) DEVICE — SEAL, UNIVERSAL, 5-12MM

## (undated) DEVICE — Device

## (undated) DEVICE — OBTURATOR, BLADELESS , SU

## (undated) DEVICE — APPLICATOR, CHLORAPREP, W/ORANGE TINT, 26ML

## (undated) DEVICE — CLEAN KIT, ANTIFOG SCOPE, SEE SHARP 150MM

## (undated) DEVICE — DRAPE, SHEET, UTILITY, NON ABSORBENT, 18 X 26 IN, LF

## (undated) DEVICE — DRAPE, SHEET, LAPAROTOMY, W/ISO-BAC, W/ARMBOARD COVERS, 98 X 122 IN, DISPOSABLE, LF, STERILE

## (undated) DEVICE — GLOVE, SURGICAL, PROTEXIS PI BLUE W/NEUTHERA, 7.5, PF, LF

## (undated) DEVICE — ELECTRODE, ELECTROSURGICAL, BLADE, INSULATED, ENT/IMA, STERILE

## (undated) DEVICE — SOLUTION, IRRIGATION, SODIUM CHLORIDE 0.9%, 1000 ML, POUR BOTTLE

## (undated) DEVICE — SUTURE, VICRYL, 0, 27 IN, UR-6, VIOLET

## (undated) DEVICE — GLOVE, SURGICAL, PROTEXIS PI W/NEU-THERA, 7.5, PF, LF

## (undated) DEVICE — ADHESIVE, SKIN, DERMABOND ADVANCED, 15CM, PEN-STYLE

## (undated) DEVICE — SOLUTION, IRRIGATION, X RX SODIUM CHL 0.9%, 1000ML BTL

## (undated) DEVICE — DRAPE, C-ARMOR KIT

## (undated) DEVICE — COVER, TIP HOT SHEARS ENDOWRIST

## (undated) DEVICE — SYRINGE, 10 CC, LUER LOCK

## (undated) DEVICE — DRESSING, TRANSPARENT, TEGADERM, 4 X 4.5

## (undated) DEVICE — SYRINGE, EPIDURAL, 8ML, LOSS RESISTANCE PERFIX

## (undated) DEVICE — BLOCK, FOAM, NEEDLE POP -N-COUNT, 1840, BLACK

## (undated) DEVICE — GLOVE, SURGICAL, PROTEXIS PI , 7.0, PF, LF

## (undated) DEVICE — PREP TRAY, VAGINAL

## (undated) DEVICE — TOWEL PACK, STERILE, 4/PACK, BLUE

## (undated) DEVICE — SCISSORS, MONOPOLAR, CURVED, 8MM

## (undated) DEVICE — DRAPE, COLUMN, DAVINCI XI

## (undated) DEVICE — DRIVER, MEGA NEEDLE

## (undated) DEVICE — DRAPE COVER, C ARM, FLOUROSCAN IMAGING SYS

## (undated) DEVICE — SLEEVE, VASO PRESS, CALF GARMENT, MEDIUM, GREEN

## (undated) DEVICE — SOLUTION, IRRIGATION, STERILE WATER, 1000 ML, POUR BOTTLE

## (undated) DEVICE — NEEDLE, HYPODERMIC, 25 G X 1.5 IN, A BEVEL, STERILE

## (undated) DEVICE — MARKER, SURGICAL, SKIN, REG TIP, W/ RULER & LABELS

## (undated) DEVICE — STRIP, SKIN CLOSURE, STERI STRIP, REINFORCED, 0.5 X 4 IN

## (undated) DEVICE — DRAPE, SHEET, FAN FOLDED, HALF, 44 X 58 IN, DISPOSABLE, LF, STERILE

## (undated) DEVICE — SUTURE, ETHIBOND, 2-0, 30 IN SH, GREEN BR

## (undated) DEVICE — DRAPE, INCISE, ANTIMICROBIAL, IOBAN 2, 13 X 13 IN, DISPOSABLE, STERILE

## (undated) DEVICE — SUTURE, VICRYL, 2-0, 27 IN, SH, UNDYED

## (undated) DEVICE — ABDOMINAL BINDER, 3-PANEL, 9, 72 - 84""

## (undated) DEVICE — CAUTERY, PENCIL, PUSH BUTTON, SMOKE EVAC, 70MM

## (undated) DEVICE — STRIP, SKIN CLOSURE, STERI STRIP, NON-REINFORCED, 0.5 X 4 IN

## (undated) DEVICE — LUBRICANT, ELECTROLUBE, F/ELECTRODE TIPS

## (undated) DEVICE — SYRINGE, 60 CC, IRRIGATION, BULB, CONTRO-BULB, PAPER POUCH

## (undated) DEVICE — TROCAR SYSTEM, BALLOON, KII GELPORT, 12 X 100MM

## (undated) DEVICE — DRAPE, ARM XI

## (undated) DEVICE — SPONGE GAUZE, XRAY RFD, 8X4 12 PLY

## (undated) DEVICE — SUTURE, MONOCRYL, 4-0, 27 IN, PS-2, UNDYED

## (undated) DEVICE — FORCEPS, PROGRASP, DAVINCI XI

## (undated) DEVICE — DRAPE, SHEET, THREE QUARTER, FAN FOLD, 57 X 77 IN

## (undated) DEVICE — SUTURE, MONOCRYL, 4-0, 18 IN, PS2, UNDYED

## (undated) DEVICE — NEEDLE, SPINAL, 22 G X 3.5 IN, BLACK HUB

## (undated) DEVICE — SHEAR, W/UNIPOLAR CAUTERY, ENDOSHEAR, 5 MM

## (undated) DEVICE — STRIP, SKIN CLOSURE, COMPOUND BENZION TINCTURE 0.6ML

## (undated) DEVICE — BAG, DECANTER

## (undated) DEVICE — GLOVE, SURGICAL, PROTEXIS PI BLUE W/NEUTHERA, 8.0, PF, LF

## (undated) DEVICE — DRAPE PACK, UNIVERSAL II

## (undated) DEVICE — TUBE SET, PNEUMOCLEAR, SMOKE EVACU, HIGH-FLOW

## (undated) DEVICE — DRAPE PACK, BASIC VI, AURORA, 50 X 90IN

## (undated) DEVICE — SUTURE, V-LOC PBT, GS-21, 12 IN, BLUE, NONABS